# Patient Record
Sex: MALE | Race: WHITE | NOT HISPANIC OR LATINO | URBAN - METROPOLITAN AREA
[De-identification: names, ages, dates, MRNs, and addresses within clinical notes are randomized per-mention and may not be internally consistent; named-entity substitution may affect disease eponyms.]

---

## 2017-07-06 ENCOUNTER — INPATIENT (INPATIENT)
Facility: HOSPITAL | Age: 37
LOS: 15 days | Discharge: HOME | DRG: 896 | End: 2017-07-22
Attending: HOSPITALIST | Admitting: HOSPITALIST
Payer: MEDICAID

## 2017-07-06 VITALS
WEIGHT: 169.98 LBS | HEIGHT: 73 IN | TEMPERATURE: 98 F | SYSTOLIC BLOOD PRESSURE: 130 MMHG | HEART RATE: 122 BPM | RESPIRATION RATE: 20 BRPM | DIASTOLIC BLOOD PRESSURE: 90 MMHG | OXYGEN SATURATION: 99 %

## 2017-07-06 DIAGNOSIS — R58 HEMORRHAGE, NOT ELSEWHERE CLASSIFIED: ICD-10-CM

## 2017-07-06 DIAGNOSIS — K85.20 ALCOHOL INDUCED ACUTE PANCREATITIS WITHOUT NECROSIS OR INFECTION: ICD-10-CM

## 2017-07-06 DIAGNOSIS — F10.239 ALCOHOL DEPENDENCE WITH WITHDRAWAL, UNSPECIFIED: ICD-10-CM

## 2017-07-06 DIAGNOSIS — Z29.9 ENCOUNTER FOR PROPHYLACTIC MEASURES, UNSPECIFIED: ICD-10-CM

## 2017-07-06 DIAGNOSIS — J45.909 UNSPECIFIED ASTHMA, UNCOMPLICATED: ICD-10-CM

## 2017-07-06 DIAGNOSIS — E11.9 TYPE 2 DIABETES MELLITUS WITHOUT COMPLICATIONS: ICD-10-CM

## 2017-07-06 LAB
ALBUMIN SERPL ELPH-MCNC: 3.4 G/DL — LOW (ref 3.5–5)
ANION GAP SERPL CALC-SCNC: 14 MMOL/L — SIGNIFICANT CHANGE UP (ref 5–17)
BASOPHILS # BLD AUTO: 0 K/UL — SIGNIFICANT CHANGE UP (ref 0–0.2)
BASOPHILS NFR BLD AUTO: 0.3 % — SIGNIFICANT CHANGE UP (ref 0–2)
BUN SERPL-MCNC: 6 MG/DL — LOW (ref 7–18)
CALCIUM SERPL-MCNC: 8.5 MG/DL — SIGNIFICANT CHANGE UP (ref 8.4–10.5)
CHLORIDE SERPL-SCNC: 101 MMOL/L — SIGNIFICANT CHANGE UP (ref 96–108)
CO2 SERPL-SCNC: 21 MMOL/L — LOW (ref 22–31)
EOSINOPHIL # BLD AUTO: 0.1 K/UL — SIGNIFICANT CHANGE UP (ref 0–0.5)
EOSINOPHIL NFR BLD AUTO: 0.8 % — SIGNIFICANT CHANGE UP (ref 0–6)
GLUCOSE SERPL-MCNC: 77 MG/DL — SIGNIFICANT CHANGE UP (ref 70–99)
HCT VFR BLD CALC: 43.2 % — SIGNIFICANT CHANGE UP (ref 39–50)
HGB BLD-MCNC: 14.7 G/DL — SIGNIFICANT CHANGE UP (ref 13–17)
LYMPHOCYTES # BLD AUTO: 0.9 K/UL — LOW (ref 1–3.3)
LYMPHOCYTES # BLD AUTO: 13.1 % — SIGNIFICANT CHANGE UP (ref 13–44)
MCHC RBC-ENTMCNC: 34.1 GM/DL — SIGNIFICANT CHANGE UP (ref 32–36)
MCHC RBC-ENTMCNC: 34.1 PG — HIGH (ref 27–34)
MCV RBC AUTO: 100 FL — SIGNIFICANT CHANGE UP (ref 80–100)
MONOCYTES # BLD AUTO: 0.5 K/UL — SIGNIFICANT CHANGE UP (ref 0–0.9)
MONOCYTES NFR BLD AUTO: 6.9 % — SIGNIFICANT CHANGE UP (ref 2–14)
NEUTROPHILS # BLD AUTO: 5.2 K/UL — SIGNIFICANT CHANGE UP (ref 1.8–7.4)
NEUTROPHILS NFR BLD AUTO: 78.9 % — HIGH (ref 43–77)
PLATELET # BLD AUTO: 58 K/UL — LOW (ref 150–400)
POTASSIUM SERPL-MCNC: 3.6 MMOL/L — SIGNIFICANT CHANGE UP (ref 3.5–5.3)
POTASSIUM SERPL-SCNC: 3.6 MMOL/L — SIGNIFICANT CHANGE UP (ref 3.5–5.3)
RBC # BLD: 4.32 M/UL — SIGNIFICANT CHANGE UP (ref 4.2–5.8)
RBC # FLD: 11.3 % — SIGNIFICANT CHANGE UP (ref 10.3–14.5)
SODIUM SERPL-SCNC: 136 MMOL/L — SIGNIFICANT CHANGE UP (ref 135–145)
WBC # BLD: 6.6 K/UL — SIGNIFICANT CHANGE UP (ref 3.8–10.5)
WBC # FLD AUTO: 6.6 K/UL — SIGNIFICANT CHANGE UP (ref 3.8–10.5)

## 2017-07-06 PROCEDURE — 99285 EMERGENCY DEPT VISIT HI MDM: CPT

## 2017-07-06 PROCEDURE — 70450 CT HEAD/BRAIN W/O DYE: CPT | Mod: 26

## 2017-07-06 RX ORDER — BUDESONIDE AND FORMOTEROL FUMARATE DIHYDRATE 160; 4.5 UG/1; UG/1
2 AEROSOL RESPIRATORY (INHALATION)
Qty: 0 | Refills: 0 | Status: DISCONTINUED | OUTPATIENT
Start: 2017-07-06 | End: 2017-07-12

## 2017-07-06 RX ORDER — NICOTINE POLACRILEX 2 MG
1 GUM BUCCAL DAILY
Qty: 0 | Refills: 0 | Status: DISCONTINUED | OUTPATIENT
Start: 2017-07-06 | End: 2017-07-22

## 2017-07-06 RX ORDER — SODIUM CHLORIDE 9 MG/ML
1000 INJECTION, SOLUTION INTRAVENOUS
Qty: 0 | Refills: 0 | Status: COMPLETED | OUTPATIENT
Start: 2017-07-06 | End: 2017-07-08

## 2017-07-06 RX ORDER — ALBUTEROL 90 UG/1
2 AEROSOL, METERED ORAL EVERY 8 HOURS
Qty: 0 | Refills: 0 | Status: DISCONTINUED | OUTPATIENT
Start: 2017-07-06 | End: 2017-07-22

## 2017-07-06 RX ORDER — SODIUM CHLORIDE 9 MG/ML
1000 INJECTION INTRAMUSCULAR; INTRAVENOUS; SUBCUTANEOUS ONCE
Qty: 0 | Refills: 0 | Status: COMPLETED | OUTPATIENT
Start: 2017-07-06 | End: 2017-07-06

## 2017-07-06 RX ORDER — SODIUM CHLORIDE 9 MG/ML
1000 INJECTION, SOLUTION INTRAVENOUS
Qty: 0 | Refills: 0 | Status: DISCONTINUED | OUTPATIENT
Start: 2017-07-06 | End: 2017-07-06

## 2017-07-06 RX ORDER — INSULIN LISPRO 100/ML
VIAL (ML) SUBCUTANEOUS
Qty: 0 | Refills: 0 | Status: DISCONTINUED | OUTPATIENT
Start: 2017-07-06 | End: 2017-07-10

## 2017-07-06 RX ORDER — IPRATROPIUM/ALBUTEROL SULFATE 18-103MCG
3 AEROSOL WITH ADAPTER (GRAM) INHALATION EVERY 6 HOURS
Qty: 0 | Refills: 0 | Status: DISCONTINUED | OUTPATIENT
Start: 2017-07-06 | End: 2017-07-07

## 2017-07-06 RX ORDER — SODIUM CHLORIDE 9 MG/ML
1000 INJECTION, SOLUTION INTRAVENOUS
Qty: 0 | Refills: 0 | Status: DISCONTINUED | OUTPATIENT
Start: 2017-07-06 | End: 2017-07-15

## 2017-07-06 RX ADMIN — SODIUM CHLORIDE 150 MILLILITER(S): 9 INJECTION, SOLUTION INTRAVENOUS at 19:43

## 2017-07-06 RX ADMIN — SODIUM CHLORIDE 1000 MILLILITER(S): 9 INJECTION INTRAMUSCULAR; INTRAVENOUS; SUBCUTANEOUS at 09:39

## 2017-07-06 RX ADMIN — Medication 2 MILLIGRAM(S): at 09:40

## 2017-07-06 RX ADMIN — SODIUM CHLORIDE 150 MILLILITER(S): 9 INJECTION, SOLUTION INTRAVENOUS at 19:45

## 2017-07-06 RX ADMIN — Medication 2 MILLIGRAM(S): at 23:09

## 2017-07-06 RX ADMIN — Medication 1 PATCH: at 23:59

## 2017-07-06 RX ADMIN — BUDESONIDE AND FORMOTEROL FUMARATE DIHYDRATE 2 PUFF(S): 160; 4.5 AEROSOL RESPIRATORY (INHALATION) at 23:10

## 2017-07-06 RX ADMIN — ALBUTEROL 2 PUFF(S): 90 AEROSOL, METERED ORAL at 23:09

## 2017-07-06 NOTE — H&P ADULT - PROBLEM SELECTOR PLAN 1
WA protocol  Banana bag  seizure precautions  Ativan CIWA protocol  Banana bag (NS with thiamine 100, folic acid 1 and MV 10) for 3 days in addition to LR at 150 ml / hr  Change to oral Thiamine, folic acid and MV after 3 days  Seizure precautions  Ativan 2 mg q 4hr and Ativan 2 mg q2 for agitation or CIWA>7  CT head -ve for acute hemorrhage  Monitor vitals as per protocol,   Taper ativan as clinically indicated  Follow and replace electrolytes   consult.

## 2017-07-06 NOTE — ED PROVIDER NOTE - MEDICAL DECISION MAKING DETAILS
alcoholic, question withdrawal, pancreatitis, hepatitis. Will check labs, give IVF, likely admission

## 2017-07-06 NOTE — H&P ADULT - PROBLEM SELECTOR PLAN 2
IV ringers lacatete  Pain control IV ringers lactate  Pain control  bowel rest Bisap score 0 predict low mortality  IV ringers lactate at 150 ml/hr in addition to Banana bag  Pain control  NPO for bowel rest, advance diet as tolerated.  Advice to quit alcohol

## 2017-07-06 NOTE — ED PROVIDER NOTE - OBJECTIVE STATEMENT
38 y/o M pt with PMHx of Alcoholism, Alcohol induced acute pancreatitis, fatty liver, gastroenteritis, presents to ED c/o seizure episode x today. Pt reports he was at work pushing boxes when he was awakened by his coworker who told him he dropped to the ground and started shaking. Pt suffered abrasions to the b/l arms and a head injury. Pt admits to drinking 6-7 beers a day. Pt had 2 beers so far today and 6 beers yesterday. Pt notes he has withdrawn from alcohol in the past. Pt denies fever, chills, nausea, vomiting, diarrhea, abd pain, shortness of breath, cough, chest pain, palpitations, numbness, tingling, weakness, or any other complaints. NKDA.

## 2017-07-06 NOTE — ED ADULT NURSE NOTE - OBJECTIVE STATEMENT
Pt reports starting work at 6am and woke up on ground after biting lip and hitting head, c/o b/l lower extremity cramping. Reports drinking alcohol every day. Last alcoholic drink at 6am 2 beers before starting work.

## 2017-07-06 NOTE — H&P ADULT - NSHPSOCIALHISTORY_GEN_ALL_CORE
smokes 1 ppd of cigarette x 20 years  smokes" weed"  drinks alcohol - about 10 - 12 bottles of beer daily

## 2017-07-06 NOTE — H&P ADULT - NSHPLABSRESULTS_GEN_ALL_CORE
07-06    135  |  98  |  7   ----------------------------<  101<H>  4.2   |  26  |  0.90    Ca    9.6      06 Jul 2017 10:17    TPro  8.3  /  Alb  4.1  /  TBili  1.1  /  DBili  x   /  AST  406<H>  /  ALT  379<H>  /  AlkPhos  140<H>  07-06                          17.1   8.9   )-----------( 77       ( 06 Jul 2017 10:16 )             49.7

## 2017-07-06 NOTE — H&P ADULT - PROBLEM SELECTOR PLAN 5
Patient is on Atorvent and Symbicort inhaler  Not clinically wheezing on exam, not in acute exacerbation  Continue Proventil and Symbicort with Duoneb PRN

## 2017-07-06 NOTE — H&P ADULT - PROBLEM SELECTOR PLAN 4
AG 11, not in DKA  Patient is on metformin at home  Hold oral medication while inpatient  Continue HSS only as patient is NPO  Adjust/Add lantus if FG are uncontrolled  Diabetic Diet when patient can tolerate  F/u HbA1C

## 2017-07-06 NOTE — H&P ADULT - PROBLEM SELECTOR PLAN 3
Patient had a bruise on L elbow and ecchymosis on parital bone  CT head -ve for acute hemorrhage.  Pain control

## 2017-07-06 NOTE — ED ADULT NURSE REASSESSMENT NOTE - NS ED NURSE REASSESS COMMENT FT1
pt received axox3 resting in bed, no c/o verbalize, no distress noted, safety and seizure precaution maintained. pt received axox3 resting in bed, no c/o verbalize, no distress noted, safety and seizure precaution maintained. Pt received from down time nurse.

## 2017-07-06 NOTE — H&P ADULT - HISTORY OF PRESENT ILLNESS
37 year old M with PMH of alcohol abuse, pancreatitis, fatty liver presented to ED with one episode of witnessed seizures this morning. Patient sates that he has generalized seizures for around 3 minutes with loss of consciousness. Patient did hit his head on right side, but denies nicole bleeding. Denies tongue bite, loss of bowel or bladder control, rolling of eyes.   Patient also complaints of epigastric abdominal pain, 7/10 in intensity worsened by eating, relived by rest radiating to back.   Associated with nausea and generalized body pain. Denies chest pain, SOB, headache, leg swelling, cough, constipation or diarrheas    SH: Smoker 1 ppd from last 35 years.   Drinks alcohol around 10 beers every day and a bottle of scotch every day. Last drink was 2 beers on morning of admission.   Smokes marijuana.   Have a PCP Dr Chloe Rodriguez

## 2017-07-06 NOTE — H&P ADULT - ATTENDING COMMENTS
a/p   38 y/o M with alcohol withdrawal seizure  2. acute pancreatitis with acute abdominal pain  3. Parietal  ecchymosis  4. Abnormal LFTS    IVF ( ringers lactate at 150cc/hr x 24 hours); banana bag  CIWA protocal with Ativan  Seizure precautions  NPO for bowel rest in view of acute pancratitis  PAin control with IV Toradol for moderate pain and IV narcotics PRN severe pain  Trend LFTs

## 2017-07-06 NOTE — ED PROVIDER NOTE - CHPI ED SYMPTOMS NEG
no fever/no chills, no nausea, no vomiting, no diarrhea, no abd pain, no numbness, no tingling, no weakness, no shortness of breath, no cough, no chest pain, no palpitations

## 2017-07-06 NOTE — H&P ADULT - ASSESSMENT
HPI:  37 year old M with PMH of alcohol abuse, pancreatitis, fatty liver presented to ED with one episode of witnessed seizures this morning. Patient sates that he has generalized seizures for around 3 minutes with loss of consciousness. Patient did hit his head on right side, but denies nicole bleeding. Denies tongue bite, loss of bowel or bladder control, rolling of eyes.   Patient also complaints of epigastric abdominal pain, 7/10 in intensity worsened by eating, relived by rest radiating to back.   Associated with nausea and generalized body pain. Denies chest pain, SOB, headache, leg swelling, cough, constipation or diarrheas    SH: Smoker 1 ppd from last 20+ years ( since age 14years ).   Drinks alcohol around 10 beers every day and a bottle of scotch every day. Last drink was 2 beers on morning of admission.   Smokes marijuana.   Have a PCP Dr Chloe Rodriguez (06 Jul 2017 18:01)

## 2017-07-06 NOTE — H&P ADULT - NSHPPHYSICALEXAM_GEN_ALL_CORE
PHYSICAL EXAM:      Constitutional: NAD; tremors+    Eyes: reactive to light    Neck: supple    Breasts: normal male breast; no tenderness    Back: normal appearance ; no masses noted    Respiratory: clear to auscultation; no wheezing    Cardiovascular: S1S2+    Gastrointestinal: soft, nontender, bowel sounds present    Extremities: no calf tenderness; no cyanosis    Vascular: + distal pedal pulses bilaterally    Neurological: AAOx 3; non focal exam; moves all extremities    Skin: warm; areas of skin abrasion     Lymph Nodes: no palpable peripheral lymphadenopathy    Musculoskeletal: joint aches and pain and lower back pain likely due to fall from seizure episode    Psychiatric: normal behavior and affect

## 2017-07-07 LAB
ALBUMIN SERPL ELPH-MCNC: 3.8 G/DL — SIGNIFICANT CHANGE UP (ref 3.5–5)
ALP SERPL-CCNC: 111 U/L — SIGNIFICANT CHANGE UP (ref 40–120)
ALP SERPL-CCNC: 127 U/L — HIGH (ref 40–120)
ALT FLD-CCNC: 268 U/L DA — HIGH (ref 10–60)
ALT FLD-CCNC: 273 U/L DA — HIGH (ref 10–60)
ANION GAP SERPL CALC-SCNC: 18 MMOL/L — HIGH (ref 5–17)
APAP SERPL-MCNC: <2 UG/ML — LOW (ref 10–30)
AST SERPL-CCNC: 301 U/L — HIGH (ref 10–40)
AST SERPL-CCNC: 343 U/L — HIGH (ref 10–40)
BASE EXCESS BLDA CALC-SCNC: -4.4 MMOL/L — LOW (ref -2–2)
BILIRUB DIRECT SERPL-MCNC: 0.3 MG/DL — HIGH (ref 0–0.2)
BILIRUB INDIRECT FLD-MCNC: 1.1 MG/DL — HIGH (ref 0.2–1)
BILIRUB SERPL-MCNC: 1.1 MG/DL — SIGNIFICANT CHANGE UP (ref 0.2–1.2)
BILIRUB SERPL-MCNC: 1.4 MG/DL — HIGH (ref 0.2–1.2)
BLOOD GAS COMMENTS ARTERIAL: SIGNIFICANT CHANGE UP
BUN SERPL-MCNC: 5 MG/DL — LOW (ref 7–18)
CALCIUM SERPL-MCNC: 8.6 MG/DL — SIGNIFICANT CHANGE UP (ref 8.4–10.5)
CHLORIDE SERPL-SCNC: 100 MMOL/L — SIGNIFICANT CHANGE UP (ref 96–108)
CHOLEST SERPL-MCNC: 188 MG/DL — SIGNIFICANT CHANGE UP (ref 10–199)
CO2 SERPL-SCNC: 17 MMOL/L — LOW (ref 22–31)
CREAT SERPL-MCNC: 0.57 MG/DL — SIGNIFICANT CHANGE UP (ref 0.5–1.3)
CREAT SERPL-MCNC: 0.6 MG/DL — SIGNIFICANT CHANGE UP (ref 0.5–1.3)
ETHANOL SERPL-MCNC: <10 MG/DL — SIGNIFICANT CHANGE UP (ref 0–10)
GLUCOSE SERPL-MCNC: 74 MG/DL — SIGNIFICANT CHANGE UP (ref 70–99)
HBA1C BLD-MCNC: 5.3 % — SIGNIFICANT CHANGE UP (ref 4–5.6)
HCO3 BLDA-SCNC: 20 MMOL/L — LOW (ref 23–27)
HCT VFR BLD CALC: 43.6 % — SIGNIFICANT CHANGE UP (ref 39–50)
HDLC SERPL-MCNC: 124 MG/DL — SIGNIFICANT CHANGE UP (ref 40–125)
HGB BLD-MCNC: 15.6 G/DL — SIGNIFICANT CHANGE UP (ref 13–17)
HOROWITZ INDEX BLDA+IHG-RTO: 40 — SIGNIFICANT CHANGE UP
LIDOCAIN IGE QN: 3635 U/L — HIGH (ref 73–393)
LIPID PNL WITH DIRECT LDL SERPL: 54 MG/DL — SIGNIFICANT CHANGE UP
MAGNESIUM SERPL-MCNC: 1.7 MG/DL — SIGNIFICANT CHANGE UP (ref 1.6–2.6)
MCHC RBC-ENTMCNC: 35.4 PG — HIGH (ref 27–34)
MCHC RBC-ENTMCNC: 35.8 GM/DL — SIGNIFICANT CHANGE UP (ref 32–36)
MCV RBC AUTO: 98.8 FL — SIGNIFICANT CHANGE UP (ref 80–100)
PCO2 BLDA: 36 MMHG — SIGNIFICANT CHANGE UP (ref 32–46)
PH BLDA: 7.36 — SIGNIFICANT CHANGE UP (ref 7.35–7.45)
PHOSPHATE SERPL-MCNC: 2.4 MG/DL — LOW (ref 2.5–4.5)
PLATELET # BLD AUTO: 76 K/UL — LOW (ref 150–400)
PO2 BLDA: 156 MMHG — HIGH (ref 74–108)
POTASSIUM SERPL-MCNC: 3.4 MMOL/L — LOW (ref 3.5–5.3)
POTASSIUM SERPL-SCNC: 3.4 MMOL/L — LOW (ref 3.5–5.3)
PROT SERPL-MCNC: 7.1 G/DL — SIGNIFICANT CHANGE UP (ref 6–8.3)
PROT SERPL-MCNC: 7.6 G/DL — SIGNIFICANT CHANGE UP (ref 6–8.3)
RBC # BLD: 4.41 M/UL — SIGNIFICANT CHANGE UP (ref 4.2–5.8)
RBC # FLD: 11.2 % — SIGNIFICANT CHANGE UP (ref 10.3–14.5)
SALICYLATES SERPL-MCNC: <1.7 MG/DL — LOW (ref 2.8–20)
SAO2 % BLDA: 99 % — HIGH (ref 92–96)
SODIUM SERPL-SCNC: 135 MMOL/L — SIGNIFICANT CHANGE UP (ref 135–145)
TOTAL CHOLESTEROL/HDL RATIO MEASUREMENT: 1.5 RATIO — LOW (ref 3.4–9.6)
TRIGL SERPL-MCNC: 49 MG/DL — SIGNIFICANT CHANGE UP (ref 10–149)
TSH SERPL-MCNC: 2.26 UU/ML — SIGNIFICANT CHANGE UP (ref 0.34–4.82)
WBC # BLD: 9 K/UL — SIGNIFICANT CHANGE UP (ref 3.8–10.5)
WBC # FLD AUTO: 9 K/UL — SIGNIFICANT CHANGE UP (ref 3.8–10.5)

## 2017-07-07 PROCEDURE — 71010: CPT | Mod: 26

## 2017-07-07 RX ORDER — DIPHENHYDRAMINE HCL 50 MG
50 CAPSULE ORAL ONCE
Qty: 0 | Refills: 0 | Status: DISCONTINUED | OUTPATIENT
Start: 2017-07-07 | End: 2017-07-11

## 2017-07-07 RX ORDER — DEXMEDETOMIDINE HYDROCHLORIDE IN 0.9% SODIUM CHLORIDE 4 UG/ML
0.5 INJECTION INTRAVENOUS
Qty: 200 | Refills: 0 | Status: DISCONTINUED | OUTPATIENT
Start: 2017-07-07 | End: 2017-07-07

## 2017-07-07 RX ORDER — ENOXAPARIN SODIUM 100 MG/ML
30 INJECTION SUBCUTANEOUS DAILY
Qty: 0 | Refills: 0 | Status: DISCONTINUED | OUTPATIENT
Start: 2017-07-07 | End: 2017-07-07

## 2017-07-07 RX ORDER — PANTOPRAZOLE SODIUM 20 MG/1
40 TABLET, DELAYED RELEASE ORAL DAILY
Qty: 0 | Refills: 0 | Status: DISCONTINUED | OUTPATIENT
Start: 2017-07-07 | End: 2017-07-11

## 2017-07-07 RX ORDER — DEXMEDETOMIDINE HYDROCHLORIDE IN 0.9% SODIUM CHLORIDE 4 UG/ML
0.7 INJECTION INTRAVENOUS
Qty: 200 | Refills: 0 | Status: DISCONTINUED | OUTPATIENT
Start: 2017-07-07 | End: 2017-07-07

## 2017-07-07 RX ORDER — POTASSIUM PHOSPHATE, MONOBASIC POTASSIUM PHOSPHATE, DIBASIC 236; 224 MG/ML; MG/ML
15 INJECTION, SOLUTION INTRAVENOUS ONCE
Qty: 0 | Refills: 0 | Status: COMPLETED | OUTPATIENT
Start: 2017-07-07 | End: 2017-07-07

## 2017-07-07 RX ORDER — DEXMEDETOMIDINE HYDROCHLORIDE IN 0.9% SODIUM CHLORIDE 4 UG/ML
0.5 INJECTION INTRAVENOUS
Qty: 200 | Refills: 0 | Status: DISCONTINUED | OUTPATIENT
Start: 2017-07-07 | End: 2017-07-08

## 2017-07-07 RX ORDER — PHENOBARBITAL 60 MG
130 TABLET ORAL EVERY 8 HOURS
Qty: 0 | Refills: 0 | Status: DISCONTINUED | OUTPATIENT
Start: 2017-07-07 | End: 2017-07-07

## 2017-07-07 RX ORDER — PROPOFOL 10 MG/ML
100 INJECTION, EMULSION INTRAVENOUS ONCE
Qty: 0 | Refills: 0 | Status: COMPLETED | OUTPATIENT
Start: 2017-07-07 | End: 2017-07-07

## 2017-07-07 RX ORDER — PHENOBARBITAL 60 MG
130 TABLET ORAL EVERY 12 HOURS
Qty: 0 | Refills: 0 | Status: DISCONTINUED | OUTPATIENT
Start: 2017-07-07 | End: 2017-07-14

## 2017-07-07 RX ORDER — LABETALOL HCL 100 MG
10 TABLET ORAL EVERY 6 HOURS
Qty: 0 | Refills: 0 | Status: DISCONTINUED | OUTPATIENT
Start: 2017-07-07 | End: 2017-07-07

## 2017-07-07 RX ORDER — SODIUM CHLORIDE 9 MG/ML
1000 INJECTION, SOLUTION INTRAVENOUS ONCE
Qty: 0 | Refills: 0 | Status: COMPLETED | OUTPATIENT
Start: 2017-07-07 | End: 2017-07-07

## 2017-07-07 RX ORDER — ENOXAPARIN SODIUM 100 MG/ML
40 INJECTION SUBCUTANEOUS DAILY
Qty: 0 | Refills: 0 | Status: DISCONTINUED | OUTPATIENT
Start: 2017-07-07 | End: 2017-07-09

## 2017-07-07 RX ORDER — SUCCINYLCHOLINE CHLORIDE 100 MG/5ML
100 SYRINGE (ML) INTRAVENOUS ONCE
Qty: 0 | Refills: 0 | Status: COMPLETED | OUTPATIENT
Start: 2017-07-07 | End: 2017-07-07

## 2017-07-07 RX ORDER — PROPOFOL 10 MG/ML
5 INJECTION, EMULSION INTRAVENOUS
Qty: 1000 | Refills: 0 | Status: DISCONTINUED | OUTPATIENT
Start: 2017-07-07 | End: 2017-07-07

## 2017-07-07 RX ORDER — PROPOFOL 10 MG/ML
5.13 INJECTION, EMULSION INTRAVENOUS
Qty: 1000 | Refills: 0 | Status: DISCONTINUED | OUTPATIENT
Start: 2017-07-07 | End: 2017-07-11

## 2017-07-07 RX ORDER — PROPOFOL 10 MG/ML
80 INJECTION, EMULSION INTRAVENOUS ONCE
Qty: 0 | Refills: 0 | Status: COMPLETED | OUTPATIENT
Start: 2017-07-07 | End: 2017-07-07

## 2017-07-07 RX ORDER — HALOPERIDOL DECANOATE 100 MG/ML
1 INJECTION INTRAMUSCULAR ONCE
Qty: 0 | Refills: 0 | Status: COMPLETED | OUTPATIENT
Start: 2017-07-07 | End: 2017-07-07

## 2017-07-07 RX ORDER — HALOPERIDOL DECANOATE 100 MG/ML
2 INJECTION INTRAMUSCULAR ONCE
Qty: 0 | Refills: 0 | Status: COMPLETED | OUTPATIENT
Start: 2017-07-07 | End: 2017-07-07

## 2017-07-07 RX ADMIN — PROPOFOL 80 MILLIGRAM(S): 10 INJECTION, EMULSION INTRAVENOUS at 09:00

## 2017-07-07 RX ADMIN — PROPOFOL 2.31 MICROGRAM(S)/KG/MIN: 10 INJECTION, EMULSION INTRAVENOUS at 09:49

## 2017-07-07 RX ADMIN — DEXMEDETOMIDINE HYDROCHLORIDE IN 0.9% SODIUM CHLORIDE 9.4 MICROGRAM(S)/KG/HR: 4 INJECTION INTRAVENOUS at 20:25

## 2017-07-07 RX ADMIN — PROPOFOL 100 MILLIGRAM(S): 10 INJECTION, EMULSION INTRAVENOUS at 09:30

## 2017-07-07 RX ADMIN — SODIUM CHLORIDE 150 MILLILITER(S): 9 INJECTION, SOLUTION INTRAVENOUS at 09:49

## 2017-07-07 RX ADMIN — ALBUTEROL 2 PUFF(S): 90 AEROSOL, METERED ORAL at 05:30

## 2017-07-07 RX ADMIN — Medication 2 MILLIGRAM(S): at 03:11

## 2017-07-07 RX ADMIN — Medication 4 MILLIGRAM(S): at 11:04

## 2017-07-07 RX ADMIN — Medication 4 MILLIGRAM(S): at 08:10

## 2017-07-07 RX ADMIN — Medication 4 MILLIGRAM(S): at 15:56

## 2017-07-07 RX ADMIN — Medication 4 MILLIGRAM(S): at 20:25

## 2017-07-07 RX ADMIN — DEXMEDETOMIDINE HYDROCHLORIDE IN 0.9% SODIUM CHLORIDE 9.4 MICROGRAM(S)/KG/HR: 4 INJECTION INTRAVENOUS at 18:43

## 2017-07-07 RX ADMIN — Medication 1 PATCH: at 23:30

## 2017-07-07 RX ADMIN — ENOXAPARIN SODIUM 40 MILLIGRAM(S): 100 INJECTION SUBCUTANEOUS at 11:07

## 2017-07-07 RX ADMIN — Medication 2 MILLIGRAM(S): at 01:31

## 2017-07-07 RX ADMIN — Medication 2 MILLIGRAM(S): at 05:30

## 2017-07-07 RX ADMIN — Medication 1 PATCH: at 14:19

## 2017-07-07 RX ADMIN — Medication 2 MILLIGRAM(S): at 14:19

## 2017-07-07 RX ADMIN — SODIUM CHLORIDE 150 MILLILITER(S): 9 INJECTION, SOLUTION INTRAVENOUS at 14:19

## 2017-07-07 RX ADMIN — Medication 2 MILLIGRAM(S): at 08:59

## 2017-07-07 RX ADMIN — Medication 3 MILLILITER(S): at 02:42

## 2017-07-07 RX ADMIN — Medication 130 MILLIGRAM(S): at 18:43

## 2017-07-07 RX ADMIN — DEXMEDETOMIDINE HYDROCHLORIDE IN 0.9% SODIUM CHLORIDE 9.64 MICROGRAM(S)/KG/HR: 4 INJECTION INTRAVENOUS at 09:49

## 2017-07-07 RX ADMIN — Medication 100 MILLIGRAM(S): at 09:00

## 2017-07-07 RX ADMIN — Medication 2 MILLIGRAM(S): at 07:49

## 2017-07-07 RX ADMIN — Medication 4 MILLIGRAM(S): at 22:30

## 2017-07-07 RX ADMIN — PROPOFOL 2.31 MICROGRAM(S)/KG/MIN: 10 INJECTION, EMULSION INTRAVENOUS at 20:25

## 2017-07-07 RX ADMIN — HALOPERIDOL DECANOATE 1 MILLIGRAM(S): 100 INJECTION INTRAMUSCULAR at 04:31

## 2017-07-07 RX ADMIN — DEXMEDETOMIDINE HYDROCHLORIDE IN 0.9% SODIUM CHLORIDE 9.64 MICROGRAM(S)/KG/HR: 4 INJECTION INTRAVENOUS at 09:58

## 2017-07-07 RX ADMIN — Medication 4 MILLIGRAM(S): at 09:58

## 2017-07-07 RX ADMIN — PANTOPRAZOLE SODIUM 40 MILLIGRAM(S): 20 TABLET, DELAYED RELEASE ORAL at 11:03

## 2017-07-07 RX ADMIN — Medication 4 MILLIGRAM(S): at 13:14

## 2017-07-07 RX ADMIN — PROPOFOL 2.31 MICROGRAM(S)/KG/MIN: 10 INJECTION, EMULSION INTRAVENOUS at 11:52

## 2017-07-07 RX ADMIN — PROPOFOL 2.31 MICROGRAM(S)/KG/MIN: 10 INJECTION, EMULSION INTRAVENOUS at 15:57

## 2017-07-07 RX ADMIN — SODIUM CHLORIDE 1000 MILLILITER(S): 9 INJECTION, SOLUTION INTRAVENOUS at 11:00

## 2017-07-07 RX ADMIN — POTASSIUM PHOSPHATE, MONOBASIC POTASSIUM PHOSPHATE, DIBASIC 62.5 MILLIMOLE(S): 236; 224 INJECTION, SOLUTION INTRAVENOUS at 11:03

## 2017-07-07 RX ADMIN — Medication 4 MILLIGRAM(S): at 17:12

## 2017-07-07 RX ADMIN — SODIUM CHLORIDE 150 MILLILITER(S): 9 INJECTION, SOLUTION INTRAVENOUS at 22:29

## 2017-07-07 RX ADMIN — HALOPERIDOL DECANOATE 2 MILLIGRAM(S): 100 INJECTION INTRAMUSCULAR at 06:51

## 2017-07-07 NOTE — DIETITIAN INITIAL EVALUATION ADULT. - NUTRITION INTERVENTION
Enteral Nutrition/Collaboration and Referral of Nutrition Care/Nutrition - Related Medication Management

## 2017-07-07 NOTE — CHART NOTE - NSCHARTNOTEFT_GEN_A_CORE
Rapid Response PGY 2/ PGY 3 Note    Patient is a 37 year-old male with PMH of alcohol abuse, pancreatitis, fatty liver who presented with seizure     Patient was seen and examined at the bedside by the rapid response team.    Allergies    No Known Allergies    Intolerances    morphine (Other)      PAST MEDICAL & SURGICAL HISTORY:  Alcohol induced acute pancreatitis  Pancreatitis  Alcoholism  Fatty liver  Gastroenteritis  No significant past surgical history      Vital Signs Last 24 Hrs  T(C): 36.4 (2017 05:40), Max: 36.8 (2017 11:16)  T(F): 97.6 (2017 05:40), Max: 98.3 (2017 11:16)  HR: 96 (2017 05:40) (73 - 96)  BP: 149/83 (2017 05:40) (120/97 - 171/102)  BP(mean): --  RR: 18 (2017 05:40) (16 - 18)  SpO2: 99% (2017 05:40) (97% - 100%)          GENERAL: The patient is awake and alert in no apparent distress.   HEENT: Head is normocephalic and atraumatic. Extraocular muscles are intact. Mucous membranes are moist. No throat erythema/exudates no lymphadenopathy, no JVD,   NECK: Supple.  LUNGS: Clear to auscultation BL without wheezing, rales or rhonchi; respirations unlabored  HEART: Regular rate and rhythm ,+S1/+S2, no murmurs, rubs, gallops  ABDOMEN: Soft, nontender, and nondistended, no rebound, guarding rigidity, bowel sounds in all 4 quadrants  EXTREMITIES: Without any cyanosis, clubbing, rash, lesions or edema.  SKIN: No new rashes or lesions.  MSK: strength equal BL  VASCULAR: Radial and Dorsal pedal pulses palpable BL  NEUROLOGIC: Grossly intact.  PSYCH: No new changes.                            15.6   9.0   )-----------( 76       ( 2017 07:05 )             43.6     07-07    135  |  100  |  5<L>  ----------------------------<  74  3.4<L>   |  17<L>  |  0.60    Ca    8.6      2017 07:05  Phos  2.4     07-07  Mg     1.7         TPro  7.6  /  Alb  3.8  /  TBili  1.4<H>  /  DBili  0.3<H>  /  AST  343<H>  /  ALT  273<H>  /  AlkPhos  127<H>           LIVER FUNCTIONS - ( 2017 07:05 )  Alb: 3.8 g/dL / Pro: 7.6 g/dL / ALK PHOS: 127 U/L / ALT: 273 U/L DA / AST: 343 U/L / GGT: x         Urinalysis Basic - ( 2017 10:27 )    Color: Jacquie / Appearance: Clear / S.025 / pH: x  Gluc: x / Ketone: Large  / Bili: Small / Urobili: 4   Blood: x / Protein: 100 / Nitrite: Negative   Leuk Esterase: Trace / RBC: 0-2 /HPF / WBC 0-2 /HPF   Sq Epi: x / Non Sq Epi: x / Bacteria: Trace /HPF             Vital Signs Last 24 Hrs*       Assessment- Rapid Response called for 37y year old Male with a past medical history of     Plan- Rapid Response PGY 2/ PGY 3 Note    Patient is a 37 year-old male with PMH of alcohol abuse, pancreatitis, fatty liver who was admitted for alcohol withdrawal seizures and alcohol induced pancreatitis. Rapid response called for increased agitation, hallucinations. Patient had received total of 6mg ativan this morning, with CIWA score still 21.     Patient was seen and examined at the bedside by the rapid response team. He is agitated, confused, talking to people who are not present. Vital signs:     Allergies    No Known Allergies    Intolerances    morphine (Other)      PAST MEDICAL & SURGICAL HISTORY:  Alcohol induced acute pancreatitis  Pancreatitis  Alcoholism  Fatty liver  Gastroenteritis  No significant past surgical history      Vital Signs Last 24 Hrs  T(C): 36.4 (2017 05:40), Max: 36.8 (2017 11:16)  T(F): 97.6 (2017 05:40), Max: 98.3 (2017 11:16)  HR: 96 (2017 05:40) (73 - 96)  BP: 149/83 (2017 05:40) (120/97 - 171/102)  BP(mean): --  RR: 18 (2017 05:40) (16 - 18)  SpO2: 99% (2017 05:40) (97% - 100%)          GENERAL: agitated, in moderate distress   HEENT: Head is normocephalic and atraumatic; pupils reactive but dilated bilaterally; dry mucus membranes   NECK: Supple.  LUNGS: Clear to auscultation BL without wheezing, rales or rhonchi; respirations unlabored  HEART: Regular rate and rhythm, tachycardic, +S1/+S2, no murmurs, rubs, gallops  ABDOMEN: epigastric tenderness to palpation   EXTREMITIES: Without any cyanosis, clubbing, rash, lesions or edema.  SKIN: No new rashes or lesions.  MSK: strength equal BL  VASCULAR: Radial and Dorsal pedal pulses palpable BL  NEUROLOGIC: tremors, tongue fasciculations   PSYCH: visual hallucinations                           15.6   9.0   )-----------( 76       ( 2017 07:05 )             43.6     07-    135  |  100  |  5<L>  ----------------------------<  74  3.4<L>   |  17<L>  |  0.60    Ca    8.6      2017 07:05  Phos  2.4     07-  Mg     1.7     07-07    TPro  7.6  /  Alb  3.8  /  TBili  1.4<H>  /  DBili  0.3<H>  /  AST  343<H>  /  ALT  273<H>  /  AlkPhos  127<H>  -         LIVER FUNCTIONS - ( 2017 07:05 )  Alb: 3.8 g/dL / Pro: 7.6 g/dL / ALK PHOS: 127 U/L / ALT: 273 U/L DA / AST: 343 U/L / GGT: x         Urinalysis Basic - ( 2017 10:27 )    Color: Jacquie / Appearance: Clear / S.025 / pH: x  Gluc: x / Ketone: Large  / Bili: Small / Urobili: 4   Blood: x / Protein: 100 / Nitrite: Negative   Leuk Esterase: Trace / RBC: 0-2 /HPF / WBC 0-2 /HPF   Sq Epi: x / Non Sq Epi: x / Bacteria: Trace /HPF      Assessment- Rapid Response called for 37 year-old male with PMH of alcohol abuse, pancreatitis, fatty liver for increased agitation. Patient was initially admitted for alcohol withdrawal seizures and pancreatitis.    Plan-  - s/p 5mg IM ativan this morning (patient pulled out IV lines)  - will give Rapid Response PGY 2/ PGY 3 Note    Patient is a 37 year-old male with PMH of alcohol abuse, pancreatitis, fatty liver who was admitted for alcohol withdrawal seizures and alcohol induced pancreatitis. Rapid response called for increased agitation, hallucinations. Patient had received total of 6mg ativan this morning, with subsequent CIWA score still 21.     Patient was seen and examined at the bedside by the rapid response team. He is agitated, confused, talking to people who are not present. Vital signs: temp 98.2, , /113, RR 18, sat 99%. No evidence of seizure activity at this time.     Allergies    No Known Allergies    Intolerances    morphine (Other)      PAST MEDICAL & SURGICAL HISTORY:  Alcohol induced acute pancreatitis  Pancreatitis  Alcoholism  Fatty liver  Gastroenteritis  No significant past surgical history      Vital Signs Last 24 Hrs  T(C): 36.4 (2017 05:40), Max: 36.8 (2017 11:16)  T(F): 97.6 (2017 05:40), Max: 98.3 (2017 11:16)  HR: 96 (2017 05:40) (73 - 96)  BP: 149/83 (2017 05:40) (120/97 - 171/102)  BP(mean): --  RR: 18 (2017 05:40) (16 - 18)  SpO2: 99% (2017 05:40) (97% - 100%)          GENERAL: agitated, in moderate distress   HEENT: Head is normocephalic and atraumatic; pupils reactive but dilated bilaterally; dry mucus membranes   NECK: Supple.  LUNGS: Clear to auscultation BL without wheezing, rales or rhonchi; respirations unlabored  HEART: Regular rate and rhythm, tachycardic, +S1/+S2, no murmurs, rubs, gallops  ABDOMEN: epigastric tenderness to palpation   EXTREMITIES: Without any cyanosis, clubbing, rash, lesions or edema.  SKIN: No new rashes or lesions.  MSK: strength equal BL  VASCULAR: Radial and Dorsal pedal pulses palpable BL  NEUROLOGIC: tremors, tongue fasciculations   PSYCH: visual hallucinations                           15.6   9.0   )-----------( 76       ( 2017 07:05 )             43.6     07-    135  |  100  |  5<L>  ----------------------------<  74  3.4<L>   |  17<L>  |  0.60    Ca    8.6      2017 07:05  Phos  2.4       Mg     1.7         TPro  7.6  /  Alb  3.8  /  TBili  1.4<H>  /  DBili  0.3<H>  /  AST  343<H>  /  ALT  273<H>  /  AlkPhos  127<H>           LIVER FUNCTIONS - ( 2017 07:05 )  Alb: 3.8 g/dL / Pro: 7.6 g/dL / ALK PHOS: 127 U/L / ALT: 273 U/L DA / AST: 343 U/L / GGT: x         Urinalysis Basic - ( 2017 10:27 )    Color: Jacquie / Appearance: Clear / S.025 / pH: x  Gluc: x / Ketone: Large  / Bili: Small / Urobili: 4   Blood: x / Protein: 100 / Nitrite: Negative   Leuk Esterase: Trace / RBC: 0-2 /HPF / WBC 0-2 /HPF   Sq Epi: x / Non Sq Epi: x / Bacteria: Trace /HPF      Assessment- Rapid Response called for 37 year-old male with PMH of alcohol abuse, pancreatitis, fatty liver for increased agitation. Patient was initially admitted for alcohol withdrawal seizures and pancreatitis.    Plan-  - s/p 6mg ativan this morning  - will give 130mg IV phenobarbital, begin precedex drip  - admit to ICU for increasing sedation requirements, DTs, concern for seizure; may need propofol drip and intubation   - continue IVF, banana bag with thiamine, folic acid     Discussed with primary floor attending, Dr. Rodriguez, and ICU attending, Dr. Gomez Rapid Response PGY 2/ PGY 3 Note    Patient is a 37 year-old male with PMH of alcohol abuse, pancreatitis, fatty liver who was admitted for alcohol withdrawal seizures and alcohol induced pancreatitis. Rapid response called for increased agitation, hallucinations. Patient had received total of 6mg ativan this morning, with subsequent CIWA score still 21.     Patient was seen and examined at the bedside by the rapid response team. He is agitated, confused, talking to people who are not present. Vital signs: temp 98.2, , /113, RR 18, sat 99%. No evidence of seizure activity at this time.     Allergies    No Known Allergies    Intolerances    morphine (Other)      PAST MEDICAL & SURGICAL HISTORY:  Alcohol induced acute pancreatitis  Pancreatitis  Alcoholism  Fatty liver  Gastroenteritis  No significant past surgical history      Vital Signs Last 24 Hrs  T(C): 36.4 (2017 05:40), Max: 36.8 (2017 11:16)  T(F): 97.6 (2017 05:40), Max: 98.3 (2017 11:16)  HR: 96 (2017 05:40) (73 - 96)  BP: 149/83 (2017 05:40) (120/97 - 171/102)  BP(mean): --  RR: 18 (2017 05:40) (16 - 18)  SpO2: 99% (2017 05:40) (97% - 100%)          GENERAL: agitated, in moderate distress   HEENT: Head is normocephalic and atraumatic; pupils reactive but dilated bilaterally; dry mucus membranes   NECK: Supple.  LUNGS: Clear to auscultation BL without wheezing, rales or rhonchi; respirations unlabored  HEART: Regular rate and rhythm, tachycardic, +S1/+S2, no murmurs, rubs, gallops  ABDOMEN: epigastric tenderness to palpation   EXTREMITIES: Without any cyanosis, clubbing, rash, lesions or edema.  SKIN: No new rashes or lesions.  MSK: strength equal BL  VASCULAR: Radial and Dorsal pedal pulses palpable BL  NEUROLOGIC: tremors, tongue fasciculations   PSYCH: visual hallucinations                           15.6   9.0   )-----------( 76       ( 2017 07:05 )             43.6     07-    135  |  100  |  5<L>  ----------------------------<  74  3.4<L>   |  17<L>  |  0.60    Ca    8.6      2017 07:05  Phos  2.4       Mg     1.7         TPro  7.6  /  Alb  3.8  /  TBili  1.4<H>  /  DBili  0.3<H>  /  AST  343<H>  /  ALT  273<H>  /  AlkPhos  127<H>           LIVER FUNCTIONS - ( 2017 07:05 )  Alb: 3.8 g/dL / Pro: 7.6 g/dL / ALK PHOS: 127 U/L / ALT: 273 U/L DA / AST: 343 U/L / GGT: x         Urinalysis Basic - ( 2017 10:27 )    Color: Jacquie / Appearance: Clear / S.025 / pH: x  Gluc: x / Ketone: Large  / Bili: Small / Urobili: 4   Blood: x / Protein: 100 / Nitrite: Negative   Leuk Esterase: Trace / RBC: 0-2 /HPF / WBC 0-2 /HPF   Sq Epi: x / Non Sq Epi: x / Bacteria: Trace /HPF      Assessment- Rapid Response called for 37 year-old male with PMH of alcohol abuse, pancreatitis, fatty liver for increased agitation. Patient was initially admitted for alcohol withdrawal seizures and pancreatitis.    Plan-  - s/p 6mg ativan this morning  - will give 130mg IV phenobarbital, begin precedex drip  - admit to ICU for increasing sedation requirements, DTs, concern for seizure; may need propofol drip and intubation   - continue IVF, banana bag with thiamine, folic acid     Discussed with primary floor attending, Dr. Rodriguez, and ICU attending, Dr. Gomez    ICU Attending Addendum:  37 male with hx of ETOH abuse, presents with acute ETOH withdrawal/witnessed seizure, agitation, required transfer to to the ICU this morning and intubation and mechanical ventilation.  Now with acute resp failure, pancreatitis.  Total CC time 35 min.     Satish Gomez MD Rapid response/ICU acceptance note     Patient is a 37 year-old male with PMH of alcohol abuse, pancreatitis, fatty liver who was admitted for alcohol withdrawal seizures and alcohol induced pancreatitis. Rapid response called for increased agitation, hallucinations. Patient had received total of 6mg ativan this morning, with subsequent CIWA score still 21.     Patient was seen and examined at the bedside by the rapid response team. He is agitated, confused, talking to people who are not present. Vital signs: temp 98.2, , /113, RR 18, sat 99%. No evidence of seizure activity at this time.     Allergies    No Known Allergies    Intolerances    morphine (Other)      PAST MEDICAL & SURGICAL HISTORY:  Alcohol induced acute pancreatitis  Pancreatitis  Alcoholism  Fatty liver  Gastroenteritis  No significant past surgical history      Vital Signs Last 24 Hrs  T(C): 36.4 (2017 05:40), Max: 36.8 (2017 11:16)  T(F): 97.6 (2017 05:40), Max: 98.3 (2017 11:16)  HR: 96 (2017 05:40) (73 - 96)  BP: 149/83 (2017 05:40) (120/97 - 171/102)  BP(mean): --  RR: 18 (2017 05:40) (16 - 18)  SpO2: 99% (2017 05:40) (97% - 100%)          GENERAL: agitated, in moderate distress   HEENT: Head is normocephalic and atraumatic; pupils reactive but dilated bilaterally; dry mucus membranes   NECK: Supple.  LUNGS: Clear to auscultation BL without wheezing, rales or rhonchi; respirations unlabored  HEART: Regular rate and rhythm, tachycardic, +S1/+S2, no murmurs, rubs, gallops  ABDOMEN: epigastric tenderness to palpation   EXTREMITIES: Without any cyanosis, clubbing, rash, lesions or edema.  SKIN: No new rashes or lesions.  MSK: strength equal BL  VASCULAR: Radial and Dorsal pedal pulses palpable BL  NEUROLOGIC: tremors, tongue fasciculations   PSYCH: visual hallucinations                           15.6   9.0   )-----------( 76       ( 2017 07:05 )             43.6     07-    135  |  100  |  5<L>  ----------------------------<  74  3.4<L>   |  17<L>  |  0.60    Ca    8.6      2017 07:05  Phos  2.4       Mg     1.7         TPro  7.6  /  Alb  3.8  /  TBili  1.4<H>  /  DBili  0.3<H>  /  AST  343<H>  /  ALT  273<H>  /  AlkPhos  127<H>           LIVER FUNCTIONS - ( 2017 07:05 )  Alb: 3.8 g/dL / Pro: 7.6 g/dL / ALK PHOS: 127 U/L / ALT: 273 U/L DA / AST: 343 U/L / GGT: x         Urinalysis Basic - ( 2017 10:27 )    Color: Jacquie / Appearance: Clear / S.025 / pH: x  Gluc: x / Ketone: Large  / Bili: Small / Urobili: 4   Blood: x / Protein: 100 / Nitrite: Negative   Leuk Esterase: Trace / RBC: 0-2 /HPF / WBC 0-2 /HPF   Sq Epi: x / Non Sq Epi: x / Bacteria: Trace /HPF      Assessment- Rapid Response called for 37 year-old male with PMH of alcohol abuse, pancreatitis, fatty liver for increased agitation. Patient was initially admitted for alcohol withdrawal seizures and pancreatitis.    Plan-  - s/p 6mg ativan this morning  - will give 130mg IV phenobarbital, begin precedex drip  - admit to ICU for increasing sedation requirements, DTs, concern for seizure; may need propofol drip and intubation   - continue IVF, banana bag with thiamine, folic acid     Discussed with primary floor attending, Dr. Rodriguez, and ICU attending, Dr. Gomez    ICU Attending Addendum:  37 male with hx of ETOH abuse, presents with acute ETOH withdrawal/witnessed seizure, agitation, required transfer to to the ICU this morning and intubation and mechanical ventilation.  Now with acute resp failure, pancreatitis.  Total CC time 35 min.     Satish Gomez MD

## 2017-07-07 NOTE — DIETITIAN INITIAL EVALUATION ADULT. - OTHER INFO
Pt with Pancreatis CCM expecting to maintain Propofol >20 ml/hr. Requesting TF recommendations. May start TF tomorrow.

## 2017-07-08 LAB
ANION GAP SERPL CALC-SCNC: 12 MMOL/L — SIGNIFICANT CHANGE UP (ref 5–17)
BASE EXCESS BLDA CALC-SCNC: -2.2 MMOL/L — LOW (ref -2–2)
BASOPHILS # BLD AUTO: 0 K/UL — SIGNIFICANT CHANGE UP (ref 0–0.2)
BASOPHILS NFR BLD AUTO: 0.5 % — SIGNIFICANT CHANGE UP (ref 0–2)
BLOOD GAS COMMENTS ARTERIAL: SIGNIFICANT CHANGE UP
BUN SERPL-MCNC: 5 MG/DL — LOW (ref 7–18)
CALCIUM SERPL-MCNC: 8.2 MG/DL — LOW (ref 8.4–10.5)
CHLORIDE SERPL-SCNC: 106 MMOL/L — SIGNIFICANT CHANGE UP (ref 96–108)
CO2 SERPL-SCNC: 20 MMOL/L — LOW (ref 22–31)
CREAT SERPL-MCNC: 0.55 MG/DL — SIGNIFICANT CHANGE UP (ref 0.5–1.3)
EOSINOPHIL # BLD AUTO: 0.1 K/UL — SIGNIFICANT CHANGE UP (ref 0–0.5)
EOSINOPHIL NFR BLD AUTO: 1 % — SIGNIFICANT CHANGE UP (ref 0–6)
GLUCOSE SERPL-MCNC: 81 MG/DL — SIGNIFICANT CHANGE UP (ref 70–99)
HCO3 BLDA-SCNC: 21 MMOL/L — LOW (ref 23–27)
HCT VFR BLD CALC: 40.2 % — SIGNIFICANT CHANGE UP (ref 39–50)
HGB BLD-MCNC: 14 G/DL — SIGNIFICANT CHANGE UP (ref 13–17)
HOROWITZ INDEX BLDA+IHG-RTO: 30 — SIGNIFICANT CHANGE UP
LYMPHOCYTES # BLD AUTO: 0.9 K/UL — LOW (ref 1–3.3)
LYMPHOCYTES # BLD AUTO: 10.3 % — LOW (ref 13–44)
MAGNESIUM SERPL-MCNC: 1.6 MG/DL — SIGNIFICANT CHANGE UP (ref 1.6–2.6)
MCHC RBC-ENTMCNC: 34.5 PG — HIGH (ref 27–34)
MCHC RBC-ENTMCNC: 34.8 GM/DL — SIGNIFICANT CHANGE UP (ref 32–36)
MCV RBC AUTO: 99.3 FL — SIGNIFICANT CHANGE UP (ref 80–100)
MONOCYTES # BLD AUTO: 0.6 K/UL — SIGNIFICANT CHANGE UP (ref 0–0.9)
MONOCYTES NFR BLD AUTO: 6.7 % — SIGNIFICANT CHANGE UP (ref 2–14)
NEUTROPHILS # BLD AUTO: 7.5 K/UL — HIGH (ref 1.8–7.4)
NEUTROPHILS NFR BLD AUTO: 81.5 % — HIGH (ref 43–77)
PCO2 BLDA: 33 MMHG — SIGNIFICANT CHANGE UP (ref 32–46)
PH BLDA: 7.42 — SIGNIFICANT CHANGE UP (ref 7.35–7.45)
PHOSPHATE SERPL-MCNC: 2.9 MG/DL — SIGNIFICANT CHANGE UP (ref 2.5–4.5)
PLATELET # BLD AUTO: 54 K/UL — LOW (ref 150–400)
PO2 BLDA: 89 MMHG — SIGNIFICANT CHANGE UP (ref 74–108)
POTASSIUM SERPL-MCNC: 3.3 MMOL/L — LOW (ref 3.5–5.3)
POTASSIUM SERPL-SCNC: 3.3 MMOL/L — LOW (ref 3.5–5.3)
RBC # BLD: 4.05 M/UL — LOW (ref 4.2–5.8)
RBC # FLD: 12.1 % — SIGNIFICANT CHANGE UP (ref 10.3–14.5)
SAO2 % BLDA: 97 % — HIGH (ref 92–96)
SODIUM SERPL-SCNC: 138 MMOL/L — SIGNIFICANT CHANGE UP (ref 135–145)
WBC # BLD: 9.2 K/UL — SIGNIFICANT CHANGE UP (ref 3.8–10.5)
WBC # FLD AUTO: 9.2 K/UL — SIGNIFICANT CHANGE UP (ref 3.8–10.5)

## 2017-07-08 PROCEDURE — 71010: CPT | Mod: 26

## 2017-07-08 RX ORDER — MAGNESIUM SULFATE 500 MG/ML
2 VIAL (ML) INJECTION ONCE
Qty: 0 | Refills: 0 | Status: COMPLETED | OUTPATIENT
Start: 2017-07-08 | End: 2017-07-08

## 2017-07-08 RX ORDER — DEXMEDETOMIDINE HYDROCHLORIDE IN 0.9% SODIUM CHLORIDE 4 UG/ML
1 INJECTION INTRAVENOUS
Qty: 200 | Refills: 0 | Status: DISCONTINUED | OUTPATIENT
Start: 2017-07-08 | End: 2017-07-11

## 2017-07-08 RX ORDER — HALOPERIDOL DECANOATE 100 MG/ML
2 INJECTION INTRAMUSCULAR EVERY 6 HOURS
Qty: 0 | Refills: 0 | Status: DISCONTINUED | OUTPATIENT
Start: 2017-07-08 | End: 2017-07-09

## 2017-07-08 RX ORDER — SODIUM CHLORIDE 9 MG/ML
1000 INJECTION, SOLUTION INTRAVENOUS
Qty: 0 | Refills: 0 | Status: DISCONTINUED | OUTPATIENT
Start: 2017-07-08 | End: 2017-07-10

## 2017-07-08 RX ORDER — POTASSIUM CHLORIDE 20 MEQ
10 PACKET (EA) ORAL
Qty: 0 | Refills: 0 | Status: COMPLETED | OUTPATIENT
Start: 2017-07-08 | End: 2017-07-08

## 2017-07-08 RX ADMIN — Medication 4 MILLIGRAM(S): at 02:14

## 2017-07-08 RX ADMIN — PROPOFOL 2.31 MICROGRAM(S)/KG/MIN: 10 INJECTION, EMULSION INTRAVENOUS at 07:43

## 2017-07-08 RX ADMIN — ALBUTEROL 2 PUFF(S): 90 AEROSOL, METERED ORAL at 13:52

## 2017-07-08 RX ADMIN — Medication 4 MILLIGRAM(S): at 10:00

## 2017-07-08 RX ADMIN — Medication 1 PATCH: at 11:13

## 2017-07-08 RX ADMIN — Medication 4 MILLIGRAM(S): at 06:36

## 2017-07-08 RX ADMIN — DEXMEDETOMIDINE HYDROCHLORIDE IN 0.9% SODIUM CHLORIDE 18.8 MICROGRAM(S)/KG/HR: 4 INJECTION INTRAVENOUS at 22:09

## 2017-07-08 RX ADMIN — Medication 4 MILLIGRAM(S): at 05:17

## 2017-07-08 RX ADMIN — Medication 4 MILLIGRAM(S): at 12:00

## 2017-07-08 RX ADMIN — DEXMEDETOMIDINE HYDROCHLORIDE IN 0.9% SODIUM CHLORIDE 18.8 MICROGRAM(S)/KG/HR: 4 INJECTION INTRAVENOUS at 18:37

## 2017-07-08 RX ADMIN — Medication 50 GRAM(S): at 09:52

## 2017-07-08 RX ADMIN — Medication 130 MILLIGRAM(S): at 06:36

## 2017-07-08 RX ADMIN — Medication 100 MILLIEQUIVALENT(S): at 12:00

## 2017-07-08 RX ADMIN — Medication 130 MILLIGRAM(S): at 17:33

## 2017-07-08 RX ADMIN — ALBUTEROL 2 PUFF(S): 90 AEROSOL, METERED ORAL at 21:30

## 2017-07-08 RX ADMIN — DEXMEDETOMIDINE HYDROCHLORIDE IN 0.9% SODIUM CHLORIDE 9.4 MICROGRAM(S)/KG/HR: 4 INJECTION INTRAVENOUS at 00:36

## 2017-07-08 RX ADMIN — Medication 4 MILLIGRAM(S): at 00:35

## 2017-07-08 RX ADMIN — DEXMEDETOMIDINE HYDROCHLORIDE IN 0.9% SODIUM CHLORIDE 9.4 MICROGRAM(S)/KG/HR: 4 INJECTION INTRAVENOUS at 02:14

## 2017-07-08 RX ADMIN — SODIUM CHLORIDE 150 MILLILITER(S): 9 INJECTION, SOLUTION INTRAVENOUS at 18:36

## 2017-07-08 RX ADMIN — ENOXAPARIN SODIUM 40 MILLIGRAM(S): 100 INJECTION SUBCUTANEOUS at 11:12

## 2017-07-08 RX ADMIN — ALBUTEROL 2 PUFF(S): 90 AEROSOL, METERED ORAL at 05:37

## 2017-07-08 RX ADMIN — PROPOFOL 2.31 MICROGRAM(S)/KG/MIN: 10 INJECTION, EMULSION INTRAVENOUS at 22:05

## 2017-07-08 RX ADMIN — SODIUM CHLORIDE 150 MILLILITER(S): 9 INJECTION, SOLUTION INTRAVENOUS at 06:42

## 2017-07-08 RX ADMIN — PANTOPRAZOLE SODIUM 40 MILLIGRAM(S): 20 TABLET, DELAYED RELEASE ORAL at 11:13

## 2017-07-08 RX ADMIN — PROPOFOL 2.31 MICROGRAM(S)/KG/MIN: 10 INJECTION, EMULSION INTRAVENOUS at 14:37

## 2017-07-08 RX ADMIN — Medication 100 MILLIEQUIVALENT(S): at 10:00

## 2017-07-08 RX ADMIN — BUDESONIDE AND FORMOTEROL FUMARATE DIHYDRATE 2 PUFF(S): 160; 4.5 AEROSOL RESPIRATORY (INHALATION) at 21:31

## 2017-07-08 RX ADMIN — DEXMEDETOMIDINE HYDROCHLORIDE IN 0.9% SODIUM CHLORIDE 9.4 MICROGRAM(S)/KG/HR: 4 INJECTION INTRAVENOUS at 05:18

## 2017-07-08 RX ADMIN — Medication 1 PATCH: at 11:08

## 2017-07-08 RX ADMIN — Medication 3 MG/HR: at 13:34

## 2017-07-08 RX ADMIN — DEXMEDETOMIDINE HYDROCHLORIDE IN 0.9% SODIUM CHLORIDE 18.8 MICROGRAM(S)/KG/HR: 4 INJECTION INTRAVENOUS at 11:07

## 2017-07-08 RX ADMIN — Medication 100 MILLIEQUIVALENT(S): at 11:00

## 2017-07-08 RX ADMIN — Medication 4 MILLIGRAM(S): at 08:20

## 2017-07-08 RX ADMIN — DEXMEDETOMIDINE HYDROCHLORIDE IN 0.9% SODIUM CHLORIDE 18.8 MICROGRAM(S)/KG/HR: 4 INJECTION INTRAVENOUS at 13:06

## 2017-07-08 RX ADMIN — PROPOFOL 2.31 MICROGRAM(S)/KG/MIN: 10 INJECTION, EMULSION INTRAVENOUS at 00:36

## 2017-07-08 NOTE — PROGRESS NOTE ADULT - SUBJECTIVE AND OBJECTIVE BOX
[ ]DNR    [ ]DNI    [ ]MEWS SUSPENDED     [ ]GOALS OF CARE DISCUSSION WITH PATIENT/FAMILY/PROXY:    INTERVAL HPI/OVERNIGHT EVENTS:     no events     PRESSORS: [ ] YES [+ ] NO  WHICH:    ANTIBIOTICS:                  DATE STARTED:  ANTIBIOTICS:                  DATE STARTED:  ANTIBIOTICS:                  DATE STARTED:    LORazepam   Injectable 2 milliGRAM(s) IV Push every 2 hours PRN  lactated ringers. 1000 milliLiter(s) IV Continuous <Continuous>  buDESOnide 160 MICROgram(s)/formoterol 4.5 MICROgram(s) Inhaler 2 Puff(s) Inhalation two times a day  ALBUTerol    90 MICROgram(s) HFA Inhaler 2 Puff(s) Inhalation every 8 hours  insulin lispro (HumaLOG) corrective regimen sliding scale   SubCutaneous three times a day before meals  nicotine - 21 mG/24Hr(s) Patch 1 patch Transdermal daily  diphenhydrAMINE   Injectable 50 milliGRAM(s) IV Push once  LORazepam   Injectable 4 milliGRAM(s) IV Push every 2 hours  PHENobarbital Injectable 130 milliGRAM(s) IV Push every 12 hours  propofol Infusion 5.126 MICROgram(s)/kG/Min IV Continuous <Continuous>  enoxaparin Injectable 40 milliGRAM(s) SubCutaneous daily  pantoprazole  Injectable 40 milliGRAM(s) IV Push daily  dexmedetomidine Infusion 1 MICROgram(s)/kG/Hr IV Continuous <Continuous>      CENTRAL LINE: [+ ] YES [ ] NO  LOCATION: [facility] DATE INSERTED:   REMOVE: [ ] YES [ ] NO  EXPLAIN:    RHODES: [ +] YES [ ] NO    DATE INSERTED:    REMOVE:  [ ] YES [ ] NO  EXPLAIN:    PMH -reviewed admission note, no change since admission  PAST MEDICAL & SURGICAL HISTORY:  Alcohol induced acute pancreatitis  Pancreatitis  Alcoholism  Fatty liver  Gastroenteritis  No significant past surgical history      T(C): 36.4 (17 @ 07:51), Max: 37.8 (17 @ 00:23)  HR: 88 (17 @ 08:00)  BP: 130/79 (17 @ 08:00)  RR: 23 (17 @ 08:00)  SpO2: 99% (17 @ 08:00)  Wt(kg): --       @ 07:01  -   @ 07:00  --------------------------------------------------------  IN: 5477.2 mL / OUT: 2605 mL / NET: 2872.2 mL        PHYSICAL EXAM:    GENERAL: NAD   HEAD: Atraumatic, Normocephalic  EYES: EOMI, PERRLA  NERVOUS SYSTEM: sedated   CHEST/LUNG: No chest deformity; Normal percussion bilaterally, No rales, rhonchi, wheezing   HEART: [ ]Regular rate and rhythm; [ ]No murmurs, rubs, or gallops  ABDOMEN: [Soft, Nontender, Nondistended; Bowel sounds present  EXTREMITIES:No clubbing, cyanosis, or edema  LYMPH: No lymphadenopathy noted  SKIN: No rashes or lesions, Good capillary refill      LABS:  CBC Full  -  ( 2017 07:02 )  WBC Count : 9.2 K/uL  Hemoglobin : 14.0 g/dL  Hematocrit : 40.2 %  Platelet Count - Automated : 54 K/uL  Mean Cell Volume : 99.3 fl  Mean Cell Hemoglobin : 34.5 pg  Mean Cell Hemoglobin Concentration : 34.8 gm/dL  Auto Neutrophil # : 7.5 K/uL  Auto Lymphocyte # : 0.9 K/uL  Auto Monocyte # : 0.6 K/uL  Auto Eosinophil # : 0.1 K/uL  Auto Basophil # : 0.0 K/uL  Auto Neutrophil % : 81.5 %  Auto Lymphocyte % : 10.3 %  Auto Monocyte % : 6.7 %  Auto Eosinophil % : 1.0 %  Auto Basophil % : 0.5 %        138  |  106  |  5<L>  ----------------------------<  81  3.3<L>   |  20<L>  |  0.55    Ca    8.2<L>      2017 07:02  Phos  2.9       Mg     1.6         TPro  7.6  /  Alb  3.8  /  TBili  1.4<H>  /  DBili  0.3<H>  /  AST  343<H>  /  ALT  273<H>  /  AlkPhos  127<H>        Urinalysis Basic - ( 2017 10:27 )    Color: Jacquie / Appearance: Clear / S.025 / pH: x  Gluc: x / Ketone: Large  / Bili: Small / Urobili: 4   Blood: x / Protein: 100 / Nitrite: Negative   Leuk Esterase: Trace / RBC: 0-2 /HPF / WBC 0-2 /HPF   Sq Epi: x / Non Sq Epi: x / Bacteria: Trace /HPF          RADIOLOGY & ADDITIONAL STUDIES REVIEWED:      Problem: Alcohol withdrawal seizure.   [facility] protocol  Banana bag (NS with thiamine 100, folic acid 1 and MV 10) for 2 days in addition to LR at 150 ml / hr  Change to oral Thiamine, folic acid and MV after 2 days  Seizure precautions  Ativan 2 mg q 4hr and Ativan 2 mg q2 for anxiety or agitation   CT head -ve for acute hemorrhage  Taper ativan as clinically indicated  c/w pheno    consult.[facility] protocol  Banana bag  seizure precautions  Ativan.       ·  Problem: Alcohol induced acute pancreatitis.    Bisap score 0 predict low mortality  IV ringers lactate at 150 ml/hr in addition to Banana bag  Pain control  NPO for bowel rest, moght start diet today   Advice to quit alcoholIV ringers lactate  Pain control  bowel rest.       ·  Problem: Ecchymosis on examination.  Plan:Plan: Patient had a bruise on L elbow and ecchymosis on parital bone  CT head -ve for acute hemorrhage.  Pain control.       ·  Problem: Diabetes.      Continue HSS only as patient is NPO  Adjust/Add lantus if FG are uncontrolled  Diabetic Diet when patient can tolerate  A1c: 5.3       ·  Problem: Reactive airway disease.   Continue Proventil and Symbicort after extubation   will order Duoneb PRN if needed     CRITICAL CARE TIME SPENT: 35 minutes [ ]DNR    [ ]DNI    [ ]MEWS SUSPENDED     [ ]GOALS OF CARE DISCUSSION WITH PATIENT/FAMILY/PROXY:    INTERVAL HPI/OVERNIGHT EVENTS:     no events     PRESSORS: [ ] YES [+ ] NO  WHICH:    ANTIBIOTICS:                  DATE STARTED:  ANTIBIOTICS:                  DATE STARTED:  ANTIBIOTICS:                  DATE STARTED:    LORazepam   Injectable 2 milliGRAM(s) IV Push every 2 hours PRN  lactated ringers. 1000 milliLiter(s) IV Continuous <Continuous>  buDESOnide 160 MICROgram(s)/formoterol 4.5 MICROgram(s) Inhaler 2 Puff(s) Inhalation two times a day  ALBUTerol    90 MICROgram(s) HFA Inhaler 2 Puff(s) Inhalation every 8 hours  insulin lispro (HumaLOG) corrective regimen sliding scale   SubCutaneous three times a day before meals  nicotine - 21 mG/24Hr(s) Patch 1 patch Transdermal daily  diphenhydrAMINE   Injectable 50 milliGRAM(s) IV Push once  LORazepam   Injectable 4 milliGRAM(s) IV Push every 2 hours  PHENobarbital Injectable 130 milliGRAM(s) IV Push every 12 hours  propofol Infusion 5.126 MICROgram(s)/kG/Min IV Continuous <Continuous>  enoxaparin Injectable 40 milliGRAM(s) SubCutaneous daily  pantoprazole  Injectable 40 milliGRAM(s) IV Push daily  dexmedetomidine Infusion 1 MICROgram(s)/kG/Hr IV Continuous <Continuous>      CENTRAL LINE: [+ ] YES [ ] NO  LOCATION: [facility] DATE INSERTED:   REMOVE: [ ] YES [ ] NO  EXPLAIN:    RHODES: [ +] YES [ ] NO    DATE INSERTED:    REMOVE:  [ ] YES [ ] NO  EXPLAIN:    PMH -reviewed admission note, no change since admission  PAST MEDICAL & SURGICAL HISTORY:  Alcohol induced acute pancreatitis  Pancreatitis  Alcoholism  Fatty liver  Gastroenteritis  No significant past surgical history      T(C): 36.4 (17 @ 07:51), Max: 37.8 (17 @ 00:23)  HR: 88 (17 @ 08:00)  BP: 130/79 (17 @ 08:00)  RR: 23 (17 @ 08:00)  SpO2: 99% (17 @ 08:00)  Wt(kg): --       @ 07:01  -   @ 07:00  --------------------------------------------------------  IN: 5477.2 mL / OUT: 2605 mL / NET: 2872.2 mL        PHYSICAL EXAM:    GENERAL: NAD   HEAD: Atraumatic, Normocephalic  EYES: EOMI, PERRLA  NERVOUS SYSTEM: sedated   CHEST/LUNG: No chest deformity; Normal percussion bilaterally, No rales, rhonchi, wheezing   HEART: [ ]Regular rate and rhythm; [ ]No murmurs, rubs, or gallops  ABDOMEN: [Soft, Nontender, Nondistended; Bowel sounds present  EXTREMITIES:No clubbing, cyanosis, or edema  LYMPH: No lymphadenopathy noted  SKIN: No rashes or lesions, Good capillary refill      LABS:  CBC Full  -  ( 2017 07:02 )  WBC Count : 9.2 K/uL  Hemoglobin : 14.0 g/dL  Hematocrit : 40.2 %  Platelet Count - Automated : 54 K/uL  Mean Cell Volume : 99.3 fl  Mean Cell Hemoglobin : 34.5 pg  Mean Cell Hemoglobin Concentration : 34.8 gm/dL  Auto Neutrophil # : 7.5 K/uL  Auto Lymphocyte # : 0.9 K/uL  Auto Monocyte # : 0.6 K/uL  Auto Eosinophil # : 0.1 K/uL  Auto Basophil # : 0.0 K/uL  Auto Neutrophil % : 81.5 %  Auto Lymphocyte % : 10.3 %  Auto Monocyte % : 6.7 %  Auto Eosinophil % : 1.0 %  Auto Basophil % : 0.5 %        138  |  106  |  5<L>  ----------------------------<  81  3.3<L>   |  20<L>  |  0.55    Ca    8.2<L>      2017 07:02  Phos  2.9       Mg     1.6         TPro  7.6  /  Alb  3.8  /  TBili  1.4<H>  /  DBili  0.3<H>  /  AST  343<H>  /  ALT  273<H>  /  AlkPhos  127<H>        Urinalysis Basic - ( 2017 10:27 )    Color: Jacquie / Appearance: Clear / S.025 / pH: x  Gluc: x / Ketone: Large  / Bili: Small / Urobili: 4   Blood: x / Protein: 100 / Nitrite: Negative   Leuk Esterase: Trace / RBC: 0-2 /HPF / WBC 0-2 /HPF   Sq Epi: x / Non Sq Epi: x / Bacteria: Trace /HPF          RADIOLOGY & ADDITIONAL STUDIES REVIEWED:  cxr shows no infiltrate, ETT is high    Problem: Alcohol withdrawal seizure.   [facility] protocol  Banana bag (NS with thiamine 100, folic acid 1 and MV 10) for 2 days in addition to LR at 150 ml / hr  Change to oral Thiamine, folic acid and MV after 2 days  Seizure precautions  Ativan 2 mg q 4hr and Ativan 2 mg q2 for anxiety or agitation   CT head -ve for acute hemorrhage  Taper ativan as clinically indicated  c/w pheno    consult.[facility] protocol  Banana bag  seizure precautions  Ativan.       ·  Problem: Alcohol induced acute pancreatitis.    Bisap score 0 predict low mortality  IV ringers lactate at 150 ml/hr in addition to Banana bag  Pain control  NPO for bowel rest, moght start diet today   Advice to quit alcoholIV ringers lactate  Pain control  bowel rest.       ·  Problem: Ecchymosis on examination.  Plan:Plan: Patient had a bruise on L elbow and ecchymosis on parital bone  CT head -ve for acute hemorrhage.  Pain control.       ·  Problem: Diabetes.      Continue HSS only as patient is NPO  Adjust/Add lantus if FG are uncontrolled  Diabetic Diet when patient can tolerate  A1c: 5.3       ·  Problem: Reactive airway disease.   Continue Proventil and Symbicort after extubation   will order Duoneb PRN if needed     CRITICAL CARE TIME SPENT: 35 minutes

## 2017-07-09 LAB
ALBUMIN SERPL ELPH-MCNC: 2.3 G/DL — LOW (ref 3.5–5)
ALP SERPL-CCNC: 99 U/L — SIGNIFICANT CHANGE UP (ref 40–120)
ALT FLD-CCNC: 98 U/L DA — HIGH (ref 10–60)
ANION GAP SERPL CALC-SCNC: 8 MMOL/L — SIGNIFICANT CHANGE UP (ref 5–17)
AST SERPL-CCNC: 78 U/L — HIGH (ref 10–40)
BASE EXCESS BLDA CALC-SCNC: 0.4 MMOL/L — SIGNIFICANT CHANGE UP (ref -2–2)
BASOPHILS # BLD AUTO: 0 K/UL — SIGNIFICANT CHANGE UP (ref 0–0.2)
BASOPHILS NFR BLD AUTO: 0.6 % — SIGNIFICANT CHANGE UP (ref 0–2)
BILIRUB SERPL-MCNC: 0.6 MG/DL — SIGNIFICANT CHANGE UP (ref 0.2–1.2)
BLOOD GAS COMMENTS ARTERIAL: SIGNIFICANT CHANGE UP
BUN SERPL-MCNC: 3 MG/DL — LOW (ref 7–18)
CALCIUM SERPL-MCNC: 8 MG/DL — LOW (ref 8.4–10.5)
CHLORIDE SERPL-SCNC: 108 MMOL/L — SIGNIFICANT CHANGE UP (ref 96–108)
CO2 SERPL-SCNC: 25 MMOL/L — SIGNIFICANT CHANGE UP (ref 22–31)
CREAT SERPL-MCNC: 0.43 MG/DL — LOW (ref 0.5–1.3)
EOSINOPHIL # BLD AUTO: 0.1 K/UL — SIGNIFICANT CHANGE UP (ref 0–0.5)
EOSINOPHIL NFR BLD AUTO: 2.1 % — SIGNIFICANT CHANGE UP (ref 0–6)
GLUCOSE SERPL-MCNC: 99 MG/DL — SIGNIFICANT CHANGE UP (ref 70–99)
HCO3 BLDA-SCNC: 24 MMOL/L — SIGNIFICANT CHANGE UP (ref 23–27)
HCT VFR BLD CALC: 39 % — SIGNIFICANT CHANGE UP (ref 39–50)
HGB BLD-MCNC: 14 G/DL — SIGNIFICANT CHANGE UP (ref 13–17)
HOROWITZ INDEX BLDA+IHG-RTO: 30 — SIGNIFICANT CHANGE UP
LYMPHOCYTES # BLD AUTO: 0.9 K/UL — LOW (ref 1–3.3)
LYMPHOCYTES # BLD AUTO: 12.8 % — LOW (ref 13–44)
MAGNESIUM SERPL-MCNC: 1.6 MG/DL — SIGNIFICANT CHANGE UP (ref 1.6–2.6)
MCHC RBC-ENTMCNC: 35.5 PG — HIGH (ref 27–34)
MCHC RBC-ENTMCNC: 35.8 GM/DL — SIGNIFICANT CHANGE UP (ref 32–36)
MCV RBC AUTO: 99.1 FL — SIGNIFICANT CHANGE UP (ref 80–100)
MONOCYTES # BLD AUTO: 0.6 K/UL — SIGNIFICANT CHANGE UP (ref 0–0.9)
MONOCYTES NFR BLD AUTO: 8.9 % — SIGNIFICANT CHANGE UP (ref 2–14)
NEUTROPHILS # BLD AUTO: 5.2 K/UL — SIGNIFICANT CHANGE UP (ref 1.8–7.4)
NEUTROPHILS NFR BLD AUTO: 75.6 % — SIGNIFICANT CHANGE UP (ref 43–77)
PCO2 BLDA: 36 MMHG — SIGNIFICANT CHANGE UP (ref 32–46)
PH BLDA: 7.44 — SIGNIFICANT CHANGE UP (ref 7.35–7.45)
PHOSPHATE SERPL-MCNC: 2.3 MG/DL — LOW (ref 2.5–4.5)
PLATELET # BLD AUTO: 55 K/UL — LOW (ref 150–400)
PO2 BLDA: 85 MMHG — SIGNIFICANT CHANGE UP (ref 74–108)
POTASSIUM SERPL-MCNC: 3.4 MMOL/L — LOW (ref 3.5–5.3)
POTASSIUM SERPL-SCNC: 3.4 MMOL/L — LOW (ref 3.5–5.3)
PROT SERPL-MCNC: 5.5 G/DL — LOW (ref 6–8.3)
RBC # BLD: 3.93 M/UL — LOW (ref 4.2–5.8)
RBC # FLD: 11.3 % — SIGNIFICANT CHANGE UP (ref 10.3–14.5)
SAO2 % BLDA: 97 % — HIGH (ref 92–96)
SODIUM SERPL-SCNC: 141 MMOL/L — SIGNIFICANT CHANGE UP (ref 135–145)
TRIGL SERPL-MCNC: 137 MG/DL — SIGNIFICANT CHANGE UP (ref 10–149)
WBC # BLD: 6.9 K/UL — SIGNIFICANT CHANGE UP (ref 3.8–10.5)
WBC # FLD AUTO: 6.9 K/UL — SIGNIFICANT CHANGE UP (ref 3.8–10.5)

## 2017-07-09 RX ORDER — POTASSIUM CHLORIDE 20 MEQ
10 PACKET (EA) ORAL
Qty: 0 | Refills: 0 | Status: COMPLETED | OUTPATIENT
Start: 2017-07-09 | End: 2017-07-09

## 2017-07-09 RX ORDER — HALOPERIDOL DECANOATE 100 MG/ML
5 INJECTION INTRAMUSCULAR EVERY 8 HOURS
Qty: 0 | Refills: 0 | Status: DISCONTINUED | OUTPATIENT
Start: 2017-07-09 | End: 2017-07-11

## 2017-07-09 RX ORDER — HALOPERIDOL DECANOATE 100 MG/ML
5 INJECTION INTRAMUSCULAR ONCE
Qty: 0 | Refills: 0 | Status: COMPLETED | OUTPATIENT
Start: 2017-07-09 | End: 2017-07-09

## 2017-07-09 RX ADMIN — Medication 0.1 MILLIGRAM(S): at 05:03

## 2017-07-09 RX ADMIN — ENOXAPARIN SODIUM 40 MILLIGRAM(S): 100 INJECTION SUBCUTANEOUS at 11:01

## 2017-07-09 RX ADMIN — ALBUTEROL 2 PUFF(S): 90 AEROSOL, METERED ORAL at 05:40

## 2017-07-09 RX ADMIN — Medication 100 MILLIEQUIVALENT(S): at 10:00

## 2017-07-09 RX ADMIN — BUDESONIDE AND FORMOTEROL FUMARATE DIHYDRATE 2 PUFF(S): 160; 4.5 AEROSOL RESPIRATORY (INHALATION) at 21:15

## 2017-07-09 RX ADMIN — PANTOPRAZOLE SODIUM 40 MILLIGRAM(S): 20 TABLET, DELAYED RELEASE ORAL at 11:01

## 2017-07-09 RX ADMIN — ALBUTEROL 2 PUFF(S): 90 AEROSOL, METERED ORAL at 13:33

## 2017-07-09 RX ADMIN — Medication 0.1 MILLIGRAM(S): at 13:02

## 2017-07-09 RX ADMIN — ALBUTEROL 2 PUFF(S): 90 AEROSOL, METERED ORAL at 21:15

## 2017-07-09 RX ADMIN — Medication 100 MILLIEQUIVALENT(S): at 08:15

## 2017-07-09 RX ADMIN — SODIUM CHLORIDE 150 MILLILITER(S): 9 INJECTION, SOLUTION INTRAVENOUS at 14:00

## 2017-07-09 RX ADMIN — Medication 5 MG/HR: at 02:06

## 2017-07-09 RX ADMIN — Medication 1 PATCH: at 10:53

## 2017-07-09 RX ADMIN — DEXMEDETOMIDINE HYDROCHLORIDE IN 0.9% SODIUM CHLORIDE 18.8 MICROGRAM(S)/KG/HR: 4 INJECTION INTRAVENOUS at 22:47

## 2017-07-09 RX ADMIN — PROPOFOL 2.31 MICROGRAM(S)/KG/MIN: 10 INJECTION, EMULSION INTRAVENOUS at 06:31

## 2017-07-09 RX ADMIN — HALOPERIDOL DECANOATE 5 MILLIGRAM(S): 100 INJECTION INTRAMUSCULAR at 13:00

## 2017-07-09 RX ADMIN — DEXMEDETOMIDINE HYDROCHLORIDE IN 0.9% SODIUM CHLORIDE 18.8 MICROGRAM(S)/KG/HR: 4 INJECTION INTRAVENOUS at 18:00

## 2017-07-09 RX ADMIN — DEXMEDETOMIDINE HYDROCHLORIDE IN 0.9% SODIUM CHLORIDE 18.8 MICROGRAM(S)/KG/HR: 4 INJECTION INTRAVENOUS at 15:00

## 2017-07-09 RX ADMIN — Medication 1 PATCH: at 11:01

## 2017-07-09 RX ADMIN — Medication 130 MILLIGRAM(S): at 17:01

## 2017-07-09 RX ADMIN — Medication 0.1 MILLIGRAM(S): at 22:47

## 2017-07-09 RX ADMIN — Medication 100 MILLIEQUIVALENT(S): at 07:06

## 2017-07-09 RX ADMIN — DEXMEDETOMIDINE HYDROCHLORIDE IN 0.9% SODIUM CHLORIDE 18.8 MICROGRAM(S)/KG/HR: 4 INJECTION INTRAVENOUS at 04:15

## 2017-07-09 RX ADMIN — Medication 130 MILLIGRAM(S): at 05:04

## 2017-07-09 RX ADMIN — HALOPERIDOL DECANOATE 5 MILLIGRAM(S): 100 INJECTION INTRAMUSCULAR at 22:48

## 2017-07-09 RX ADMIN — SODIUM CHLORIDE 150 MILLILITER(S): 9 INJECTION, SOLUTION INTRAVENOUS at 06:31

## 2017-07-09 RX ADMIN — DEXMEDETOMIDINE HYDROCHLORIDE IN 0.9% SODIUM CHLORIDE 18.8 MICROGRAM(S)/KG/HR: 4 INJECTION INTRAVENOUS at 10:28

## 2017-07-09 RX ADMIN — PROPOFOL 2.31 MICROGRAM(S)/KG/MIN: 10 INJECTION, EMULSION INTRAVENOUS at 14:00

## 2017-07-09 RX ADMIN — DEXMEDETOMIDINE HYDROCHLORIDE IN 0.9% SODIUM CHLORIDE 18.8 MICROGRAM(S)/KG/HR: 4 INJECTION INTRAVENOUS at 07:20

## 2017-07-09 RX ADMIN — Medication 63.75 MILLIMOLE(S): at 08:40

## 2017-07-09 NOTE — PROGRESS NOTE ADULT - SUBJECTIVE AND OBJECTIVE BOX
INTERVAL HPI/OVERNIGHT EVENTS:     PRESSORS: [ ] YES [ ] NO  WHICH:    ANTIBIOTICS:                  DATE STARTED:  ANTIBIOTICS:                  DATE STARTED:  ANTIBIOTICS:                  DATE STARTED:    Antimicrobial:    Cardiovascular:  cloNIDine 0.1 milliGRAM(s) Oral every 8 hours    Pulmonary:  buDESOnide 160 MICROgram(s)/formoterol 4.5 MICROgram(s) Inhaler 2 Puff(s) Inhalation two times a day  ALBUTerol    90 MICROgram(s) HFA Inhaler 2 Puff(s) Inhalation every 8 hours  diphenhydrAMINE   Injectable 50 milliGRAM(s) IV Push once    Hematalogic:  enoxaparin Injectable 40 milliGRAM(s) SubCutaneous daily    Other:  lactated ringers. 1000 milliLiter(s) IV Continuous <Continuous>  insulin lispro (HumaLOG) corrective regimen sliding scale   SubCutaneous three times a day before meals  nicotine - 21 mG/24Hr(s) Patch 1 patch Transdermal daily  PHENobarbital Injectable 130 milliGRAM(s) IV Push every 12 hours  propofol Infusion 5.126 MICROgram(s)/kG/Min IV Continuous <Continuous>  pantoprazole  Injectable 40 milliGRAM(s) IV Push daily  dexmedetomidine Infusion 1 MICROgram(s)/kG/Hr IV Continuous <Continuous>  sodium chloride 0.9% 1000 milliLiter(s) IV Continuous <Continuous>  haloperidol    Injectable 2 milliGRAM(s) IV Push every 6 hours PRN  LORazepam Infusion 5 mG/Hr IV Continuous <Continuous>      Drug Dosing Weight  Height (cm): 182.88 (07 Jul 2017 09:46)  Weight (kg): 75.2 (07 Jul 2017 09:46)  BMI (kg/m2): 22.5 (07 Jul 2017 09:46)  BSA (m2): 1.97 (07 Jul 2017 09:46)    CENTRAL LINE: [ ] YES [ ] NO  LOCATION:   DATE INSERTED:  REMOVE: [ ] YES [ ] NO  EXPLAIN:    RHODES: [ ] YES [ ] NO    DATE INSERTED:  REMOVE:  [ ] YES [ ] NO  EXPLAIN:    A-LINE:  [ ] YES [ ] NO  LOCATION:   DATE INSERTED:  REMOVE:  [ ] YES [ ] NO  EXPLAIN:    PMH/Social Hx/Fam Hx -reviewed admission note, no change since admission  PAST MEDICAL & SURGICAL HISTORY:  Alcohol induced acute pancreatitis  Pancreatitis  Alcoholism  Fatty liver  Gastroenteritis  No significant past surgical history    Heart faliure: acute [ ] chronic [ ] acute or chronic [ ] diastolic [ ] systolic [ ] combied systolic and diastolic[ ]  HANK: ATN[ ] renal medullary necrosis [ ] CKD I [ ]CKDII [ ]CKD III [ ]CKD IV [ ]CKD V [ ]Other pathological lesions [ ]  Abdominal Nutrition Status: malnutrition [ ] cachexia [ ] morbid obesity/BMI=40 [ ] Supplement ordered [___________]     T(C): 37.2 (07-09-17 @ 08:00), Max: 37.3 (07-09-17 @ 04:00)  HR: 78 (07-09-17 @ 10:00)  BP: 154/94 (07-09-17 @ 10:00)  BP(mean): 109 (07-09-17 @ 10:00)  ABP: --  ABP(mean): --  RR: 18 (07-09-17 @ 10:00)  SpO2: 100% (07-09-17 @ 10:00)  Wt(kg): --    ABG - ( 09 Jul 2017 05:12 )  pH: 7.44  /  pCO2: 36    /  pO2: 85    / HCO3: 24    / Base Excess: 0.4   /  SaO2: 97                    07-08 @ 07:01  -  07-09 @ 07:00  --------------------------------------------------------  IN: 5017.5 mL / OUT: 1480 mL / NET: 3537.5 mL        Mode: AC/ CMV (Assist Control/ Continuous Mandatory Ventilation)  RR (machine): 12  TV (machine): 450  FiO2: 30  PEEP: 5  ITime: 1  MAP: 9.8  PIP: 22      PHYSICAL EXAM:    GENERAL: [ ]NAD, [ ]well-groomed, [ ]well-developed  HEAD:  [ ]Atraumatic, [ ]Normocephalic  EYES: [ ]EOMI, [ ]PERRLA, [ ]conjunctiva and sclera clear  ENMT: [ ]No tonsillar erythema, exudates, or enlargement; [ ]Moist mucous membranes, [ ]Good dentition, [ ]No lesions  NECK: [ ]Supple, normal appearance, [ ]No JVD; [ ]Normal thyroid; [ ]Trachea midline  NERVOUS SYSTEM:  [ ]Alert & Oriented X3, [ ]Good concentration; [ ]Motor Strength 5/5 B/L upper and lower extremities; [ ]DTRs 2+ intact and symmetric  CHEST/LUNG: [ ]No chest deformity; [ ]Normal percussion bilaterally; [ ]No rales, rhonchi, wheezing; [ ]Crackles at bases  HEART: [ ]Regular rate and rhythm; [ ]No murmurs, rubs, or gallops  ABDOMEN: [ ]Soft, Nontender, Nondistended; [ ]Bowel sounds present  EXTREMITIES:  [ ]2+ Peripheral Pulses, [ ]No clubbing, cyanosis, or edema [ ]Bilat lower extremity edema  LYMPH: [ ]No lymphadenopathy noted  SKIN: [ ]No rashes or lesions; [ ]Good capillary refill      LABS:  CBC Full  -  ( 09 Jul 2017 05:50 )  WBC Count : 6.9 K/uL  Hemoglobin : 14.0 g/dL  Hematocrit : 39.0 %  Platelet Count - Automated : 55 K/uL  Mean Cell Volume : 99.1 fl  Mean Cell Hemoglobin : 35.5 pg  Mean Cell Hemoglobin Concentration : 35.8 gm/dL  Auto Neutrophil # : 5.2 K/uL  Auto Lymphocyte # : 0.9 K/uL  Auto Monocyte # : 0.6 K/uL  Auto Eosinophil # : 0.1 K/uL  Auto Basophil # : 0.0 K/uL  Auto Neutrophil % : 75.6 %  Auto Lymphocyte % : 12.8 %  Auto Monocyte % : 8.9 %  Auto Eosinophil % : 2.1 %  Auto Basophil % : 0.6 %    07-09    141  |  108  |  3<L>  ----------------------------<  99  3.4<L>   |  25  |  0.43<L>    Ca    8.0<L>      09 Jul 2017 05:50  Phos  2.3     07-09  Mg     1.6     07-09    TPro  5.5<L>  /  Alb  2.3<L>  /  TBili  0.6  /  DBili  0.2  /  AST  78<H>  /  ALT  98<H>  /  AlkPhos  99  07-09            RADIOLOGY & ADDITIONAL STUDIES REVIEWED:      [ ]GOALS OF CARE DISCUSSION WITH PATIENT/FAMILY/PROXY:    CRITICAL CARE TIME SPENT: 35 minutes  Assessment    1. Problem: Alcohol withdrawal seizure.   [facility] protocol  Banana bag (NS with thiamine 100, folic acid 1 and MV 10) for 2 days in addition to LR at 150 ml / hr  Seizure precautions  Increased dose of Ativan Drip to 5mg  and titrating propofol down         2. Alcohol induced acute pancreatitis.    IV fluids  Pain control  NPO for bowel rest, might start diet today   Advice to quit alcohol.       3. Ecchymosis on examination.  Plan: Patient had a bruise on L elbow and ecchymosis on parital bone  CT head -ve for acute hemorrhage.  Pain control.       4. Diabetes.      Continue HSS only as patient is NPO  Adjust/Add lantus if FG are uncontrolled  Diabetic Diet when patient can tolerate  A1c: 5.3       5. Reactive airway disease.   Continue Proventil and Symbicort after extubation    Duoneb PRN if needed     CRITICAL CARE TIME SPENT: 35 minutes INTERVAL HPI/OVERNIGHT EVENTS: none    PRESSORS: [ ] YES [x ] NO  WHICH:    Antimicrobial:    Cardiovascular:  cloNIDine 0.1 milliGRAM(s) Oral every 8 hours    Pulmonary:  buDESOnide 160 MICROgram(s)/formoterol 4.5 MICROgram(s) Inhaler 2 Puff(s) Inhalation two times a day  ALBUTerol    90 MICROgram(s) HFA Inhaler 2 Puff(s) Inhalation every 8 hours  diphenhydrAMINE   Injectable 50 milliGRAM(s) IV Push once    Hematalogic:  enoxaparin Injectable 40 milliGRAM(s) SubCutaneous daily    Other:  lactated ringers. 1000 milliLiter(s) IV Continuous <Continuous>  insulin lispro (HumaLOG) corrective regimen sliding scale   SubCutaneous three times a day before meals  nicotine - 21 mG/24Hr(s) Patch 1 patch Transdermal daily  PHENobarbital Injectable 130 milliGRAM(s) IV Push every 12 hours  propofol Infusion 5.126 MICROgram(s)/kG/Min IV Continuous <Continuous>  pantoprazole  Injectable 40 milliGRAM(s) IV Push daily  dexmedetomidine Infusion 1 MICROgram(s)/kG/Hr IV Continuous <Continuous>  sodium chloride 0.9% 1000 milliLiter(s) IV Continuous <Continuous>  haloperidol    Injectable 2 milliGRAM(s) IV Push every 6 hours PRN  LORazepam Infusion 5 mG/Hr IV Continuous <Continuous>      Drug Dosing Weight  Height (cm): 182.88 (07 Jul 2017 09:46)  Weight (kg): 75.2 (07 Jul 2017 09:46)  BMI (kg/m2): 22.5 (07 Jul 2017 09:46)  BSA (m2): 1.97 (07 Jul 2017 09:46)    CENTRAL LINE: [ ] YES [ ] NO  LOCATION:   DATE INSERTED:  REMOVE: [ ] YES [ ] NO  EXPLAIN:    RHODES: [ ] YES [ ] NO    DATE INSERTED:  REMOVE:  [ ] YES [ ] NO  EXPLAIN:    A-LINE:  [ ] YES [ ] NO  LOCATION:   DATE INSERTED:  REMOVE:  [ ] YES [ ] NO  EXPLAIN:    PMH/Social Hx/Fam Hx -reviewed admission note, no change since admission  PAST MEDICAL & SURGICAL HISTORY:  Alcohol induced acute pancreatitis  Pancreatitis  Alcoholism  Fatty liver  Gastroenteritis  No significant past surgical history    Heart faliure: acute [ ] chronic [ ] acute or chronic [ ] diastolic [ ] systolic [ ] combied systolic and diastolic[ ]  HANK: ATN[ ] renal medullary necrosis [ ] CKD I [ ]CKDII [ ]CKD III [ ]CKD IV [ ]CKD V [ ]Other pathological lesions [ ]  Abdominal Nutrition Status: malnutrition [ ] cachexia [ ] morbid obesity/BMI=40 [ ] Supplement ordered [___________]     T(C): 37.2 (07-09-17 @ 08:00), Max: 37.3 (07-09-17 @ 04:00)  HR: 78 (07-09-17 @ 10:00)  BP: 154/94 (07-09-17 @ 10:00)  BP(mean): 109 (07-09-17 @ 10:00)  ABP: --  ABP(mean): --  RR: 18 (07-09-17 @ 10:00)  SpO2: 100% (07-09-17 @ 10:00)  Wt(kg): --    ABG - ( 09 Jul 2017 05:12 )  pH: 7.44  /  pCO2: 36    /  pO2: 85    / HCO3: 24    / Base Excess: 0.4   /  SaO2: 97                    07-08 @ 07:01  -  07-09 @ 07:00  --------------------------------------------------------  IN: 5017.5 mL / OUT: 1480 mL / NET: 3537.5 mL        Mode: AC/ CMV (Assist Control/ Continuous Mandatory Ventilation)  RR (machine): 12  TV (machine): 450  FiO2: 30  PEEP: 5  ITime: 1  MAP: 9.8  PIP: 22      PHYSICAL EXAM:    GENERAL: [ ]NAD, [ ]well-groomed, [ ]well-developed  HEAD:  [ ]Atraumatic, [ ]Normocephalic  EYES: [ ]EOMI, [ ]PERRLA, [ ]conjunctiva and sclera clear  ENMT: [ ]No tonsillar erythema, exudates, or enlargement; [ ]Moist mucous membranes, [ ]Good dentition, [ ]No lesions  NECK: [ ]Supple, normal appearance, [ ]No JVD; [ ]Normal thyroid; [ ]Trachea midline  NERVOUS SYSTEM:  [ ]Alert & Oriented X3, [ ]Good concentration; [ ]Motor Strength 5/5 B/L upper and lower extremities; [ ]DTRs 2+ intact and symmetric  CHEST/LUNG: [ ]No chest deformity; [ ]Normal percussion bilaterally; [ ]No rales, rhonchi, wheezing; [ ]Crackles at bases  HEART: [ ]Regular rate and rhythm; [ ]No murmurs, rubs, or gallops  ABDOMEN: [ ]Soft, Nontender, Nondistended; [ ]Bowel sounds present  EXTREMITIES:  [ ]2+ Peripheral Pulses, [ ]No clubbing, cyanosis, or edema [ ]Bilat lower extremity edema  LYMPH: [ ]No lymphadenopathy noted  SKIN: [ ]No rashes or lesions; [ ]Good capillary refill      LABS:  CBC Full  -  ( 09 Jul 2017 05:50 )  WBC Count : 6.9 K/uL  Hemoglobin : 14.0 g/dL  Hematocrit : 39.0 %  Platelet Count - Automated : 55 K/uL  Mean Cell Volume : 99.1 fl  Mean Cell Hemoglobin : 35.5 pg  Mean Cell Hemoglobin Concentration : 35.8 gm/dL  Auto Neutrophil # : 5.2 K/uL  Auto Lymphocyte # : 0.9 K/uL  Auto Monocyte # : 0.6 K/uL  Auto Eosinophil # : 0.1 K/uL  Auto Basophil # : 0.0 K/uL  Auto Neutrophil % : 75.6 %  Auto Lymphocyte % : 12.8 %  Auto Monocyte % : 8.9 %  Auto Eosinophil % : 2.1 %  Auto Basophil % : 0.6 %    07-09    141  |  108  |  3<L>  ----------------------------<  99  3.4<L>   |  25  |  0.43<L>    Ca    8.0<L>      09 Jul 2017 05:50  Phos  2.3     07-09  Mg     1.6     07-09    TPro  5.5<L>  /  Alb  2.3<L>  /  TBili  0.6  /  DBili  0.2  /  AST  78<H>  /  ALT  98<H>  /  AlkPhos  99  07-09            RADIOLOGY & ADDITIONAL STUDIES REVIEWED:      [ ]GOALS OF CARE DISCUSSION WITH PATIENT/FAMILY/PROXY:    CRITICAL CARE TIME SPENT: 35 minutes  Assessment    1. Problem: Alcohol withdrawal seizure.   [facility] protocol  Banana bag (NS with thiamine 100, folic acid 1 and MV 10) for 2 days in addition to LR at 150 ml / hr  Seizure precautions  Increased dose of Ativan Drip to 5mg  and titrating propofol down         2. Alcohol induced acute pancreatitis.    IV fluids  Pain control  NPO for bowel rest, might start diet today   Advice to quit alcohol.       3. Ecchymosis on examination.  Plan: Patient had a bruise on L elbow and ecchymosis on parital bone  CT head -ve for acute hemorrhage.  Pain control.       4. Diabetes.      Continue HSS only as patient is NPO  Adjust/Add lantus if FG are uncontrolled  Diabetic Diet when patient can tolerate  A1c: 5.3       5. Reactive airway disease.   Continue Proventil and Symbicort after extubation    Duoneb PRN if needed     CRITICAL CARE TIME SPENT: 35 minutes

## 2017-07-10 LAB
ANION GAP SERPL CALC-SCNC: 7 MMOL/L — SIGNIFICANT CHANGE UP (ref 5–17)
BASE EXCESS BLDA CALC-SCNC: 4.3 MMOL/L — HIGH (ref -2–2)
BASOPHILS # BLD AUTO: 0 K/UL — SIGNIFICANT CHANGE UP (ref 0–0.2)
BASOPHILS NFR BLD AUTO: 1 % — SIGNIFICANT CHANGE UP (ref 0–2)
BLOOD GAS COMMENTS ARTERIAL: SIGNIFICANT CHANGE UP
BUN SERPL-MCNC: 2 MG/DL — LOW (ref 7–18)
CALCIUM SERPL-MCNC: 8.2 MG/DL — LOW (ref 8.4–10.5)
CHLORIDE SERPL-SCNC: 106 MMOL/L — SIGNIFICANT CHANGE UP (ref 96–108)
CO2 SERPL-SCNC: 29 MMOL/L — SIGNIFICANT CHANGE UP (ref 22–31)
CREAT SERPL-MCNC: 0.44 MG/DL — LOW (ref 0.5–1.3)
EOSINOPHIL # BLD AUTO: 0.1 K/UL — SIGNIFICANT CHANGE UP (ref 0–0.5)
EOSINOPHIL NFR BLD AUTO: 2.7 % — SIGNIFICANT CHANGE UP (ref 0–6)
GLUCOSE SERPL-MCNC: 109 MG/DL — HIGH (ref 70–99)
HCO3 BLDA-SCNC: 28 MMOL/L — HIGH (ref 23–27)
HCT VFR BLD CALC: 38.2 % — LOW (ref 39–50)
HGB BLD-MCNC: 13.7 G/DL — SIGNIFICANT CHANGE UP (ref 13–17)
HOROWITZ INDEX BLDA+IHG-RTO: 30 — SIGNIFICANT CHANGE UP
LYMPHOCYTES # BLD AUTO: 0.8 K/UL — LOW (ref 1–3.3)
LYMPHOCYTES # BLD AUTO: 17.2 % — SIGNIFICANT CHANGE UP (ref 13–44)
MAGNESIUM SERPL-MCNC: 1.6 MG/DL — SIGNIFICANT CHANGE UP (ref 1.6–2.6)
MCHC RBC-ENTMCNC: 35.5 PG — HIGH (ref 27–34)
MCHC RBC-ENTMCNC: 36 GM/DL — SIGNIFICANT CHANGE UP (ref 32–36)
MCV RBC AUTO: 98.7 FL — SIGNIFICANT CHANGE UP (ref 80–100)
MONOCYTES # BLD AUTO: 0.5 K/UL — SIGNIFICANT CHANGE UP (ref 0–0.9)
MONOCYTES NFR BLD AUTO: 12.4 % — SIGNIFICANT CHANGE UP (ref 2–14)
NEUTROPHILS # BLD AUTO: 2.9 K/UL — SIGNIFICANT CHANGE UP (ref 1.8–7.4)
NEUTROPHILS NFR BLD AUTO: 66.6 % — SIGNIFICANT CHANGE UP (ref 43–77)
PCO2 BLDA: 40 MMHG — SIGNIFICANT CHANGE UP (ref 32–46)
PH BLDA: 7.46 — HIGH (ref 7.35–7.45)
PLATELET # BLD AUTO: 75 K/UL — LOW (ref 150–400)
PO2 BLDA: 97 MMHG — SIGNIFICANT CHANGE UP (ref 74–108)
POTASSIUM SERPL-MCNC: 3.3 MMOL/L — LOW (ref 3.5–5.3)
POTASSIUM SERPL-SCNC: 3.3 MMOL/L — LOW (ref 3.5–5.3)
RBC # BLD: 3.87 M/UL — LOW (ref 4.2–5.8)
RBC # FLD: 11.6 % — SIGNIFICANT CHANGE UP (ref 10.3–14.5)
SAO2 % BLDA: 98 % — HIGH (ref 92–96)
SODIUM SERPL-SCNC: 142 MMOL/L — SIGNIFICANT CHANGE UP (ref 135–145)
WBC # BLD: 4.4 K/UL — SIGNIFICANT CHANGE UP (ref 3.8–10.5)
WBC # FLD AUTO: 4.4 K/UL — SIGNIFICANT CHANGE UP (ref 3.8–10.5)

## 2017-07-10 RX ORDER — ENOXAPARIN SODIUM 100 MG/ML
40 INJECTION SUBCUTANEOUS DAILY
Qty: 0 | Refills: 0 | Status: DISCONTINUED | OUTPATIENT
Start: 2017-07-10 | End: 2017-07-22

## 2017-07-10 RX ORDER — HYDRALAZINE HCL 50 MG
10 TABLET ORAL ONCE
Qty: 0 | Refills: 0 | Status: COMPLETED | OUTPATIENT
Start: 2017-07-10 | End: 2017-07-10

## 2017-07-10 RX ORDER — POTASSIUM PHOSPHATE, MONOBASIC POTASSIUM PHOSPHATE, DIBASIC 236; 224 MG/ML; MG/ML
15 INJECTION, SOLUTION INTRAVENOUS ONCE
Qty: 0 | Refills: 0 | Status: COMPLETED | OUTPATIENT
Start: 2017-07-10 | End: 2017-07-10

## 2017-07-10 RX ORDER — POTASSIUM CHLORIDE 20 MEQ
10 PACKET (EA) ORAL
Qty: 0 | Refills: 0 | Status: COMPLETED | OUTPATIENT
Start: 2017-07-10 | End: 2017-07-10

## 2017-07-10 RX ORDER — INSULIN LISPRO 100/ML
VIAL (ML) SUBCUTANEOUS EVERY 6 HOURS
Qty: 0 | Refills: 0 | Status: DISCONTINUED | OUTPATIENT
Start: 2017-07-10 | End: 2017-07-15

## 2017-07-10 RX ORDER — SODIUM CHLORIDE 9 MG/ML
1000 INJECTION, SOLUTION INTRAVENOUS
Qty: 0 | Refills: 0 | Status: DISCONTINUED | OUTPATIENT
Start: 2017-07-10 | End: 2017-07-13

## 2017-07-10 RX ORDER — MAGNESIUM SULFATE 500 MG/ML
1 VIAL (ML) INJECTION ONCE
Qty: 0 | Refills: 0 | Status: COMPLETED | OUTPATIENT
Start: 2017-07-10 | End: 2017-07-10

## 2017-07-10 RX ADMIN — ALBUTEROL 2 PUFF(S): 90 AEROSOL, METERED ORAL at 13:57

## 2017-07-10 RX ADMIN — BUDESONIDE AND FORMOTEROL FUMARATE DIHYDRATE 2 PUFF(S): 160; 4.5 AEROSOL RESPIRATORY (INHALATION) at 13:58

## 2017-07-10 RX ADMIN — Medication 130 MILLIGRAM(S): at 17:47

## 2017-07-10 RX ADMIN — Medication 6 MG/HR: at 01:40

## 2017-07-10 RX ADMIN — Medication 1 PATCH: at 11:50

## 2017-07-10 RX ADMIN — HALOPERIDOL DECANOATE 5 MILLIGRAM(S): 100 INJECTION INTRAMUSCULAR at 13:50

## 2017-07-10 RX ADMIN — Medication 0.1 MILLIGRAM(S): at 06:36

## 2017-07-10 RX ADMIN — BUDESONIDE AND FORMOTEROL FUMARATE DIHYDRATE 2 PUFF(S): 160; 4.5 AEROSOL RESPIRATORY (INHALATION) at 21:11

## 2017-07-10 RX ADMIN — DEXMEDETOMIDINE HYDROCHLORIDE IN 0.9% SODIUM CHLORIDE 18.8 MICROGRAM(S)/KG/HR: 4 INJECTION INTRAVENOUS at 17:51

## 2017-07-10 RX ADMIN — Medication 130 MILLIGRAM(S): at 06:36

## 2017-07-10 RX ADMIN — HALOPERIDOL DECANOATE 5 MILLIGRAM(S): 100 INJECTION INTRAMUSCULAR at 22:12

## 2017-07-10 RX ADMIN — Medication 6 MG/HR: at 16:24

## 2017-07-10 RX ADMIN — DEXMEDETOMIDINE HYDROCHLORIDE IN 0.9% SODIUM CHLORIDE 18.8 MICROGRAM(S)/KG/HR: 4 INJECTION INTRAVENOUS at 05:09

## 2017-07-10 RX ADMIN — Medication 0.1 MILLIGRAM(S): at 13:46

## 2017-07-10 RX ADMIN — ALBUTEROL 2 PUFF(S): 90 AEROSOL, METERED ORAL at 21:10

## 2017-07-10 RX ADMIN — SODIUM CHLORIDE 150 MILLILITER(S): 9 INJECTION, SOLUTION INTRAVENOUS at 17:54

## 2017-07-10 RX ADMIN — Medication 10 MILLIGRAM(S): at 23:04

## 2017-07-10 RX ADMIN — DEXMEDETOMIDINE HYDROCHLORIDE IN 0.9% SODIUM CHLORIDE 18.8 MICROGRAM(S)/KG/HR: 4 INJECTION INTRAVENOUS at 07:31

## 2017-07-10 RX ADMIN — SODIUM CHLORIDE 150 MILLILITER(S): 9 INJECTION, SOLUTION INTRAVENOUS at 01:39

## 2017-07-10 RX ADMIN — DEXMEDETOMIDINE HYDROCHLORIDE IN 0.9% SODIUM CHLORIDE 18.8 MICROGRAM(S)/KG/HR: 4 INJECTION INTRAVENOUS at 22:12

## 2017-07-10 RX ADMIN — PROPOFOL 2.31 MICROGRAM(S)/KG/MIN: 10 INJECTION, EMULSION INTRAVENOUS at 01:39

## 2017-07-10 RX ADMIN — Medication 100 MILLIEQUIVALENT(S): at 11:51

## 2017-07-10 RX ADMIN — PROPOFOL 2.31 MICROGRAM(S)/KG/MIN: 10 INJECTION, EMULSION INTRAVENOUS at 06:36

## 2017-07-10 RX ADMIN — Medication 100 MILLIEQUIVALENT(S): at 13:57

## 2017-07-10 RX ADMIN — ENOXAPARIN SODIUM 40 MILLIGRAM(S): 100 INJECTION SUBCUTANEOUS at 13:45

## 2017-07-10 RX ADMIN — PROPOFOL 2.31 MICROGRAM(S)/KG/MIN: 10 INJECTION, EMULSION INTRAVENOUS at 09:32

## 2017-07-10 RX ADMIN — DEXMEDETOMIDINE HYDROCHLORIDE IN 0.9% SODIUM CHLORIDE 18.8 MICROGRAM(S)/KG/HR: 4 INJECTION INTRAVENOUS at 01:39

## 2017-07-10 RX ADMIN — POTASSIUM PHOSPHATE, MONOBASIC POTASSIUM PHOSPHATE, DIBASIC 62.5 MILLIMOLE(S): 236; 224 INJECTION, SOLUTION INTRAVENOUS at 11:50

## 2017-07-10 RX ADMIN — PANTOPRAZOLE SODIUM 40 MILLIGRAM(S): 20 TABLET, DELAYED RELEASE ORAL at 11:50

## 2017-07-10 RX ADMIN — HALOPERIDOL DECANOATE 5 MILLIGRAM(S): 100 INJECTION INTRAMUSCULAR at 06:36

## 2017-07-10 RX ADMIN — DEXMEDETOMIDINE HYDROCHLORIDE IN 0.9% SODIUM CHLORIDE 18.8 MICROGRAM(S)/KG/HR: 4 INJECTION INTRAVENOUS at 10:18

## 2017-07-10 RX ADMIN — ALBUTEROL 2 PUFF(S): 90 AEROSOL, METERED ORAL at 05:25

## 2017-07-10 RX ADMIN — Medication 0.1 MILLIGRAM(S): at 22:12

## 2017-07-10 RX ADMIN — SODIUM CHLORIDE 150 MILLILITER(S): 9 INJECTION, SOLUTION INTRAVENOUS at 06:36

## 2017-07-10 RX ADMIN — Medication 100 MILLIEQUIVALENT(S): at 09:32

## 2017-07-10 RX ADMIN — Medication 100 GRAM(S): at 11:52

## 2017-07-10 RX ADMIN — Medication 1 PATCH: at 11:54

## 2017-07-10 NOTE — PROGRESS NOTE ADULT - SUBJECTIVE AND OBJECTIVE BOX
INTERVAL HPI/OVERNIGHT EVENTS: Patient remains on sedation with Precedex, Propofol, and Ativan Infusions    PRESSORS: [ ] YES [X] NO    ANTIBIOTICS:                  DATE STARTED:    Cardiovascular:  cloNIDine 0.1 milliGRAM(s) Oral every 8 hours    Pulmonary:  buDESOnide 160 MICROgram(s)/formoterol 4.5 MICROgram(s) Inhaler 2 Puff(s) Inhalation two times a day  ALBUTerol    90 MICROgram(s) HFA Inhaler 2 Puff(s) Inhalation every 8 hours  diphenhydrAMINE   Injectable 50 milliGRAM(s) IV Push once    Hematalogic:    Other:  lactated ringers. 1000 milliLiter(s) IV Continuous <Continuous>  insulin lispro (HumaLOG) corrective regimen sliding scale   SubCutaneous three times a day before meals  nicotine - 21 mG/24Hr(s) Patch 1 patch Transdermal daily  PHENobarbital Injectable 130 milliGRAM(s) IV Push every 12 hours  propofol Infusion 5.126 MICROgram(s)/kG/Min IV Continuous <Continuous>  pantoprazole  Injectable 40 milliGRAM(s) IV Push daily  dexmedetomidine Infusion 1 MICROgram(s)/kG/Hr IV Continuous <Continuous>  sodium chloride 0.9% 1000 milliLiter(s) IV Continuous <Continuous>  haloperidol     Tablet 5 milliGRAM(s) Oral every 8 hours  LORazepam Infusion 6 mG/Hr IV Continuous <Continuous>    lactated ringers. 1000 milliLiter(s) IV Continuous <Continuous>  buDESOnide 160 MICROgram(s)/formoterol 4.5 MICROgram(s) Inhaler 2 Puff(s) Inhalation two times a day  ALBUTerol    90 MICROgram(s) HFA Inhaler 2 Puff(s) Inhalation every 8 hours  insulin lispro (HumaLOG) corrective regimen sliding scale   SubCutaneous three times a day before meals  nicotine - 21 mG/24Hr(s) Patch 1 patch Transdermal daily  diphenhydrAMINE   Injectable 50 milliGRAM(s) IV Push once  PHENobarbital Injectable 130 milliGRAM(s) IV Push every 12 hours  propofol Infusion 5.126 MICROgram(s)/kG/Min IV Continuous <Continuous>  pantoprazole  Injectable 40 milliGRAM(s) IV Push daily  dexmedetomidine Infusion 1 MICROgram(s)/kG/Hr IV Continuous <Continuous>  sodium chloride 0.9% 1000 milliLiter(s) IV Continuous <Continuous>  cloNIDine 0.1 milliGRAM(s) Oral every 8 hours  haloperidol     Tablet 5 milliGRAM(s) Oral every 8 hours  LORazepam Infusion 6 mG/Hr IV Continuous <Continuous>    Drug Dosing Weight  Height (cm): 182.88 (07 Jul 2017 09:46)  Weight (kg): 75.2 (07 Jul 2017 09:46)  BMI (kg/m2): 22.5 (07 Jul 2017 09:46)  BSA (m2): 1.97 (07 Jul 2017 09:46)    CENTRAL LINE: [ ] YES [X] NO      ACEVEDO: [X] YES [ ] NO    DATE INSERTED: 6/7  REMOVE:  [ ] YES [X] NO  EXPLAIN: Patient intubated and sedated    A-LINE:  [ ] YES [X] NO      PMH -reviewed admission note, no change since admission    ICU Vital Signs Last 24 Hrs  T(C): 37.4 (10 Jul 2017 04:00), Max: 37.4 (09 Jul 2017 15:58)  T(F): 99.3 (10 Jul 2017 04:00), Max: 99.3 (09 Jul 2017 15:58)  HR: 81 (10 Jul 2017 08:52) (70 - 88)  BP: 143/85 (10 Jul 2017 07:00) (127/79 - 165/96)  BP(mean): 97 (10 Jul 2017 07:00) (90 - 115)  ABP: --  ABP(mean): --  RR: 18 (10 Jul 2017 07:00) (16 - 24)  SpO2: 97% (10 Jul 2017 08:52) (96% - 100%)      ABG - ( 10 Jul 2017 05:10 )  pH: 7.46  /  pCO2: 40    /  pO2: 97    / HCO3: 28    / Base Excess: 4.3   /  SaO2: 98        07-09 @ 07:01  -  07-10 @ 07:00  --------------------------------------------------------  IN: 5729.1 mL / OUT: 5270 mL / NET: 459.1 mL    Mode: AC/ CMV (Assist Control/ Continuous Mandatory Ventilation)  RR (machine): 12  TV (machine): 450  FiO2: 30  PEEP: 5  ITime: 1  MAP: 12  PIP: 26      PHYSICAL EXAM:    GENERAL: lying in bed, intubated and sedated   HEAD: atraumatic, normocephalic   NECK: supple, no JVD     SKIN: warm, dry   CHEST/LUNG: ET tube: size 7.5, 23 cm at lip. Bilateral breath sounds present without any adventitious sounds  HEART: RRR, no m/r/g   ABDOMEN: soft, nontender, nondistended; bowel sounds present.  :acevedo catheter.  EXTREMITIES: +1 non-pitting edema, no cyanosis, no clubbing.  NEURO: Sedated    LABS:  CBC Full  -  ( 10 Jul 2017 05:13 )  WBC Count : 4.4 K/uL  Hemoglobin : 13.7 g/dL  Hematocrit : 38.2 %  Platelet Count - Automated : 75 K/uL  Mean Cell Volume : 98.7 fl  Mean Cell Hemoglobin : 35.5 pg  Mean Cell Hemoglobin Concentration : 36.0 gm/dL  Auto Neutrophil # : 2.9 K/uL  Auto Lymphocyte # : 0.8 K/uL  Auto Monocyte # : 0.5 K/uL  Auto Eosinophil # : 0.1 K/uL  Auto Basophil # : 0.0 K/uL  Auto Neutrophil % : 66.6 %  Auto Lymphocyte % : 17.2 %  Auto Monocyte % : 12.4 %  Auto Eosinophil % : 2.7 %  Auto Basophil % : 1.0 %    07-10    142  |  106  |  2<L>  ----------------------------<  109<H>  3.3<L>   |  29  |  0.44<L>    Ca    8.2<L>      10 Jul 2017 05:13  Phos  2.3     07-09  Mg     1.6     07-10    TPro  5.5<L>  /  Alb  2.3<L>  /  TBili  0.6  /  DBili  0.2  /  AST  78<H>  /  ALT  98<H>  /  AlkPhos  99  07-09      Assessment and Plan    1. Alcohol Withdrawal with Seizures  -[facility] protocol  -S/P Banana Bag; LR 150cc/hr  -C/W Ativan and Precedex Infusion; attempt to wean from propofol and plan for extubation  -C/W Phenobarbital and Haldol    2. Alcohol induced acute pancreatitis.    -IV fluids  -Pain control  -Repeat Lipase (Last was 3635 on 7/6)   -Consult social work     3. Diabetes Mellitus  -Continue HSS only as patient is NPO  -Adjust/Add lantus if FG are uncontrolled  -Diabetic Diet when patient can tolerate    4.History of Reactive Airway Disease   -Continue Proventil and Symbicort after extubation   -Duoneb PRN if needed     5. Prophylaxis  -SQ heparin and SCDs for DVT  -Protonix for Peptic Ulcers INTERVAL HPI/OVERNIGHT EVENTS: Patient remains on sedation with Precedex, Propofol, and Ativan Infusions    PRESSORS: [ ] YES [X] NO    ANTIBIOTICS:                  DATE STARTED:    Cardiovascular:  cloNIDine 0.1 milliGRAM(s) Oral every 8 hours    Pulmonary:  buDESOnide 160 MICROgram(s)/formoterol 4.5 MICROgram(s) Inhaler 2 Puff(s) Inhalation two times a day  ALBUTerol    90 MICROgram(s) HFA Inhaler 2 Puff(s) Inhalation every 8 hours  diphenhydrAMINE   Injectable 50 milliGRAM(s) IV Push once    Hematalogic:    Other:  lactated ringers. 1000 milliLiter(s) IV Continuous <Continuous>  insulin lispro (HumaLOG) corrective regimen sliding scale   SubCutaneous three times a day before meals  nicotine - 21 mG/24Hr(s) Patch 1 patch Transdermal daily  PHENobarbital Injectable 130 milliGRAM(s) IV Push every 12 hours  propofol Infusion 5.126 MICROgram(s)/kG/Min IV Continuous <Continuous>  pantoprazole  Injectable 40 milliGRAM(s) IV Push daily  dexmedetomidine Infusion 1 MICROgram(s)/kG/Hr IV Continuous <Continuous>  sodium chloride 0.9% 1000 milliLiter(s) IV Continuous <Continuous>  haloperidol     Tablet 5 milliGRAM(s) Oral every 8 hours  LORazepam Infusion 6 mG/Hr IV Continuous <Continuous>    lactated ringers. 1000 milliLiter(s) IV Continuous <Continuous>  buDESOnide 160 MICROgram(s)/formoterol 4.5 MICROgram(s) Inhaler 2 Puff(s) Inhalation two times a day  ALBUTerol    90 MICROgram(s) HFA Inhaler 2 Puff(s) Inhalation every 8 hours  insulin lispro (HumaLOG) corrective regimen sliding scale   SubCutaneous three times a day before meals  nicotine - 21 mG/24Hr(s) Patch 1 patch Transdermal daily  diphenhydrAMINE   Injectable 50 milliGRAM(s) IV Push once  PHENobarbital Injectable 130 milliGRAM(s) IV Push every 12 hours  propofol Infusion 5.126 MICROgram(s)/kG/Min IV Continuous <Continuous>  pantoprazole  Injectable 40 milliGRAM(s) IV Push daily  dexmedetomidine Infusion 1 MICROgram(s)/kG/Hr IV Continuous <Continuous>  sodium chloride 0.9% 1000 milliLiter(s) IV Continuous <Continuous>  cloNIDine 0.1 milliGRAM(s) Oral every 8 hours  haloperidol     Tablet 5 milliGRAM(s) Oral every 8 hours  LORazepam Infusion 6 mG/Hr IV Continuous <Continuous>    Drug Dosing Weight  Height (cm): 182.88 (07 Jul 2017 09:46)  Weight (kg): 75.2 (07 Jul 2017 09:46)  BMI (kg/m2): 22.5 (07 Jul 2017 09:46)  BSA (m2): 1.97 (07 Jul 2017 09:46)    CENTRAL LINE: [ ] YES [X] NO      ACEVEDO: [X] YES [ ] NO    DATE INSERTED: 6/7  REMOVE:  [ ] YES [X] NO  EXPLAIN: Patient intubated and sedated    A-LINE:  [ ] YES [X] NO      PMH -reviewed admission note, no change since admission    ICU Vital Signs Last 24 Hrs  T(C): 37.4 (10 Jul 2017 04:00), Max: 37.4 (09 Jul 2017 15:58)  T(F): 99.3 (10 Jul 2017 04:00), Max: 99.3 (09 Jul 2017 15:58)  HR: 81 (10 Jul 2017 08:52) (70 - 88)  BP: 143/85 (10 Jul 2017 07:00) (127/79 - 165/96)  BP(mean): 97 (10 Jul 2017 07:00) (90 - 115)  ABP: --  ABP(mean): --  RR: 18 (10 Jul 2017 07:00) (16 - 24)  SpO2: 97% (10 Jul 2017 08:52) (96% - 100%)      ABG - ( 10 Jul 2017 05:10 )  pH: 7.46  /  pCO2: 40    /  pO2: 97    / HCO3: 28    / Base Excess: 4.3   /  SaO2: 98        07-09 @ 07:01  -  07-10 @ 07:00  --------------------------------------------------------  IN: 5729.1 mL / OUT: 5270 mL / NET: 459.1 mL    Mode: AC/ CMV (Assist Control/ Continuous Mandatory Ventilation)  RR (machine): 12  TV (machine): 450  FiO2: 30  PEEP: 5  ITime: 1  MAP: 12  PIP: 26      PHYSICAL EXAM:    GENERAL: lying in bed, intubated and sedated   HEAD: atraumatic, normocephalic   NECK: supple, no JVD     SKIN: warm, dry   CHEST/LUNG: ET tube: size 7.5, 23 cm at lip. Bilateral breath sounds present without any adventitious sounds  HEART: RRR, no m/r/g   ABDOMEN: soft, nontender, nondistended; bowel sounds present.  :acevedo catheter.  EXTREMITIES: +1 non-pitting edema, no cyanosis, no clubbing.  NEURO: Sedated    LABS:  CBC Full  -  ( 10 Jul 2017 05:13 )  WBC Count : 4.4 K/uL  Hemoglobin : 13.7 g/dL  Hematocrit : 38.2 %  Platelet Count - Automated : 75 K/uL  Mean Cell Volume : 98.7 fl  Mean Cell Hemoglobin : 35.5 pg  Mean Cell Hemoglobin Concentration : 36.0 gm/dL  Auto Neutrophil # : 2.9 K/uL  Auto Lymphocyte # : 0.8 K/uL  Auto Monocyte # : 0.5 K/uL  Auto Eosinophil # : 0.1 K/uL  Auto Basophil # : 0.0 K/uL  Auto Neutrophil % : 66.6 %  Auto Lymphocyte % : 17.2 %  Auto Monocyte % : 12.4 %  Auto Eosinophil % : 2.7 %  Auto Basophil % : 1.0 %    07-10    142  |  106  |  2<L>  ----------------------------<  109<H>  3.3<L>   |  29  |  0.44<L>    Ca    8.2<L>      10 Jul 2017 05:13  Phos  2.3     07-09  Mg     1.6     07-10    TPro  5.5<L>  /  Alb  2.3<L>  /  TBili  0.6  /  DBili  0.2  /  AST  78<H>  /  ALT  98<H>  /  AlkPhos  99  07-09      Assessment and Plan    1. Alcohol Withdrawal with Seizures  -[facility] protocol  -C/W Banana Bag; LR 150cc/hr  -C/W Ativan and Precedex Infusion; attempt to wean from propofol and plan for extubation  -C/W Phenobarbital and Haldol    2. Alcohol induced acute pancreatitis.    -IV fluids  -Pain control  -Repeat Lipase (Last was 3635 on 7/6)   -Consult social work     3. Diabetes Mellitus  -Continue HSS only as patient is NPO  -Adjust/Add lantus if FG are uncontrolled  -Diabetic Diet when patient can tolerate    4.History of Reactive Airway Disease   -Continue Proventil and Symbicort after extubation   -Duoneb PRN if needed     5. Prophylaxis  -SQ heparin and SCDs for DVT  -Protonix for Peptic Ulcers INTERVAL HPI/OVERNIGHT EVENTS: Patient remains on sedation with Precedex, Propofol, and Ativan Infusions    PRESSORS: [ ] YES [X] NO    ANTIBIOTICS:                  DATE STARTED:    Cardiovascular:  cloNIDine 0.1 milliGRAM(s) Oral every 8 hours    Pulmonary:  buDESOnide 160 MICROgram(s)/formoterol 4.5 MICROgram(s) Inhaler 2 Puff(s) Inhalation two times a day  ALBUTerol    90 MICROgram(s) HFA Inhaler 2 Puff(s) Inhalation every 8 hours  diphenhydrAMINE   Injectable 50 milliGRAM(s) IV Push once    Hematalogic:    Other:  lactated ringers. 1000 milliLiter(s) IV Continuous <Continuous>  insulin lispro (HumaLOG) corrective regimen sliding scale   SubCutaneous three times a day before meals  nicotine - 21 mG/24Hr(s) Patch 1 patch Transdermal daily  PHENobarbital Injectable 130 milliGRAM(s) IV Push every 12 hours  propofol Infusion 5.126 MICROgram(s)/kG/Min IV Continuous <Continuous>  pantoprazole  Injectable 40 milliGRAM(s) IV Push daily  dexmedetomidine Infusion 1 MICROgram(s)/kG/Hr IV Continuous <Continuous>  sodium chloride 0.9% 1000 milliLiter(s) IV Continuous <Continuous>  haloperidol     Tablet 5 milliGRAM(s) Oral every 8 hours  LORazepam Infusion 6 mG/Hr IV Continuous <Continuous>    lactated ringers. 1000 milliLiter(s) IV Continuous <Continuous>  buDESOnide 160 MICROgram(s)/formoterol 4.5 MICROgram(s) Inhaler 2 Puff(s) Inhalation two times a day  ALBUTerol    90 MICROgram(s) HFA Inhaler 2 Puff(s) Inhalation every 8 hours  insulin lispro (HumaLOG) corrective regimen sliding scale   SubCutaneous three times a day before meals  nicotine - 21 mG/24Hr(s) Patch 1 patch Transdermal daily  diphenhydrAMINE   Injectable 50 milliGRAM(s) IV Push once  PHENobarbital Injectable 130 milliGRAM(s) IV Push every 12 hours  propofol Infusion 5.126 MICROgram(s)/kG/Min IV Continuous <Continuous>  pantoprazole  Injectable 40 milliGRAM(s) IV Push daily  dexmedetomidine Infusion 1 MICROgram(s)/kG/Hr IV Continuous <Continuous>  sodium chloride 0.9% 1000 milliLiter(s) IV Continuous <Continuous>  cloNIDine 0.1 milliGRAM(s) Oral every 8 hours  haloperidol     Tablet 5 milliGRAM(s) Oral every 8 hours  LORazepam Infusion 6 mG/Hr IV Continuous <Continuous>    Drug Dosing Weight  Height (cm): 182.88 (07 Jul 2017 09:46)  Weight (kg): 75.2 (07 Jul 2017 09:46)  BMI (kg/m2): 22.5 (07 Jul 2017 09:46)  BSA (m2): 1.97 (07 Jul 2017 09:46)    CENTRAL LINE: [ ] YES [X] NO      ACEVEDO: [X] YES [ ] NO    DATE INSERTED: 6/7  REMOVE:  [ ] YES [X] NO  EXPLAIN: Patient intubated and sedated    A-LINE:  [ ] YES [X] NO      PMH -reviewed admission note, no change since admission    ICU Vital Signs Last 24 Hrs  T(C): 37.4 (10 Jul 2017 04:00), Max: 37.4 (09 Jul 2017 15:58)  T(F): 99.3 (10 Jul 2017 04:00), Max: 99.3 (09 Jul 2017 15:58)  HR: 81 (10 Jul 2017 08:52) (70 - 88)  BP: 143/85 (10 Jul 2017 07:00) (127/79 - 165/96)  BP(mean): 97 (10 Jul 2017 07:00) (90 - 115)  ABP: --  ABP(mean): --  RR: 18 (10 Jul 2017 07:00) (16 - 24)  SpO2: 97% (10 Jul 2017 08:52) (96% - 100%)      ABG - ( 10 Jul 2017 05:10 )  pH: 7.46  /  pCO2: 40    /  pO2: 97    / HCO3: 28    / Base Excess: 4.3   /  SaO2: 98        07-09 @ 07:01  -  07-10 @ 07:00  --------------------------------------------------------  IN: 5729.1 mL / OUT: 5270 mL / NET: 459.1 mL    Mode: AC/ CMV (Assist Control/ Continuous Mandatory Ventilation)  RR (machine): 12  TV (machine): 450  FiO2: 30  PEEP: 5  ITime: 1  MAP: 12  PIP: 26      PHYSICAL EXAM:    GENERAL: lying in bed, intubated and sedated   HEAD: atraumatic, normocephalic   NECK: supple, no JVD     SKIN: warm, dry   CHEST/LUNG: ET tube: size 7.5, 23 cm at lip. Bilateral breath sounds present without any adventitious sounds  HEART: RRR, no m/r/g   ABDOMEN: soft, nontender, nondistended; bowel sounds present.  :acevedo catheter.  EXTREMITIES: +1 non-pitting edema, no cyanosis, no clubbing.  NEURO: Sedated    LABS:  CBC Full  -  ( 10 Jul 2017 05:13 )  WBC Count : 4.4 K/uL  Hemoglobin : 13.7 g/dL  Hematocrit : 38.2 %  Platelet Count - Automated : 75 K/uL  Mean Cell Volume : 98.7 fl  Mean Cell Hemoglobin : 35.5 pg  Mean Cell Hemoglobin Concentration : 36.0 gm/dL  Auto Neutrophil # : 2.9 K/uL  Auto Lymphocyte # : 0.8 K/uL  Auto Monocyte # : 0.5 K/uL  Auto Eosinophil # : 0.1 K/uL  Auto Basophil # : 0.0 K/uL  Auto Neutrophil % : 66.6 %  Auto Lymphocyte % : 17.2 %  Auto Monocyte % : 12.4 %  Auto Eosinophil % : 2.7 %  Auto Basophil % : 1.0 %    07-10    142  |  106  |  2<L>  ----------------------------<  109<H>  3.3<L>   |  29  |  0.44<L>    Ca    8.2<L>      10 Jul 2017 05:13  Phos  2.3     07-09  Mg     1.6     07-10    TPro  5.5<L>  /  Alb  2.3<L>  /  TBili  0.6  /  DBili  0.2  /  AST  78<H>  /  ALT  98<H>  /  AlkPhos  99  07-09      Assessment and Plan    1. Alcohol Withdrawal with Seizures  -[facility] protocol  -C/W Banana Bag; LR 150cc/hr  -C/W Ativan and Precedex Infusion; attempt to wean from propofol and plan for extubation  -C/W Phenobarbital and Haldol    2. Alcohol induced acute pancreatitis.    -IV fluids  -Pain control  -Repeat Lipase (Last was 3635 on 7/6)   -Consult social work     3. Hyperglycemia now under control  -C/W Sliding Scale   -Jevity 1.5 at rate of 40    4.History of Reactive Airway Disease likely due to smoking  -Continue Proventil and Symbicort after extubation   -Duoneb PRN if needed     5. Prophylaxis  -SQ heparin and SCDs for DVT  -Protonix for Peptic Ulcers INTERVAL HPI/OVERNIGHT EVENTS: Patient remains on sedation with Precedex, Propofol, and Ativan Infusions    PRESSORS: [ ] YES [X] NO    ANTIBIOTICS:                  DATE STARTED:    Cardiovascular:  cloNIDine 0.1 milliGRAM(s) Oral every 8 hours    Pulmonary:  buDESOnide 160 MICROgram(s)/formoterol 4.5 MICROgram(s) Inhaler 2 Puff(s) Inhalation two times a day  ALBUTerol    90 MICROgram(s) HFA Inhaler 2 Puff(s) Inhalation every 8 hours  diphenhydrAMINE   Injectable 50 milliGRAM(s) IV Push once    Hematalogic:    Other:  lactated ringers. 1000 milliLiter(s) IV Continuous <Continuous>  insulin lispro (HumaLOG) corrective regimen sliding scale   SubCutaneous three times a day before meals  nicotine - 21 mG/24Hr(s) Patch 1 patch Transdermal daily  PHENobarbital Injectable 130 milliGRAM(s) IV Push every 12 hours  propofol Infusion 5.126 MICROgram(s)/kG/Min IV Continuous <Continuous>  pantoprazole  Injectable 40 milliGRAM(s) IV Push daily  dexmedetomidine Infusion 1 MICROgram(s)/kG/Hr IV Continuous <Continuous>  sodium chloride 0.9% 1000 milliLiter(s) IV Continuous <Continuous>  haloperidol     Tablet 5 milliGRAM(s) Oral every 8 hours  LORazepam Infusion 6 mG/Hr IV Continuous <Continuous>    lactated ringers. 1000 milliLiter(s) IV Continuous <Continuous>  buDESOnide 160 MICROgram(s)/formoterol 4.5 MICROgram(s) Inhaler 2 Puff(s) Inhalation two times a day  ALBUTerol    90 MICROgram(s) HFA Inhaler 2 Puff(s) Inhalation every 8 hours  insulin lispro (HumaLOG) corrective regimen sliding scale   SubCutaneous three times a day before meals  nicotine - 21 mG/24Hr(s) Patch 1 patch Transdermal daily  diphenhydrAMINE   Injectable 50 milliGRAM(s) IV Push once  PHENobarbital Injectable 130 milliGRAM(s) IV Push every 12 hours  propofol Infusion 5.126 MICROgram(s)/kG/Min IV Continuous <Continuous>  pantoprazole  Injectable 40 milliGRAM(s) IV Push daily  dexmedetomidine Infusion 1 MICROgram(s)/kG/Hr IV Continuous <Continuous>  sodium chloride 0.9% 1000 milliLiter(s) IV Continuous <Continuous>  cloNIDine 0.1 milliGRAM(s) Oral every 8 hours  haloperidol     Tablet 5 milliGRAM(s) Oral every 8 hours  LORazepam Infusion 6 mG/Hr IV Continuous <Continuous>    Drug Dosing Weight  Height (cm): 182.88 (07 Jul 2017 09:46)  Weight (kg): 75.2 (07 Jul 2017 09:46)  BMI (kg/m2): 22.5 (07 Jul 2017 09:46)  BSA (m2): 1.97 (07 Jul 2017 09:46)    CENTRAL LINE: [ ] YES [X] NO      ACEVEDO: [X] YES [ ] NO    DATE INSERTED: 6/7  REMOVE:  [ ] YES [X] NO  EXPLAIN: Patient intubated and sedated    A-LINE:  [ ] YES [X] NO      PMH -reviewed admission note, no change since admission    ICU Vital Signs Last 24 Hrs  T(C): 37.4 (10 Jul 2017 04:00), Max: 37.4 (09 Jul 2017 15:58)  T(F): 99.3 (10 Jul 2017 04:00), Max: 99.3 (09 Jul 2017 15:58)  HR: 81 (10 Jul 2017 08:52) (70 - 88)  BP: 143/85 (10 Jul 2017 07:00) (127/79 - 165/96)  BP(mean): 97 (10 Jul 2017 07:00) (90 - 115)  ABP: --  ABP(mean): --  RR: 18 (10 Jul 2017 07:00) (16 - 24)  SpO2: 97% (10 Jul 2017 08:52) (96% - 100%)      ABG - ( 10 Jul 2017 05:10 )  pH: 7.46  /  pCO2: 40    /  pO2: 97    / HCO3: 28    / Base Excess: 4.3   /  SaO2: 98        07-09 @ 07:01  -  07-10 @ 07:00  --------------------------------------------------------  IN: 5729.1 mL / OUT: 5270 mL / NET: 459.1 mL    Mode: AC/ CMV (Assist Control/ Continuous Mandatory Ventilation)  RR (machine): 12  TV (machine): 450  FiO2: 30  PEEP: 5  ITime: 1  MAP: 12  PIP: 26      PHYSICAL EXAM:    GENERAL: lying in bed, intubated and sedated   HEAD: atraumatic, normocephalic   NECK: supple, no JVD     SKIN: warm, dry   CHEST/LUNG: ET tube: size 7.5, 23 cm at lip. Bilateral breath sounds present without any adventitious sounds  HEART: RRR, no m/r/g   ABDOMEN: soft, nontender, nondistended; bowel sounds present.  :acevedo catheter.  EXTREMITIES: +1 non-pitting edema, no cyanosis, no clubbing.  NEURO: Sedated    LABS:  CBC Full  -  ( 10 Jul 2017 05:13 )  WBC Count : 4.4 K/uL  Hemoglobin : 13.7 g/dL  Hematocrit : 38.2 %  Platelet Count - Automated : 75 K/uL  Mean Cell Volume : 98.7 fl  Mean Cell Hemoglobin : 35.5 pg  Mean Cell Hemoglobin Concentration : 36.0 gm/dL  Auto Neutrophil # : 2.9 K/uL  Auto Lymphocyte # : 0.8 K/uL  Auto Monocyte # : 0.5 K/uL  Auto Eosinophil # : 0.1 K/uL  Auto Basophil # : 0.0 K/uL  Auto Neutrophil % : 66.6 %  Auto Lymphocyte % : 17.2 %  Auto Monocyte % : 12.4 %  Auto Eosinophil % : 2.7 %  Auto Basophil % : 1.0 %    07-10    142  |  106  |  2<L>  ----------------------------<  109<H>  3.3<L>   |  29  |  0.44<L>    Ca    8.2<L>      10 Jul 2017 05:13  Phos  2.3     07-09  Mg     1.6     07-10    TPro  5.5<L>  /  Alb  2.3<L>  /  TBili  0.6  /  DBili  0.2  /  AST  78<H>  /  ALT  98<H>  /  AlkPhos  99  07-09      Assessment and Plan    1. Alcohol Withdrawal with Seizures  -[facility] protocol  -C/W Banana Bag; LR 150cc/hr  -C/W Ativan and Precedex Infusion; attempt to wean from propofol and plan for extubation  -C/W Phenobarbital and Haldol    2. Alcohol induced acute pancreatitis.    -IV fluids  -Pain control  -Lipase trending down (3635 on 7/6 -> 1032 on 7/10)   -Consult social work     3. Hyperglycemia now under control  -C/W Sliding Scale   -Jevity 1.5 at rate of 40    4.History of Reactive Airway Disease likely due to smoking  -Continue Proventil and Symbicort after extubation   -Duoneb PRN if needed     5. Prophylaxis  -SQ heparin and SCDs for DVT  -Protonix for Peptic Ulcers INTERVAL HPI/OVERNIGHT EVENTS: Patient remains on sedation with Precedex, Propofol, and Ativan Infusions    PRESSORS: [ ] YES [X] NO    ANTIBIOTICS:                  DATE STARTED:    Cardiovascular:  cloNIDine 0.1 milliGRAM(s) Oral every 8 hours    Pulmonary:  buDESOnide 160 MICROgram(s)/formoterol 4.5 MICROgram(s) Inhaler 2 Puff(s) Inhalation two times a day  ALBUTerol    90 MICROgram(s) HFA Inhaler 2 Puff(s) Inhalation every 8 hours  diphenhydrAMINE   Injectable 50 milliGRAM(s) IV Push once    Hematalogic:    Other:  lactated ringers. 1000 milliLiter(s) IV Continuous <Continuous>  insulin lispro (HumaLOG) corrective regimen sliding scale   SubCutaneous three times a day before meals  nicotine - 21 mG/24Hr(s) Patch 1 patch Transdermal daily  PHENobarbital Injectable 130 milliGRAM(s) IV Push every 12 hours  propofol Infusion 5.126 MICROgram(s)/kG/Min IV Continuous <Continuous>  pantoprazole  Injectable 40 milliGRAM(s) IV Push daily  dexmedetomidine Infusion 1 MICROgram(s)/kG/Hr IV Continuous <Continuous>  sodium chloride 0.9% 1000 milliLiter(s) IV Continuous <Continuous>  haloperidol     Tablet 5 milliGRAM(s) Oral every 8 hours  LORazepam Infusion 6 mG/Hr IV Continuous <Continuous>    lactated ringers. 1000 milliLiter(s) IV Continuous <Continuous>  buDESOnide 160 MICROgram(s)/formoterol 4.5 MICROgram(s) Inhaler 2 Puff(s) Inhalation two times a day  ALBUTerol    90 MICROgram(s) HFA Inhaler 2 Puff(s) Inhalation every 8 hours  insulin lispro (HumaLOG) corrective regimen sliding scale   SubCutaneous three times a day before meals  nicotine - 21 mG/24Hr(s) Patch 1 patch Transdermal daily  diphenhydrAMINE   Injectable 50 milliGRAM(s) IV Push once  PHENobarbital Injectable 130 milliGRAM(s) IV Push every 12 hours  propofol Infusion 5.126 MICROgram(s)/kG/Min IV Continuous <Continuous>  pantoprazole  Injectable 40 milliGRAM(s) IV Push daily  dexmedetomidine Infusion 1 MICROgram(s)/kG/Hr IV Continuous <Continuous>  sodium chloride 0.9% 1000 milliLiter(s) IV Continuous <Continuous>  cloNIDine 0.1 milliGRAM(s) Oral every 8 hours  haloperidol     Tablet 5 milliGRAM(s) Oral every 8 hours  LORazepam Infusion 6 mG/Hr IV Continuous <Continuous>    Drug Dosing Weight  Height (cm): 182.88 (07 Jul 2017 09:46)  Weight (kg): 75.2 (07 Jul 2017 09:46)  BMI (kg/m2): 22.5 (07 Jul 2017 09:46)  BSA (m2): 1.97 (07 Jul 2017 09:46)    CENTRAL LINE: [ ] YES [X] NO      ACEVEDO: [X] YES [ ] NO    DATE INSERTED: 6/7  REMOVE:  [ ] YES [X] NO  EXPLAIN: Patient intubated and sedated    A-LINE:  [ ] YES [X] NO      PMH -reviewed admission note, no change since admission    ICU Vital Signs Last 24 Hrs  T(C): 37.4 (10 Jul 2017 04:00), Max: 37.4 (09 Jul 2017 15:58)  T(F): 99.3 (10 Jul 2017 04:00), Max: 99.3 (09 Jul 2017 15:58)  HR: 81 (10 Jul 2017 08:52) (70 - 88)  BP: 143/85 (10 Jul 2017 07:00) (127/79 - 165/96)  BP(mean): 97 (10 Jul 2017 07:00) (90 - 115)  ABP: --  ABP(mean): --  RR: 18 (10 Jul 2017 07:00) (16 - 24)  SpO2: 97% (10 Jul 2017 08:52) (96% - 100%)      ABG - ( 10 Jul 2017 05:10 )  pH: 7.46  /  pCO2: 40    /  pO2: 97    / HCO3: 28    / Base Excess: 4.3   /  SaO2: 98        07-09 @ 07:01  -  07-10 @ 07:00  --------------------------------------------------------  IN: 5729.1 mL / OUT: 5270 mL / NET: 459.1 mL    Mode: AC/ CMV (Assist Control/ Continuous Mandatory Ventilation)  RR (machine): 12  TV (machine): 450  FiO2: 30  PEEP: 5  ITime: 1  MAP: 12  PIP: 26      PHYSICAL EXAM:    GENERAL: lying in bed, intubated and sedated   HEAD: atraumatic, normocephalic   NECK: supple, no JVD     SKIN: warm, dry   CHEST/LUNG: ET tube: size 7.5, 23 cm at lip. Bilateral breath sounds present without any adventitious sounds  HEART: RRR, no m/r/g   ABDOMEN: soft, nontender, nondistended; bowel sounds present.  :acevedo catheter.  EXTREMITIES: +1 non-pitting edema, no cyanosis, no clubbing.  NEURO: Sedated    LABS:  CBC Full  -  ( 10 Jul 2017 05:13 )  WBC Count : 4.4 K/uL  Hemoglobin : 13.7 g/dL  Hematocrit : 38.2 %  Platelet Count - Automated : 75 K/uL  Mean Cell Volume : 98.7 fl  Mean Cell Hemoglobin : 35.5 pg  Mean Cell Hemoglobin Concentration : 36.0 gm/dL  Auto Neutrophil # : 2.9 K/uL  Auto Lymphocyte # : 0.8 K/uL  Auto Monocyte # : 0.5 K/uL  Auto Eosinophil # : 0.1 K/uL  Auto Basophil # : 0.0 K/uL  Auto Neutrophil % : 66.6 %  Auto Lymphocyte % : 17.2 %  Auto Monocyte % : 12.4 %  Auto Eosinophil % : 2.7 %  Auto Basophil % : 1.0 %    07-10    142  |  106  |  2<L>  ----------------------------<  109<H>  3.3<L>   |  29  |  0.44<L>    Ca    8.2<L>      10 Jul 2017 05:13  Phos  2.3     07-09  Mg     1.6     07-10    TPro  5.5<L>  /  Alb  2.3<L>  /  TBili  0.6  /  DBili  0.2  /  AST  78<H>  /  ALT  98<H>  /  AlkPhos  99  07-09      Assessment and Plan    36 y/o M with Hx of Alcoholism, hepatic steatosis, pancreatitis, and reactive airway disease, likely secondary to smoking, admitted to the ICU for management of withdrawal causing severe agitation not amendable to 6mg Ativan and CIWA of 21.     1. Alcoholic Withdrawal with Seizures  -C/W Ventilation support due to sedation with Propofol, Ativan, and Precedex  -Attempt to wean from propofol and plan for extubation tomorrow   -C/W Phenobarbital and Haldol  -CIWA protocol  -C/W Banana Bag; LR 150cc/hr    2. Alcohol induced acute pancreatitis.    -IV fluids  -Pain control  -Lipase trending down (3635 on 7/6 -> 1032 on 7/10)   -Consult social work     3. Hyperglycemia now under control  -C/W Sliding Scale   -Jevity 1.5 at rate of 40    4.History of Reactive Airway Disease likely due to smoking  -Continue Proventil and Symbicort after extubation   -Duoneb PRN if needed     5. Prophylaxis  -SQ heparin and SCDs for DVT  -Protonix for Peptic Ulcers

## 2017-07-11 LAB
ALBUMIN SERPL ELPH-MCNC: 2.3 G/DL — LOW (ref 3.5–5)
ALP SERPL-CCNC: 113 U/L — SIGNIFICANT CHANGE UP (ref 40–120)
ALT FLD-CCNC: 72 U/L DA — HIGH (ref 10–60)
ANION GAP SERPL CALC-SCNC: 5 MMOL/L — SIGNIFICANT CHANGE UP (ref 5–17)
ANION GAP SERPL CALC-SCNC: 9 MMOL/L — SIGNIFICANT CHANGE UP (ref 5–17)
AST SERPL-CCNC: 46 U/L — HIGH (ref 10–40)
BASE EXCESS BLDA CALC-SCNC: 5.1 MMOL/L — HIGH (ref -2–2)
BILIRUB SERPL-MCNC: 0.4 MG/DL — SIGNIFICANT CHANGE UP (ref 0.2–1.2)
BLOOD GAS COMMENTS ARTERIAL: SIGNIFICANT CHANGE UP
BUN SERPL-MCNC: 3 MG/DL — LOW (ref 7–18)
BUN SERPL-MCNC: 4 MG/DL — LOW (ref 7–18)
CALCIUM SERPL-MCNC: 8.5 MG/DL — SIGNIFICANT CHANGE UP (ref 8.4–10.5)
CALCIUM SERPL-MCNC: 8.6 MG/DL — SIGNIFICANT CHANGE UP (ref 8.4–10.5)
CHLORIDE SERPL-SCNC: 106 MMOL/L — SIGNIFICANT CHANGE UP (ref 96–108)
CHLORIDE SERPL-SCNC: 107 MMOL/L — SIGNIFICANT CHANGE UP (ref 96–108)
CO2 SERPL-SCNC: 27 MMOL/L — SIGNIFICANT CHANGE UP (ref 22–31)
CO2 SERPL-SCNC: 29 MMOL/L — SIGNIFICANT CHANGE UP (ref 22–31)
CREAT SERPL-MCNC: 0.42 MG/DL — LOW (ref 0.5–1.3)
CREAT SERPL-MCNC: 0.45 MG/DL — LOW (ref 0.5–1.3)
EOSINOPHIL NFR BLD AUTO: 2 % — SIGNIFICANT CHANGE UP (ref 0–6)
GLUCOSE SERPL-MCNC: 118 MG/DL — HIGH (ref 70–99)
GLUCOSE SERPL-MCNC: 130 MG/DL — HIGH (ref 70–99)
HCO3 BLDA-SCNC: 28 MMOL/L — HIGH (ref 23–27)
HCT VFR BLD CALC: 39.8 % — SIGNIFICANT CHANGE UP (ref 39–50)
HGB BLD-MCNC: 13.7 G/DL — SIGNIFICANT CHANGE UP (ref 13–17)
HOROWITZ INDEX BLDA+IHG-RTO: 30 — SIGNIFICANT CHANGE UP
LYMPHOCYTES # BLD AUTO: 18 % — SIGNIFICANT CHANGE UP (ref 13–44)
MAGNESIUM SERPL-MCNC: 1.6 MG/DL — SIGNIFICANT CHANGE UP (ref 1.6–2.6)
MAGNESIUM SERPL-MCNC: 1.7 MG/DL — SIGNIFICANT CHANGE UP (ref 1.6–2.6)
MCHC RBC-ENTMCNC: 34.1 PG — HIGH (ref 27–34)
MCHC RBC-ENTMCNC: 34.4 GM/DL — SIGNIFICANT CHANGE UP (ref 32–36)
MCV RBC AUTO: 99.1 FL — SIGNIFICANT CHANGE UP (ref 80–100)
MONOCYTES NFR BLD AUTO: 18 % — HIGH (ref 2–14)
NEUTROPHILS NFR BLD AUTO: 56 % — SIGNIFICANT CHANGE UP (ref 43–77)
PCO2 BLDA: 36 MMHG — SIGNIFICANT CHANGE UP (ref 32–46)
PH BLDA: 7.5 — HIGH (ref 7.35–7.45)
PHOSPHATE SERPL-MCNC: 2.9 MG/DL — SIGNIFICANT CHANGE UP (ref 2.5–4.5)
PHOSPHATE SERPL-MCNC: 3.9 MG/DL — SIGNIFICANT CHANGE UP (ref 2.5–4.5)
PLATELET # BLD AUTO: 128 K/UL — LOW (ref 150–400)
PO2 BLDA: 70 MMHG — LOW (ref 74–108)
POTASSIUM SERPL-MCNC: 3.3 MMOL/L — LOW (ref 3.5–5.3)
POTASSIUM SERPL-MCNC: 4.1 MMOL/L — SIGNIFICANT CHANGE UP (ref 3.5–5.3)
POTASSIUM SERPL-SCNC: 3.3 MMOL/L — LOW (ref 3.5–5.3)
POTASSIUM SERPL-SCNC: 4.1 MMOL/L — SIGNIFICANT CHANGE UP (ref 3.5–5.3)
PROT SERPL-MCNC: 6.1 G/DL — SIGNIFICANT CHANGE UP (ref 6–8.3)
RBC # BLD: 4.01 M/UL — LOW (ref 4.2–5.8)
RBC # FLD: 11.1 % — SIGNIFICANT CHANGE UP (ref 10.3–14.5)
SAO2 % BLDA: 95 % — SIGNIFICANT CHANGE UP (ref 92–96)
SODIUM SERPL-SCNC: 141 MMOL/L — SIGNIFICANT CHANGE UP (ref 135–145)
SODIUM SERPL-SCNC: 142 MMOL/L — SIGNIFICANT CHANGE UP (ref 135–145)
WBC # BLD: 4.3 K/UL — SIGNIFICANT CHANGE UP (ref 3.8–10.5)
WBC # FLD AUTO: 4.3 K/UL — SIGNIFICANT CHANGE UP (ref 3.8–10.5)

## 2017-07-11 PROCEDURE — 71010: CPT | Mod: 26

## 2017-07-11 RX ORDER — QUETIAPINE FUMARATE 200 MG/1
25 TABLET, FILM COATED ORAL EVERY OTHER DAY
Qty: 0 | Refills: 0 | Status: DISCONTINUED | OUTPATIENT
Start: 2017-07-11 | End: 2017-07-20

## 2017-07-11 RX ORDER — FENTANYL CITRATE 50 UG/ML
50 INJECTION INTRAVENOUS EVERY 4 HOURS
Qty: 0 | Refills: 0 | Status: DISCONTINUED | OUTPATIENT
Start: 2017-07-11 | End: 2017-07-14

## 2017-07-11 RX ORDER — SENNA PLUS 8.6 MG/1
10 TABLET ORAL
Qty: 0 | Refills: 0 | Status: DISCONTINUED | OUTPATIENT
Start: 2017-07-11 | End: 2017-07-11

## 2017-07-11 RX ORDER — QUETIAPINE FUMARATE 200 MG/1
25 TABLET, FILM COATED ORAL DAILY
Qty: 0 | Refills: 0 | Status: DISCONTINUED | OUTPATIENT
Start: 2017-07-11 | End: 2017-07-11

## 2017-07-11 RX ORDER — POTASSIUM CHLORIDE 20 MEQ
40 PACKET (EA) ORAL ONCE
Qty: 0 | Refills: 0 | Status: DISCONTINUED | OUTPATIENT
Start: 2017-07-11 | End: 2017-07-11

## 2017-07-11 RX ORDER — POTASSIUM CHLORIDE 20 MEQ
40 PACKET (EA) ORAL ONCE
Qty: 0 | Refills: 0 | Status: COMPLETED | OUTPATIENT
Start: 2017-07-11 | End: 2017-07-11

## 2017-07-11 RX ORDER — DEXMEDETOMIDINE HYDROCHLORIDE IN 0.9% SODIUM CHLORIDE 4 UG/ML
1 INJECTION INTRAVENOUS
Qty: 200 | Refills: 0 | Status: DISCONTINUED | OUTPATIENT
Start: 2017-07-11 | End: 2017-07-14

## 2017-07-11 RX ORDER — PANTOPRAZOLE SODIUM 20 MG/1
40 TABLET, DELAYED RELEASE ORAL DAILY
Qty: 0 | Refills: 0 | Status: DISCONTINUED | OUTPATIENT
Start: 2017-07-11 | End: 2017-07-14

## 2017-07-11 RX ORDER — POTASSIUM CHLORIDE 20 MEQ
10 PACKET (EA) ORAL
Qty: 0 | Refills: 0 | Status: COMPLETED | OUTPATIENT
Start: 2017-07-11 | End: 2017-07-11

## 2017-07-11 RX ORDER — PROPOFOL 10 MG/ML
5.13 INJECTION, EMULSION INTRAVENOUS
Qty: 1000 | Refills: 0 | Status: DISCONTINUED | OUTPATIENT
Start: 2017-07-11 | End: 2017-07-14

## 2017-07-11 RX ADMIN — Medication 0.1 MILLIGRAM(S): at 13:51

## 2017-07-11 RX ADMIN — Medication 1.25 MILLIGRAM(S): at 02:20

## 2017-07-11 RX ADMIN — FENTANYL CITRATE 50 MICROGRAM(S): 50 INJECTION INTRAVENOUS at 17:45

## 2017-07-11 RX ADMIN — Medication 100 MILLIEQUIVALENT(S): at 09:43

## 2017-07-11 RX ADMIN — ALBUTEROL 2 PUFF(S): 90 AEROSOL, METERED ORAL at 13:31

## 2017-07-11 RX ADMIN — FENTANYL CITRATE 50 MICROGRAM(S): 50 INJECTION INTRAVENOUS at 13:51

## 2017-07-11 RX ADMIN — DEXMEDETOMIDINE HYDROCHLORIDE IN 0.9% SODIUM CHLORIDE 18.8 MICROGRAM(S)/KG/HR: 4 INJECTION INTRAVENOUS at 12:30

## 2017-07-11 RX ADMIN — FENTANYL CITRATE 50 MICROGRAM(S): 50 INJECTION INTRAVENOUS at 22:05

## 2017-07-11 RX ADMIN — SODIUM CHLORIDE 150 MILLILITER(S): 9 INJECTION, SOLUTION INTRAVENOUS at 15:57

## 2017-07-11 RX ADMIN — ENOXAPARIN SODIUM 40 MILLIGRAM(S): 100 INJECTION SUBCUTANEOUS at 11:49

## 2017-07-11 RX ADMIN — BUDESONIDE AND FORMOTEROL FUMARATE DIHYDRATE 2 PUFF(S): 160; 4.5 AEROSOL RESPIRATORY (INHALATION) at 13:31

## 2017-07-11 RX ADMIN — PROPOFOL 2.31 MICROGRAM(S)/KG/MIN: 10 INJECTION, EMULSION INTRAVENOUS at 11:02

## 2017-07-11 RX ADMIN — SODIUM CHLORIDE 150 MILLILITER(S): 9 INJECTION, SOLUTION INTRAVENOUS at 09:43

## 2017-07-11 RX ADMIN — Medication 40 MILLIEQUIVALENT(S): at 09:42

## 2017-07-11 RX ADMIN — DEXMEDETOMIDINE HYDROCHLORIDE IN 0.9% SODIUM CHLORIDE 18.8 MICROGRAM(S)/KG/HR: 4 INJECTION INTRAVENOUS at 19:15

## 2017-07-11 RX ADMIN — DEXMEDETOMIDINE HYDROCHLORIDE IN 0.9% SODIUM CHLORIDE 18.8 MICROGRAM(S)/KG/HR: 4 INJECTION INTRAVENOUS at 02:23

## 2017-07-11 RX ADMIN — PROPOFOL 2.31 MICROGRAM(S)/KG/MIN: 10 INJECTION, EMULSION INTRAVENOUS at 02:20

## 2017-07-11 RX ADMIN — SODIUM CHLORIDE 150 MILLILITER(S): 9 INJECTION, SOLUTION INTRAVENOUS at 22:20

## 2017-07-11 RX ADMIN — DEXMEDETOMIDINE HYDROCHLORIDE IN 0.9% SODIUM CHLORIDE 18.8 MICROGRAM(S)/KG/HR: 4 INJECTION INTRAVENOUS at 21:34

## 2017-07-11 RX ADMIN — FENTANYL CITRATE 50 MICROGRAM(S): 50 INJECTION INTRAVENOUS at 10:19

## 2017-07-11 RX ADMIN — Medication 6 MG/HR: at 05:40

## 2017-07-11 RX ADMIN — ALBUTEROL 2 PUFF(S): 90 AEROSOL, METERED ORAL at 20:20

## 2017-07-11 RX ADMIN — ALBUTEROL 2 PUFF(S): 90 AEROSOL, METERED ORAL at 05:34

## 2017-07-11 RX ADMIN — Medication 1 PATCH: at 11:46

## 2017-07-11 RX ADMIN — BUDESONIDE AND FORMOTEROL FUMARATE DIHYDRATE 2 PUFF(S): 160; 4.5 AEROSOL RESPIRATORY (INHALATION) at 20:21

## 2017-07-11 RX ADMIN — Medication 0.1 MILLIGRAM(S): at 21:34

## 2017-07-11 RX ADMIN — Medication 1: at 05:39

## 2017-07-11 RX ADMIN — Medication 130 MILLIGRAM(S): at 17:33

## 2017-07-11 RX ADMIN — FENTANYL CITRATE 50 MICROGRAM(S): 50 INJECTION INTRAVENOUS at 14:21

## 2017-07-11 RX ADMIN — FENTANYL CITRATE 50 MICROGRAM(S): 50 INJECTION INTRAVENOUS at 18:15

## 2017-07-11 RX ADMIN — Medication 1 PATCH: at 11:50

## 2017-07-11 RX ADMIN — DEXMEDETOMIDINE HYDROCHLORIDE IN 0.9% SODIUM CHLORIDE 18.8 MICROGRAM(S)/KG/HR: 4 INJECTION INTRAVENOUS at 07:54

## 2017-07-11 RX ADMIN — Medication 100 MILLIEQUIVALENT(S): at 08:43

## 2017-07-11 RX ADMIN — FENTANYL CITRATE 50 MICROGRAM(S): 50 INJECTION INTRAVENOUS at 09:49

## 2017-07-11 RX ADMIN — Medication 0.1 MILLIGRAM(S): at 05:26

## 2017-07-11 RX ADMIN — FENTANYL CITRATE 50 MICROGRAM(S): 50 INJECTION INTRAVENOUS at 21:33

## 2017-07-11 RX ADMIN — DEXMEDETOMIDINE HYDROCHLORIDE IN 0.9% SODIUM CHLORIDE 18.8 MICROGRAM(S)/KG/HR: 4 INJECTION INTRAVENOUS at 14:59

## 2017-07-11 RX ADMIN — Medication 100 MILLIEQUIVALENT(S): at 07:41

## 2017-07-11 RX ADMIN — DEXMEDETOMIDINE HYDROCHLORIDE IN 0.9% SODIUM CHLORIDE 18.8 MICROGRAM(S)/KG/HR: 4 INJECTION INTRAVENOUS at 05:23

## 2017-07-11 RX ADMIN — PANTOPRAZOLE SODIUM 40 MILLIGRAM(S): 20 TABLET, DELAYED RELEASE ORAL at 11:48

## 2017-07-11 RX ADMIN — SODIUM CHLORIDE 150 MILLILITER(S): 9 INJECTION, SOLUTION INTRAVENOUS at 07:55

## 2017-07-11 RX ADMIN — SODIUM CHLORIDE 150 MILLILITER(S): 9 INJECTION, SOLUTION INTRAVENOUS at 02:26

## 2017-07-11 RX ADMIN — QUETIAPINE FUMARATE 25 MILLIGRAM(S): 200 TABLET, FILM COATED ORAL at 11:49

## 2017-07-11 RX ADMIN — Medication 130 MILLIGRAM(S): at 05:31

## 2017-07-11 RX ADMIN — HALOPERIDOL DECANOATE 5 MILLIGRAM(S): 100 INJECTION INTRAMUSCULAR at 05:26

## 2017-07-11 NOTE — PHYSICAL THERAPY INITIAL EVALUATION ADULT - CRITERIA FOR SKILLED THERAPEUTIC INTERVENTIONS
predicted duration of therapy intervention/rehab potential/therapy frequency/impairments found/anticipated discharge recommendation

## 2017-07-11 NOTE — PHYSICAL THERAPY INITIAL EVALUATION ADULT - GENERAL OBSERVATIONS, REHAB EVAL
patient received in ICU,intubated. PEG tube,cardiac monitor, Givens's catheter, Z-flex boots, wrist restrain in place.

## 2017-07-11 NOTE — PROGRESS NOTE ADULT - SUBJECTIVE AND OBJECTIVE BOX
INTERVAL HPI/OVERNIGHT EVENTS: Patient remains sedated on Propofol, Precedex, and Ativan infusions    PRESSORS: [ ] YES [X] NO    Antimicrobial:    Cardiovascular:  cloNIDine 0.1 milliGRAM(s) Oral every 8 hours    Pulmonary:  buDESOnide 160 MICROgram(s)/formoterol 4.5 MICROgram(s) Inhaler 2 Puff(s) Inhalation two times a day  ALBUTerol    90 MICROgram(s) HFA Inhaler 2 Puff(s) Inhalation every 8 hours  diphenhydrAMINE   Injectable 50 milliGRAM(s) IV Push once    Hematalogic:  enoxaparin Injectable 40 milliGRAM(s) SubCutaneous daily    Other:  lactated ringers. 1000 milliLiter(s) IV Continuous <Continuous>  nicotine - 21 mG/24Hr(s) Patch 1 patch Transdermal daily  PHENobarbital Injectable 130 milliGRAM(s) IV Push every 12 hours  propofol Infusion 5.126 MICROgram(s)/kG/Min IV Continuous <Continuous>  pantoprazole  Injectable 40 milliGRAM(s) IV Push daily  dexmedetomidine Infusion 1 MICROgram(s)/kG/Hr IV Continuous <Continuous>  haloperidol     Tablet 5 milliGRAM(s) Oral every 8 hours  LORazepam Infusion 6 mG/Hr IV Continuous <Continuous>  sodium chloride 0.9% 1000 milliLiter(s) IV Continuous <Continuous>  insulin lispro (HumaLOG) corrective regimen sliding scale   SubCutaneous every 6 hours  potassium chloride  10 mEq/100 mL IVPB 10 milliEquivalent(s) IV Intermittent every 1 hour    lactated ringers. 1000 milliLiter(s) IV Continuous <Continuous>  buDESOnide 160 MICROgram(s)/formoterol 4.5 MICROgram(s) Inhaler 2 Puff(s) Inhalation two times a day  ALBUTerol    90 MICROgram(s) HFA Inhaler 2 Puff(s) Inhalation every 8 hours  nicotine - 21 mG/24Hr(s) Patch 1 patch Transdermal daily  diphenhydrAMINE   Injectable 50 milliGRAM(s) IV Push once  PHENobarbital Injectable 130 milliGRAM(s) IV Push every 12 hours  propofol Infusion 5.126 MICROgram(s)/kG/Min IV Continuous <Continuous>  pantoprazole  Injectable 40 milliGRAM(s) IV Push daily  dexmedetomidine Infusion 1 MICROgram(s)/kG/Hr IV Continuous <Continuous>  cloNIDine 0.1 milliGRAM(s) Oral every 8 hours  haloperidol     Tablet 5 milliGRAM(s) Oral every 8 hours  LORazepam Infusion 6 mG/Hr IV Continuous <Continuous>  enoxaparin Injectable 40 milliGRAM(s) SubCutaneous daily  sodium chloride 0.9% 1000 milliLiter(s) IV Continuous <Continuous>  insulin lispro (HumaLOG) corrective regimen sliding scale   SubCutaneous every 6 hours  potassium chloride  10 mEq/100 mL IVPB 10 milliEquivalent(s) IV Intermittent every 1 hour    Drug Dosing Weight  Height (cm): 182.88 (07 Jul 2017 09:46)  Weight (kg): 75.2 (07 Jul 2017 09:46)  BMI (kg/m2): 22.5 (07 Jul 2017 09:46)  BSA (m2): 1.97 (07 Jul 2017 09:46)    CENTRAL LINE: [ ] YES [X] NO     ACEVEDO: [X] YES [ ] NO    DATE INSERTED: 6/7  REMOVE:  [ ] YES [X] NO  EXPLAIN: Patient intubated and sedated    A-LINE:  [ ] YES [X] NO     PMH -reviewed admission note, no change since admission    ICU Vital Signs Last 24 Hrs  T(C): 36.9 (11 Jul 2017 05:00), Max: 37.8 (10 Jul 2017 15:00)  T(F): 98.4 (11 Jul 2017 05:00), Max: 100 (10 Jul 2017 15:00)  HR: 75 (11 Jul 2017 07:00) (70 - 99)  BP: 143/93 (11 Jul 2017 07:00) (125/77 - 185/97)  BP(mean): 105 (11 Jul 2017 07:00) (87 - 120)  ABP: --  ABP(mean): --  RR: 17 (11 Jul 2017 07:00) (16 - 32)  SpO2: 99% (11 Jul 2017 07:00) (95% - 100%)      ABG - ( 11 Jul 2017 05:03 )  pH: 7.50  /  pCO2: 36    /  pO2: 70    / HCO3: 28    / Base Excess: 5.1   /  SaO2: 95        07-10 @ 07:01  -  07-11 @ 07:00  --------------------------------------------------------  IN: 5028.1 mL / OUT: 4775 mL / NET: 253.1 mL    Mode: AC/ CMV (Assist Control/ Continuous Mandatory Ventilation)  RR (machine): 12  TV (machine): 450  FiO2: 30  PEEP: 5  ITime: 1  MAP: 9.3  PIP: 21      PHYSICAL EXAM:    GENERAL: lying in bed, sedated   HEAD: atraumatic, normocephalic   NECK: supple, no JVD    SKIN: warm, dry   CHEST/LUNG: ET tube: size 7.5, 21 cm at lip. Bilateral breath sounds present without any adventitious sounds  HEART: RRR, no m/r/g   ABDOMEN: soft, nontender, nondistended; bowel sounds present.  :acevedo catheter.  EXTREMITIES: +1 non-pitting edema, no cyanosis, no clubbing.    LABS:  CBC Full  -  ( 11 Jul 2017 06:21 )  WBC Count : 4.3 K/uL  Hemoglobin : 13.7 g/dL  Hematocrit : 39.8 %  Platelet Count - Automated : 128 K/uL  Mean Cell Volume : 99.1 fl  Mean Cell Hemoglobin : 34.1 pg  Mean Cell Hemoglobin Concentration : 34.4 gm/dL  Auto Neutrophil # : x  Auto Lymphocyte # : x  Auto Monocyte # : x  Auto Eosinophil # : x  Auto Basophil # : x  Auto Neutrophil % : x  Auto Lymphocyte % : x  Auto Monocyte % : x  Auto Eosinophil % : x  Auto Basophil % : x    07-11    142  |  106  |  3<L>  ----------------------------<  130<H>  3.3<L>   |  27  |  0.45<L>    Ca    8.6      11 Jul 2017 06:21  Phos  2.9     07-11  Mg     1.7     07-11    TPro  6.1  /  Alb  2.3<L>  /  TBili  0.4  /  DBili  x   /  AST  46<H>  /  ALT  72<H>  /  AlkPhos  113  07-11    Assessment and Plan    38 y/o M with Hx of Alcoholism, hepatic steatosis, pancreatitis, and reactive airway disease, likely secondary to smoking, admitted to the ICU for management of withdrawal causing severe agitation not amendable to 6mg Ativan and CIWA of 21.     1. Alcoholic Withdrawal with Seizures  -Plan for weaning from sedation and ventilation today, as tolerated by patient  -C/W Phenobarbital and Haldol  -CIWA protocol    2. Alcohol induced acute pancreatitis.    -IV fluids  -Pain control  -Lipase trending down (3635 on 7/6 -> 1032 on 7/10)   -Consult social work     3. Persistent Hypokalemia, likely due to alcoholism and poor oral intake  -10mEq of Potassium x 3 repleted      4. Hyperglycemia now under control  -C/W Sliding Scale   -Jevity 1.5 at rate of 30    5.History of Reactive Airway Disease likely due to smoking  -Continue Proventil and Symbicort after extubation   -Duoneb PRN if needed     6. Prophylaxis  -Lovenox and SCDs for DVT  -Protonix for Peptic Ulcers INTERVAL HPI/OVERNIGHT EVENTS: Patient remains sedated on Propofol, Precedex, and Ativan infusions    PRESSORS: [ ] YES [X] NO    Antimicrobial:    Cardiovascular:  cloNIDine 0.1 milliGRAM(s) Oral every 8 hours    Pulmonary:  buDESOnide 160 MICROgram(s)/formoterol 4.5 MICROgram(s) Inhaler 2 Puff(s) Inhalation two times a day  ALBUTerol    90 MICROgram(s) HFA Inhaler 2 Puff(s) Inhalation every 8 hours  diphenhydrAMINE   Injectable 50 milliGRAM(s) IV Push once    Hematalogic:  enoxaparin Injectable 40 milliGRAM(s) SubCutaneous daily    Other:  lactated ringers. 1000 milliLiter(s) IV Continuous <Continuous>  nicotine - 21 mG/24Hr(s) Patch 1 patch Transdermal daily  PHENobarbital Injectable 130 milliGRAM(s) IV Push every 12 hours  propofol Infusion 5.126 MICROgram(s)/kG/Min IV Continuous <Continuous>  pantoprazole  Injectable 40 milliGRAM(s) IV Push daily  dexmedetomidine Infusion 1 MICROgram(s)/kG/Hr IV Continuous <Continuous>  haloperidol     Tablet 5 milliGRAM(s) Oral every 8 hours  LORazepam Infusion 6 mG/Hr IV Continuous <Continuous>  sodium chloride 0.9% 1000 milliLiter(s) IV Continuous <Continuous>  insulin lispro (HumaLOG) corrective regimen sliding scale   SubCutaneous every 6 hours  potassium chloride  10 mEq/100 mL IVPB 10 milliEquivalent(s) IV Intermittent every 1 hour    lactated ringers. 1000 milliLiter(s) IV Continuous <Continuous>  buDESOnide 160 MICROgram(s)/formoterol 4.5 MICROgram(s) Inhaler 2 Puff(s) Inhalation two times a day  ALBUTerol    90 MICROgram(s) HFA Inhaler 2 Puff(s) Inhalation every 8 hours  nicotine - 21 mG/24Hr(s) Patch 1 patch Transdermal daily  diphenhydrAMINE   Injectable 50 milliGRAM(s) IV Push once  PHENobarbital Injectable 130 milliGRAM(s) IV Push every 12 hours  propofol Infusion 5.126 MICROgram(s)/kG/Min IV Continuous <Continuous>  pantoprazole  Injectable 40 milliGRAM(s) IV Push daily  dexmedetomidine Infusion 1 MICROgram(s)/kG/Hr IV Continuous <Continuous>  cloNIDine 0.1 milliGRAM(s) Oral every 8 hours  haloperidol     Tablet 5 milliGRAM(s) Oral every 8 hours  LORazepam Infusion 6 mG/Hr IV Continuous <Continuous>  enoxaparin Injectable 40 milliGRAM(s) SubCutaneous daily  sodium chloride 0.9% 1000 milliLiter(s) IV Continuous <Continuous>  insulin lispro (HumaLOG) corrective regimen sliding scale   SubCutaneous every 6 hours  potassium chloride  10 mEq/100 mL IVPB 10 milliEquivalent(s) IV Intermittent every 1 hour    Drug Dosing Weight  Height (cm): 182.88 (07 Jul 2017 09:46)  Weight (kg): 75.2 (07 Jul 2017 09:46)  BMI (kg/m2): 22.5 (07 Jul 2017 09:46)  BSA (m2): 1.97 (07 Jul 2017 09:46)    CENTRAL LINE: [ ] YES [X] NO     ACEVEDO: [X] YES [ ] NO    DATE INSERTED: 6/7  REMOVE:  [ ] YES [X] NO  EXPLAIN: Patient intubated and sedated    A-LINE:  [ ] YES [X] NO     PMH -reviewed admission note, no change since admission    ICU Vital Signs Last 24 Hrs  T(C): 36.9 (11 Jul 2017 05:00), Max: 37.8 (10 Jul 2017 15:00)  T(F): 98.4 (11 Jul 2017 05:00), Max: 100 (10 Jul 2017 15:00)  HR: 75 (11 Jul 2017 07:00) (70 - 99)  BP: 143/93 (11 Jul 2017 07:00) (125/77 - 185/97)  BP(mean): 105 (11 Jul 2017 07:00) (87 - 120)  ABP: --  ABP(mean): --  RR: 17 (11 Jul 2017 07:00) (16 - 32)  SpO2: 99% (11 Jul 2017 07:00) (95% - 100%)      ABG - ( 11 Jul 2017 05:03 )  pH: 7.50  /  pCO2: 36    /  pO2: 70    / HCO3: 28    / Base Excess: 5.1   /  SaO2: 95        07-10 @ 07:01  -  07-11 @ 07:00  --------------------------------------------------------  IN: 5028.1 mL / OUT: 4775 mL / NET: 253.1 mL    Mode: AC/ CMV (Assist Control/ Continuous Mandatory Ventilation)  RR (machine): 12  TV (machine): 450  FiO2: 30  PEEP: 5  ITime: 1  MAP: 9.3  PIP: 21      PHYSICAL EXAM:    GENERAL: lying in bed, sedated   HEAD: atraumatic, normocephalic   NECK: supple, no JVD    SKIN: warm, dry   CHEST/LUNG: ET tube: size 7.5, 21 cm at lip. Bilateral breath sounds present without any adventitious sounds  HEART: RRR, no m/r/g   ABDOMEN: soft, nontender, nondistended; bowel sounds present.  :acevedo catheter.  EXTREMITIES: +1 non-pitting edema, no cyanosis, no clubbing.    LABS:  CBC Full  -  ( 11 Jul 2017 06:21 )  WBC Count : 4.3 K/uL  Hemoglobin : 13.7 g/dL  Hematocrit : 39.8 %  Platelet Count - Automated : 128 K/uL  Mean Cell Volume : 99.1 fl  Mean Cell Hemoglobin : 34.1 pg  Mean Cell Hemoglobin Concentration : 34.4 gm/dL  Auto Neutrophil # : x  Auto Lymphocyte # : x  Auto Monocyte # : x  Auto Eosinophil # : x  Auto Basophil # : x  Auto Neutrophil % : x  Auto Lymphocyte % : x  Auto Monocyte % : x  Auto Eosinophil % : x  Auto Basophil % : x    07-11    142  |  106  |  3<L>  ----------------------------<  130<H>  3.3<L>   |  27  |  0.45<L>    Ca    8.6      11 Jul 2017 06:21  Phos  2.9     07-11  Mg     1.7     07-11    TPro  6.1  /  Alb  2.3<L>  /  TBili  0.4  /  DBili  x   /  AST  46<H>  /  ALT  72<H>  /  AlkPhos  113  07-11    Assessment and Plan    36 y/o M with Hx of Alcoholism, hepatic steatosis, pancreatitis, and reactive airway disease, likely secondary to smoking, admitted to the ICU for management of withdrawal causing severe agitation not amendable to 6mg Ativan and CIWA of 21.     1. Respiratory Failure secondary to sedation medication infusion for alcohol withdrawal   -C/W Propofol and Precedex; D/C Ativan Infusion  -Fentanyl 25mg q4 hours  -C/W AC Ventilation   -C/W Phenobarbital, switch Haldol to 25mg Seroquel BID  -[facility] Protocol    2. Alcohol induced acute pancreatitis.    -IV fluids  -Pain control  -Lipase trending down (3635 on 7/6 -> 1032 on 7/10)   -Consult social work     3. Persistent Hypokalemia, likely due to alcoholism and poor oral intake  -10mEq of Potassium x 3 repleted      4. Hyperglycemia now under control  -C/W Sliding Scale   -Jevity 1.5 at rate of 30    5.History of Reactive Airway Disease likely due to smoking  -Continue Proventil and Symbicort after extubation   -Duoneb PRN if needed     6. Prophylaxis  -Lovenox and SCDs for DVT  -Protonix for Peptic Ulcers

## 2017-07-12 LAB
ALBUMIN SERPL ELPH-MCNC: 2.3 G/DL — LOW (ref 3.5–5)
ALP SERPL-CCNC: 125 U/L — HIGH (ref 40–120)
ALT FLD-CCNC: 67 U/L DA — HIGH (ref 10–60)
ANION GAP SERPL CALC-SCNC: 8 MMOL/L — SIGNIFICANT CHANGE UP (ref 5–17)
APPEARANCE UR: CLEAR — SIGNIFICANT CHANGE UP
AST SERPL-CCNC: 42 U/L — HIGH (ref 10–40)
BASE EXCESS BLDA CALC-SCNC: 3.5 MMOL/L — HIGH (ref -2–2)
BASOPHILS # BLD AUTO: 0.1 K/UL — SIGNIFICANT CHANGE UP (ref 0–0.2)
BASOPHILS NFR BLD AUTO: 1.8 % — SIGNIFICANT CHANGE UP (ref 0–2)
BILIRUB SERPL-MCNC: 0.4 MG/DL — SIGNIFICANT CHANGE UP (ref 0.2–1.2)
BILIRUB UR-MCNC: NEGATIVE — SIGNIFICANT CHANGE UP
BLOOD GAS COMMENTS ARTERIAL: SIGNIFICANT CHANGE UP
BUN SERPL-MCNC: 6 MG/DL — LOW (ref 7–18)
CALCIUM SERPL-MCNC: 8.5 MG/DL — SIGNIFICANT CHANGE UP (ref 8.4–10.5)
CHLORIDE SERPL-SCNC: 107 MMOL/L — SIGNIFICANT CHANGE UP (ref 96–108)
CHLORIDE UR-SCNC: 101 MMOL/L — SIGNIFICANT CHANGE UP (ref 55–125)
CO2 SERPL-SCNC: 28 MMOL/L — SIGNIFICANT CHANGE UP (ref 22–31)
COLOR SPEC: YELLOW — SIGNIFICANT CHANGE UP
CREAT SERPL-MCNC: 0.44 MG/DL — LOW (ref 0.5–1.3)
DIFF PNL FLD: NEGATIVE — SIGNIFICANT CHANGE UP
EOSINOPHIL # BLD AUTO: 0.1 K/UL — SIGNIFICANT CHANGE UP (ref 0–0.5)
EOSINOPHIL NFR BLD AUTO: 3.1 % — SIGNIFICANT CHANGE UP (ref 0–6)
GLUCOSE SERPL-MCNC: 121 MG/DL — HIGH (ref 70–99)
GLUCOSE UR QL: NEGATIVE — SIGNIFICANT CHANGE UP
HCO3 BLDA-SCNC: 28 MMOL/L — HIGH (ref 23–27)
HCT VFR BLD CALC: 39.4 % — SIGNIFICANT CHANGE UP (ref 39–50)
HGB BLD-MCNC: 13.6 G/DL — SIGNIFICANT CHANGE UP (ref 13–17)
HOROWITZ INDEX BLDA+IHG-RTO: 30 — SIGNIFICANT CHANGE UP
KETONES UR-MCNC: NEGATIVE — SIGNIFICANT CHANGE UP
LEUKOCYTE ESTERASE UR-ACNC: NEGATIVE — SIGNIFICANT CHANGE UP
LYMPHOCYTES # BLD AUTO: 1 K/UL — SIGNIFICANT CHANGE UP (ref 1–3.3)
LYMPHOCYTES # BLD AUTO: 23.3 % — SIGNIFICANT CHANGE UP (ref 13–44)
MAGNESIUM SERPL-MCNC: 1.7 MG/DL — SIGNIFICANT CHANGE UP (ref 1.6–2.6)
MCHC RBC-ENTMCNC: 34.5 GM/DL — SIGNIFICANT CHANGE UP (ref 32–36)
MCHC RBC-ENTMCNC: 34.6 PG — HIGH (ref 27–34)
MCV RBC AUTO: 100.1 FL — HIGH (ref 80–100)
MONOCYTES # BLD AUTO: 0.8 K/UL — SIGNIFICANT CHANGE UP (ref 0–0.9)
MONOCYTES NFR BLD AUTO: 17.7 % — HIGH (ref 2–14)
NEUTROPHILS # BLD AUTO: 2.3 K/UL — SIGNIFICANT CHANGE UP (ref 1.8–7.4)
NEUTROPHILS NFR BLD AUTO: 54.1 % — SIGNIFICANT CHANGE UP (ref 43–77)
NITRITE UR-MCNC: NEGATIVE — SIGNIFICANT CHANGE UP
OSMOLALITY UR: 313 MOS/KG — SIGNIFICANT CHANGE UP (ref 50–1200)
PCO2 BLDA: 42 MMHG — SIGNIFICANT CHANGE UP (ref 32–46)
PH BLDA: 7.44 — SIGNIFICANT CHANGE UP (ref 7.35–7.45)
PH UR: 8 — SIGNIFICANT CHANGE UP (ref 5–8)
PHOSPHATE SERPL-MCNC: 3.6 MG/DL — SIGNIFICANT CHANGE UP (ref 2.5–4.5)
PLATELET # BLD AUTO: 144 K/UL — LOW (ref 150–400)
PO2 BLDA: 84 MMHG — SIGNIFICANT CHANGE UP (ref 74–108)
POTASSIUM SERPL-MCNC: 3.8 MMOL/L — SIGNIFICANT CHANGE UP (ref 3.5–5.3)
POTASSIUM SERPL-SCNC: 3.8 MMOL/L — SIGNIFICANT CHANGE UP (ref 3.5–5.3)
PROT SERPL-MCNC: 6.2 G/DL — SIGNIFICANT CHANGE UP (ref 6–8.3)
PROT UR-MCNC: NEGATIVE — SIGNIFICANT CHANGE UP
RBC # BLD: 3.94 M/UL — LOW (ref 4.2–5.8)
RBC # FLD: 11.5 % — SIGNIFICANT CHANGE UP (ref 10.3–14.5)
SAO2 % BLDA: 96 % — SIGNIFICANT CHANGE UP (ref 92–96)
SODIUM SERPL-SCNC: 143 MMOL/L — SIGNIFICANT CHANGE UP (ref 135–145)
SODIUM UR-SCNC: 111 MMOL/L — SIGNIFICANT CHANGE UP (ref 40–220)
SP GR SPEC: 1.01 — SIGNIFICANT CHANGE UP (ref 1.01–1.02)
UROBILINOGEN FLD QL: NEGATIVE — SIGNIFICANT CHANGE UP
WBC # BLD: 4.3 K/UL — SIGNIFICANT CHANGE UP (ref 3.8–10.5)
WBC # FLD AUTO: 4.3 K/UL — SIGNIFICANT CHANGE UP (ref 3.8–10.5)

## 2017-07-12 PROCEDURE — 71010: CPT | Mod: 26

## 2017-07-12 RX ORDER — PHENOBARBITAL 60 MG
130 TABLET ORAL EVERY 12 HOURS
Qty: 0 | Refills: 0 | Status: DISCONTINUED | OUTPATIENT
Start: 2017-07-15 | End: 2017-07-18

## 2017-07-12 RX ADMIN — PANTOPRAZOLE SODIUM 40 MILLIGRAM(S): 20 TABLET, DELAYED RELEASE ORAL at 11:04

## 2017-07-12 RX ADMIN — FENTANYL CITRATE 50 MICROGRAM(S): 50 INJECTION INTRAVENOUS at 21:00

## 2017-07-12 RX ADMIN — FENTANYL CITRATE 50 MICROGRAM(S): 50 INJECTION INTRAVENOUS at 13:40

## 2017-07-12 RX ADMIN — FENTANYL CITRATE 50 MICROGRAM(S): 50 INJECTION INTRAVENOUS at 21:04

## 2017-07-12 RX ADMIN — DEXMEDETOMIDINE HYDROCHLORIDE IN 0.9% SODIUM CHLORIDE 18.8 MICROGRAM(S)/KG/HR: 4 INJECTION INTRAVENOUS at 12:40

## 2017-07-12 RX ADMIN — DEXMEDETOMIDINE HYDROCHLORIDE IN 0.9% SODIUM CHLORIDE 18.8 MICROGRAM(S)/KG/HR: 4 INJECTION INTRAVENOUS at 15:56

## 2017-07-12 RX ADMIN — FENTANYL CITRATE 50 MICROGRAM(S): 50 INJECTION INTRAVENOUS at 17:25

## 2017-07-12 RX ADMIN — Medication 130 MILLIGRAM(S): at 17:26

## 2017-07-12 RX ADMIN — Medication 0.1 MILLIGRAM(S): at 13:05

## 2017-07-12 RX ADMIN — SODIUM CHLORIDE 150 MILLILITER(S): 9 INJECTION, SOLUTION INTRAVENOUS at 18:42

## 2017-07-12 RX ADMIN — ALBUTEROL 2 PUFF(S): 90 AEROSOL, METERED ORAL at 21:19

## 2017-07-12 RX ADMIN — FENTANYL CITRATE 50 MICROGRAM(S): 50 INJECTION INTRAVENOUS at 10:34

## 2017-07-12 RX ADMIN — FENTANYL CITRATE 50 MICROGRAM(S): 50 INJECTION INTRAVENOUS at 06:52

## 2017-07-12 RX ADMIN — DEXMEDETOMIDINE HYDROCHLORIDE IN 0.9% SODIUM CHLORIDE 18.8 MICROGRAM(S)/KG/HR: 4 INJECTION INTRAVENOUS at 10:17

## 2017-07-12 RX ADMIN — FENTANYL CITRATE 50 MICROGRAM(S): 50 INJECTION INTRAVENOUS at 13:05

## 2017-07-12 RX ADMIN — FENTANYL CITRATE 50 MICROGRAM(S): 50 INJECTION INTRAVENOUS at 06:22

## 2017-07-12 RX ADMIN — FENTANYL CITRATE 50 MICROGRAM(S): 50 INJECTION INTRAVENOUS at 02:30

## 2017-07-12 RX ADMIN — QUETIAPINE FUMARATE 25 MILLIGRAM(S): 200 TABLET, FILM COATED ORAL at 12:40

## 2017-07-12 RX ADMIN — Medication 1 PATCH: at 11:08

## 2017-07-12 RX ADMIN — DEXMEDETOMIDINE HYDROCHLORIDE IN 0.9% SODIUM CHLORIDE 18.8 MICROGRAM(S)/KG/HR: 4 INJECTION INTRAVENOUS at 02:08

## 2017-07-12 RX ADMIN — ENOXAPARIN SODIUM 40 MILLIGRAM(S): 100 INJECTION SUBCUTANEOUS at 11:04

## 2017-07-12 RX ADMIN — ALBUTEROL 2 PUFF(S): 90 AEROSOL, METERED ORAL at 02:59

## 2017-07-12 RX ADMIN — FENTANYL CITRATE 50 MICROGRAM(S): 50 INJECTION INTRAVENOUS at 17:46

## 2017-07-12 RX ADMIN — FENTANYL CITRATE 50 MICROGRAM(S): 50 INJECTION INTRAVENOUS at 02:10

## 2017-07-12 RX ADMIN — Medication 0.1 MILLIGRAM(S): at 06:17

## 2017-07-12 RX ADMIN — Medication 0.1 MILLIGRAM(S): at 21:03

## 2017-07-12 RX ADMIN — DEXMEDETOMIDINE HYDROCHLORIDE IN 0.9% SODIUM CHLORIDE 18.8 MICROGRAM(S)/KG/HR: 4 INJECTION INTRAVENOUS at 18:42

## 2017-07-12 RX ADMIN — Medication 1 PATCH: at 11:04

## 2017-07-12 RX ADMIN — ALBUTEROL 2 PUFF(S): 90 AEROSOL, METERED ORAL at 13:30

## 2017-07-12 RX ADMIN — Medication 130 MILLIGRAM(S): at 06:24

## 2017-07-12 RX ADMIN — PROPOFOL 2.31 MICROGRAM(S)/KG/MIN: 10 INJECTION, EMULSION INTRAVENOUS at 10:17

## 2017-07-12 RX ADMIN — FENTANYL CITRATE 50 MICROGRAM(S): 50 INJECTION INTRAVENOUS at 10:17

## 2017-07-12 NOTE — PROGRESS NOTE ADULT - SUBJECTIVE AND OBJECTIVE BOX
INTERVAL HPI/OVERNIGHT EVENTS: Propofol infusion increased to 20 mcg due to agitation.    PRESSORS: [ ] YES [x] NO    Antimicrobial:    Cardiovascular:  cloNIDine 0.1 milliGRAM(s) Oral every 8 hours    Pulmonary:  buDESOnide 160 MICROgram(s)/formoterol 4.5 MICROgram(s) Inhaler 2 Puff(s) Inhalation two times a day  ALBUTerol    90 MICROgram(s) HFA Inhaler 2 Puff(s) Inhalation every 8 hours    Hematalogic:  enoxaparin Injectable 40 milliGRAM(s) SubCutaneous daily    Other:  lactated ringers. 1000 milliLiter(s) IV Continuous <Continuous>  nicotine - 21 mG/24Hr(s) Patch 1 patch Transdermal daily  PHENobarbital Injectable 130 milliGRAM(s) IV Push every 12 hours  sodium chloride 0.9% 1000 milliLiter(s) IV Continuous <Continuous>  insulin lispro (HumaLOG) corrective regimen sliding scale   SubCutaneous every 6 hours  fentaNYL    Injectable 50 MICROGram(s) IV Push every 4 hours  Senna syrup 10 milliLiter(s) 10 milliLiter(s) Enteral Tube two times a day  QUEtiapine 25 milliGRAM(s) Oral every other day  propofol Infusion 5.126 MICROgram(s)/kG/Min IV Continuous <Continuous>  pantoprazole  Injectable 40 milliGRAM(s) IV Push daily  dexmedetomidine Infusion 1 MICROgram(s)/kG/Hr IV Continuous <Continuous>    lactated ringers. 1000 milliLiter(s) IV Continuous <Continuous>  buDESOnide 160 MICROgram(s)/formoterol 4.5 MICROgram(s) Inhaler 2 Puff(s) Inhalation two times a day  ALBUTerol    90 MICROgram(s) HFA Inhaler 2 Puff(s) Inhalation every 8 hours  nicotine - 21 mG/24Hr(s) Patch 1 patch Transdermal daily  PHENobarbital Injectable 130 milliGRAM(s) IV Push every 12 hours  cloNIDine 0.1 milliGRAM(s) Oral every 8 hours  enoxaparin Injectable 40 milliGRAM(s) SubCutaneous daily  sodium chloride 0.9% 1000 milliLiter(s) IV Continuous <Continuous>  insulin lispro (HumaLOG) corrective regimen sliding scale   SubCutaneous every 6 hours  fentaNYL    Injectable 50 MICROGram(s) IV Push every 4 hours  Senna syrup 10 milliLiter(s) 10 milliLiter(s) Enteral Tube two times a day  QUEtiapine 25 milliGRAM(s) Oral every other day  propofol Infusion 5.126 MICROgram(s)/kG/Min IV Continuous <Continuous>  pantoprazole  Injectable 40 milliGRAM(s) IV Push daily  dexmedetomidine Infusion 1 MICROgram(s)/kG/Hr IV Continuous <Continuous>    Drug Dosing Weight  Height (cm): 182.88 (2017 09:46)  Weight (kg): 75.2 (2017 09:46)  BMI (kg/m2): 22.5 (2017 09:46)  BSA (m2): 1.97 (2017 09:46)    CENTRAL LINE: [ ] YES [X] NO     ACEVEDO: [X] YES [ ] NO    DATE INSERTED:   REMOVE:  [ ] YES [X] NO  EXPLAIN: Patient intubated and sedated    A-LINE:  [ ] YES [X] NO     PMH -reviewed admission note, no change since admission    ICU Vital Signs Last 24 Hrs  T(C): 36 (2017 04:34), Max: 37.7 (2017 20:15)  T(F): 96.8 (2017 04:34), Max: 99.8 (2017 20:15)  HR: 81 (2017 09:00) (72 - 92)  BP: 147/92 (2017 09:00) (124/73 - 156/94)  BP(mean): 105 (2017 09:00) (83 - 110)  ABP: --  ABP(mean): --  RR: 20 (2017 09:00) (15 - 33)  SpO2: 99% (2017 09:00) (95% - 100%)    ABG - ( 2017 05:10 )  pH: 7.44  /  pCO2: 42    /  pO2: 84    / HCO3: 28    / Base Excess: 3.5   /  SaO2: 96        07-11 @ 07:01  -  -12 @ 07:00  --------------------------------------------------------  IN: 5274.8 mL / OUT: 4385 mL / NET: 889.8 mL    Mode: AC/ CMV (Assist Control/ Continuous Mandatory Ventilation)  RR (machine): 12  TV (machine): 450  FiO2: 30  PEEP: 5  ITime: 1  MAP: 9.8  PIP: 24    PHYSICAL EXAM:    HEAD: atraumatic, normocephalic   NECK: supple, no JVD    SKIN: warm, dry   CHEST/LUNG: ET tube: size 7.5, 21 cm at lip. Bilateral breath sounds present without any adventitious sounds  HEART: RRR, no m/r/g   ABDOMEN: soft, nontender, nondistended; bowel sounds present.  :acevedo catheter.  EXTREMITIES: +1 non-pitting edema, no cyanosis, no clubbing.    LABS:  CBC Full  -  ( 2017 06:09 )  WBC Count : 4.3 K/uL  Hemoglobin : 13.6 g/dL  Hematocrit : 39.4 %  Platelet Count - Automated : 144 K/uL  Mean Cell Volume : 100.1 fl  Mean Cell Hemoglobin : 34.6 pg  Mean Cell Hemoglobin Concentration : 34.5 gm/dL  Auto Neutrophil # : 2.3 K/uL  Auto Lymphocyte # : 1.0 K/uL  Auto Monocyte # : 0.8 K/uL  Auto Eosinophil # : 0.1 K/uL  Auto Basophil # : 0.1 K/uL  Auto Neutrophil % : 54.1 %  Auto Lymphocyte % : 23.3 %  Auto Monocyte % : 17.7 %  Auto Eosinophil % : 3.1 %  Auto Basophil % : 1.8 %        143  |  107  |  6<L>  ----------------------------<  121<H>  3.8   |  28  |  0.44<L>    Ca    8.5      2017 06:09  Phos  3.6       Mg     1.7         TPro  6.2  /  Alb  2.3<L>  /  TBili  0.4  /  DBili  x   /  AST  42<H>  /  ALT  67<H>  /  AlkPhos  125<H>      Urinalysis Basic - ( 2017 04:27 )    Color: Yellow / Appearance: Clear / S.015 / pH: x  Gluc: x / Ketone: Negative  / Bili: Negative / Urobili: Negative   Blood: x / Protein: Negative / Nitrite: Negative   Leuk Esterase: Negative / RBC: x / WBC x   Sq Epi: x / Non Sq Epi: x / Bacteria: x    Assessment and Plan    38 y/o M with Hx of Alcoholism, hepatic steatosis, pancreatitis, and reactive airway disease, likely secondary to smoking, admitted to the ICU for management of withdrawal causing severe agitation not amendable to 6mg Ativan and CIWA of 21.     1. Respiratory Failure secondary to sedation medication infusion for alcohol withdrawal   -C/W Propofol and Precedex  -Fentanyl 25mg q4 hours  -C/W AC Ventilation   -C/W Phenobarbital 130mg BID, 25mg Seroquel BID  -CIWA Protocol    2. Alcohol induced acute pancreatitis.    -IV fluids  -Pain control  -Lipase trending down (3635 on  -> 1032 on 7/10)   -Consult social work     3. Hyperglycemia now under control  -C/W Sliding Scale   -Jevity 1.5 at rate of 30    4.History of Reactive Airway Disease likely due to smoking  -Continue Proventil and Symbicort after extubation   -Duoneb PRN if needed     5. Prophylaxis  -Lovenox and SCDs for DVT  -Protonix for Peptic Ulcers

## 2017-07-13 LAB
ALBUMIN SERPL ELPH-MCNC: 2.4 G/DL — LOW (ref 3.5–5)
ALP SERPL-CCNC: 129 U/L — HIGH (ref 40–120)
ALT FLD-CCNC: 61 U/L DA — HIGH (ref 10–60)
ANION GAP SERPL CALC-SCNC: 7 MMOL/L — SIGNIFICANT CHANGE UP (ref 5–17)
AST SERPL-CCNC: 38 U/L — SIGNIFICANT CHANGE UP (ref 10–40)
BASE EXCESS BLDA CALC-SCNC: 4.9 MMOL/L — HIGH (ref -2–2)
BASOPHILS # BLD AUTO: 0.1 K/UL — SIGNIFICANT CHANGE UP (ref 0–0.2)
BASOPHILS NFR BLD AUTO: 1.3 % — SIGNIFICANT CHANGE UP (ref 0–2)
BILIRUB SERPL-MCNC: 0.3 MG/DL — SIGNIFICANT CHANGE UP (ref 0.2–1.2)
BLOOD GAS COMMENTS ARTERIAL: SIGNIFICANT CHANGE UP
BUN SERPL-MCNC: 6 MG/DL — LOW (ref 7–18)
CALCIUM SERPL-MCNC: 8.7 MG/DL — SIGNIFICANT CHANGE UP (ref 8.4–10.5)
CHLORIDE SERPL-SCNC: 105 MMOL/L — SIGNIFICANT CHANGE UP (ref 96–108)
CO2 SERPL-SCNC: 29 MMOL/L — SIGNIFICANT CHANGE UP (ref 22–31)
CREAT SERPL-MCNC: 0.44 MG/DL — LOW (ref 0.5–1.3)
EOSINOPHIL # BLD AUTO: 0.1 K/UL — SIGNIFICANT CHANGE UP (ref 0–0.5)
EOSINOPHIL NFR BLD AUTO: 2.4 % — SIGNIFICANT CHANGE UP (ref 0–6)
GLUCOSE SERPL-MCNC: 112 MG/DL — HIGH (ref 70–99)
HCO3 BLDA-SCNC: 28 MMOL/L — HIGH (ref 23–27)
HCT VFR BLD CALC: 40.7 % — SIGNIFICANT CHANGE UP (ref 39–50)
HGB BLD-MCNC: 14.1 G/DL — SIGNIFICANT CHANGE UP (ref 13–17)
HOROWITZ INDEX BLDA+IHG-RTO: 30 — SIGNIFICANT CHANGE UP
LIDOCAIN IGE QN: 976 U/L — HIGH (ref 73–393)
LYMPHOCYTES # BLD AUTO: 1.1 K/UL — SIGNIFICANT CHANGE UP (ref 1–3.3)
LYMPHOCYTES # BLD AUTO: 20.7 % — SIGNIFICANT CHANGE UP (ref 13–44)
MAGNESIUM SERPL-MCNC: 1.5 MG/DL — LOW (ref 1.6–2.6)
MCHC RBC-ENTMCNC: 34.4 PG — HIGH (ref 27–34)
MCHC RBC-ENTMCNC: 34.6 GM/DL — SIGNIFICANT CHANGE UP (ref 32–36)
MCV RBC AUTO: 99.5 FL — SIGNIFICANT CHANGE UP (ref 80–100)
MONOCYTES # BLD AUTO: 0.6 K/UL — SIGNIFICANT CHANGE UP (ref 0–0.9)
MONOCYTES NFR BLD AUTO: 10.7 % — SIGNIFICANT CHANGE UP (ref 2–14)
NEUTROPHILS # BLD AUTO: 3.4 K/UL — SIGNIFICANT CHANGE UP (ref 1.8–7.4)
NEUTROPHILS NFR BLD AUTO: 64.9 % — SIGNIFICANT CHANGE UP (ref 43–77)
PCO2 BLDA: 39 MMHG — SIGNIFICANT CHANGE UP (ref 32–46)
PH BLDA: 7.48 — HIGH (ref 7.35–7.45)
PHOSPHATE SERPL-MCNC: 3.3 MG/DL — SIGNIFICANT CHANGE UP (ref 2.5–4.5)
PLATELET # BLD AUTO: 209 K/UL — SIGNIFICANT CHANGE UP (ref 150–400)
PO2 BLDA: 62 MMHG — LOW (ref 74–108)
POTASSIUM SERPL-MCNC: 4.3 MMOL/L — SIGNIFICANT CHANGE UP (ref 3.5–5.3)
POTASSIUM SERPL-SCNC: 4.3 MMOL/L — SIGNIFICANT CHANGE UP (ref 3.5–5.3)
PROT SERPL-MCNC: 6.6 G/DL — SIGNIFICANT CHANGE UP (ref 6–8.3)
RBC # BLD: 4.09 M/UL — LOW (ref 4.2–5.8)
RBC # FLD: 10.9 % — SIGNIFICANT CHANGE UP (ref 10.3–14.5)
SAO2 % BLDA: 92 % — SIGNIFICANT CHANGE UP (ref 92–96)
SODIUM SERPL-SCNC: 141 MMOL/L — SIGNIFICANT CHANGE UP (ref 135–145)
TRIGL SERPL-MCNC: 98 MG/DL — SIGNIFICANT CHANGE UP (ref 10–149)
WBC # BLD: 5.2 K/UL — SIGNIFICANT CHANGE UP (ref 3.8–10.5)
WBC # FLD AUTO: 5.2 K/UL — SIGNIFICANT CHANGE UP (ref 3.8–10.5)

## 2017-07-13 RX ORDER — LACTULOSE 10 G/15ML
20 SOLUTION ORAL DAILY
Qty: 0 | Refills: 0 | Status: DISCONTINUED | OUTPATIENT
Start: 2017-07-13 | End: 2017-07-16

## 2017-07-13 RX ORDER — MAGNESIUM SULFATE 500 MG/ML
1 VIAL (ML) INJECTION ONCE
Qty: 0 | Refills: 0 | Status: COMPLETED | OUTPATIENT
Start: 2017-07-13 | End: 2017-07-13

## 2017-07-13 RX ORDER — SODIUM CHLORIDE 9 MG/ML
1000 INJECTION, SOLUTION INTRAVENOUS
Qty: 0 | Refills: 0 | Status: DISCONTINUED | OUTPATIENT
Start: 2017-07-13 | End: 2017-07-14

## 2017-07-13 RX ADMIN — FENTANYL CITRATE 50 MICROGRAM(S): 50 INJECTION INTRAVENOUS at 17:27

## 2017-07-13 RX ADMIN — FENTANYL CITRATE 50 MICROGRAM(S): 50 INJECTION INTRAVENOUS at 13:34

## 2017-07-13 RX ADMIN — QUETIAPINE FUMARATE 25 MILLIGRAM(S): 200 TABLET, FILM COATED ORAL at 11:03

## 2017-07-13 RX ADMIN — Medication 1 PATCH: at 11:02

## 2017-07-13 RX ADMIN — FENTANYL CITRATE 50 MICROGRAM(S): 50 INJECTION INTRAVENOUS at 02:03

## 2017-07-13 RX ADMIN — LACTULOSE 20 GRAM(S): 10 SOLUTION ORAL at 11:03

## 2017-07-13 RX ADMIN — FENTANYL CITRATE 50 MICROGRAM(S): 50 INJECTION INTRAVENOUS at 17:09

## 2017-07-13 RX ADMIN — Medication 0.1 MILLIGRAM(S): at 13:18

## 2017-07-13 RX ADMIN — SODIUM CHLORIDE 150 MILLILITER(S): 9 INJECTION, SOLUTION INTRAVENOUS at 07:35

## 2017-07-13 RX ADMIN — DEXMEDETOMIDINE HYDROCHLORIDE IN 0.9% SODIUM CHLORIDE 18.8 MICROGRAM(S)/KG/HR: 4 INJECTION INTRAVENOUS at 07:32

## 2017-07-13 RX ADMIN — ALBUTEROL 2 PUFF(S): 90 AEROSOL, METERED ORAL at 14:22

## 2017-07-13 RX ADMIN — Medication 0.1 MILLIGRAM(S): at 21:28

## 2017-07-13 RX ADMIN — SODIUM CHLORIDE 150 MILLILITER(S): 9 INJECTION, SOLUTION INTRAVENOUS at 18:46

## 2017-07-13 RX ADMIN — FENTANYL CITRATE 50 MICROGRAM(S): 50 INJECTION INTRAVENOUS at 21:28

## 2017-07-13 RX ADMIN — PROPOFOL 2.31 MICROGRAM(S)/KG/MIN: 10 INJECTION, EMULSION INTRAVENOUS at 09:57

## 2017-07-13 RX ADMIN — FENTANYL CITRATE 50 MICROGRAM(S): 50 INJECTION INTRAVENOUS at 02:05

## 2017-07-13 RX ADMIN — PROPOFOL 2.31 MICROGRAM(S)/KG/MIN: 10 INJECTION, EMULSION INTRAVENOUS at 15:36

## 2017-07-13 RX ADMIN — Medication 0.1 MILLIGRAM(S): at 05:18

## 2017-07-13 RX ADMIN — PANTOPRAZOLE SODIUM 40 MILLIGRAM(S): 20 TABLET, DELAYED RELEASE ORAL at 11:02

## 2017-07-13 RX ADMIN — Medication 100 GRAM(S): at 09:26

## 2017-07-13 RX ADMIN — DEXMEDETOMIDINE HYDROCHLORIDE IN 0.9% SODIUM CHLORIDE 18.8 MICROGRAM(S)/KG/HR: 4 INJECTION INTRAVENOUS at 09:57

## 2017-07-13 RX ADMIN — FENTANYL CITRATE 50 MICROGRAM(S): 50 INJECTION INTRAVENOUS at 21:35

## 2017-07-13 RX ADMIN — ALBUTEROL 2 PUFF(S): 90 AEROSOL, METERED ORAL at 20:03

## 2017-07-13 RX ADMIN — FENTANYL CITRATE 50 MICROGRAM(S): 50 INJECTION INTRAVENOUS at 05:24

## 2017-07-13 RX ADMIN — FENTANYL CITRATE 50 MICROGRAM(S): 50 INJECTION INTRAVENOUS at 13:21

## 2017-07-13 RX ADMIN — DEXMEDETOMIDINE HYDROCHLORIDE IN 0.9% SODIUM CHLORIDE 18.8 MICROGRAM(S)/KG/HR: 4 INJECTION INTRAVENOUS at 18:12

## 2017-07-13 RX ADMIN — ENOXAPARIN SODIUM 40 MILLIGRAM(S): 100 INJECTION SUBCUTANEOUS at 11:02

## 2017-07-13 RX ADMIN — FENTANYL CITRATE 50 MICROGRAM(S): 50 INJECTION INTRAVENOUS at 09:30

## 2017-07-13 RX ADMIN — DEXMEDETOMIDINE HYDROCHLORIDE IN 0.9% SODIUM CHLORIDE 18.8 MICROGRAM(S)/KG/HR: 4 INJECTION INTRAVENOUS at 18:46

## 2017-07-13 RX ADMIN — Medication 130 MILLIGRAM(S): at 17:11

## 2017-07-13 RX ADMIN — FENTANYL CITRATE 50 MICROGRAM(S): 50 INJECTION INTRAVENOUS at 05:19

## 2017-07-13 RX ADMIN — Medication 130 MILLIGRAM(S): at 05:19

## 2017-07-13 RX ADMIN — FENTANYL CITRATE 50 MICROGRAM(S): 50 INJECTION INTRAVENOUS at 09:44

## 2017-07-13 RX ADMIN — ALBUTEROL 2 PUFF(S): 90 AEROSOL, METERED ORAL at 05:25

## 2017-07-13 RX ADMIN — PROPOFOL 2.31 MICROGRAM(S)/KG/MIN: 10 INJECTION, EMULSION INTRAVENOUS at 18:46

## 2017-07-13 RX ADMIN — DEXMEDETOMIDINE HYDROCHLORIDE IN 0.9% SODIUM CHLORIDE 18.8 MICROGRAM(S)/KG/HR: 4 INJECTION INTRAVENOUS at 15:36

## 2017-07-13 RX ADMIN — Medication 1 PATCH: at 11:01

## 2017-07-13 RX ADMIN — SODIUM CHLORIDE 150 MILLILITER(S): 9 INJECTION, SOLUTION INTRAVENOUS at 11:32

## 2017-07-13 NOTE — PROGRESS NOTE ADULT - SUBJECTIVE AND OBJECTIVE BOX
INTERVAL HPI/OVERNIGHT EVENTS: Patient's Propofol infusion was increased to 20mcg due to agitation.     PRESSORS: [ ] YES [X] NO  WHICH:    Antimicrobial:    Cardiovascular:  cloNIDine 0.1 milliGRAM(s) Oral every 8 hours    Pulmonary:  ALBUTerol    90 MICROgram(s) HFA Inhaler 2 Puff(s) Inhalation every 8 hours    Hematalogic:  enoxaparin Injectable 40 milliGRAM(s) SubCutaneous daily    Other:  lactated ringers. 1000 milliLiter(s) IV Continuous <Continuous>  nicotine - 21 mG/24Hr(s) Patch 1 patch Transdermal daily  PHENobarbital Injectable 130 milliGRAM(s) IV Push every 12 hours  sodium chloride 0.9% 1000 milliLiter(s) IV Continuous <Continuous>  insulin lispro (HumaLOG) corrective regimen sliding scale   SubCutaneous every 6 hours  fentaNYL    Injectable 50 MICROGram(s) IV Push every 4 hours  Senna syrup 10 milliLiter(s) 10 milliLiter(s) Enteral Tube two times a day  QUEtiapine 25 milliGRAM(s) Oral every other day  propofol Infusion 5.126 MICROgram(s)/kG/Min IV Continuous <Continuous>  pantoprazole  Injectable 40 milliGRAM(s) IV Push daily  dexmedetomidine Infusion 1 MICROgram(s)/kG/Hr IV Continuous <Continuous>  magnesium sulfate  IVPB 1 Gram(s) IV Intermittent once    lactated ringers. 1000 milliLiter(s) IV Continuous <Continuous>  ALBUTerol    90 MICROgram(s) HFA Inhaler 2 Puff(s) Inhalation every 8 hours  nicotine - 21 mG/24Hr(s) Patch 1 patch Transdermal daily  PHENobarbital Injectable 130 milliGRAM(s) IV Push every 12 hours  cloNIDine 0.1 milliGRAM(s) Oral every 8 hours  enoxaparin Injectable 40 milliGRAM(s) SubCutaneous daily  sodium chloride 0.9% 1000 milliLiter(s) IV Continuous <Continuous>  insulin lispro (HumaLOG) corrective regimen sliding scale   SubCutaneous every 6 hours  fentaNYL    Injectable 50 MICROGram(s) IV Push every 4 hours  Senna syrup 10 milliLiter(s) 10 milliLiter(s) Enteral Tube two times a day  QUEtiapine 25 milliGRAM(s) Oral every other day  propofol Infusion 5.126 MICROgram(s)/kG/Min IV Continuous <Continuous>  pantoprazole  Injectable 40 milliGRAM(s) IV Push daily  dexmedetomidine Infusion 1 MICROgram(s)/kG/Hr IV Continuous <Continuous>  magnesium sulfate  IVPB 1 Gram(s) IV Intermittent once    Drug Dosing Weight  Height (cm): 182.88 (2017 09:46)  Weight (kg): 75.2 (2017 09:46)  BMI (kg/m2): 22.5 (2017 09:46)  BSA (m2): 1.97 (2017 09:46)    CENTRAL LINE: [ ] YES [X] NO      ACEVEDO: [X] YES [ ] NO    DATE INSERTED:   REMOVE: [ ] YES [X] NO  EXPLAIN: Sedated and monitoring I/O    A-LINE:  [ ] YES [X] NO     PMH -reviewed admission note, no change since admission    ICU Vital Signs Last 24 Hrs  T(C): 36.7 (2017 07:00), Max: 37.1 (2017 12:00)  T(F): 98 (2017 07:00), Max: 98.7 (2017 12:00)  HR: 98 (2017 08:04) (73 - 98)  BP: 173/99 (2017 08:04) (130/70 - 173/101)  BP(mean): 118 (2017 08:00) (84 - 118)  ABP: --  ABP(mean): --  RR: 20 (2017 08:04) (13 - 24)  SpO2: 97% (2017 08:04) (97% - 100%)    ABG - ( 2017 04:48 )  pH: 7.48  /  pCO2: 39    /  pO2: 62    / HCO3: 28    / Base Excess: 4.9   /  SaO2: 92      12 @ 07:01  -  -13 @ 07:00  --------------------------------------------------------  IN: 4784.1 mL / OUT: 5220 mL / NET: -435.9 mL    Mode: AC/ CMV (Assist Control/ Continuous Mandatory Ventilation)  RR (machine): 12  TV (machine): 450  FiO2: 30  PEEP: 5  ITime: 1  MAP: 9.3  PIP: 20      PHYSICAL EXAM:    GENERAL: lying in bed in no acute distress   HEAD: atraumatic, normocephalic   EYES: PERRLA, white sclera.   NECK: supple, no JVD     SKIN: warm, dry   CHEST/LUNG: ET tube: 7.5 size 23 cm at lip. Bilateral breath sounds present with no rales, ronchi, or wheezing   HEART: RRR, no m/r/g   ABDOMEN: soft, nontender, nondistended; bowel sounds present.  : acevedo catheter.  EXTREMITIES: +1 non-pitting edema, no cyanosis, no clubbing.    LABS:  CBC Full  -  ( 2017 06:38 )  WBC Count : 5.2 K/uL  Hemoglobin : 14.1 g/dL  Hematocrit : 40.7 %  Platelet Count - Automated : 209 K/uL  Mean Cell Volume : 99.5 fl  Mean Cell Hemoglobin : 34.4 pg  Mean Cell Hemoglobin Concentration : 34.6 gm/dL  Auto Neutrophil # : 3.4 K/uL  Auto Lymphocyte # : 1.1 K/uL  Auto Monocyte # : 0.6 K/uL  Auto Eosinophil # : 0.1 K/uL  Auto Basophil # : 0.1 K/uL  Auto Neutrophil % : 64.9 %  Auto Lymphocyte % : 20.7 %  Auto Monocyte % : 10.7 %  Auto Eosinophil % : 2.4 %  Auto Basophil % : 1.3 %        141  |  105  |  6<L>  ----------------------------<  112<H>  4.3   |  29  |  0.44<L>    Ca    8.7      2017 06:38  Phos  3.3       Mg     1.5         TPro  6.6  /  Alb  2.4<L>  /  TBili  0.3  /  DBili  x   /  AST  38  /  ALT  61<H>  /  AlkPhos  129<H>        Urinalysis Basic - ( 2017 04:27 )    Color: Yellow / Appearance: Clear / S.015 / pH: x  Gluc: x / Ketone: Negative  / Bili: Negative / Urobili: Negative   Blood: x / Protein: Negative / Nitrite: Negative   Leuk Esterase: Negative / RBC: x / WBC x   Sq Epi: x / Non Sq Epi: x / Bacteria: x      [X]GOALS OF CARE DISCUSSION WITH PATIENT/FAMILY/PROXY:    Assessment and Plan    36 y/o M with Hx of Alcoholism, hepatic steatosis, pancreatitis, and reactive airway disease, likely secondary to smoking, admitted to the ICU for management of withdrawal causing severe agitation not amendable to 6mg Ativan and CIWA of 21.     1. Respiratory Failure secondary to sedation medication infusion for alcohol withdrawal   -C/W Propofol and Precedex; attempt to wean from sedation today  -Fentanyl 25mg q4 hours  -C/W AC Ventilation   -C/W Phenobarbital 130mg BID, 25mg Seroquel BID  -[facility]WA Protocol    2. Alcohol induced acute pancreatitis.   -IV fluids  -Pain control  -Lipase trending down (3635 on  -> 1032 on 7/10 -> 967 )     3. Hyperglycemia now under control  -C/W Sliding Scale   -Jevity 1.5 at rate of 30    4.History of Reactive Airway Disease likely due to smoking  -Continue Proventil and Symbicort after extubation   -Duoneb PRN if needed     5. Prophylaxis  -Lovenox and SCDs for DVT  -Protonix for Peptic Ulcers INTERVAL HPI/OVERNIGHT EVENTS: Patient's Propofol infusion was increased to 20mcg due to agitation.     PRESSORS: [ ] YES [X] NO  WHICH:    Antimicrobial:    Cardiovascular:  cloNIDine 0.1 milliGRAM(s) Oral every 8 hours    Pulmonary:  ALBUTerol    90 MICROgram(s) HFA Inhaler 2 Puff(s) Inhalation every 8 hours    Hematalogic:  enoxaparin Injectable 40 milliGRAM(s) SubCutaneous daily    Other:  lactated ringers. 1000 milliLiter(s) IV Continuous <Continuous>  nicotine - 21 mG/24Hr(s) Patch 1 patch Transdermal daily  PHENobarbital Injectable 130 milliGRAM(s) IV Push every 12 hours  sodium chloride 0.9% 1000 milliLiter(s) IV Continuous <Continuous>  insulin lispro (HumaLOG) corrective regimen sliding scale   SubCutaneous every 6 hours  fentaNYL    Injectable 50 MICROGram(s) IV Push every 4 hours  Senna syrup 10 milliLiter(s) 10 milliLiter(s) Enteral Tube two times a day  QUEtiapine 25 milliGRAM(s) Oral every other day  propofol Infusion 5.126 MICROgram(s)/kG/Min IV Continuous <Continuous>  pantoprazole  Injectable 40 milliGRAM(s) IV Push daily  dexmedetomidine Infusion 1 MICROgram(s)/kG/Hr IV Continuous <Continuous>  magnesium sulfate  IVPB 1 Gram(s) IV Intermittent once    lactated ringers. 1000 milliLiter(s) IV Continuous <Continuous>  ALBUTerol    90 MICROgram(s) HFA Inhaler 2 Puff(s) Inhalation every 8 hours  nicotine - 21 mG/24Hr(s) Patch 1 patch Transdermal daily  PHENobarbital Injectable 130 milliGRAM(s) IV Push every 12 hours  cloNIDine 0.1 milliGRAM(s) Oral every 8 hours  enoxaparin Injectable 40 milliGRAM(s) SubCutaneous daily  sodium chloride 0.9% 1000 milliLiter(s) IV Continuous <Continuous>  insulin lispro (HumaLOG) corrective regimen sliding scale   SubCutaneous every 6 hours  fentaNYL    Injectable 50 MICROGram(s) IV Push every 4 hours  Senna syrup 10 milliLiter(s) 10 milliLiter(s) Enteral Tube two times a day  QUEtiapine 25 milliGRAM(s) Oral every other day  propofol Infusion 5.126 MICROgram(s)/kG/Min IV Continuous <Continuous>  pantoprazole  Injectable 40 milliGRAM(s) IV Push daily  dexmedetomidine Infusion 1 MICROgram(s)/kG/Hr IV Continuous <Continuous>  magnesium sulfate  IVPB 1 Gram(s) IV Intermittent once    Drug Dosing Weight  Height (cm): 182.88 (2017 09:46)  Weight (kg): 75.2 (2017 09:46)  BMI (kg/m2): 22.5 (2017 09:46)  BSA (m2): 1.97 (2017 09:46)    CENTRAL LINE: [ ] YES [X] NO      ACEVEDO: [X] YES [ ] NO    DATE INSERTED:   REMOVE: [ ] YES [X] NO  EXPLAIN: Sedated and monitoring I/O    A-LINE:  [ ] YES [X] NO     PMH -reviewed admission note, no change since admission    ICU Vital Signs Last 24 Hrs  T(C): 36.7 (2017 07:00), Max: 37.1 (2017 12:00)  T(F): 98 (2017 07:00), Max: 98.7 (2017 12:00)  HR: 98 (2017 08:04) (73 - 98)  BP: 173/99 (2017 08:04) (130/70 - 173/101)  BP(mean): 118 (2017 08:00) (84 - 118)  ABP: --  ABP(mean): --  RR: 20 (2017 08:04) (13 - 24)  SpO2: 97% (2017 08:04) (97% - 100%)    ABG - ( 2017 04:48 )  pH: 7.48  /  pCO2: 39    /  pO2: 62    / HCO3: 28    / Base Excess: 4.9   /  SaO2: 92      12 @ 07:01  -  -13 @ 07:00  --------------------------------------------------------  IN: 4784.1 mL / OUT: 5220 mL / NET: -435.9 mL    Mode: AC/ CMV (Assist Control/ Continuous Mandatory Ventilation)  RR (machine): 12  TV (machine): 450  FiO2: 30  PEEP: 5  ITime: 1  MAP: 9.3  PIP: 20      PHYSICAL EXAM:    GENERAL: lying in bed in no acute distress   HEAD: atraumatic, normocephalic   EYES: PERRLA, white sclera.   NECK: supple, no JVD     SKIN: warm, dry   CHEST/LUNG: ET tube: 7.5 size 23 cm at lip. Bilateral breath sounds present with no rales, ronchi, or wheezing   HEART: RRR, no m/r/g   ABDOMEN: soft, nontender, nondistended; bowel sounds present.  : acevedo catheter.  EXTREMITIES: +1 non-pitting edema, no cyanosis, no clubbing.    LABS:  CBC Full  -  ( 2017 06:38 )  WBC Count : 5.2 K/uL  Hemoglobin : 14.1 g/dL  Hematocrit : 40.7 %  Platelet Count - Automated : 209 K/uL  Mean Cell Volume : 99.5 fl  Mean Cell Hemoglobin : 34.4 pg  Mean Cell Hemoglobin Concentration : 34.6 gm/dL  Auto Neutrophil # : 3.4 K/uL  Auto Lymphocyte # : 1.1 K/uL  Auto Monocyte # : 0.6 K/uL  Auto Eosinophil # : 0.1 K/uL  Auto Basophil # : 0.1 K/uL  Auto Neutrophil % : 64.9 %  Auto Lymphocyte % : 20.7 %  Auto Monocyte % : 10.7 %  Auto Eosinophil % : 2.4 %  Auto Basophil % : 1.3 %        141  |  105  |  6<L>  ----------------------------<  112<H>  4.3   |  29  |  0.44<L>    Ca    8.7      2017 06:38  Phos  3.3       Mg     1.5         TPro  6.6  /  Alb  2.4<L>  /  TBili  0.3  /  DBili  x   /  AST  38  /  ALT  61<H>  /  AlkPhos  129<H>        Urinalysis Basic - ( 2017 04:27 )    Color: Yellow / Appearance: Clear / S.015 / pH: x  Gluc: x / Ketone: Negative  / Bili: Negative / Urobili: Negative   Blood: x / Protein: Negative / Nitrite: Negative   Leuk Esterase: Negative / RBC: x / WBC x   Sq Epi: x / Non Sq Epi: x / Bacteria: x      [X]GOALS OF CARE DISCUSSION WITH PATIENT/FAMILY/PROXY:    Assessment and Plan    36 y/o M with Hx of Alcoholism, hepatic steatosis, pancreatitis, and reactive airway disease, likely secondary to smoking, admitted to the ICU for management of withdrawal causing severe agitation not amendable to 6mg Ativan and CIWA of 21.     1. Respiratory Failure secondary to sedation medication infusion for alcohol withdrawal   -C/W Propofol and Precedex; C/W AC Ventilation as patient is not candidate for SBT due to continued withdrawal symptoms  -Fentanyl 25mg q4 hours  -C/W Phenobarbital 130mg BID, 25mg Seroquel BID  -[facility]WA Protocol    2. Alcohol induced acute pancreatitis.   -IV fluids  -Pain control  -Lipase trending down (3635 on  -> 1032 on 7/10 -> 967 )     3. Hyperglycemia now under control  -C/W Sliding Scale   -Jevity 1.5 at rate of 30    4.History of Reactive Airway Disease likely due to smoking  -Continue Proventil and Symbicort after extubation   -Duoneb PRN if needed     5. Prophylaxis  -Lovenox and SCDs for DVT  -Protonix for Peptic Ulcers

## 2017-07-14 LAB
ANION GAP SERPL CALC-SCNC: 8 MMOL/L — SIGNIFICANT CHANGE UP (ref 5–17)
BASE EXCESS BLDA CALC-SCNC: 4.4 MMOL/L — HIGH (ref -2–2)
BASOPHILS # BLD AUTO: 0.1 K/UL — SIGNIFICANT CHANGE UP (ref 0–0.2)
BASOPHILS NFR BLD AUTO: 1.2 % — SIGNIFICANT CHANGE UP (ref 0–2)
BLOOD GAS COMMENTS ARTERIAL: SIGNIFICANT CHANGE UP
BUN SERPL-MCNC: 8 MG/DL — SIGNIFICANT CHANGE UP (ref 7–18)
CALCIUM SERPL-MCNC: 8.9 MG/DL — SIGNIFICANT CHANGE UP (ref 8.4–10.5)
CHLORIDE SERPL-SCNC: 106 MMOL/L — SIGNIFICANT CHANGE UP (ref 96–108)
CO2 SERPL-SCNC: 26 MMOL/L — SIGNIFICANT CHANGE UP (ref 22–31)
CREAT SERPL-MCNC: 0.48 MG/DL — LOW (ref 0.5–1.3)
EOSINOPHIL # BLD AUTO: 0 K/UL — SIGNIFICANT CHANGE UP (ref 0–0.5)
EOSINOPHIL NFR BLD AUTO: 0.6 % — SIGNIFICANT CHANGE UP (ref 0–6)
GLUCOSE SERPL-MCNC: 125 MG/DL — HIGH (ref 70–99)
HCO3 BLDA-SCNC: 28 MMOL/L — HIGH (ref 23–27)
HCT VFR BLD CALC: 40.5 % — SIGNIFICANT CHANGE UP (ref 39–50)
HGB BLD-MCNC: 14.4 G/DL — SIGNIFICANT CHANGE UP (ref 13–17)
HOROWITZ INDEX BLDA+IHG-RTO: 30 — SIGNIFICANT CHANGE UP
LYMPHOCYTES # BLD AUTO: 0.8 K/UL — LOW (ref 1–3.3)
LYMPHOCYTES # BLD AUTO: 10.6 % — LOW (ref 13–44)
MAGNESIUM SERPL-MCNC: 1.8 MG/DL — SIGNIFICANT CHANGE UP (ref 1.6–2.6)
MCHC RBC-ENTMCNC: 34.6 PG — HIGH (ref 27–34)
MCHC RBC-ENTMCNC: 35.5 GM/DL — SIGNIFICANT CHANGE UP (ref 32–36)
MCV RBC AUTO: 97.3 FL — SIGNIFICANT CHANGE UP (ref 80–100)
MONOCYTES # BLD AUTO: 0.7 K/UL — SIGNIFICANT CHANGE UP (ref 0–0.9)
MONOCYTES NFR BLD AUTO: 9.6 % — SIGNIFICANT CHANGE UP (ref 2–14)
NEUTROPHILS # BLD AUTO: 5.8 K/UL — SIGNIFICANT CHANGE UP (ref 1.8–7.4)
NEUTROPHILS NFR BLD AUTO: 78 % — HIGH (ref 43–77)
PCO2 BLDA: 39 MMHG — SIGNIFICANT CHANGE UP (ref 32–46)
PH BLDA: 7.47 — HIGH (ref 7.35–7.45)
PHOSPHATE SERPL-MCNC: 2.7 MG/DL — SIGNIFICANT CHANGE UP (ref 2.5–4.5)
PLATELET # BLD AUTO: 249 K/UL — SIGNIFICANT CHANGE UP (ref 150–400)
PO2 BLDA: 80 MMHG — SIGNIFICANT CHANGE UP (ref 74–108)
POTASSIUM SERPL-MCNC: 3.7 MMOL/L — SIGNIFICANT CHANGE UP (ref 3.5–5.3)
POTASSIUM SERPL-SCNC: 3.7 MMOL/L — SIGNIFICANT CHANGE UP (ref 3.5–5.3)
RBC # BLD: 4.16 M/UL — LOW (ref 4.2–5.8)
RBC # FLD: 11.6 % — SIGNIFICANT CHANGE UP (ref 10.3–14.5)
SAO2 % BLDA: 96 % — SIGNIFICANT CHANGE UP (ref 92–96)
SODIUM SERPL-SCNC: 140 MMOL/L — SIGNIFICANT CHANGE UP (ref 135–145)
WBC # BLD: 7.4 K/UL — SIGNIFICANT CHANGE UP (ref 3.8–10.5)
WBC # FLD AUTO: 7.4 K/UL — SIGNIFICANT CHANGE UP (ref 3.8–10.5)

## 2017-07-14 PROCEDURE — 71010: CPT | Mod: 26

## 2017-07-14 RX ORDER — METOPROLOL TARTRATE 50 MG
5 TABLET ORAL ONCE
Qty: 0 | Refills: 0 | Status: COMPLETED | OUTPATIENT
Start: 2017-07-14 | End: 2017-07-14

## 2017-07-14 RX ORDER — HYDRALAZINE HCL 50 MG
10 TABLET ORAL ONCE
Qty: 0 | Refills: 0 | Status: COMPLETED | OUTPATIENT
Start: 2017-07-14 | End: 2017-07-14

## 2017-07-14 RX ORDER — PANTOPRAZOLE SODIUM 20 MG/1
40 TABLET, DELAYED RELEASE ORAL DAILY
Qty: 0 | Refills: 0 | Status: DISCONTINUED | OUTPATIENT
Start: 2017-07-14 | End: 2017-07-17

## 2017-07-14 RX ORDER — DEXMEDETOMIDINE HYDROCHLORIDE IN 0.9% SODIUM CHLORIDE 4 UG/ML
1 INJECTION INTRAVENOUS
Qty: 200 | Refills: 0 | Status: DISCONTINUED | OUTPATIENT
Start: 2017-07-14 | End: 2017-07-17

## 2017-07-14 RX ORDER — PROPOFOL 10 MG/ML
5.13 INJECTION, EMULSION INTRAVENOUS
Qty: 1000 | Refills: 0 | Status: DISCONTINUED | OUTPATIENT
Start: 2017-07-14 | End: 2017-07-14

## 2017-07-14 RX ORDER — HALOPERIDOL DECANOATE 100 MG/ML
5 INJECTION INTRAMUSCULAR ONCE
Qty: 0 | Refills: 0 | Status: COMPLETED | OUTPATIENT
Start: 2017-07-14 | End: 2017-07-14

## 2017-07-14 RX ORDER — DEXMEDETOMIDINE HYDROCHLORIDE IN 0.9% SODIUM CHLORIDE 4 UG/ML
1.5 INJECTION INTRAVENOUS
Qty: 200 | Refills: 0 | Status: DISCONTINUED | OUTPATIENT
Start: 2017-07-14 | End: 2017-07-14

## 2017-07-14 RX ADMIN — PROPOFOL 2.31 MICROGRAM(S)/KG/MIN: 10 INJECTION, EMULSION INTRAVENOUS at 08:30

## 2017-07-14 RX ADMIN — Medication 1 PATCH: at 11:42

## 2017-07-14 RX ADMIN — DEXMEDETOMIDINE HYDROCHLORIDE IN 0.9% SODIUM CHLORIDE 28.2 MICROGRAM(S)/KG/HR: 4 INJECTION INTRAVENOUS at 17:15

## 2017-07-14 RX ADMIN — Medication 130 MILLIGRAM(S): at 17:14

## 2017-07-14 RX ADMIN — FENTANYL CITRATE 50 MICROGRAM(S): 50 INJECTION INTRAVENOUS at 02:16

## 2017-07-14 RX ADMIN — DEXMEDETOMIDINE HYDROCHLORIDE IN 0.9% SODIUM CHLORIDE 18.8 MICROGRAM(S)/KG/HR: 4 INJECTION INTRAVENOUS at 19:46

## 2017-07-14 RX ADMIN — DEXMEDETOMIDINE HYDROCHLORIDE IN 0.9% SODIUM CHLORIDE 28.2 MICROGRAM(S)/KG/HR: 4 INJECTION INTRAVENOUS at 15:05

## 2017-07-14 RX ADMIN — Medication 130 MILLIGRAM(S): at 05:06

## 2017-07-14 RX ADMIN — ALBUTEROL 2 PUFF(S): 90 AEROSOL, METERED ORAL at 13:49

## 2017-07-14 RX ADMIN — FENTANYL CITRATE 50 MICROGRAM(S): 50 INJECTION INTRAVENOUS at 05:14

## 2017-07-14 RX ADMIN — FENTANYL CITRATE 50 MICROGRAM(S): 50 INJECTION INTRAVENOUS at 02:19

## 2017-07-14 RX ADMIN — LACTULOSE 20 GRAM(S): 10 SOLUTION ORAL at 11:11

## 2017-07-14 RX ADMIN — Medication 0.1 MILLIGRAM(S): at 13:08

## 2017-07-14 RX ADMIN — ALBUTEROL 2 PUFF(S): 90 AEROSOL, METERED ORAL at 03:21

## 2017-07-14 RX ADMIN — Medication 1 PATCH: at 11:12

## 2017-07-14 RX ADMIN — Medication 2 MILLIGRAM(S): at 23:45

## 2017-07-14 RX ADMIN — DEXMEDETOMIDINE HYDROCHLORIDE IN 0.9% SODIUM CHLORIDE 18.8 MICROGRAM(S)/KG/HR: 4 INJECTION INTRAVENOUS at 23:45

## 2017-07-14 RX ADMIN — PROPOFOL 2.31 MICROGRAM(S)/KG/MIN: 10 INJECTION, EMULSION INTRAVENOUS at 13:08

## 2017-07-14 RX ADMIN — ENOXAPARIN SODIUM 40 MILLIGRAM(S): 100 INJECTION SUBCUTANEOUS at 11:11

## 2017-07-14 RX ADMIN — HALOPERIDOL DECANOATE 5 MILLIGRAM(S): 100 INJECTION INTRAMUSCULAR at 09:16

## 2017-07-14 RX ADMIN — Medication 5 MILLIGRAM(S): at 09:16

## 2017-07-14 RX ADMIN — FENTANYL CITRATE 50 MICROGRAM(S): 50 INJECTION INTRAVENOUS at 05:06

## 2017-07-14 RX ADMIN — DEXMEDETOMIDINE HYDROCHLORIDE IN 0.9% SODIUM CHLORIDE 28.2 MICROGRAM(S)/KG/HR: 4 INJECTION INTRAVENOUS at 11:21

## 2017-07-14 RX ADMIN — Medication 5 MILLIGRAM(S): at 11:12

## 2017-07-14 RX ADMIN — Medication 2 MILLIGRAM(S): at 17:14

## 2017-07-14 RX ADMIN — Medication 0.1 MILLIGRAM(S): at 05:06

## 2017-07-14 RX ADMIN — DEXMEDETOMIDINE HYDROCHLORIDE IN 0.9% SODIUM CHLORIDE 28.2 MICROGRAM(S)/KG/HR: 4 INJECTION INTRAVENOUS at 13:11

## 2017-07-14 RX ADMIN — DEXMEDETOMIDINE HYDROCHLORIDE IN 0.9% SODIUM CHLORIDE 28.2 MICROGRAM(S)/KG/HR: 4 INJECTION INTRAVENOUS at 08:43

## 2017-07-14 RX ADMIN — PANTOPRAZOLE SODIUM 40 MILLIGRAM(S): 20 TABLET, DELAYED RELEASE ORAL at 11:11

## 2017-07-14 RX ADMIN — Medication 2 MILLIGRAM(S): at 11:15

## 2017-07-14 RX ADMIN — SODIUM CHLORIDE 150 MILLILITER(S): 9 INJECTION, SOLUTION INTRAVENOUS at 19:46

## 2017-07-14 RX ADMIN — Medication 2 MILLIGRAM(S): at 08:43

## 2017-07-14 RX ADMIN — Medication 10 MILLIGRAM(S): at 05:46

## 2017-07-14 RX ADMIN — SODIUM CHLORIDE 150 MILLILITER(S): 9 INJECTION, SOLUTION INTRAVENOUS at 13:07

## 2017-07-14 NOTE — PROGRESS NOTE ADULT - SUBJECTIVE AND OBJECTIVE BOX
INTERVAL HPI/OVERNIGHT EVENTS: Patient's blood pressure increased to a max of 190/120; responded to Hydralazine 5mg and improved to 150/90. He is more awake despite 50mcg propofol and 1mcg precedex infusions.     PRESSORS: [] YES [X] NO    Antimicrobial:    Cardiovascular:  cloNIDine 0.1 milliGRAM(s) Oral every 8 hours    Pulmonary:  ALBUTerol    90 MICROgram(s) HFA Inhaler 2 Puff(s) Inhalation every 8 hours    Hematalogic:  enoxaparin Injectable 40 milliGRAM(s) SubCutaneous daily    Other:  lactated ringers. 1000 milliLiter(s) IV Continuous <Continuous>  nicotine - 21 mG/24Hr(s) Patch 1 patch Transdermal daily  PHENobarbital Injectable 130 milliGRAM(s) IV Push every 12 hours  insulin lispro (HumaLOG) corrective regimen sliding scale   SubCutaneous every 6 hours  fentaNYL    Injectable 50 MICROGram(s) IV Push every 4 hours  Senna syrup 10 milliLiter(s) 10 milliLiter(s) Enteral Tube two times a day  QUEtiapine 25 milliGRAM(s) Oral every other day  propofol Infusion 5.126 MICROgram(s)/kG/Min IV Continuous <Continuous>  pantoprazole  Injectable 40 milliGRAM(s) IV Push daily  dexmedetomidine Infusion 1 MICROgram(s)/kG/Hr IV Continuous <Continuous>  sodium chloride 0.9% 1000 milliLiter(s) IV Continuous <Continuous>  lactulose Syrup 20 Gram(s) Oral daily    lactated ringers. 1000 milliLiter(s) IV Continuous <Continuous>  ALBUTerol    90 MICROgram(s) HFA Inhaler 2 Puff(s) Inhalation every 8 hours  nicotine - 21 mG/24Hr(s) Patch 1 patch Transdermal daily  PHENobarbital Injectable 130 milliGRAM(s) IV Push every 12 hours  cloNIDine 0.1 milliGRAM(s) Oral every 8 hours  enoxaparin Injectable 40 milliGRAM(s) SubCutaneous daily  insulin lispro (HumaLOG) corrective regimen sliding scale   SubCutaneous every 6 hours  fentaNYL    Injectable 50 MICROGram(s) IV Push every 4 hours  Senna syrup 10 milliLiter(s) 10 milliLiter(s) Enteral Tube two times a day  QUEtiapine 25 milliGRAM(s) Oral every other day  propofol Infusion 5.126 MICROgram(s)/kG/Min IV Continuous <Continuous>  pantoprazole  Injectable 40 milliGRAM(s) IV Push daily  dexmedetomidine Infusion 1 MICROgram(s)/kG/Hr IV Continuous <Continuous>  sodium chloride 0.9% 1000 milliLiter(s) IV Continuous <Continuous>  lactulose Syrup 20 Gram(s) Oral daily    Drug Dosing Weight  Height (cm): 182.88 (07 Jul 2017 09:46)  Weight (kg): 75.2 (07 Jul 2017 09:46)  BMI (kg/m2): 22.5 (07 Jul 2017 09:46)  BSA (m2): 1.97 (07 Jul 2017 09:46)    CENTRAL LINE: [X] YES [X] NO      ACEVEDO: [X] YES [] NO    DATE INSERTED: 7/6  REMOVE: [] YES [X] NO  EXPLAIN: Patient sedated    A-LINE:  [X] YES [X] NO      PMH -reviewed admission note, no change since admission    ICU Vital Signs Last 24 Hrs  T(C): 37 (14 Jul 2017 04:00), Max: 38.1 (13 Jul 2017 15:00)  T(F): 98.6 (14 Jul 2017 04:00), Max: 100.5 (13 Jul 2017 15:00)  HR: 100 (14 Jul 2017 06:00) (78 - 106)  BP: 153/88 (14 Jul 2017 06:00) (115/64 - 191/117)  BP(mean): 103 (14 Jul 2017 06:00) (79 - 132)  ABP: --  ABP(mean): --  RR: 18 (14 Jul 2017 06:00) (13 - 21)  SpO2: 99% (14 Jul 2017 06:00) (96% - 100%)      ABG - ( 14 Jul 2017 05:02 )  pH: 7.47  /  pCO2: 39    /  pO2: 80    / HCO3: 28    / Base Excess: 4.4   /  SaO2: 96                    07-13 @ 07:01  -  07-14 @ 07:00  --------------------------------------------------------  IN: 5081.1 mL / OUT: 6675 mL / NET: -1593.9 mL        Mode: AC/ CMV (Assist Control/ Continuous Mandatory Ventilation)  RR (machine): 12  TV (machine): 450  FiO2: 30  PEEP: 5  ITime: 1  MAP: 12  PIP: 31      PHYSICAL EXAM:    GENERAL: lying in bed, eyes open and mild distress  EYES: PERRLA, white sclera.   NECK: supple, no JVD  SKIN: warm, dry   CHEST/LUNG: ET tube: 7.5 size 23 cm at lip. Bilateral breath sounds present with no rales, ronchi, or wheezing   HEART: RRR, no m/r/g   ABDOMEN: soft, nontender, nondistended; bowel sounds present.  : acevedo catheter.  EXTREMITIES: +1 non-pitting edema, no cyanosis, no clubbing.    LABS:  CBC Full  -  ( 14 Jul 2017 06:26 )  WBC Count : 7.4 K/uL  Hemoglobin : 14.4 g/dL  Hematocrit : 40.5 %  Platelet Count - Automated : 249 K/uL  Mean Cell Volume : 97.3 fl  Mean Cell Hemoglobin : 34.6 pg  Mean Cell Hemoglobin Concentration : 35.5 gm/dL  Auto Neutrophil # : 5.8 K/uL  Auto Lymphocyte # : 0.8 K/uL  Auto Monocyte # : 0.7 K/uL  Auto Eosinophil # : 0.0 K/uL  Auto Basophil # : 0.1 K/uL  Auto Neutrophil % : 78.0 %  Auto Lymphocyte % : 10.6 %  Auto Monocyte % : 9.6 %  Auto Eosinophil % : 0.6 %  Auto Basophil % : 1.2 %    07-14    140  |  106  |  8   ----------------------------<  125<H>  3.7   |  26  |  0.48<L>    Ca    8.9      14 Jul 2017 06:26  Phos  2.7     07-14  Mg     1.8     07-14    TPro  6.6  /  Alb  2.4<L>  /  TBili  0.3  /  DBili  x   /  AST  38  /  ALT  61<H>  /  AlkPhos  129<H>  07-13    Assessment and Plan    36 y/o M with Hx of Alcoholism, hepatic steatosis, pancreatitis, and reactive airway disease, likely secondary to smoking, admitted to the ICU for management of withdrawal causing severe agitation not amendable to 6mg Ativan and CIWA of 21.     1. Respiratory Failure secondary to sedation medication infusion for alcohol withdrawal   -Attempt to taper from Propofol and Precedex for SBT  -C/WFentanyl 25mg q4 hours  -C/W Phenobarbital 130mg BID, 25mg Seroquel BID  -[facility] Protocol    2. Alcohol induced acute pancreatitis.   -IV fluids  -Pain control  -Lipase trending down (3635 on 7/6 -> 1032 on 7/10 -> 967 7/13)     3. Hyperglycemia now under control  -C/W Sliding Scale   -Jevity 1.5 at rate of 30    4.History of Reactive Airway Disease likely due to smoking  -Continue Proventil and Symbicort after extubation   -Duoneb PRN if needed     5. Prophylaxis  -Lovenox and SCDs for DVT  -Protonix for Peptic Ulcers INTERVAL HPI/OVERNIGHT EVENTS: Patient's blood pressure increased to a max of 190/120; responded to Hydralazine 5mg and improved to 150/90. He is more awake despite 50mcg propofol and 1mcg precedex infusions.     PRESSORS: [] YES [X] NO    Antimicrobial:    Cardiovascular:  cloNIDine 0.1 milliGRAM(s) Oral every 8 hours    Pulmonary:  ALBUTerol    90 MICROgram(s) HFA Inhaler 2 Puff(s) Inhalation every 8 hours    Hematalogic:  enoxaparin Injectable 40 milliGRAM(s) SubCutaneous daily    Other:  lactated ringers. 1000 milliLiter(s) IV Continuous <Continuous>  nicotine - 21 mG/24Hr(s) Patch 1 patch Transdermal daily  PHENobarbital Injectable 130 milliGRAM(s) IV Push every 12 hours  insulin lispro (HumaLOG) corrective regimen sliding scale   SubCutaneous every 6 hours  fentaNYL    Injectable 50 MICROGram(s) IV Push every 4 hours  Senna syrup 10 milliLiter(s) 10 milliLiter(s) Enteral Tube two times a day  QUEtiapine 25 milliGRAM(s) Oral every other day  propofol Infusion 5.126 MICROgram(s)/kG/Min IV Continuous <Continuous>  pantoprazole  Injectable 40 milliGRAM(s) IV Push daily  dexmedetomidine Infusion 1 MICROgram(s)/kG/Hr IV Continuous <Continuous>  sodium chloride 0.9% 1000 milliLiter(s) IV Continuous <Continuous>  lactulose Syrup 20 Gram(s) Oral daily    lactated ringers. 1000 milliLiter(s) IV Continuous <Continuous>  ALBUTerol    90 MICROgram(s) HFA Inhaler 2 Puff(s) Inhalation every 8 hours  nicotine - 21 mG/24Hr(s) Patch 1 patch Transdermal daily  PHENobarbital Injectable 130 milliGRAM(s) IV Push every 12 hours  cloNIDine 0.1 milliGRAM(s) Oral every 8 hours  enoxaparin Injectable 40 milliGRAM(s) SubCutaneous daily  insulin lispro (HumaLOG) corrective regimen sliding scale   SubCutaneous every 6 hours  fentaNYL    Injectable 50 MICROGram(s) IV Push every 4 hours  Senna syrup 10 milliLiter(s) 10 milliLiter(s) Enteral Tube two times a day  QUEtiapine 25 milliGRAM(s) Oral every other day  propofol Infusion 5.126 MICROgram(s)/kG/Min IV Continuous <Continuous>  pantoprazole  Injectable 40 milliGRAM(s) IV Push daily  dexmedetomidine Infusion 1 MICROgram(s)/kG/Hr IV Continuous <Continuous>  sodium chloride 0.9% 1000 milliLiter(s) IV Continuous <Continuous>  lactulose Syrup 20 Gram(s) Oral daily    Drug Dosing Weight  Height (cm): 182.88 (07 Jul 2017 09:46)  Weight (kg): 75.2 (07 Jul 2017 09:46)  BMI (kg/m2): 22.5 (07 Jul 2017 09:46)  BSA (m2): 1.97 (07 Jul 2017 09:46)    CENTRAL LINE: [X] YES [X] NO      ACEVEDO: [X] YES [] NO    DATE INSERTED: 7/6  REMOVE: [] YES [X] NO  EXPLAIN: Patient sedated    A-LINE:  [X] YES [X] NO      PMH -reviewed admission note, no change since admission    ICU Vital Signs Last 24 Hrs  T(C): 37 (14 Jul 2017 04:00), Max: 38.1 (13 Jul 2017 15:00)  T(F): 98.6 (14 Jul 2017 04:00), Max: 100.5 (13 Jul 2017 15:00)  HR: 100 (14 Jul 2017 06:00) (78 - 106)  BP: 153/88 (14 Jul 2017 06:00) (115/64 - 191/117)  BP(mean): 103 (14 Jul 2017 06:00) (79 - 132)  ABP: --  ABP(mean): --  RR: 18 (14 Jul 2017 06:00) (13 - 21)  SpO2: 99% (14 Jul 2017 06:00) (96% - 100%)      ABG - ( 14 Jul 2017 05:02 )  pH: 7.47  /  pCO2: 39    /  pO2: 80    / HCO3: 28    / Base Excess: 4.4   /  SaO2: 96                    07-13 @ 07:01  -  07-14 @ 07:00  --------------------------------------------------------  IN: 5081.1 mL / OUT: 6675 mL / NET: -1593.9 mL        Mode: AC/ CMV (Assist Control/ Continuous Mandatory Ventilation)  RR (machine): 12  TV (machine): 450  FiO2: 30  PEEP: 5  ITime: 1  MAP: 12  PIP: 31      PHYSICAL EXAM:    GENERAL: lying in bed, eyes open and mild distress  EYES: PERRLA, white sclera.   SKIN: warm, dry   CHEST/LUNG: ET tube: 7.5 size 23 cm at lip. Bilateral breath sounds present with no rales, ronchi, or wheezing   HEART: RRR, no m/r/g   ABDOMEN: soft, nontender, nondistended; bowel sounds present.  : acevedo catheter.  EXTREMITIES: +1 non-pitting edema, no cyanosis, no clubbing.  Neuro: patient follows commands    LABS:  CBC Full  -  ( 14 Jul 2017 06:26 )  WBC Count : 7.4 K/uL  Hemoglobin : 14.4 g/dL  Hematocrit : 40.5 %  Platelet Count - Automated : 249 K/uL  Mean Cell Volume : 97.3 fl  Mean Cell Hemoglobin : 34.6 pg  Mean Cell Hemoglobin Concentration : 35.5 gm/dL  Auto Neutrophil # : 5.8 K/uL  Auto Lymphocyte # : 0.8 K/uL  Auto Monocyte # : 0.7 K/uL  Auto Eosinophil # : 0.0 K/uL  Auto Basophil # : 0.1 K/uL  Auto Neutrophil % : 78.0 %  Auto Lymphocyte % : 10.6 %  Auto Monocyte % : 9.6 %  Auto Eosinophil % : 0.6 %  Auto Basophil % : 1.2 %    07-14    140  |  106  |  8   ----------------------------<  125<H>  3.7   |  26  |  0.48<L>    Ca    8.9      14 Jul 2017 06:26  Phos  2.7     07-14  Mg     1.8     07-14    TPro  6.6  /  Alb  2.4<L>  /  TBili  0.3  /  DBili  x   /  AST  38  /  ALT  61<H>  /  AlkPhos  129<H>  07-13    Assessment and Plan    36 y/o M with Hx of Alcoholism, hepatic steatosis, pancreatitis, and reactive airway disease, likely secondary to smoking, admitted to the ICU for management of withdrawal causing severe agitation not amendable to 6mg Ativan and CIWA of 21.     1. Respiratory Failure secondary to sedation medication infusion for alcohol withdrawal   -Attempt to taper from Propofol and Precedex for SBT  -C/W Fentanyl 25mg q4 hours  -C/W Phenobarbital 130mg BID, 25mg Seroquel BID  -[facility] Protocol    2. Alcohol induced acute pancreatitis.   -IV fluids  -Pain control  -Lipase trending down (3635 on 7/6 -> 1032 on 7/10 -> 967 7/13)     3. Hyperglycemia now under control  -C/W Sliding Scale   -Jevity 1.5 at rate of 30    4.History of Reactive Airway Disease likely due to smoking  -Continue Proventil and Symbicort after extubation   -Duoneb PRN if needed     5. Prophylaxis  -Lovenox and SCDs for DVT  -Protonix for Peptic Ulcers

## 2017-07-15 LAB
ALBUMIN SERPL ELPH-MCNC: 3.2 G/DL — LOW (ref 3.5–5)
ALP SERPL-CCNC: 161 U/L — HIGH (ref 40–120)
ALT FLD-CCNC: 65 U/L DA — HIGH (ref 10–60)
ANION GAP SERPL CALC-SCNC: 13 MMOL/L — SIGNIFICANT CHANGE UP (ref 5–17)
AST SERPL-CCNC: 46 U/L — HIGH (ref 10–40)
BASOPHILS # BLD AUTO: 0.1 K/UL — SIGNIFICANT CHANGE UP (ref 0–0.2)
BASOPHILS NFR BLD AUTO: 0.9 % — SIGNIFICANT CHANGE UP (ref 0–2)
BILIRUB SERPL-MCNC: 0.6 MG/DL — SIGNIFICANT CHANGE UP (ref 0.2–1.2)
BUN SERPL-MCNC: 6 MG/DL — LOW (ref 7–18)
CALCIUM SERPL-MCNC: 9.5 MG/DL — SIGNIFICANT CHANGE UP (ref 8.4–10.5)
CHLORIDE SERPL-SCNC: 100 MMOL/L — SIGNIFICANT CHANGE UP (ref 96–108)
CO2 SERPL-SCNC: 24 MMOL/L — SIGNIFICANT CHANGE UP (ref 22–31)
CREAT SERPL-MCNC: 0.44 MG/DL — LOW (ref 0.5–1.3)
EOSINOPHIL # BLD AUTO: 0.1 K/UL — SIGNIFICANT CHANGE UP (ref 0–0.5)
EOSINOPHIL NFR BLD AUTO: 1 % — SIGNIFICANT CHANGE UP (ref 0–6)
GLUCOSE SERPL-MCNC: 83 MG/DL — SIGNIFICANT CHANGE UP (ref 70–99)
HCT VFR BLD CALC: 42.2 % — SIGNIFICANT CHANGE UP (ref 39–50)
HGB BLD-MCNC: 15.1 G/DL — SIGNIFICANT CHANGE UP (ref 13–17)
LYMPHOCYTES # BLD AUTO: 1.7 K/UL — SIGNIFICANT CHANGE UP (ref 1–3.3)
LYMPHOCYTES # BLD AUTO: 12.3 % — LOW (ref 13–44)
MAGNESIUM SERPL-MCNC: 1.8 MG/DL — SIGNIFICANT CHANGE UP (ref 1.6–2.6)
MCHC RBC-ENTMCNC: 34.6 PG — HIGH (ref 27–34)
MCHC RBC-ENTMCNC: 35.7 GM/DL — SIGNIFICANT CHANGE UP (ref 32–36)
MCV RBC AUTO: 96.7 FL — SIGNIFICANT CHANGE UP (ref 80–100)
MONOCYTES # BLD AUTO: 1.2 K/UL — HIGH (ref 0–0.9)
MONOCYTES NFR BLD AUTO: 8.7 % — SIGNIFICANT CHANGE UP (ref 2–14)
NEUTROPHILS # BLD AUTO: 10.6 K/UL — HIGH (ref 1.8–7.4)
NEUTROPHILS NFR BLD AUTO: 77.1 % — HIGH (ref 43–77)
PHOSPHATE SERPL-MCNC: 2.7 MG/DL — SIGNIFICANT CHANGE UP (ref 2.5–4.5)
PLATELET # BLD AUTO: 363 K/UL — SIGNIFICANT CHANGE UP (ref 150–400)
POTASSIUM SERPL-MCNC: 3 MMOL/L — LOW (ref 3.5–5.3)
POTASSIUM SERPL-SCNC: 3 MMOL/L — LOW (ref 3.5–5.3)
PROT SERPL-MCNC: 8 G/DL — SIGNIFICANT CHANGE UP (ref 6–8.3)
RBC # BLD: 4.36 M/UL — SIGNIFICANT CHANGE UP (ref 4.2–5.8)
RBC # FLD: 10.5 % — SIGNIFICANT CHANGE UP (ref 10.3–14.5)
SODIUM SERPL-SCNC: 137 MMOL/L — SIGNIFICANT CHANGE UP (ref 135–145)
WBC # BLD: 13.8 K/UL — HIGH (ref 3.8–10.5)
WBC # FLD AUTO: 13.8 K/UL — HIGH (ref 3.8–10.5)

## 2017-07-15 RX ADMIN — DEXMEDETOMIDINE HYDROCHLORIDE IN 0.9% SODIUM CHLORIDE 18.8 MICROGRAM(S)/KG/HR: 4 INJECTION INTRAVENOUS at 11:25

## 2017-07-15 RX ADMIN — DEXMEDETOMIDINE HYDROCHLORIDE IN 0.9% SODIUM CHLORIDE 18.8 MICROGRAM(S)/KG/HR: 4 INJECTION INTRAVENOUS at 21:15

## 2017-07-15 RX ADMIN — PANTOPRAZOLE SODIUM 40 MILLIGRAM(S): 20 TABLET, DELAYED RELEASE ORAL at 11:24

## 2017-07-15 RX ADMIN — SODIUM CHLORIDE 150 MILLILITER(S): 9 INJECTION, SOLUTION INTRAVENOUS at 08:43

## 2017-07-15 RX ADMIN — Medication 4 MILLIGRAM(S): at 04:01

## 2017-07-15 RX ADMIN — Medication 1 PATCH: at 11:26

## 2017-07-15 RX ADMIN — Medication 2 MILLIGRAM(S): at 23:22

## 2017-07-15 RX ADMIN — Medication 1 PATCH: at 11:25

## 2017-07-15 RX ADMIN — Medication 50 MILLIGRAM(S): at 14:30

## 2017-07-15 RX ADMIN — DEXMEDETOMIDINE HYDROCHLORIDE IN 0.9% SODIUM CHLORIDE 18.8 MICROGRAM(S)/KG/HR: 4 INJECTION INTRAVENOUS at 14:31

## 2017-07-15 RX ADMIN — Medication 2 MILLIGRAM(S): at 05:32

## 2017-07-15 RX ADMIN — SODIUM CHLORIDE 150 MILLILITER(S): 9 INJECTION, SOLUTION INTRAVENOUS at 03:55

## 2017-07-15 RX ADMIN — Medication 50 MILLIGRAM(S): at 21:15

## 2017-07-15 RX ADMIN — Medication 5 MILLIGRAM(S): at 05:49

## 2017-07-15 RX ADMIN — DEXMEDETOMIDINE HYDROCHLORIDE IN 0.9% SODIUM CHLORIDE 18.8 MICROGRAM(S)/KG/HR: 4 INJECTION INTRAVENOUS at 03:55

## 2017-07-15 RX ADMIN — QUETIAPINE FUMARATE 25 MILLIGRAM(S): 200 TABLET, FILM COATED ORAL at 11:26

## 2017-07-15 RX ADMIN — DEXMEDETOMIDINE HYDROCHLORIDE IN 0.9% SODIUM CHLORIDE 18.8 MICROGRAM(S)/KG/HR: 4 INJECTION INTRAVENOUS at 08:43

## 2017-07-15 RX ADMIN — LACTULOSE 20 GRAM(S): 10 SOLUTION ORAL at 11:24

## 2017-07-15 RX ADMIN — Medication 2 MILLIGRAM(S): at 11:25

## 2017-07-15 RX ADMIN — Medication 130 MILLIGRAM(S): at 05:33

## 2017-07-15 RX ADMIN — Medication 130 MILLIGRAM(S): at 18:31

## 2017-07-15 RX ADMIN — ENOXAPARIN SODIUM 40 MILLIGRAM(S): 100 INJECTION SUBCUTANEOUS at 11:24

## 2017-07-15 RX ADMIN — Medication 4 MILLIGRAM(S): at 08:43

## 2017-07-15 RX ADMIN — DEXMEDETOMIDINE HYDROCHLORIDE IN 0.9% SODIUM CHLORIDE 18.8 MICROGRAM(S)/KG/HR: 4 INJECTION INTRAVENOUS at 18:32

## 2017-07-15 NOTE — PROGRESS NOTE ADULT - SUBJECTIVE AND OBJECTIVE BOX
[ ]DNR    [ ]DNI    [ ]MEWS SUSPENDED     [ ]GOALS OF CARE DISCUSSION WITH PATIENT/FAMILY/PROXY:    INTERVAL HPI/OVERNIGHT EVENTS: mild distress     PRESSORS: [ ] YES [+ ] NO  WHICH:    ANTIBIOTICS:                  DATE STARTED:  ANTIBIOTICS:                  DATE STARTED:  ANTIBIOTICS:                  DATE STARTED:    lactated ringers. 1000 milliLiter(s) IV Continuous <Continuous>  ALBUTerol    90 MICROgram(s) HFA Inhaler 2 Puff(s) Inhalation every 8 hours  nicotine - 21 mG/24Hr(s) Patch 1 patch Transdermal daily  enoxaparin Injectable 40 milliGRAM(s) SubCutaneous daily  insulin lispro (HumaLOG) corrective regimen sliding scale   SubCutaneous every 6 hours  Senna syrup 10 milliLiter(s) 10 milliLiter(s) Enteral Tube two times a day  QUEtiapine 25 milliGRAM(s) Oral every other day  PHENobarbital Injectable 130 milliGRAM(s) IV Push every 12 hours  lactulose Syrup 20 Gram(s) Oral daily  pantoprazole  Injectable 40 milliGRAM(s) IV Push daily  enalapril 5 milliGRAM(s) Oral daily  LORazepam   Injectable 2 milliGRAM(s) IV Push every 6 hours  dexmedetomidine Infusion 1 MICROgram(s)/kG/Hr IV Continuous <Continuous>      CENTRAL LINE: [ ] YES [+ ] NO  LOCATION:   DATE INSERTED: 7/6   REMOVE: [ ] YES [ ] NO  EXPLAIN:    CARMELO: [+ ] YES [ ] NO    DATE INSERTED:  REMOVE:  [ ] YES [ ] NO  EXPLAIN:    PMH -reviewed admission note, no change since admission  PAST MEDICAL & SURGICAL HISTORY:  Alcohol induced acute pancreatitis  Pancreatitis  Alcoholism  Fatty liver  Gastroenteritis  No significant past surgical history      T(C): 36.4 (07-15-17 @ 02:00), Max: 37.1 (07-14-17 @ 08:00)  HR: 105 (07-15-17 @ 04:00)  BP: 167/101 (07-15-17 @ 04:00)  RR: 18 (07-15-17 @ 04:00)  SpO2: 97% (07-15-17 @ 03:00)  Wt(kg): --      07-13 @ 07:01  -  07-14 @ 07:00  --------------------------------------------------------  IN: 5302.5 mL / OUT: 6675 mL / NET: -1372.5 mL        PHYSICAL EXAM:    GENERAL: [ ]NAD, [ ]well-groomed, [ ]well-developed  HEAD:  [ ]Atraumatic, [ ]Normocephalic  EYES: [ ]EOMI, [ ]PERRLA, [ ]conjunctiva and sclera clear  ENMT: [ ]No tonsillar erythema, exudates, or enlargement; [ ]Moist mucous membranes, [ ]Good dentition, [ ]No lesions  NECK: [ ]Supple, normal appearance, [ ]No JVD; [ ]Normal thyroid; [ ]Trachea midline  NERVOUS SYSTEM:  [ ]Alert & Oriented X3, [ ]Good concentration; [ ]Motor Strength 5/5 B/L upper and lower extremities; [ ]DTRs 2+ intact and symmetric  CHEST/LUNG: [ ]No chest deformity; [ ]Normal percussion bilaterally; [ ]No rales, rhonchi, wheezing   HEART: [ ]Regular rate and rhythm; [ ]No murmurs, rubs, or gallops  ABDOMEN: [ ]Soft, Nontender, Nondistended; [ ]Bowel sounds present  EXTREMITIES:  [ ]2+ Peripheral Pulses, [ ]No clubbing, cyanosis, or edema  LYMPH: [ ]No lymphadenopathy noted  SKIN: [ ]No rashes or lesions; [ ]Good capillary refill      LABS:  CBC Full  -  ( 14 Jul 2017 06:26 )  WBC Count : 7.4 K/uL  Hemoglobin : 14.4 g/dL  Hematocrit : 40.5 %  Platelet Count - Automated : 249 K/uL  Mean Cell Volume : 97.3 fl  Mean Cell Hemoglobin : 34.6 pg  Mean Cell Hemoglobin Concentration : 35.5 gm/dL  Auto Neutrophil # : 5.8 K/uL  Auto Lymphocyte # : 0.8 K/uL  Auto Monocyte # : 0.7 K/uL  Auto Eosinophil # : 0.0 K/uL  Auto Basophil # : 0.1 K/uL  Auto Neutrophil % : 78.0 %  Auto Lymphocyte % : 10.6 %  Auto Monocyte % : 9.6 %  Auto Eosinophil % : 0.6 %  Auto Basophil % : 1.2 %    07-14    140  |  106  |  8   ----------------------------<  125<H>  3.7   |  26  |  0.48<L>    Ca    8.9      14 Jul 2017 06:26  Phos  2.7     07-14  Mg     1.8     07-14    TPro  6.6  /  Alb  2.4<L>  /  TBili  0.3  /  DBili  x   /  AST  38  /  ALT  61<H>  /  AlkPhos  129<H>  07-13            RADIOLOGY & ADDITIONAL STUDIES REVIEWED:     1. Respiratory Failure secondary to sedation medication infusion for alcohol withdrawal     -s/p intubation  -C/W Phenobarbital 130mg BID, 25mg Seroquel BID  c/w Precedex drip myrna as tolerated      2. Alcohol induced acute pancreatitis.   -c/w IV fluids  -c/w Pain control  -advance diet es tolerated      3. Hyperglycemia     controlled now   FS: 126  -c/w Sliding Scale     4.History of Reactive Airway Disease likely due to smoking  -Continue Proventil and Symbicort      5. Prophylaxis  -Lovenox and SCDs for DVT  -Protonix for Peptic Ulcers        CRITICAL CARE TIME SPENT: 35 minutes [ ]DNR    [ ]DNI    [ ]MEWS SUSPENDED     [X]GOALS OF CARE DISCUSSION WITH PATIENT/FAMILY/PROXY:    INTERVAL HPI/OVERNIGHT EVENTS: mild distress     PRESSORS: [ ] YES [+ ] NO  WHICH:    ANTIBIOTICS:                  DATE STARTED:  ANTIBIOTICS:                  DATE STARTED:  ANTIBIOTICS:                  DATE STARTED:    lactated ringers. 1000 milliLiter(s) IV Continuous <Continuous>  ALBUTerol    90 MICROgram(s) HFA Inhaler 2 Puff(s) Inhalation every 8 hours  nicotine - 21 mG/24Hr(s) Patch 1 patch Transdermal daily  enoxaparin Injectable 40 milliGRAM(s) SubCutaneous daily  insulin lispro (HumaLOG) corrective regimen sliding scale   SubCutaneous every 6 hours  Senna syrup 10 milliLiter(s) 10 milliLiter(s) Enteral Tube two times a day  QUEtiapine 25 milliGRAM(s) Oral every other day  PHENobarbital Injectable 130 milliGRAM(s) IV Push every 12 hours  lactulose Syrup 20 Gram(s) Oral daily  pantoprazole  Injectable 40 milliGRAM(s) IV Push daily  enalapril 5 milliGRAM(s) Oral daily  LORazepam   Injectable 2 milliGRAM(s) IV Push every 6 hours  dexmedetomidine Infusion 1 MICROgram(s)/kG/Hr IV Continuous <Continuous>      CENTRAL LINE: [ ] YES [+ ] NO  LOCATION:   DATE INSERTED: 7/6   REMOVE: [ ] YES [ ] NO  EXPLAIN:    CARMELO: [+ ] YES [ ] NO    DATE INSERTED:  REMOVE:  [ ] YES [ ] NO  EXPLAIN:    PMH -reviewed admission note, no change since admission  PAST MEDICAL & SURGICAL HISTORY:  Alcohol induced acute pancreatitis  Pancreatitis  Alcoholism  Fatty liver  Gastroenteritis  No significant past surgical history      T(C): 36.4 (07-15-17 @ 02:00), Max: 37.1 (07-14-17 @ 08:00)  HR: 105 (07-15-17 @ 04:00)  BP: 167/101 (07-15-17 @ 04:00)  RR: 18 (07-15-17 @ 04:00)  SpO2: 97% (07-15-17 @ 03:00)  Wt(kg): --      07-13 @ 07:01  -  07-14 @ 07:00  --------------------------------------------------------  IN: 5302.5 mL / OUT: 6675 mL / NET: -1372.5 mL        PHYSICAL EXAM:    GENERAL: lying in bed, eyes open and mild distress  SKIN: warm, dry   CHEST/LUNG: Bilateral breath sounds present with no rales, ronchi, or wheezing   HEART: RRR, no m/r/g   ABDOMEN: soft, nontender, nondistended; bowel sounds present.  : acevedo catheter.  Neuro: AAOx1, mildly agitated      LABS:  CBC Full  -  ( 14 Jul 2017 06:26 )  WBC Count : 7.4 K/uL  Hemoglobin : 14.4 g/dL  Hematocrit : 40.5 %  Platelet Count - Automated : 249 K/uL  Mean Cell Volume : 97.3 fl  Mean Cell Hemoglobin : 34.6 pg  Mean Cell Hemoglobin Concentration : 35.5 gm/dL  Auto Neutrophil # : 5.8 K/uL  Auto Lymphocyte # : 0.8 K/uL  Auto Monocyte # : 0.7 K/uL  Auto Eosinophil # : 0.0 K/uL  Auto Basophil # : 0.1 K/uL  Auto Neutrophil % : 78.0 %  Auto Lymphocyte % : 10.6 %  Auto Monocyte % : 9.6 %  Auto Eosinophil % : 0.6 %  Auto Basophil % : 1.2 %    07-14    140  |  106  |  8   ----------------------------<  125<H>  3.7   |  26  |  0.48<L>    Ca    8.9      14 Jul 2017 06:26  Phos  2.7     07-14  Mg     1.8     07-14    TPro  6.6  /  Alb  2.4<L>  /  TBili  0.3  /  DBili  x   /  AST  38  /  ALT  61<H>  /  AlkPhos  129<H>  07-13        RADIOLOGY & ADDITIONAL STUDIES REVIEWED:     Assessment and Plan    36 y/o M with Hx of Alcoholism, hepatic steatosis, pancreatitis, and reactive airway disease, likely secondary to smoking, admitted to the ICU for management of withdrawal causing severe agitation not amendable to 6mg Ativan and CIWA of 21.     1. Respiratory Failure secondary to sedation medication infusion for alcohol withdrawal   -s/p intubation  -C/W Phenobarbital 130mg BID, 25mg Seroquel BID  c/w Precedex drip myrna as tolerated    2. Alcohol induced acute pancreatitis.   -c/w IV fluids  -c/w Pain control  -advance diet es tolerated    3. Hyperglycemia   controlled now   FS: 126  -c/w Sliding Scale     4.History of Reactive Airway Disease likely due to smoking  -Continue Proventil and Symbicort    5. Prophylaxis  -Lovenox and SCDs for DVT  -Protonix for Peptic Ulcers      CRITICAL CARE TIME SPENT: 35 minutes

## 2017-07-16 LAB
ANION GAP SERPL CALC-SCNC: 9 MMOL/L — SIGNIFICANT CHANGE UP (ref 5–17)
BUN SERPL-MCNC: 10 MG/DL — SIGNIFICANT CHANGE UP (ref 7–18)
CALCIUM SERPL-MCNC: 8.8 MG/DL — SIGNIFICANT CHANGE UP (ref 8.4–10.5)
CHLORIDE SERPL-SCNC: 104 MMOL/L — SIGNIFICANT CHANGE UP (ref 96–108)
CO2 SERPL-SCNC: 29 MMOL/L — SIGNIFICANT CHANGE UP (ref 22–31)
CREAT SERPL-MCNC: 0.44 MG/DL — LOW (ref 0.5–1.3)
GLUCOSE SERPL-MCNC: 108 MG/DL — HIGH (ref 70–99)
HCT VFR BLD CALC: 38.2 % — LOW (ref 39–50)
HGB BLD-MCNC: 13.8 G/DL — SIGNIFICANT CHANGE UP (ref 13–17)
MAGNESIUM SERPL-MCNC: 1.8 MG/DL — SIGNIFICANT CHANGE UP (ref 1.6–2.6)
MCHC RBC-ENTMCNC: 34.6 PG — HIGH (ref 27–34)
MCHC RBC-ENTMCNC: 36 GM/DL — SIGNIFICANT CHANGE UP (ref 32–36)
MCV RBC AUTO: 95.9 FL — SIGNIFICANT CHANGE UP (ref 80–100)
PHOSPHATE SERPL-MCNC: 2.8 MG/DL — SIGNIFICANT CHANGE UP (ref 2.5–4.5)
PLATELET # BLD AUTO: 383 K/UL — SIGNIFICANT CHANGE UP (ref 150–400)
POTASSIUM SERPL-MCNC: 3.2 MMOL/L — LOW (ref 3.5–5.3)
POTASSIUM SERPL-SCNC: 3.2 MMOL/L — LOW (ref 3.5–5.3)
RBC # BLD: 3.99 M/UL — LOW (ref 4.2–5.8)
RBC # FLD: 11.3 % — SIGNIFICANT CHANGE UP (ref 10.3–14.5)
SODIUM SERPL-SCNC: 142 MMOL/L — SIGNIFICANT CHANGE UP (ref 135–145)
WBC # BLD: 11.8 K/UL — HIGH (ref 3.8–10.5)
WBC # FLD AUTO: 11.8 K/UL — HIGH (ref 3.8–10.5)

## 2017-07-16 RX ORDER — POTASSIUM CHLORIDE 20 MEQ
40 PACKET (EA) ORAL ONCE
Qty: 0 | Refills: 0 | Status: DISCONTINUED | OUTPATIENT
Start: 2017-07-16 | End: 2017-07-16

## 2017-07-16 RX ORDER — MULTIVIT-MIN/FERROUS GLUCONATE 9 MG/15 ML
1 LIQUID (ML) ORAL DAILY
Qty: 0 | Refills: 0 | Status: DISCONTINUED | OUTPATIENT
Start: 2017-07-16 | End: 2017-07-22

## 2017-07-16 RX ORDER — POTASSIUM CHLORIDE 20 MEQ
40 PACKET (EA) ORAL
Qty: 0 | Refills: 0 | Status: COMPLETED | OUTPATIENT
Start: 2017-07-16 | End: 2017-07-16

## 2017-07-16 RX ORDER — BACITRACIN ZINC 500 UNIT/G
1 OINTMENT IN PACKET (EA) TOPICAL EVERY 6 HOURS
Qty: 0 | Refills: 0 | Status: DISCONTINUED | OUTPATIENT
Start: 2017-07-16 | End: 2017-07-22

## 2017-07-16 RX ORDER — FOLIC ACID 0.8 MG
1 TABLET ORAL DAILY
Qty: 0 | Refills: 0 | Status: DISCONTINUED | OUTPATIENT
Start: 2017-07-16 | End: 2017-07-22

## 2017-07-16 RX ORDER — SODIUM CHLORIDE 9 MG/ML
1000 INJECTION, SOLUTION INTRAVENOUS
Qty: 0 | Refills: 0 | Status: DISCONTINUED | OUTPATIENT
Start: 2017-07-16 | End: 2017-07-17

## 2017-07-16 RX ORDER — THIAMINE MONONITRATE (VIT B1) 100 MG
100 TABLET ORAL DAILY
Qty: 0 | Refills: 0 | Status: DISCONTINUED | OUTPATIENT
Start: 2017-07-16 | End: 2017-07-22

## 2017-07-16 RX ORDER — BACITRACIN ZINC 500 UNIT/G
1 OINTMENT IN PACKET (EA) TOPICAL DAILY
Qty: 0 | Refills: 0 | Status: DISCONTINUED | OUTPATIENT
Start: 2017-07-16 | End: 2017-07-16

## 2017-07-16 RX ADMIN — Medication 1 MILLIGRAM(S): at 15:35

## 2017-07-16 RX ADMIN — DEXMEDETOMIDINE HYDROCHLORIDE IN 0.9% SODIUM CHLORIDE 18.8 MICROGRAM(S)/KG/HR: 4 INJECTION INTRAVENOUS at 11:22

## 2017-07-16 RX ADMIN — DEXMEDETOMIDINE HYDROCHLORIDE IN 0.9% SODIUM CHLORIDE 18.8 MICROGRAM(S)/KG/HR: 4 INJECTION INTRAVENOUS at 18:17

## 2017-07-16 RX ADMIN — Medication 1 APPLICATION(S): at 18:17

## 2017-07-16 RX ADMIN — Medication 50 MILLIGRAM(S): at 21:24

## 2017-07-16 RX ADMIN — Medication 1 PATCH: at 11:22

## 2017-07-16 RX ADMIN — Medication 2 MILLIGRAM(S): at 11:23

## 2017-07-16 RX ADMIN — Medication 40 MILLIEQUIVALENT(S): at 08:13

## 2017-07-16 RX ADMIN — Medication 130 MILLIGRAM(S): at 05:17

## 2017-07-16 RX ADMIN — DEXMEDETOMIDINE HYDROCHLORIDE IN 0.9% SODIUM CHLORIDE 18.8 MICROGRAM(S)/KG/HR: 4 INJECTION INTRAVENOUS at 15:00

## 2017-07-16 RX ADMIN — Medication 1 PATCH: at 11:23

## 2017-07-16 RX ADMIN — Medication 100 MILLIGRAM(S): at 15:36

## 2017-07-16 RX ADMIN — DEXMEDETOMIDINE HYDROCHLORIDE IN 0.9% SODIUM CHLORIDE 18.8 MICROGRAM(S)/KG/HR: 4 INJECTION INTRAVENOUS at 08:13

## 2017-07-16 RX ADMIN — Medication 130 MILLIGRAM(S): at 18:17

## 2017-07-16 RX ADMIN — SODIUM CHLORIDE 80 MILLILITER(S): 9 INJECTION, SOLUTION INTRAVENOUS at 15:33

## 2017-07-16 RX ADMIN — Medication 5 MILLIGRAM(S): at 05:17

## 2017-07-16 RX ADMIN — Medication 1 APPLICATION(S): at 11:23

## 2017-07-16 RX ADMIN — Medication 1 TABLET(S): at 15:35

## 2017-07-16 RX ADMIN — PANTOPRAZOLE SODIUM 40 MILLIGRAM(S): 20 TABLET, DELAYED RELEASE ORAL at 11:22

## 2017-07-16 RX ADMIN — ENOXAPARIN SODIUM 40 MILLIGRAM(S): 100 INJECTION SUBCUTANEOUS at 11:22

## 2017-07-16 RX ADMIN — Medication 50 MILLIGRAM(S): at 05:17

## 2017-07-16 RX ADMIN — Medication 40 MILLIEQUIVALENT(S): at 11:22

## 2017-07-16 NOTE — PROGRESS NOTE ADULT - SUBJECTIVE AND OBJECTIVE BOX
INTERVAL HPI/OVERNIGHT EVENTS:     PRESSORS: [ ] YES [ ] NO  WHICH:    ANTIBIOTICS:                  DATE STARTED:  ANTIBIOTICS:                  DATE STARTED:  ANTIBIOTICS:                  DATE STARTED:    Antimicrobial:    Cardiovascular:  enalapril 5 milliGRAM(s) Oral daily    Pulmonary:  ALBUTerol    90 MICROgram(s) HFA Inhaler 2 Puff(s) Inhalation every 8 hours    Hematalogic:  enoxaparin Injectable 40 milliGRAM(s) SubCutaneous daily    Other:  nicotine - 21 mG/24Hr(s) Patch 1 patch Transdermal daily  Senna syrup 10 milliLiter(s) 10 milliLiter(s) Enteral Tube two times a day  QUEtiapine 25 milliGRAM(s) Oral every other day  PHENobarbital Injectable 130 milliGRAM(s) IV Push every 12 hours  lactulose Syrup 20 Gram(s) Oral daily  pantoprazole  Injectable 40 milliGRAM(s) IV Push daily  LORazepam   Injectable 2 milliGRAM(s) IV Push every 6 hours  dexmedetomidine Infusion 1 MICROgram(s)/kG/Hr IV Continuous <Continuous>  chlordiazePOXIDE 50 milliGRAM(s) Oral every 8 hours      Drug Dosing Weight  Height (cm): 182.88 (07 Jul 2017 09:46)  Weight (kg): 75.2 (07 Jul 2017 09:46)  BMI (kg/m2): 22.5 (07 Jul 2017 09:46)  BSA (m2): 1.97 (07 Jul 2017 09:46)    CENTRAL LINE: [ ] YES [x ] NO  LOCATION:   DATE INSERTED:  REMOVE: [ ] YES [ ] NO  EXPLAIN:    RHODES: [ ] YES [x ] NO    DATE INSERTED:  REMOVE:  [ ] YES [ ] NO  EXPLAIN:    A-LINE:  [ ] YES [x ] NO  LOCATION:   DATE INSERTED:  REMOVE:  [ ] YES [ ] NO  EXPLAIN:    PMH/Social Hx/Fam Hx -reviewed admission note, no change since admission  PAST MEDICAL & SURGICAL HISTORY:  Alcohol induced acute pancreatitis  Pancreatitis  Alcoholism  Fatty liver  Gastroenteritis  No significant past surgical history    Heart faliure: acute [ ] chronic [ ] acute or chronic [ ] diastolic [ ] systolic [ ] combied systolic and diastolic[ ]  HANK: ATN[ ] renal medullary necrosis [ ] CKD I [ ]CKDII [ ]CKD III [ ]CKD IV [ ]CKD V [ ]Other pathological lesions [ ]  Abdominal Nutrition Status: malnutrition [ ] cachexia [ ] morbid obesity/BMI=40 [ ] Supplement ordered [___________]     T(C): 37.3 (07-16-17 @ 00:00), Max: 37.3 (07-15-17 @ 07:15)  HR: 82 (07-16-17 @ 04:00)  BP: 119/70 (07-16-17 @ 04:00)  BP(mean): 87 (07-16-17 @ 03:00)  ABP: --  ABP(mean): --  RR: 20 (07-16-17 @ 04:00)  SpO2: 97% (07-16-17 @ 04:00)  Wt(kg): --    ABG - ( 14 Jul 2017 05:02 )  pH: 7.47  /  pCO2: 39    /  pO2: 80    / HCO3: 28    / Base Excess: 4.4   /  SaO2: 96                    07-14 @ 07:01  -  07-15 @ 07:00  --------------------------------------------------------  IN: 4547.6 mL / OUT: 4075 mL / NET: 472.6 mL            PHYSICAL EXAM:    GENERAL: lying in bed, eyes open   SKIN: warm, dry   CHEST/LUNG: Bilateral breath sounds present with no rales, ronchi, or wheezing   HEART: RRR, no m/r/g   ABDOMEN: soft, nontender, nondistended; bowel sounds present.  NEURO: AAOx1, hypoactive, NAD      LABS:  CBC Full  -  ( 15 Jul 2017 06:27 )  WBC Count : 13.8 K/uL  Hemoglobin : 15.1 g/dL  Hematocrit : 42.2 %  Platelet Count - Automated : 363 K/uL  Mean Cell Volume : 96.7 fl  Mean Cell Hemoglobin : 34.6 pg  Mean Cell Hemoglobin Concentration : 35.7 gm/dL  Auto Neutrophil # : 10.6 K/uL  Auto Lymphocyte # : 1.7 K/uL  Auto Monocyte # : 1.2 K/uL  Auto Eosinophil # : 0.1 K/uL  Auto Basophil # : 0.1 K/uL  Auto Neutrophil % : 77.1 %  Auto Lymphocyte % : 12.3 %  Auto Monocyte % : 8.7 %  Auto Eosinophil % : 1.0 %  Auto Basophil % : 0.9 %    07-15    137  |  100  |  6<L>  ----------------------------<  83  3.0<L>   |  24  |  0.44<L>    Ca    9.5      15 Jul 2017 06:27  Phos  2.7     07-15  Mg     1.8     07-15    TPro  8.0  /  Alb  3.2<L>  /  TBili  0.6  /  DBili  x   /  AST  46<H>  /  ALT  65<H>  /  AlkPhos  161<H>  07-15              RADIOLOGY & ADDITIONAL STUDIES REVIEWED:      [ ]GOALS OF CARE DISCUSSION WITH PATIENT/FAMILY/PROXY:    CRITICAL CARE TIME SPENT: 35 minutes    Assessment and Plan    36 y/o M with Hx of Alcoholism, hepatic steatosis, pancreatitis, and reactive airway disease, likely secondary to smoking, admitted to the ICU for management of withdrawal causing severe agitation not amendable to 6mg Ativan and CIWA of 21.     1. Respiratory Failure secondary to sedation medication infusion for alcohol withdrawal   -s/p intubation  -C/W Phenobarbital 130mg BID, 25mg Seroquel BID  c/w Precedex drip myrna as tolerated    2. Alcohol induced acute pancreatitis.   -c/w IV fluids  -c/w Pain control  -advance diet es tolerated    3. Hyperglycemia   -c/w Sliding Scale     4.History of Reactive Airway Disease likely due to smoking  -Continue Proventil and Symbicort    5. Prophylaxis  -Lovenox and SCDs for DVT  -Protonix for Peptic Ulcers

## 2017-07-17 LAB
ANION GAP SERPL CALC-SCNC: 8 MMOL/L — SIGNIFICANT CHANGE UP (ref 5–17)
APPEARANCE UR: CLEAR — SIGNIFICANT CHANGE UP
BASOPHILS # BLD AUTO: 0.1 K/UL — SIGNIFICANT CHANGE UP (ref 0–0.2)
BASOPHILS NFR BLD AUTO: 0.9 % — SIGNIFICANT CHANGE UP (ref 0–2)
BILIRUB UR-MCNC: NEGATIVE — SIGNIFICANT CHANGE UP
BUN SERPL-MCNC: 9 MG/DL — SIGNIFICANT CHANGE UP (ref 7–18)
CALCIUM SERPL-MCNC: 8.6 MG/DL — SIGNIFICANT CHANGE UP (ref 8.4–10.5)
CHLORIDE SERPL-SCNC: 104 MMOL/L — SIGNIFICANT CHANGE UP (ref 96–108)
CO2 SERPL-SCNC: 26 MMOL/L — SIGNIFICANT CHANGE UP (ref 22–31)
COLOR SPEC: YELLOW — SIGNIFICANT CHANGE UP
CREAT SERPL-MCNC: 0.45 MG/DL — LOW (ref 0.5–1.3)
DIFF PNL FLD: ABNORMAL
EOSINOPHIL # BLD AUTO: 0.2 K/UL — SIGNIFICANT CHANGE UP (ref 0–0.5)
EOSINOPHIL NFR BLD AUTO: 1.4 % — SIGNIFICANT CHANGE UP (ref 0–6)
GLUCOSE SERPL-MCNC: 130 MG/DL — HIGH (ref 70–99)
GLUCOSE UR QL: NEGATIVE — SIGNIFICANT CHANGE UP
HCT VFR BLD CALC: 39.5 % — SIGNIFICANT CHANGE UP (ref 39–50)
HGB BLD-MCNC: 14.2 G/DL — SIGNIFICANT CHANGE UP (ref 13–17)
KETONES UR-MCNC: NEGATIVE — SIGNIFICANT CHANGE UP
LEUKOCYTE ESTERASE UR-ACNC: ABNORMAL
LYMPHOCYTES # BLD AUTO: 1.7 K/UL — SIGNIFICANT CHANGE UP (ref 1–3.3)
LYMPHOCYTES # BLD AUTO: 11.8 % — LOW (ref 13–44)
MAGNESIUM SERPL-MCNC: 1.7 MG/DL — SIGNIFICANT CHANGE UP (ref 1.6–2.6)
MCHC RBC-ENTMCNC: 35 PG — HIGH (ref 27–34)
MCHC RBC-ENTMCNC: 35.8 GM/DL — SIGNIFICANT CHANGE UP (ref 32–36)
MCV RBC AUTO: 97.6 FL — SIGNIFICANT CHANGE UP (ref 80–100)
MONOCYTES # BLD AUTO: 0.6 K/UL — SIGNIFICANT CHANGE UP (ref 0–0.9)
MONOCYTES NFR BLD AUTO: 4 % — SIGNIFICANT CHANGE UP (ref 2–14)
NEUTROPHILS # BLD AUTO: 11.5 K/UL — HIGH (ref 1.8–7.4)
NEUTROPHILS NFR BLD AUTO: 81.9 % — HIGH (ref 43–77)
NITRITE UR-MCNC: NEGATIVE — SIGNIFICANT CHANGE UP
PH UR: 8 — SIGNIFICANT CHANGE UP (ref 5–8)
PHOSPHATE SERPL-MCNC: 2.2 MG/DL — LOW (ref 2.5–4.5)
PLATELET # BLD AUTO: 421 K/UL — HIGH (ref 150–400)
POTASSIUM SERPL-MCNC: 3.4 MMOL/L — LOW (ref 3.5–5.3)
POTASSIUM SERPL-SCNC: 3.4 MMOL/L — LOW (ref 3.5–5.3)
PROT UR-MCNC: 30 MG/DL
RBC # BLD: 4.04 M/UL — LOW (ref 4.2–5.8)
RBC # FLD: 10.7 % — SIGNIFICANT CHANGE UP (ref 10.3–14.5)
SODIUM SERPL-SCNC: 138 MMOL/L — SIGNIFICANT CHANGE UP (ref 135–145)
SP GR SPEC: 1.01 — SIGNIFICANT CHANGE UP (ref 1.01–1.02)
UROBILINOGEN FLD QL: NEGATIVE — SIGNIFICANT CHANGE UP
WBC # BLD: 14.1 K/UL — HIGH (ref 3.8–10.5)
WBC # FLD AUTO: 14.1 K/UL — HIGH (ref 3.8–10.5)

## 2017-07-17 PROCEDURE — 71010: CPT | Mod: 26

## 2017-07-17 RX ORDER — ACETAMINOPHEN 500 MG
650 TABLET ORAL ONCE
Qty: 0 | Refills: 0 | Status: COMPLETED | OUTPATIENT
Start: 2017-07-17 | End: 2017-07-17

## 2017-07-17 RX ORDER — DEXMEDETOMIDINE HYDROCHLORIDE IN 0.9% SODIUM CHLORIDE 4 UG/ML
1 INJECTION INTRAVENOUS
Qty: 200 | Refills: 0 | Status: DISCONTINUED | OUTPATIENT
Start: 2017-07-17 | End: 2017-07-17

## 2017-07-17 RX ORDER — PANTOPRAZOLE SODIUM 20 MG/1
40 TABLET, DELAYED RELEASE ORAL
Qty: 0 | Refills: 0 | Status: DISCONTINUED | OUTPATIENT
Start: 2017-07-17 | End: 2017-07-22

## 2017-07-17 RX ORDER — POTASSIUM CHLORIDE 20 MEQ
40 PACKET (EA) ORAL EVERY 4 HOURS
Qty: 0 | Refills: 0 | Status: COMPLETED | OUTPATIENT
Start: 2017-07-17 | End: 2017-07-17

## 2017-07-17 RX ORDER — CEFTRIAXONE 500 MG/1
INJECTION, POWDER, FOR SOLUTION INTRAMUSCULAR; INTRAVENOUS
Qty: 0 | Refills: 0 | Status: DISCONTINUED | OUTPATIENT
Start: 2017-07-17 | End: 2017-07-22

## 2017-07-17 RX ORDER — CEFTRIAXONE 500 MG/1
1 INJECTION, POWDER, FOR SOLUTION INTRAMUSCULAR; INTRAVENOUS ONCE
Qty: 0 | Refills: 0 | Status: COMPLETED | OUTPATIENT
Start: 2017-07-17 | End: 2017-07-17

## 2017-07-17 RX ORDER — PANTOPRAZOLE SODIUM 20 MG/1
40 TABLET, DELAYED RELEASE ORAL DAILY
Qty: 0 | Refills: 0 | Status: DISCONTINUED | OUTPATIENT
Start: 2017-07-17 | End: 2017-07-17

## 2017-07-17 RX ORDER — ACETAMINOPHEN 500 MG
650 TABLET ORAL EVERY 6 HOURS
Qty: 0 | Refills: 0 | Status: DISCONTINUED | OUTPATIENT
Start: 2017-07-17 | End: 2017-07-22

## 2017-07-17 RX ORDER — DEXMEDETOMIDINE HYDROCHLORIDE IN 0.9% SODIUM CHLORIDE 4 UG/ML
1 INJECTION INTRAVENOUS
Qty: 200 | Refills: 0 | Status: DISCONTINUED | OUTPATIENT
Start: 2017-07-17 | End: 2017-07-19

## 2017-07-17 RX ORDER — CEFTRIAXONE 500 MG/1
1 INJECTION, POWDER, FOR SOLUTION INTRAMUSCULAR; INTRAVENOUS EVERY 24 HOURS
Qty: 0 | Refills: 0 | Status: DISCONTINUED | OUTPATIENT
Start: 2017-07-18 | End: 2017-07-22

## 2017-07-17 RX ORDER — DOCUSATE SODIUM 100 MG
100 CAPSULE ORAL THREE TIMES A DAY
Qty: 0 | Refills: 0 | Status: DISCONTINUED | OUTPATIENT
Start: 2017-07-17 | End: 2017-07-22

## 2017-07-17 RX ORDER — SODIUM,POTASSIUM PHOSPHATES 278-250MG
1 POWDER IN PACKET (EA) ORAL
Qty: 0 | Refills: 0 | Status: COMPLETED | OUTPATIENT
Start: 2017-07-17 | End: 2017-07-18

## 2017-07-17 RX ADMIN — DEXMEDETOMIDINE HYDROCHLORIDE IN 0.9% SODIUM CHLORIDE 18.8 MICROGRAM(S)/KG/HR: 4 INJECTION INTRAVENOUS at 23:44

## 2017-07-17 RX ADMIN — Medication 1 APPLICATION(S): at 11:43

## 2017-07-17 RX ADMIN — Medication 650 MILLIGRAM(S): at 15:36

## 2017-07-17 RX ADMIN — Medication 1 TABLET(S): at 13:23

## 2017-07-17 RX ADMIN — Medication 1 APPLICATION(S): at 05:53

## 2017-07-17 RX ADMIN — Medication 100 MILLIGRAM(S): at 11:53

## 2017-07-17 RX ADMIN — Medication 40 MILLIEQUIVALENT(S): at 11:45

## 2017-07-17 RX ADMIN — Medication 650 MILLIGRAM(S): at 08:26

## 2017-07-17 RX ADMIN — Medication 650 MILLIGRAM(S): at 23:44

## 2017-07-17 RX ADMIN — Medication 1 APPLICATION(S): at 18:24

## 2017-07-17 RX ADMIN — DEXMEDETOMIDINE HYDROCHLORIDE IN 0.9% SODIUM CHLORIDE 18.8 MICROGRAM(S)/KG/HR: 4 INJECTION INTRAVENOUS at 20:32

## 2017-07-17 RX ADMIN — Medication 5 MILLIGRAM(S): at 05:53

## 2017-07-17 RX ADMIN — DEXMEDETOMIDINE HYDROCHLORIDE IN 0.9% SODIUM CHLORIDE 18.8 MICROGRAM(S)/KG/HR: 4 INJECTION INTRAVENOUS at 22:43

## 2017-07-17 RX ADMIN — Medication 100 MILLIGRAM(S): at 21:15

## 2017-07-17 RX ADMIN — Medication 1 APPLICATION(S): at 21:15

## 2017-07-17 RX ADMIN — Medication 40 MILLIEQUIVALENT(S): at 15:12

## 2017-07-17 RX ADMIN — Medication 2 MILLIGRAM(S): at 11:45

## 2017-07-17 RX ADMIN — Medication 1 PATCH: at 11:42

## 2017-07-17 RX ADMIN — CEFTRIAXONE 100 GRAM(S): 500 INJECTION, POWDER, FOR SOLUTION INTRAMUSCULAR; INTRAVENOUS at 20:32

## 2017-07-17 RX ADMIN — Medication 1 TABLET(S): at 18:24

## 2017-07-17 RX ADMIN — Medication 40 MILLIEQUIVALENT(S): at 17:54

## 2017-07-17 RX ADMIN — Medication 130 MILLIGRAM(S): at 05:53

## 2017-07-17 RX ADMIN — Medication 1 MILLIGRAM(S): at 11:45

## 2017-07-17 RX ADMIN — PANTOPRAZOLE SODIUM 40 MILLIGRAM(S): 20 TABLET, DELAYED RELEASE ORAL at 11:46

## 2017-07-17 RX ADMIN — ENOXAPARIN SODIUM 40 MILLIGRAM(S): 100 INJECTION SUBCUTANEOUS at 11:53

## 2017-07-17 RX ADMIN — Medication 130 MILLIGRAM(S): at 17:54

## 2017-07-17 RX ADMIN — Medication 1 TABLET(S): at 11:43

## 2017-07-17 RX ADMIN — Medication 2 MILLIGRAM(S): at 17:54

## 2017-07-17 RX ADMIN — Medication 100 MILLIGRAM(S): at 15:12

## 2017-07-17 RX ADMIN — Medication 50 MILLIGRAM(S): at 11:47

## 2017-07-17 RX ADMIN — Medication 650 MILLIGRAM(S): at 13:23

## 2017-07-17 RX ADMIN — Medication 2 MILLIGRAM(S): at 22:46

## 2017-07-17 RX ADMIN — Medication 650 MILLIGRAM(S): at 09:00

## 2017-07-17 RX ADMIN — ALBUTEROL 2 PUFF(S): 90 AEROSOL, METERED ORAL at 14:00

## 2017-07-17 RX ADMIN — Medication 1 APPLICATION(S): at 00:40

## 2017-07-17 RX ADMIN — QUETIAPINE FUMARATE 25 MILLIGRAM(S): 200 TABLET, FILM COATED ORAL at 11:53

## 2017-07-17 RX ADMIN — Medication 2 MILLIGRAM(S): at 00:40

## 2017-07-17 RX ADMIN — Medication 650 MILLIGRAM(S): at 01:06

## 2017-07-17 RX ADMIN — Medication 50 MILLIGRAM(S): at 05:53

## 2017-07-17 RX ADMIN — Medication 1 PATCH: at 12:05

## 2017-07-17 NOTE — PROGRESS NOTE ADULT - NSHPATTENDINGPLANDISCUSS_GEN_ALL_CORE
icu team on rounds

## 2017-07-17 NOTE — CHART NOTE - NSCHARTNOTEFT_GEN_A_CORE
38 y/o M with Hx of Alcoholism, hepatic steatosis, pancreatitis, and reactive airway disease, likely secondary to smoking, admitted to the ICU for management of withdrawal causing severe agitation not amendable to 6mg Ativan and CIWA of 21. Admitted to ICU for close monitoring            1. Fever:   -101  - CXR clear  - UA positive will start Rocephin   -f/u  Bcx  -monitor fevers  -skin shows no signs of infection     2. Respiratory Failure secondary to alcohol withdrawal and intubated for airway protection      -pt alert, awake and cooperative   -pt intubated for airway protection   -s/p intubation on 7/14  -c/w  Phenobarbital 130mg BID, 25mg Seroquel BID, ativan 2 mg q6  - d/maricruz Precedex   - librium was increased to 100 mg q8      3. Alcohol induced acute pancreatitis    -c/w Pain control  -advance diet es tolerated    4. Hypokalemia      -will replace with PO potassium   -monitor BMP am     5) Hypophosphatemia     will replace with PO   monitor BMP am       Plan discussed with ICU team on rounds. 38 y/o M with Hx of Alcoholism, hepatic steatosis, pancreatitis, and reactive airway disease, likely secondary to smoking, admitted to the ICU for management of withdrawal causing severe agitation not amendable to 6mg Ativan and CIWA of 21.     1. Respiratory Failure secondary to alcohol withdrawal and intubated for airway protection      -pt alert, awake  -off precedex   -s/p intubation  -Phenobarbital discontinued mg BID  c/w  25mg Seroquel BID  - ativan  was discontinued   - librium was decrease to 50 mg q8      2. UTI    - pt had a one time 101 fever, septic work up was done   -UA positive , CXR negative -Bcx negative   -pt started on IV abx   - leukocytosis improving 25>19  -monitor fevers  -monitor CBC     3. Alcohol induced acute pancreatitis    -c/w Pain control  -pt tolerating regular diet     4. Hypokalemia/hypomagnesemia/hypophosphatemia     -resolved   -monitor BMP am 38 y/o M with Hx of Alcoholism, hepatic steatosis, pancreatitis, and reactive airway disease, likely secondary to smoking, admitted to the ICU for management of withdrawal causing severe agitation not amendable to 6mg Ativan and CIWA of 21.     1. Respiratory Failure secondary to alcohol withdrawal and intubated for airway protection      -pt alert, awake  -off precedex   -s/p intubation  -Phenobarbital discontinued mg BID  c/w  25mg Seroquel BID  - ativan  was discontinued   - librium was decrease to 50 mg q8      2. UTI    - pt had a one time 101 fever, septic work up was done   -UA positive , CXR negative -Bcx negative   - leukocytosis improving 25>19, pt does not look septic   -c/w IV Rocephin   -monitor fevers  -monitor CBC     3. Alcohol induced acute pancreatitis    -c/w Pain control  -pt tolerating regular diet     4. Hypokalemia/hypomagnesemia/hypophosphatemia     -resolved   -monitor BMP am 38 y/o M with Hx of Alcoholism, hepatic steatosis, pancreatitis, and reactive airway disease, likely secondary to smoking, admitted to the ICU for management of withdrawal causing severe agitation not amendable to 6mg Ativan and CIWA of 21.     1. Respiratory Failure secondary to alcohol withdrawal and intubated for airway protection      -s/p intubation. Pt extubated  7/14/17  -pt alert, awake and cooperative  -off precedex   -Phenobarbital discontinued mg BID  -c/w  25mg Seroquel BID  - ativan  was discontinued   - librium was decrease to 50 mg q8      2. UTI    - pt had a one time 101 fever, septic work up was done   -UA positive , CXR negative -Bcx negative   - leukocytosis improving 25>19, pt does not look septic   -c/w IV Rocephin   -monitor fevers  -monitor CBC     3. Alcohol induced acute pancreatitis    -c/w Pain control  -pt tolerating regular diet     4. Hypokalemia/hypomagnesemia/hypophosphatemia     -resolved   -monitor BMP am

## 2017-07-17 NOTE — PROGRESS NOTE ADULT - SUBJECTIVE AND OBJECTIVE BOX
[ ]DNR    [ ]DNI    [ ]MEWS SUSPENDED     [ ]GOALS OF CARE DISCUSSION WITH PATIENT/FAMILY/PROXY:    INTERVAL HPI/OVERNIGHT EVENTS:     headache    PRESSORS: [ ] YES [+ ] NO  WHICH:    ANTIBIOTICS:                  DATE STARTED:  ANTIBIOTICS:                  DATE STARTED:  ANTIBIOTICS:                  DATE STARTED:    ALBUTerol    90 MICROgram(s) HFA Inhaler 2 Puff(s) Inhalation every 8 hours  nicotine - 21 mG/24Hr(s) Patch 1 patch Transdermal daily  enoxaparin Injectable 40 milliGRAM(s) SubCutaneous daily  Senna syrup 10 milliLiter(s) 10 milliLiter(s) Enteral Tube two times a day  QUEtiapine 25 milliGRAM(s) Oral every other day  PHENobarbital Injectable 130 milliGRAM(s) IV Push every 12 hours  enalapril 5 milliGRAM(s) Oral daily  LORazepam   Injectable 2 milliGRAM(s) IV Push every 6 hours  dexmedetomidine Infusion 1 MICROgram(s)/kG/Hr IV Continuous <Continuous>  chlordiazePOXIDE 50 milliGRAM(s) Oral every 8 hours  BACItracin   Ointment 1 Application(s) Topical every 6 hours  thiamine 100 milliGRAM(s) Oral daily  folic acid 1 milliGRAM(s) Oral daily  multivitamin/minerals 1 Tablet(s) Oral daily  sodium chloride 0.9% 1000 milliLiter(s) IV Continuous <Continuous>  pantoprazole  Injectable 40 milliGRAM(s) IV Push daily  acetaminophen   Tablet. 650 milliGRAM(s) Oral once      CENTRAL LINE: [ ] YES [ +] NO  LOCATION:   DATE INSERTED:  REMOVE: [ ] YES [ ] NO  EXPLAIN:    CARMELO: [ ] YES [ ] NO    DATE INSERTED:  REMOVE:  [ ] YES [ ] NO  EXPLAIN:    [facility] -reviewed admission note, no change since admission  PAST MEDICAL & SURGICAL HISTORY:  Alcohol induced acute pancreatitis  Pancreatitis  Alcoholism  Fatty liver  Gastroenteritis  No significant past surgical history      T(C): 36.5 (07-17-17 @ 04:21), Max: 37.1 (07-16-17 @ 12:00)  HR: 108 (07-17-17 @ 08:00)  BP: 161/95 (07-17-17 @ 08:00)  RR: 20 (07-16-17 @ 23:00)  SpO2: 97% (07-17-17 @ 08:00)  Wt(kg): --      07-16 @ 07:01  -  07-17 @ 07:00  --------------------------------------------------------  IN: 1894.2 mL / OUT: 1775 mL / NET: 119.2 mL        PHYSICAL EXAM:    GENERAL: awake, alert   SKIN: warm, dry   CHEST/LUNG: Bilateral breath sounds present with no rales, ronchi, or wheezing   HEART: RRR, no m/r/g   ABDOMEN: soft, nontender, nondistended; bowel sounds present.  NEURO: AAOx2       LABS:  CBC Full  -  ( 17 Jul 2017 06:28 )  WBC Count : 14.1 K/uL  Hemoglobin : 14.2 g/dL  Hematocrit : 39.5 %  Platelet Count - Automated : 421 K/uL  Mean Cell Volume : 97.6 fl  Mean Cell Hemoglobin : 35.0 pg  Mean Cell Hemoglobin Concentration : 35.8 gm/dL  Auto Neutrophil # : 11.5 K/uL  Auto Lymphocyte # : 1.7 K/uL  Auto Monocyte # : 0.6 K/uL  Auto Eosinophil # : 0.2 K/uL  Auto Basophil # : 0.1 K/uL  Auto Neutrophil % : 81.9 %  Auto Lymphocyte % : 11.8 %  Auto Monocyte % : 4.0 %  Auto Eosinophil % : 1.4 %  Auto Basophil % : 0.9 %    07-17    138  |  104  |  9   ----------------------------<  130<H>  3.4<L>   |  26  |  0.45<L>    Ca    8.6      17 Jul 2017 06:28  Phos  2.2     07-17  Mg     1.7     07-17              RADIOLOGY & ADDITIONAL STUDIES REVIEWED:        1. Respiratory Failure secondary to sedation medication infusion for alcohol withdrawal   -pt alert, awake and cooperative   -s/p intubation  -C/W Phenobarbital 130mg BID, 25mg Seroquel BID  c/w Precedex drip myrna as tolerated    2. Alcohol induced acute pancreatitis    -c/w IV fluids  -c/w Pain control  -advance diet es tolerated, currently on puree     Plan discussed with ICU team on rounds.    CRITICAL CARE TIME SPENT: 35 minutes [ ]DNR    [ ]DNI    [ ]MEWS SUSPENDED     [ ]GOALS OF CARE DISCUSSION WITH PATIENT/FAMILY/PROXY:    INTERVAL HPI/OVERNIGHT EVENTS:     headache    PRESSORS: [ ] YES [+ ] NO  WHICH:    ANTIBIOTICS:                  DATE STARTED:  ANTIBIOTICS:                  DATE STARTED:  ANTIBIOTICS:                  DATE STARTED:    ALBUTerol    90 MICROgram(s) HFA Inhaler 2 Puff(s) Inhalation every 8 hours  nicotine - 21 mG/24Hr(s) Patch 1 patch Transdermal daily  enoxaparin Injectable 40 milliGRAM(s) SubCutaneous daily  Senna syrup 10 milliLiter(s) 10 milliLiter(s) Enteral Tube two times a day  QUEtiapine 25 milliGRAM(s) Oral every other day  PHENobarbital Injectable 130 milliGRAM(s) IV Push every 12 hours  enalapril 5 milliGRAM(s) Oral daily  LORazepam   Injectable 2 milliGRAM(s) IV Push every 6 hours  dexmedetomidine Infusion 1 MICROgram(s)/kG/Hr IV Continuous <Continuous>  chlordiazePOXIDE 50 milliGRAM(s) Oral every 8 hours  BACItracin   Ointment 1 Application(s) Topical every 6 hours  thiamine 100 milliGRAM(s) Oral daily  folic acid 1 milliGRAM(s) Oral daily  multivitamin/minerals 1 Tablet(s) Oral daily  sodium chloride 0.9% 1000 milliLiter(s) IV Continuous <Continuous>  pantoprazole  Injectable 40 milliGRAM(s) IV Push daily  acetaminophen   Tablet. 650 milliGRAM(s) Oral once      CENTRAL LINE: [ ] YES [ +] NO  LOCATION:   DATE INSERTED:  REMOVE: [ ] YES [ ] NO  EXPLAIN:    CARMELO: [ ] YES [ ] NO    DATE INSERTED:  REMOVE:  [ ] YES [ ] NO  EXPLAIN:    [facility] -reviewed admission note, no change since admission  PAST MEDICAL & SURGICAL HISTORY:  Alcohol induced acute pancreatitis  Pancreatitis  Alcoholism  Fatty liver  Gastroenteritis  No significant past surgical history      T(C): 36.5 (07-17-17 @ 04:21), Max: 37.1 (07-16-17 @ 12:00)  HR: 108 (07-17-17 @ 08:00)  BP: 161/95 (07-17-17 @ 08:00)  RR: 20 (07-16-17 @ 23:00)  SpO2: 97% (07-17-17 @ 08:00)  Wt(kg): --      07-16 @ 07:01  -  07-17 @ 07:00  --------------------------------------------------------  IN: 1894.2 mL / OUT: 1775 mL / NET: 119.2 mL        PHYSICAL EXAM:    GENERAL: awake, alert   SKIN: warm, dry   CHEST/LUNG: Bilateral breath sounds present with no rales, ronchi, or wheezing   HEART: RRR, no m/r/g   ABDOMEN: soft, nontender, nondistended; bowel sounds present.  NEURO: AAOx2       LABS:  CBC Full  -  ( 17 Jul 2017 06:28 )  WBC Count : 14.1 K/uL  Hemoglobin : 14.2 g/dL  Hematocrit : 39.5 %  Platelet Count - Automated : 421 K/uL  Mean Cell Volume : 97.6 fl  Mean Cell Hemoglobin : 35.0 pg  Mean Cell Hemoglobin Concentration : 35.8 gm/dL  Auto Neutrophil # : 11.5 K/uL  Auto Lymphocyte # : 1.7 K/uL  Auto Monocyte # : 0.6 K/uL  Auto Eosinophil # : 0.2 K/uL  Auto Basophil # : 0.1 K/uL  Auto Neutrophil % : 81.9 %  Auto Lymphocyte % : 11.8 %  Auto Monocyte % : 4.0 %  Auto Eosinophil % : 1.4 %  Auto Basophil % : 0.9 %    07-17    138  |  104  |  9   ----------------------------<  130<H>  3.4<L>   |  26  |  0.45<L>    Ca    8.6      17 Jul 2017 06:28  Phos  2.2     07-17  Mg     1.7     07-17              RADIOLOGY & ADDITIONAL STUDIES REVIEWED:        1. Respiratory Failure secondary to sedation medication infusion for alcohol withdrawal   -pt alert, awake and cooperative   -s/p intubation  -C/W Phenobarbital 130mg BID, 25mg Seroquel BID  c/w Precedex drip myrna as tolerated    2. Alcohol induced acute pancreatitis    -c/w IV fluids  -c/w Pain control  -advance diet es tolerated, currently on puree     3. Hypokalemia    -will replace with PO potassium   -monitor BMP am       Plan discussed with ICU team on rounds.    CRITICAL CARE TIME SPENT: 35 minutes [ ]DNR    [ ]DNI    [ ]MEWS SUSPENDED     [ ]GOALS OF CARE DISCUSSION WITH PATIENT/FAMILY/PROXY:    INTERVAL HPI/OVERNIGHT EVENTS:     headache    PRESSORS: [ ] YES [+ ] NO  WHICH:    ANTIBIOTICS:                  DATE STARTED:  ANTIBIOTICS:                  DATE STARTED:  ANTIBIOTICS:                  DATE STARTED:    ALBUTerol    90 MICROgram(s) HFA Inhaler 2 Puff(s) Inhalation every 8 hours  nicotine - 21 mG/24Hr(s) Patch 1 patch Transdermal daily  enoxaparin Injectable 40 milliGRAM(s) SubCutaneous daily  Senna syrup 10 milliLiter(s) 10 milliLiter(s) Enteral Tube two times a day  QUEtiapine 25 milliGRAM(s) Oral every other day  PHENobarbital Injectable 130 milliGRAM(s) IV Push every 12 hours  enalapril 5 milliGRAM(s) Oral daily  LORazepam   Injectable 2 milliGRAM(s) IV Push every 6 hours  dexmedetomidine Infusion 1 MICROgram(s)/kG/Hr IV Continuous <Continuous>  chlordiazePOXIDE 50 milliGRAM(s) Oral every 8 hours  BACItracin   Ointment 1 Application(s) Topical every 6 hours  thiamine 100 milliGRAM(s) Oral daily  folic acid 1 milliGRAM(s) Oral daily  multivitamin/minerals 1 Tablet(s) Oral daily  sodium chloride 0.9% 1000 milliLiter(s) IV Continuous <Continuous>  pantoprazole  Injectable 40 milliGRAM(s) IV Push daily  acetaminophen   Tablet. 650 milliGRAM(s) Oral once      CENTRAL LINE: [ ] YES [ +] NO  LOCATION:   DATE INSERTED:  REMOVE: [ ] YES [ ] NO  EXPLAIN:    CARMELO: [ ] YES [ ] NO    DATE INSERTED:  REMOVE:  [ ] YES [ ] NO  EXPLAIN:    [facility] -reviewed admission note, no change since admission  PAST MEDICAL & SURGICAL HISTORY:  Alcohol induced acute pancreatitis  Pancreatitis  Alcoholism  Fatty liver  Gastroenteritis  No significant past surgical history      T(C): 36.5 (07-17-17 @ 04:21), Max: 37.1 (07-16-17 @ 12:00)  HR: 108 (07-17-17 @ 08:00)  BP: 161/95 (07-17-17 @ 08:00)  RR: 20 (07-16-17 @ 23:00)  SpO2: 97% (07-17-17 @ 08:00)  Wt(kg): --      07-16 @ 07:01  -  07-17 @ 07:00  --------------------------------------------------------  IN: 1894.2 mL / OUT: 1775 mL / NET: 119.2 mL        PHYSICAL EXAM:    GENERAL: awake, alert   SKIN: warm, dry   CHEST/LUNG: Bilateral breath sounds present with no rales, ronchi, or wheezing   HEART: RRR, no m/r/g   ABDOMEN: soft, nontender, nondistended; bowel sounds present.  NEURO: AAOx2       LABS:  CBC Full  -  ( 17 Jul 2017 06:28 )  WBC Count : 14.1 K/uL  Hemoglobin : 14.2 g/dL  Hematocrit : 39.5 %  Platelet Count - Automated : 421 K/uL  Mean Cell Volume : 97.6 fl  Mean Cell Hemoglobin : 35.0 pg  Mean Cell Hemoglobin Concentration : 35.8 gm/dL  Auto Neutrophil # : 11.5 K/uL  Auto Lymphocyte # : 1.7 K/uL  Auto Monocyte # : 0.6 K/uL  Auto Eosinophil # : 0.2 K/uL  Auto Basophil # : 0.1 K/uL  Auto Neutrophil % : 81.9 %  Auto Lymphocyte % : 11.8 %  Auto Monocyte % : 4.0 %  Auto Eosinophil % : 1.4 %  Auto Basophil % : 0.9 %    07-17    138  |  104  |  9   ----------------------------<  130<H>  3.4<L>   |  26  |  0.45<L>    Ca    8.6      17 Jul 2017 06:28  Phos  2.2     07-17  Mg     1.7     07-17              RADIOLOGY & ADDITIONAL STUDIES REVIEWED:      38 y/o M with Hx of Alcoholism, hepatic steatosis, pancreatitis, and reactive airway disease, likely secondary to smoking, admitted to the ICU for management of withdrawal causing severe agitation not amendable to 6mg Ativan and CIWA of 21.           1. Fever:   -101  -will get UA, CXR and Bcx  -monitor fevers  -skin shows no signs of infection     2. Respiratory Failure secondary to alcohol withdrawal and intubated for airway protection      -pt alert, awake and cooperative   -s/p intubation  -c/w  Phenobarbital 130mg BID, 25mg Seroquel BID  - d/c Precedex and increase librium to 100 mg q8    3. Alcohol induced acute pancreatitis      -c/w Pain control  -advance diet es tolerated    4. Hypokalemia    -will replace with PO potassium   -monitor BMP am       Plan discussed with ICU team on rounds.    CRITICAL CARE TIME SPENT: 35 minutes [ ]DNR    [ ]DNI    [ ]MEWS SUSPENDED     [ ]GOALS OF CARE DISCUSSION WITH PATIENT/FAMILY/PROXY:    INTERVAL HPI/OVERNIGHT EVENTS:     headache    PRESSORS: [ ] YES [+ ] NO  WHICH:    ANTIBIOTICS:              DATE STARTED:   ANTIBIOTICS:                  DATE STARTED:  ANTIBIOTICS:                  DATE STARTED:    ALBUTerol    90 MICROgram(s) HFA Inhaler 2 Puff(s) Inhalation every 8 hours  nicotine - 21 mG/24Hr(s) Patch 1 patch Transdermal daily  enoxaparin Injectable 40 milliGRAM(s) SubCutaneous daily  Senna syrup 10 milliLiter(s) 10 milliLiter(s) Enteral Tube two times a day  QUEtiapine 25 milliGRAM(s) Oral every other day  PHENobarbital Injectable 130 milliGRAM(s) IV Push every 12 hours  enalapril 5 milliGRAM(s) Oral daily  LORazepam   Injectable 2 milliGRAM(s) IV Push every 6 hours  dexmedetomidine Infusion 1 MICROgram(s)/kG/Hr IV Continuous <Continuous>  chlordiazePOXIDE 50 milliGRAM(s) Oral every 8 hours  BACItracin   Ointment 1 Application(s) Topical every 6 hours  thiamine 100 milliGRAM(s) Oral daily  folic acid 1 milliGRAM(s) Oral daily  multivitamin/minerals 1 Tablet(s) Oral daily  sodium chloride 0.9% 1000 milliLiter(s) IV Continuous <Continuous>  pantoprazole  Injectable 40 milliGRAM(s) IV Push daily  acetaminophen   Tablet. 650 milliGRAM(s) Oral once      CENTRAL LINE: [ ] YES [ +] NO  LOCATION:   DATE INSERTED:  REMOVE: [ ] YES [ ] NO  EXPLAIN:    CARMELO: [ ] YES [ ] NO    DATE INSERTED:  REMOVE:  [ ] YES [ ] NO  EXPLAIN:    [facility] -reviewed admission note, no change since admission  PAST MEDICAL & SURGICAL HISTORY:  Alcohol induced acute pancreatitis  Pancreatitis  Alcoholism  Fatty liver  Gastroenteritis  No significant past surgical history      T(C): 36.5 (07-17-17 @ 04:21), Max: 37.1 (07-16-17 @ 12:00)  HR: 108 (07-17-17 @ 08:00)  BP: 161/95 (07-17-17 @ 08:00)  RR: 20 (07-16-17 @ 23:00)  SpO2: 97% (07-17-17 @ 08:00)  Wt(kg): --      07-16 @ 07:01  -  07-17 @ 07:00  --------------------------------------------------------  IN: 1894.2 mL / OUT: 1775 mL / NET: 119.2 mL        PHYSICAL EXAM:    GENERAL: awake, alert   SKIN: warm, dry   CHEST/LUNG: Bilateral breath sounds present with no rales, ronchi, or wheezing   HEART: RRR, no m/r/g   ABDOMEN: soft, nontender, nondistended; bowel sounds present.  NEURO: AAOx2       LABS:  CBC Full  -  ( 17 Jul 2017 06:28 )  WBC Count : 14.1 K/uL  Hemoglobin : 14.2 g/dL  Hematocrit : 39.5 %  Platelet Count - Automated : 421 K/uL  Mean Cell Volume : 97.6 fl  Mean Cell Hemoglobin : 35.0 pg  Mean Cell Hemoglobin Concentration : 35.8 gm/dL  Auto Neutrophil # : 11.5 K/uL  Auto Lymphocyte # : 1.7 K/uL  Auto Monocyte # : 0.6 K/uL  Auto Eosinophil # : 0.2 K/uL  Auto Basophil # : 0.1 K/uL  Auto Neutrophil % : 81.9 %  Auto Lymphocyte % : 11.8 %  Auto Monocyte % : 4.0 %  Auto Eosinophil % : 1.4 %  Auto Basophil % : 0.9 %    07-17    138  |  104  |  9   ----------------------------<  130<H>  3.4<L>   |  26  |  0.45<L>    Ca    8.6      17 Jul 2017 06:28  Phos  2.2     07-17  Mg     1.7     07-17              RADIOLOGY & ADDITIONAL STUDIES REVIEWED:      36 y/o M with Hx of Alcoholism, hepatic steatosis, pancreatitis, and reactive airway disease, likely secondary to smoking, admitted to the ICU for management of withdrawal causing severe agitation not amendable to 6mg Ativan and CIWA of 21.           1. Fever:   -101  -will get UA, CXR and Bcx  -monitor fevers  -skin shows no signs of infection     2. Respiratory Failure secondary to alcohol withdrawal and intubated for airway protection      -pt alert, awake and cooperative   -s/p intubation  -c/w  Phenobarbital 130mg BID, 25mg Seroquel BID  - d/c Precedex and increase librium to 100 mg q8    3. Alcohol induced acute pancreatitis    -c/w Pain control  -advance diet es tolerated    4. Hypokalemia    -will replace with PO potassium   -monitor BMP am       Plan discussed with ICU team on rounds.    CRITICAL CARE TIME SPENT: 35 minutes

## 2017-07-18 LAB
ANION GAP SERPL CALC-SCNC: 10 MMOL/L — SIGNIFICANT CHANGE UP (ref 5–17)
BUN SERPL-MCNC: 7 MG/DL — SIGNIFICANT CHANGE UP (ref 7–18)
CALCIUM SERPL-MCNC: 9.4 MG/DL — SIGNIFICANT CHANGE UP (ref 8.4–10.5)
CHLORIDE SERPL-SCNC: 102 MMOL/L — SIGNIFICANT CHANGE UP (ref 96–108)
CO2 SERPL-SCNC: 23 MMOL/L — SIGNIFICANT CHANGE UP (ref 22–31)
CREAT SERPL-MCNC: 0.52 MG/DL — SIGNIFICANT CHANGE UP (ref 0.5–1.3)
GLUCOSE SERPL-MCNC: 87 MG/DL — SIGNIFICANT CHANGE UP (ref 70–99)
HCT VFR BLD CALC: 38.7 % — LOW (ref 39–50)
HGB BLD-MCNC: 14 G/DL — SIGNIFICANT CHANGE UP (ref 13–17)
LYMPHOCYTES # BLD AUTO: 6 % — LOW (ref 13–44)
MAGNESIUM SERPL-MCNC: 1.5 MG/DL — LOW (ref 1.6–2.6)
MCHC RBC-ENTMCNC: 34.9 PG — HIGH (ref 27–34)
MCHC RBC-ENTMCNC: 36.2 GM/DL — HIGH (ref 32–36)
MCV RBC AUTO: 96.6 FL — SIGNIFICANT CHANGE UP (ref 80–100)
MONOCYTES NFR BLD AUTO: 6 % — SIGNIFICANT CHANGE UP (ref 2–14)
NEUTROPHILS NFR BLD AUTO: 86 % — HIGH (ref 43–77)
PHOSPHATE SERPL-MCNC: 3 MG/DL — SIGNIFICANT CHANGE UP (ref 2.5–4.5)
PLATELET # BLD AUTO: 442 K/UL — HIGH (ref 150–400)
POTASSIUM SERPL-MCNC: 3.8 MMOL/L — SIGNIFICANT CHANGE UP (ref 3.5–5.3)
POTASSIUM SERPL-SCNC: 3.8 MMOL/L — SIGNIFICANT CHANGE UP (ref 3.5–5.3)
RBC # BLD: 4.01 M/UL — LOW (ref 4.2–5.8)
RBC # FLD: 11.5 % — SIGNIFICANT CHANGE UP (ref 10.3–14.5)
SODIUM SERPL-SCNC: 135 MMOL/L — SIGNIFICANT CHANGE UP (ref 135–145)
WBC # BLD: 25 K/UL — HIGH (ref 3.8–10.5)
WBC # FLD AUTO: 25 K/UL — HIGH (ref 3.8–10.5)

## 2017-07-18 RX ORDER — PHENOBARBITAL 60 MG
64.8 TABLET ORAL EVERY 12 HOURS
Qty: 0 | Refills: 0 | Status: DISCONTINUED | OUTPATIENT
Start: 2017-07-18 | End: 2017-07-19

## 2017-07-18 RX ORDER — PHENOBARBITAL 60 MG
60 TABLET ORAL EVERY 12 HOURS
Qty: 0 | Refills: 0 | Status: DISCONTINUED | OUTPATIENT
Start: 2017-07-18 | End: 2017-07-18

## 2017-07-18 RX ADMIN — DEXMEDETOMIDINE HYDROCHLORIDE IN 0.9% SODIUM CHLORIDE 18.8 MICROGRAM(S)/KG/HR: 4 INJECTION INTRAVENOUS at 21:02

## 2017-07-18 RX ADMIN — DEXMEDETOMIDINE HYDROCHLORIDE IN 0.9% SODIUM CHLORIDE 18.8 MICROGRAM(S)/KG/HR: 4 INJECTION INTRAVENOUS at 01:56

## 2017-07-18 RX ADMIN — Medication 1 PATCH: at 13:21

## 2017-07-18 RX ADMIN — DEXMEDETOMIDINE HYDROCHLORIDE IN 0.9% SODIUM CHLORIDE 18.8 MICROGRAM(S)/KG/HR: 4 INJECTION INTRAVENOUS at 13:00

## 2017-07-18 RX ADMIN — ENOXAPARIN SODIUM 40 MILLIGRAM(S): 100 INJECTION SUBCUTANEOUS at 13:21

## 2017-07-18 RX ADMIN — Medication 100 MILLIGRAM(S): at 05:23

## 2017-07-18 RX ADMIN — Medication 1 PATCH: at 13:22

## 2017-07-18 RX ADMIN — Medication 1 APPLICATION(S): at 18:08

## 2017-07-18 RX ADMIN — Medication 130 MILLIGRAM(S): at 05:24

## 2017-07-18 RX ADMIN — Medication 100 MILLIGRAM(S): at 13:21

## 2017-07-18 RX ADMIN — Medication 1 APPLICATION(S): at 13:22

## 2017-07-18 RX ADMIN — Medication 100 MILLIGRAM(S): at 21:58

## 2017-07-18 RX ADMIN — Medication 100 MILLIGRAM(S): at 13:25

## 2017-07-18 RX ADMIN — Medication 1 MILLIGRAM(S): at 13:21

## 2017-07-18 RX ADMIN — CEFTRIAXONE 100 GRAM(S): 500 INJECTION, POWDER, FOR SOLUTION INTRAMUSCULAR; INTRAVENOUS at 18:08

## 2017-07-18 RX ADMIN — ALBUTEROL 2 PUFF(S): 90 AEROSOL, METERED ORAL at 13:22

## 2017-07-18 RX ADMIN — Medication 1 TABLET(S): at 10:21

## 2017-07-18 RX ADMIN — Medication 2 MILLIGRAM(S): at 04:48

## 2017-07-18 RX ADMIN — DEXMEDETOMIDINE HYDROCHLORIDE IN 0.9% SODIUM CHLORIDE 18.8 MICROGRAM(S)/KG/HR: 4 INJECTION INTRAVENOUS at 05:22

## 2017-07-18 RX ADMIN — PANTOPRAZOLE SODIUM 40 MILLIGRAM(S): 20 TABLET, DELAYED RELEASE ORAL at 05:23

## 2017-07-18 RX ADMIN — DEXMEDETOMIDINE HYDROCHLORIDE IN 0.9% SODIUM CHLORIDE 18.8 MICROGRAM(S)/KG/HR: 4 INJECTION INTRAVENOUS at 07:30

## 2017-07-18 RX ADMIN — Medication 1 TABLET(S): at 13:21

## 2017-07-18 RX ADMIN — Medication 1 APPLICATION(S): at 05:20

## 2017-07-18 RX ADMIN — Medication 64.8 MILLIGRAM(S): at 18:08

## 2017-07-18 NOTE — PROGRESS NOTE ADULT - SUBJECTIVE AND OBJECTIVE BOX
[ ]DNR    [ ]DNI    [ ]MEWS SUSPENDED     [ ]GOALS OF CARE DISCUSSION WITH PATIENT/FAMILY/PROXY:    INTERVAL HPI/OVERNIGHT EVENTS: agitation, tachycardia     PRESSORS: [ ] YES [+ ] NO  WHICH:    ANTIBIOTICS:    Rocephin               DATE STARTED:   ANTIBIOTICS:                  DATE STARTED:  ANTIBIOTICS:                  DATE STARTED:    ALBUTerol    90 MICROgram(s) HFA Inhaler 2 Puff(s) Inhalation every 8 hours  nicotine - 21 mG/24Hr(s) Patch 1 patch Transdermal daily  enoxaparin Injectable 40 milliGRAM(s) SubCutaneous daily  QUEtiapine 25 milliGRAM(s) Oral every other day  PHENobarbital Injectable 130 milliGRAM(s) IV Push every 12 hours  enalapril 5 milliGRAM(s) Oral daily  LORazepam   Injectable 2 milliGRAM(s) IV Push every 6 hours  BACItracin   Ointment 1 Application(s) Topical every 6 hours  thiamine 100 milliGRAM(s) Oral daily  folic acid 1 milliGRAM(s) Oral daily  multivitamin/minerals 1 Tablet(s) Oral daily  pantoprazole    Tablet 40 milliGRAM(s) Oral before breakfast  chlordiazePOXIDE 100 milliGRAM(s) Oral every 8 hours  docusate sodium 100 milliGRAM(s) Oral three times a day  potassium acid phosphate/sodium acid phosphate tablet (K-PHOS No. 2) 1 Tablet(s) Oral four times a day with meals  acetaminophen   Tablet. 650 milliGRAM(s) Oral every 6 hours PRN  cefTRIAXone   IVPB 1 Gram(s) IV Intermittent every 24 hours  cefTRIAXone   IVPB   IV Intermittent   dexmedetomidine Infusion 1 MICROgram(s)/kG/Hr IV Continuous <Continuous>      CENTRAL LINE: [ ] YES [+ ] NO  LOCATION:   DATE INSERTED:  REMOVE: [ ] YES [ ] NO  EXPLAIN:    RHODES: [ ] YES [+ ] NO    DATE INSERTED:  REMOVE:  [ ] YES [ ] NO  EXPLAIN:    [facility] -reviewed admission note, no change since admission  PAST MEDICAL & SURGICAL HISTORY:  Alcohol induced acute pancreatitis  Pancreatitis  Alcoholism  Fatty liver  Gastroenteritis  No significant past surgical history      T(C): 37.7 (17 @ 05:12), Max: 38.6 (17 @ 23:37)  HR: 109 (17 @ 08:00)  BP: 116/64 (17 @ 08:00)  RR: 26 (17 @ 08:00)  SpO2: 99% (17 @ 08:00)  Wt(kg): --       @ 07:01  -   @ 07:00  --------------------------------------------------------  IN: 814 mL / OUT: 1050 mL / NET: -236 mL        PHYSICAL EXAM:        LABS:  CBC Full  -  ( 2017 06:30 )  WBC Count : 25.0 K/uL  Hemoglobin : 14.0 g/dL  Hematocrit : 38.7 %  Platelet Count - Automated : 442 K/uL  Mean Cell Volume : 96.6 fl  Mean Cell Hemoglobin : 34.9 pg  Mean Cell Hemoglobin Concentration : 36.2 gm/dL  Auto Neutrophil # : x  Auto Lymphocyte # : x  Auto Monocyte # : x  Auto Eosinophil # : x  Auto Basophil # : x  Auto Neutrophil % : x  Auto Lymphocyte % : x  Auto Monocyte % : x  Auto Eosinophil % : x  Auto Basophil % : x        135  |  102  |  7   ----------------------------<  87  3.8   |  23  |  0.52    Ca    9.4      2017 06:30  Phos  3.0       Mg     1.5             Urinalysis Basic - ( 2017 13:15 )    Color: Yellow / Appearance: Clear / S.015 / pH: x  Gluc: x / Ketone: Negative  / Bili: Negative / Urobili: Negative   Blood: x / Protein: 30 mg/dL / Nitrite: Negative   Leuk Esterase: Small / RBC: 10-25 /HPF / WBC 26-50 /HPF   Sq Epi: x / Non Sq Epi: x / Bacteria: Few /HPF          RADIOLOGY & ADDITIONAL STUDIES REVIEWED:          1. Respiratory Failure secondary to alcohol withdrawal and intubated for airway protection      -pt alert, awake but had episode of agitation over night and precedex has to be restarted   -s/p intubation  -c/w  Phenobarbital 130mg BID, 25mg Seroquel BID  - c/w  librium to 100 mg q8      2. Fever:   -101  -UA positive , CXR negative   -f/u Bcx  -monitor fevers  -skin shows no signs of infection       3. Alcohol induced acute pancreatitis    -c/w Pain control  -advance diet es tolerated    4. Hypokalemia    -resolved   -monitor BMP am       CRITICAL CARE TIME SPENT: 35 minutes [ ]DNR    [ ]DNI    [ ]MEWS SUSPENDED     [ ]GOALS OF CARE DISCUSSION WITH PATIENT/FAMILY/PROXY:    INTERVAL HPI/OVERNIGHT EVENTS: agitation, tachycardia     PRESSORS: [ ] YES [+ ] NO  WHICH:    ANTIBIOTICS:    Rocephin               DATE STARTED:   ANTIBIOTICS:                  DATE STARTED:  ANTIBIOTICS:                  DATE STARTED:    ALBUTerol    90 MICROgram(s) HFA Inhaler 2 Puff(s) Inhalation every 8 hours  nicotine - 21 mG/24Hr(s) Patch 1 patch Transdermal daily  enoxaparin Injectable 40 milliGRAM(s) SubCutaneous daily  QUEtiapine 25 milliGRAM(s) Oral every other day  PHENobarbital Injectable 130 milliGRAM(s) IV Push every 12 hours  enalapril 5 milliGRAM(s) Oral daily  LORazepam   Injectable 2 milliGRAM(s) IV Push every 6 hours  BACItracin   Ointment 1 Application(s) Topical every 6 hours  thiamine 100 milliGRAM(s) Oral daily  folic acid 1 milliGRAM(s) Oral daily  multivitamin/minerals 1 Tablet(s) Oral daily  pantoprazole    Tablet 40 milliGRAM(s) Oral before breakfast  chlordiazePOXIDE 100 milliGRAM(s) Oral every 8 hours  docusate sodium 100 milliGRAM(s) Oral three times a day  potassium acid phosphate/sodium acid phosphate tablet (K-PHOS No. 2) 1 Tablet(s) Oral four times a day with meals  acetaminophen   Tablet. 650 milliGRAM(s) Oral every 6 hours PRN  cefTRIAXone   IVPB 1 Gram(s) IV Intermittent every 24 hours  cefTRIAXone   IVPB   IV Intermittent   dexmedetomidine Infusion 1 MICROgram(s)/kG/Hr IV Continuous <Continuous>      CENTRAL LINE: [ ] YES [+ ] NO  LOCATION:   DATE INSERTED:  REMOVE: [ ] YES [ ] NO  EXPLAIN:    RHODES: [ ] YES [+ ] NO    DATE INSERTED:  REMOVE:  [ ] YES [ ] NO  EXPLAIN:    [facility] -reviewed admission note, no change since admission  PAST MEDICAL & SURGICAL HISTORY:  Alcohol induced acute pancreatitis  Pancreatitis  Alcoholism  Fatty liver  Gastroenteritis  No significant past surgical history      T(C): 37.7 (17 @ 05:12), Max: 38.6 (17 @ 23:37)  HR: 109 (17 @ 08:00)  BP: 116/64 (17 @ 08:00)  RR: 26 (17 @ 08:00)  SpO2: 99% (17 @ 08:00)  Wt(kg): --       @ 07:01  -   @ 07:00  --------------------------------------------------------  IN: 814 mL / OUT: 1050 mL / NET: -236 mL        PHYSICAL EXAM:    GENERAL: lying in bed, eyes open   CVS: tachy, no RMG  CHEST/LUNG: Bilateral breath sounds present with no rales, ronchi, or wheezing   ABDOMEN: soft, nontender, nondistended; bowel sounds present.  NEURO: AAOx2, hypoactive, NAD, mild sedation       LABS:  CBC Full  -  ( 2017 06:30 )  WBC Count : 25.0 K/uL  Hemoglobin : 14.0 g/dL  Hematocrit : 38.7 %  Platelet Count - Automated : 442 K/uL  Mean Cell Volume : 96.6 fl  Mean Cell Hemoglobin : 34.9 pg  Mean Cell Hemoglobin Concentration : 36.2 gm/dL  Auto Neutrophil # : x  Auto Lymphocyte # : x  Auto Monocyte # : x  Auto Eosinophil # : x  Auto Basophil # : x  Auto Neutrophil % : x  Auto Lymphocyte % : x  Auto Monocyte % : x  Auto Eosinophil % : x  Auto Basophil % : x        135  |  102  |  7   ----------------------------<  87  3.8   |  23  |  0.52    Ca    9.4      2017 06:30  Phos  3.0       Mg     1.5     18        Urinalysis Basic - ( 2017 13:15 )    Color: Yellow / Appearance: Clear / S.015 / pH: x  Gluc: x / Ketone: Negative  / Bili: Negative / Urobili: Negative   Blood: x / Protein: 30 mg/dL / Nitrite: Negative   Leuk Esterase: Small / RBC: 10-25 /HPF / WBC 26-50 /HPF   Sq Epi: x / Non Sq Epi: x / Bacteria: Few /HPF          RADIOLOGY & ADDITIONAL STUDIES REVIEWED:          1. Respiratory Failure secondary to alcohol withdrawal and intubated for airway protection      -pt alert, awake but had episode of agitation over night and precedex has to be restarted   -s/p intubation  -c/w  Phenobarbital 130mg BID, 25mg Seroquel BID  - c/w  librium to 100 mg q8      2. Fever:   -101  -UA positive , CXR negative   -f/u Bcx  -monitor fevers  -skin shows no signs of infection       3. Alcohol induced acute pancreatitis    -c/w Pain control  -advance diet es tolerated    4. Hypokalemia    -resolved   -monitor BMP am       CRITICAL CARE TIME SPENT: 35 minutes

## 2017-07-19 LAB
ANION GAP SERPL CALC-SCNC: 12 MMOL/L — SIGNIFICANT CHANGE UP (ref 5–17)
BUN SERPL-MCNC: 8 MG/DL — SIGNIFICANT CHANGE UP (ref 7–18)
CALCIUM SERPL-MCNC: 9 MG/DL — SIGNIFICANT CHANGE UP (ref 8.4–10.5)
CHLORIDE SERPL-SCNC: 100 MMOL/L — SIGNIFICANT CHANGE UP (ref 96–108)
CO2 SERPL-SCNC: 23 MMOL/L — SIGNIFICANT CHANGE UP (ref 22–31)
CREAT SERPL-MCNC: 0.48 MG/DL — LOW (ref 0.5–1.3)
GLUCOSE SERPL-MCNC: 96 MG/DL — SIGNIFICANT CHANGE UP (ref 70–99)
HCT VFR BLD CALC: 37 % — LOW (ref 39–50)
HGB BLD-MCNC: 13.3 G/DL — SIGNIFICANT CHANGE UP (ref 13–17)
MAGNESIUM SERPL-MCNC: 1.8 MG/DL — SIGNIFICANT CHANGE UP (ref 1.6–2.6)
MCHC RBC-ENTMCNC: 34.3 PG — HIGH (ref 27–34)
MCHC RBC-ENTMCNC: 35.9 GM/DL — SIGNIFICANT CHANGE UP (ref 32–36)
MCV RBC AUTO: 95.6 FL — SIGNIFICANT CHANGE UP (ref 80–100)
PHOSPHATE SERPL-MCNC: 3.4 MG/DL — SIGNIFICANT CHANGE UP (ref 2.5–4.5)
PLATELET # BLD AUTO: 432 K/UL — HIGH (ref 150–400)
POTASSIUM SERPL-MCNC: 3.6 MMOL/L — SIGNIFICANT CHANGE UP (ref 3.5–5.3)
POTASSIUM SERPL-SCNC: 3.6 MMOL/L — SIGNIFICANT CHANGE UP (ref 3.5–5.3)
RBC # BLD: 3.86 M/UL — LOW (ref 4.2–5.8)
RBC # FLD: 11 % — SIGNIFICANT CHANGE UP (ref 10.3–14.5)
SODIUM SERPL-SCNC: 135 MMOL/L — SIGNIFICANT CHANGE UP (ref 135–145)
WBC # BLD: 19.6 K/UL — HIGH (ref 3.8–10.5)
WBC # FLD AUTO: 19.6 K/UL — HIGH (ref 3.8–10.5)

## 2017-07-19 RX ADMIN — Medication 100 MILLIGRAM(S): at 13:38

## 2017-07-19 RX ADMIN — ALBUTEROL 2 PUFF(S): 90 AEROSOL, METERED ORAL at 06:46

## 2017-07-19 RX ADMIN — Medication 64.8 MILLIGRAM(S): at 06:45

## 2017-07-19 RX ADMIN — Medication 100 MILLIGRAM(S): at 06:45

## 2017-07-19 RX ADMIN — Medication 1 PATCH: at 11:56

## 2017-07-19 RX ADMIN — ALBUTEROL 2 PUFF(S): 90 AEROSOL, METERED ORAL at 22:20

## 2017-07-19 RX ADMIN — Medication 1 APPLICATION(S): at 02:32

## 2017-07-19 RX ADMIN — Medication 1 APPLICATION(S): at 11:55

## 2017-07-19 RX ADMIN — DEXMEDETOMIDINE HYDROCHLORIDE IN 0.9% SODIUM CHLORIDE 18.8 MICROGRAM(S)/KG/HR: 4 INJECTION INTRAVENOUS at 07:40

## 2017-07-19 RX ADMIN — QUETIAPINE FUMARATE 25 MILLIGRAM(S): 200 TABLET, FILM COATED ORAL at 11:56

## 2017-07-19 RX ADMIN — Medication 50 MILLIGRAM(S): at 22:18

## 2017-07-19 RX ADMIN — Medication 1 APPLICATION(S): at 06:45

## 2017-07-19 RX ADMIN — Medication 100 MILLIGRAM(S): at 11:56

## 2017-07-19 RX ADMIN — ENOXAPARIN SODIUM 40 MILLIGRAM(S): 100 INJECTION SUBCUTANEOUS at 11:55

## 2017-07-19 RX ADMIN — PANTOPRAZOLE SODIUM 40 MILLIGRAM(S): 20 TABLET, DELAYED RELEASE ORAL at 06:45

## 2017-07-19 RX ADMIN — Medication 1 MILLIGRAM(S): at 11:56

## 2017-07-19 RX ADMIN — Medication 1 PATCH: at 12:04

## 2017-07-19 RX ADMIN — Medication 1 TABLET(S): at 11:56

## 2017-07-19 RX ADMIN — Medication 1 APPLICATION(S): at 20:09

## 2017-07-19 RX ADMIN — CEFTRIAXONE 100 GRAM(S): 500 INJECTION, POWDER, FOR SOLUTION INTRAMUSCULAR; INTRAVENOUS at 20:06

## 2017-07-19 RX ADMIN — ALBUTEROL 2 PUFF(S): 90 AEROSOL, METERED ORAL at 13:40

## 2017-07-19 RX ADMIN — Medication 50 MILLIGRAM(S): at 13:39

## 2017-07-19 RX ADMIN — Medication 100 MILLIGRAM(S): at 22:18

## 2017-07-19 NOTE — PROGRESS NOTE ADULT - SUBJECTIVE AND OBJECTIVE BOX
[ ]DNR    [ ]DNI    [ ]MEWS SUSPENDED     [ ]GOALS OF CARE DISCUSSION WITH PATIENT/FAMILY/PROXY:    INTERVAL HPI/OVERNIGHT EVENTS: restless still on precedex     PRESSORS: [ ] YES [ ] NO  WHICH:    ANTIBIOTICS:           Rocephin        DATE STARTED:   ANTIBIOTICS:                  DATE STARTED:  ANTIBIOTICS:                  DATE STARTED:    ALBUTerol    90 MICROgram(s) HFA Inhaler 2 Puff(s) Inhalation every 8 hours  nicotine - 21 mG/24Hr(s) Patch 1 patch Transdermal daily  enoxaparin Injectable 40 milliGRAM(s) SubCutaneous daily  QUEtiapine 25 milliGRAM(s) Oral every other day  enalapril 5 milliGRAM(s) Oral daily  BACItracin   Ointment 1 Application(s) Topical every 6 hours  thiamine 100 milliGRAM(s) Oral daily  folic acid 1 milliGRAM(s) Oral daily  multivitamin/minerals 1 Tablet(s) Oral daily  pantoprazole    Tablet 40 milliGRAM(s) Oral before breakfast  chlordiazePOXIDE 100 milliGRAM(s) Oral every 8 hours  docusate sodium 100 milliGRAM(s) Oral three times a day  acetaminophen   Tablet. 650 milliGRAM(s) Oral every 6 hours PRN  cefTRIAXone   IVPB 1 Gram(s) IV Intermittent every 24 hours  cefTRIAXone   IVPB   IV Intermittent   dexmedetomidine Infusion 1 MICROgram(s)/kG/Hr IV Continuous <Continuous>  PHENobarbital 64.8 milliGRAM(s) Oral every 12 hours      CENTRAL LINE: [ ] YES [+ ] NO  LOCATION:   DATE INSERTED:  REMOVE: [ ] YES [ ] NO  EXPLAIN:    RHODES: [ ] YES [+ ] NO    DATE INSERTED:  REMOVE:  [ ] YES [ ] NO  EXPLAIN:    PMH -reviewed admission note, no change since admission  PAST MEDICAL & SURGICAL HISTORY:  Alcohol induced acute pancreatitis  Pancreatitis  Alcoholism  Fatty liver  Gastroenteritis  No significant past surgical history      T(C): 36.8 (17 @ 05:08), Max: 37.8 (17 @ 10:00)  HR: 96 (17 @ 07:00)  BP: 101/47 (17 @ 07:00)  RR: 22 (17 @ 07:00)  SpO2: 97% (17 @ 07:00)  Wt(kg): --       @ 07:01  -   @ 07:00  --------------------------------------------------------  IN: 1233.3 mL / OUT: 800 mL / NET: 433.3 mL        PHYSICAL EXAM:    GENERAL: lying in bed, eyes open, alert, active, cooperative   CVS: tachy, no RMG  CHEST/LUNG: Bilateral breath sounds present with no rales, ronchi, or wheezing   ABDOMEN: soft, nontender, nondistended; bowel sounds present.  NEURO: AAOx3, reflexes intact  EXT: no cyanosis, no edema    LABS:  CBC Full  -  ( 2017 06:43 )  WBC Count : 19.6 K/uL  Hemoglobin : 13.3 g/dL  Hematocrit : 37.0 %  Platelet Count - Automated : 432 K/uL  Mean Cell Volume : 95.6 fl  Mean Cell Hemoglobin : 34.3 pg  Mean Cell Hemoglobin Concentration : 35.9 gm/dL  Auto Neutrophil # : x  Auto Lymphocyte # : x  Auto Monocyte # : x  Auto Eosinophil # : x  Auto Basophil # : x  Auto Neutrophil % : x  Auto Lymphocyte % : x  Auto Monocyte % : x  Auto Eosinophil % : x  Auto Basophil % : x    07-19    135  |  100  |  8   ----------------------------<  96  3.6   |  23  |  0.48<L>    Ca    9.0      2017 06:43  Phos  3.4     07-19  Mg     1.8     07-19        Urinalysis Basic - ( 2017 13:15 )    Color: Yellow / Appearance: Clear / S.015 / pH: x  Gluc: x / Ketone: Negative  / Bili: Negative / Urobili: Negative   Blood: x / Protein: 30 mg/dL / Nitrite: Negative   Leuk Esterase: Small / RBC: 10-25 /HPF / WBC 26-50 /HPF   Sq Epi: x / Non Sq Epi: x / Bacteria: Few /HPF          RADIOLOGY & ADDITIONAL STUDIES REVIEWED:     38 y/o M with Hx of Alcoholism, hepatic steatosis, pancreatitis, and reactive airway disease, likely secondary to smoking, admitted to the ICU for management of withdrawal causing severe agitation not amendable to 6mg Ativan and CIWA of 21.     1. Respiratory Failure secondary to alcohol withdrawal and intubated for airway protection      -pt alert, awake  -still on precedex   -s/p intubation  -Phenobarbital decreased to 64.8 mg BID, 25mg Seroquel BID  - dc ativan   - c/w  librium to 100 mg q8      2. Fever:   -no more fevers overnight   -UA positive , CXR negative   -Bcx negative   -monitor fevers  - leukocytosis improving 25>19, reactive?  -monitor CBC     3. Alcohol induced acute pancreatitis    -c/w Pain control  -pt tolerating regular diet     4. Hypokalemia    -resolved   -monitor BMP am       CRITICAL CARE TIME SPENT: 35 minutes [ ]DNR    [ ]DNI    [ ]MEWS SUSPENDED     [ ]GOALS OF CARE DISCUSSION WITH PATIENT/FAMILY/PROXY:    INTERVAL HPI/OVERNIGHT EVENTS: no acute events     PRESSORS: [ ] YES [ ] NO  WHICH:    ANTIBIOTICS:           Rocephin        DATE STARTED:   ANTIBIOTICS:                  DATE STARTED:  ANTIBIOTICS:                  DATE STARTED:    ALBUTerol    90 MICROgram(s) HFA Inhaler 2 Puff(s) Inhalation every 8 hours  nicotine - 21 mG/24Hr(s) Patch 1 patch Transdermal daily  enoxaparin Injectable 40 milliGRAM(s) SubCutaneous daily  QUEtiapine 25 milliGRAM(s) Oral every other day  enalapril 5 milliGRAM(s) Oral daily  BACItracin   Ointment 1 Application(s) Topical every 6 hours  thiamine 100 milliGRAM(s) Oral daily  folic acid 1 milliGRAM(s) Oral daily  multivitamin/minerals 1 Tablet(s) Oral daily  pantoprazole    Tablet 40 milliGRAM(s) Oral before breakfast  chlordiazePOXIDE 100 milliGRAM(s) Oral every 8 hours  docusate sodium 100 milliGRAM(s) Oral three times a day  acetaminophen   Tablet. 650 milliGRAM(s) Oral every 6 hours PRN  cefTRIAXone   IVPB 1 Gram(s) IV Intermittent every 24 hours  cefTRIAXone   IVPB   IV Intermittent   dexmedetomidine Infusion 1 MICROgram(s)/kG/Hr IV Continuous <Continuous>  PHENobarbital 64.8 milliGRAM(s) Oral every 12 hours      CENTRAL LINE: [ ] YES [+ ] NO  LOCATION:   DATE INSERTED:  REMOVE: [ ] YES [ ] NO  EXPLAIN:    RHODES: [ ] YES [+ ] NO    DATE INSERTED:  REMOVE:  [ ] YES [ ] NO  EXPLAIN:    PMH -reviewed admission note, no change since admission  PAST MEDICAL & SURGICAL HISTORY:  Alcohol induced acute pancreatitis  Pancreatitis  Alcoholism  Fatty liver  Gastroenteritis  No significant past surgical history      T(C): 36.8 (17 @ 05:08), Max: 37.8 (17 @ 10:00)  HR: 96 (17 @ 07:00)  BP: 101/47 (17 @ 07:00)  RR: 22 (17 @ 07:00)  SpO2: 97% (17 @ 07:00)  Wt(kg): --       @ 07:01  -   @ 07:00  --------------------------------------------------------  IN: 1233.3 mL / OUT: 800 mL / NET: 433.3 mL        PHYSICAL EXAM:    GENERAL: lying in bed, eyes open, alert, active, cooperative   CVS: tachy, no RMG  CHEST/LUNG: Bilateral breath sounds present with no rales, ronchi, or wheezing   ABDOMEN: soft, nontender, nondistended; bowel sounds present.  NEURO: AAOx3, reflexes intact  EXT: no cyanosis, no edema    LABS:  CBC Full  -  ( 2017 06:43 )  WBC Count : 19.6 K/uL  Hemoglobin : 13.3 g/dL  Hematocrit : 37.0 %  Platelet Count - Automated : 432 K/uL  Mean Cell Volume : 95.6 fl  Mean Cell Hemoglobin : 34.3 pg  Mean Cell Hemoglobin Concentration : 35.9 gm/dL  Auto Neutrophil # : x  Auto Lymphocyte # : x  Auto Monocyte # : x  Auto Eosinophil # : x  Auto Basophil # : x  Auto Neutrophil % : x  Auto Lymphocyte % : x  Auto Monocyte % : x  Auto Eosinophil % : x  Auto Basophil % : x    07-19    135  |  100  |  8   ----------------------------<  96  3.6   |  23  |  0.48<L>    Ca    9.0      2017 06:43  Phos  3.4     07-19  Mg     1.8     07-19        Urinalysis Basic - ( 2017 13:15 )    Color: Yellow / Appearance: Clear / S.015 / pH: x  Gluc: x / Ketone: Negative  / Bili: Negative / Urobili: Negative   Blood: x / Protein: 30 mg/dL / Nitrite: Negative   Leuk Esterase: Small / RBC: 10-25 /HPF / WBC 26-50 /HPF   Sq Epi: x / Non Sq Epi: x / Bacteria: Few /HPF          RADIOLOGY & ADDITIONAL STUDIES REVIEWED:     38 y/o M with Hx of Alcoholism, hepatic steatosis, pancreatitis, and reactive airway disease, likely secondary to smoking, admitted to the ICU for management of withdrawal causing severe agitation not amendable to 6mg Ativan and CIWA of 21.     1. Respiratory Failure secondary to alcohol withdrawal and intubated for airway protection      -pt alert, awake  -still on precedex   -s/p intubation  -Phenobarbital decreased to 64.8 mg BID, 25mg Seroquel BID  - dc ativan   - c/w  librium to 100 mg q8      2. UTI  -no more fevers overnight   -UA positive , CXR negative   -Bcx negative   -monitor fevers  - leukocytosis improving 25>19  -monitor CBC     3. Alcohol induced acute pancreatitis    -c/w Pain control  -pt tolerating regular diet     4. Hypokalemia    -resolved   -monitor BMP am       CRITICAL CARE TIME SPENT: 35 minutes [ ]DNR    [ ]DNI    [ ]MEWS SUSPENDED     [ ]GOALS OF CARE DISCUSSION WITH PATIENT/FAMILY/PROXY:    INTERVAL HPI/OVERNIGHT EVENTS: no acute events     PRESSORS: [ ] YES [ ] NO  WHICH:    ANTIBIOTICS:           Rocephin        DATE STARTED:   ANTIBIOTICS:                  DATE STARTED:  ANTIBIOTICS:                  DATE STARTED:    ALBUTerol    90 MICROgram(s) HFA Inhaler 2 Puff(s) Inhalation every 8 hours  nicotine - 21 mG/24Hr(s) Patch 1 patch Transdermal daily  enoxaparin Injectable 40 milliGRAM(s) SubCutaneous daily  QUEtiapine 25 milliGRAM(s) Oral every other day  enalapril 5 milliGRAM(s) Oral daily  BACItracin   Ointment 1 Application(s) Topical every 6 hours  thiamine 100 milliGRAM(s) Oral daily  folic acid 1 milliGRAM(s) Oral daily  multivitamin/minerals 1 Tablet(s) Oral daily  pantoprazole    Tablet 40 milliGRAM(s) Oral before breakfast  chlordiazePOXIDE 100 milliGRAM(s) Oral every 8 hours  docusate sodium 100 milliGRAM(s) Oral three times a day  acetaminophen   Tablet. 650 milliGRAM(s) Oral every 6 hours PRN  cefTRIAXone   IVPB 1 Gram(s) IV Intermittent every 24 hours  cefTRIAXone   IVPB   IV Intermittent   dexmedetomidine Infusion 1 MICROgram(s)/kG/Hr IV Continuous <Continuous>  PHENobarbital 64.8 milliGRAM(s) Oral every 12 hours      CENTRAL LINE: [ ] YES [+ ] NO  LOCATION:   DATE INSERTED:  REMOVE: [ ] YES [ ] NO  EXPLAIN:    RHODES: [ ] YES [+ ] NO    DATE INSERTED:  REMOVE:  [ ] YES [ ] NO  EXPLAIN:    PMH -reviewed admission note, no change since admission  PAST MEDICAL & SURGICAL HISTORY:  Alcohol induced acute pancreatitis  Pancreatitis  Alcoholism  Fatty liver  Gastroenteritis  No significant past surgical history      T(C): 36.8 (17 @ 05:08), Max: 37.8 (17 @ 10:00)  HR: 96 (17 @ 07:00)  BP: 101/47 (17 @ 07:00)  RR: 22 (17 @ 07:00)  SpO2: 97% (17 @ 07:00)  Wt(kg): --       @ 07:01  -   @ 07:00  --------------------------------------------------------  IN: 1233.3 mL / OUT: 800 mL / NET: 433.3 mL        PHYSICAL EXAM:    GENERAL: lying in bed, eyes open, alert, active, cooperative   CVS: tachy, no RMG  CHEST/LUNG: Bilateral breath sounds present with no rales, ronchi, or wheezing   ABDOMEN: soft, nontender, nondistended; bowel sounds present.  NEURO: AAOx3, reflexes intact  EXT: no cyanosis, no edema    LABS:  CBC Full  -  ( 2017 06:43 )  WBC Count : 19.6 K/uL  Hemoglobin : 13.3 g/dL  Hematocrit : 37.0 %  Platelet Count - Automated : 432 K/uL  Mean Cell Volume : 95.6 fl  Mean Cell Hemoglobin : 34.3 pg  Mean Cell Hemoglobin Concentration : 35.9 gm/dL  Auto Neutrophil # : x  Auto Lymphocyte # : x  Auto Monocyte # : x  Auto Eosinophil # : x  Auto Basophil # : x  Auto Neutrophil % : x  Auto Lymphocyte % : x  Auto Monocyte % : x  Auto Eosinophil % : x  Auto Basophil % : x    07-19    135  |  100  |  8   ----------------------------<  96  3.6   |  23  |  0.48<L>    Ca    9.0      2017 06:43  Phos  3.4     07-19  Mg     1.8     07-19        Urinalysis Basic - ( 2017 13:15 )    Color: Yellow / Appearance: Clear / S.015 / pH: x  Gluc: x / Ketone: Negative  / Bili: Negative / Urobili: Negative   Blood: x / Protein: 30 mg/dL / Nitrite: Negative   Leuk Esterase: Small / RBC: 10-25 /HPF / WBC 26-50 /HPF   Sq Epi: x / Non Sq Epi: x / Bacteria: Few /HPF          RADIOLOGY & ADDITIONAL STUDIES REVIEWED:     36 y/o M with Hx of Alcoholism, hepatic steatosis, pancreatitis, and reactive airway disease, likely secondary to smoking, admitted to the ICU for management of withdrawal causing severe agitation not amendable to 6mg Ativan and CIWA of 21.     1. Respiratory Failure secondary to alcohol withdrawal and intubated for airway protection      -pt alert, awake  -off precedex   -s/p intubation  -Phenobarbital decreased to 64.8 mg BID, 25mg Seroquel BID  - dc ativan   - c/w  librium to 100 mg q8      2. UTI  -no more fevers overnight   -UA positive , CXR negative   -Bcx negative   -monitor fevers  - leukocytosis improving 25>19  -monitor CBC     3. Alcohol induced acute pancreatitis    -c/w Pain control  -pt tolerating regular diet     4. Hypokalemia    -resolved   -monitor BMP am       CRITICAL CARE TIME SPENT: 35 minutes [ ]DNR    [ ]DNI    [ ]MEWS SUSPENDED     [ ]GOALS OF CARE DISCUSSION WITH PATIENT/FAMILY/PROXY:    INTERVAL HPI/OVERNIGHT EVENTS: no acute events     PRESSORS: [ ] YES [ ] NO  WHICH:    ANTIBIOTICS:           Rocephin        DATE STARTED:   ANTIBIOTICS:                  DATE STARTED:  ANTIBIOTICS:                  DATE STARTED:    ALBUTerol    90 MICROgram(s) HFA Inhaler 2 Puff(s) Inhalation every 8 hours  nicotine - 21 mG/24Hr(s) Patch 1 patch Transdermal daily  enoxaparin Injectable 40 milliGRAM(s) SubCutaneous daily  QUEtiapine 25 milliGRAM(s) Oral every other day  enalapril 5 milliGRAM(s) Oral daily  BACItracin   Ointment 1 Application(s) Topical every 6 hours  thiamine 100 milliGRAM(s) Oral daily  folic acid 1 milliGRAM(s) Oral daily  multivitamin/minerals 1 Tablet(s) Oral daily  pantoprazole    Tablet 40 milliGRAM(s) Oral before breakfast  chlordiazePOXIDE 100 milliGRAM(s) Oral every 8 hours  docusate sodium 100 milliGRAM(s) Oral three times a day  acetaminophen   Tablet. 650 milliGRAM(s) Oral every 6 hours PRN  cefTRIAXone   IVPB 1 Gram(s) IV Intermittent every 24 hours  cefTRIAXone   IVPB   IV Intermittent   dexmedetomidine Infusion 1 MICROgram(s)/kG/Hr IV Continuous <Continuous>  PHENobarbital 64.8 milliGRAM(s) Oral every 12 hours      CENTRAL LINE: [ ] YES [+ ] NO  LOCATION:   DATE INSERTED:  REMOVE: [ ] YES [ ] NO  EXPLAIN:    RHODES: [ ] YES [+ ] NO    DATE INSERTED:  REMOVE:  [ ] YES [ ] NO  EXPLAIN:    PMH -reviewed admission note, no change since admission  PAST MEDICAL & SURGICAL HISTORY:  Alcohol induced acute pancreatitis  Pancreatitis  Alcoholism  Fatty liver  Gastroenteritis  No significant past surgical history      T(C): 36.8 (17 @ 05:08), Max: 37.8 (17 @ 10:00)  HR: 96 (17 @ 07:00)  BP: 101/47 (17 @ 07:00)  RR: 22 (17 @ 07:00)  SpO2: 97% (17 @ 07:00)  Wt(kg): --       @ 07:01  -   @ 07:00  --------------------------------------------------------  IN: 1233.3 mL / OUT: 800 mL / NET: 433.3 mL        PHYSICAL EXAM:    GENERAL: lying in bed, eyes open, alert, active, cooperative   CVS: tachy, no RMG  CHEST/LUNG: Bilateral breath sounds present with no rales, ronchi, or wheezing   ABDOMEN: soft, nontender, nondistended; bowel sounds present.  NEURO: AAOx3, reflexes intact  EXT: no cyanosis, no edema    LABS:  CBC Full  -  ( 2017 06:43 )  WBC Count : 19.6 K/uL  Hemoglobin : 13.3 g/dL  Hematocrit : 37.0 %  Platelet Count - Automated : 432 K/uL  Mean Cell Volume : 95.6 fl  Mean Cell Hemoglobin : 34.3 pg  Mean Cell Hemoglobin Concentration : 35.9 gm/dL  Auto Neutrophil # : x  Auto Lymphocyte # : x  Auto Monocyte # : x  Auto Eosinophil # : x  Auto Basophil # : x  Auto Neutrophil % : x  Auto Lymphocyte % : x  Auto Monocyte % : x  Auto Eosinophil % : x  Auto Basophil % : x    07-19    135  |  100  |  8   ----------------------------<  96  3.6   |  23  |  0.48<L>    Ca    9.0      2017 06:43  Phos  3.4     07-19  Mg     1.8     07-19        Urinalysis Basic - ( 2017 13:15 )    Color: Yellow / Appearance: Clear / S.015 / pH: x  Gluc: x / Ketone: Negative  / Bili: Negative / Urobili: Negative   Blood: x / Protein: 30 mg/dL / Nitrite: Negative   Leuk Esterase: Small / RBC: 10-25 /HPF / WBC 26-50 /HPF   Sq Epi: x / Non Sq Epi: x / Bacteria: Few /HPF          RADIOLOGY & ADDITIONAL STUDIES REVIEWED:     38 y/o M with Hx of Alcoholism, hepatic steatosis, pancreatitis, and reactive airway disease, likely secondary to smoking, admitted to the ICU for management of withdrawal causing severe agitation not amendable to 6mg Ativan and CIWA of 21.     1. Respiratory Failure secondary to alcohol withdrawal and intubated for airway protection      -pt alert, awake  -off precedex   -s/p intubation  -Phenobarbital decreased to 64.8 mg BID, 25mg Seroquel BID  - dc ativan   - c/w  librium to 100 mg q8      2. UTI  -no more fevers overnight   -UA positive , CXR negative   -Bcx negative   -monitor fevers  - leukocytosis improving 25>19  -c/w IV rocephin   -monitor CBC     3. Alcohol induced acute pancreatitis    -c/w Pain control  -pt tolerating regular diet     4. Hypokalemia    -resolved   -monitor BMP am       CRITICAL CARE TIME SPENT: 35 minutes

## 2017-07-20 DIAGNOSIS — N39.0 URINARY TRACT INFECTION, SITE NOT SPECIFIED: ICD-10-CM

## 2017-07-20 LAB
ANION GAP SERPL CALC-SCNC: 11 MMOL/L — SIGNIFICANT CHANGE UP (ref 5–17)
BUN SERPL-MCNC: 8 MG/DL — SIGNIFICANT CHANGE UP (ref 7–18)
CALCIUM SERPL-MCNC: 9.1 MG/DL — SIGNIFICANT CHANGE UP (ref 8.4–10.5)
CHLORIDE SERPL-SCNC: 101 MMOL/L — SIGNIFICANT CHANGE UP (ref 96–108)
CO2 SERPL-SCNC: 23 MMOL/L — SIGNIFICANT CHANGE UP (ref 22–31)
CREAT SERPL-MCNC: 0.52 MG/DL — SIGNIFICANT CHANGE UP (ref 0.5–1.3)
GLUCOSE SERPL-MCNC: 109 MG/DL — HIGH (ref 70–99)
HCT VFR BLD CALC: 40.1 % — SIGNIFICANT CHANGE UP (ref 39–50)
HGB BLD-MCNC: 13.8 G/DL — SIGNIFICANT CHANGE UP (ref 13–17)
MAGNESIUM SERPL-MCNC: 1.9 MG/DL — SIGNIFICANT CHANGE UP (ref 1.6–2.6)
MCHC RBC-ENTMCNC: 33.8 PG — SIGNIFICANT CHANGE UP (ref 27–34)
MCHC RBC-ENTMCNC: 34.5 GM/DL — SIGNIFICANT CHANGE UP (ref 32–36)
MCV RBC AUTO: 98 FL — SIGNIFICANT CHANGE UP (ref 80–100)
PHOSPHATE SERPL-MCNC: 3.4 MG/DL — SIGNIFICANT CHANGE UP (ref 2.5–4.5)
PLATELET # BLD AUTO: 524 K/UL — HIGH (ref 150–400)
POTASSIUM SERPL-MCNC: 3.6 MMOL/L — SIGNIFICANT CHANGE UP (ref 3.5–5.3)
POTASSIUM SERPL-SCNC: 3.6 MMOL/L — SIGNIFICANT CHANGE UP (ref 3.5–5.3)
RBC # BLD: 4.09 M/UL — LOW (ref 4.2–5.8)
RBC # FLD: 10.6 % — SIGNIFICANT CHANGE UP (ref 10.3–14.5)
SODIUM SERPL-SCNC: 135 MMOL/L — SIGNIFICANT CHANGE UP (ref 135–145)
WBC # BLD: 13.2 K/UL — HIGH (ref 3.8–10.5)
WBC # FLD AUTO: 13.2 K/UL — HIGH (ref 3.8–10.5)

## 2017-07-20 PROCEDURE — 99233 SBSQ HOSP IP/OBS HIGH 50: CPT | Mod: GC

## 2017-07-20 RX ORDER — BENZOCAINE AND MENTHOL 5; 1 G/100ML; G/100ML
1 LIQUID ORAL
Qty: 0 | Refills: 0 | Status: DISCONTINUED | OUTPATIENT
Start: 2017-07-20 | End: 2017-07-20

## 2017-07-20 RX ADMIN — Medication 1 APPLICATION(S): at 18:13

## 2017-07-20 RX ADMIN — Medication 1 APPLICATION(S): at 00:14

## 2017-07-20 RX ADMIN — Medication 100 MILLIGRAM(S): at 12:18

## 2017-07-20 RX ADMIN — Medication 1 APPLICATION(S): at 22:04

## 2017-07-20 RX ADMIN — Medication 5 MILLIGRAM(S): at 05:56

## 2017-07-20 RX ADMIN — Medication 1 PATCH: at 12:22

## 2017-07-20 RX ADMIN — PANTOPRAZOLE SODIUM 40 MILLIGRAM(S): 20 TABLET, DELAYED RELEASE ORAL at 05:52

## 2017-07-20 RX ADMIN — Medication 100 MILLIGRAM(S): at 05:52

## 2017-07-20 RX ADMIN — Medication 650 MILLIGRAM(S): at 03:19

## 2017-07-20 RX ADMIN — Medication 650 MILLIGRAM(S): at 03:56

## 2017-07-20 RX ADMIN — Medication 1 APPLICATION(S): at 12:18

## 2017-07-20 RX ADMIN — CEFTRIAXONE 100 GRAM(S): 500 INJECTION, POWDER, FOR SOLUTION INTRAMUSCULAR; INTRAVENOUS at 18:15

## 2017-07-20 RX ADMIN — Medication 1 MILLIGRAM(S): at 12:18

## 2017-07-20 RX ADMIN — ALBUTEROL 2 PUFF(S): 90 AEROSOL, METERED ORAL at 22:04

## 2017-07-20 RX ADMIN — Medication 1 APPLICATION(S): at 05:51

## 2017-07-20 RX ADMIN — Medication 1 PATCH: at 12:18

## 2017-07-20 RX ADMIN — Medication 100 MILLIGRAM(S): at 13:47

## 2017-07-20 RX ADMIN — Medication 50 MILLIGRAM(S): at 18:13

## 2017-07-20 RX ADMIN — Medication 100 MILLIGRAM(S): at 22:04

## 2017-07-20 RX ADMIN — ENOXAPARIN SODIUM 40 MILLIGRAM(S): 100 INJECTION SUBCUTANEOUS at 12:19

## 2017-07-20 RX ADMIN — Medication 50 MILLIGRAM(S): at 13:47

## 2017-07-20 RX ADMIN — ALBUTEROL 2 PUFF(S): 90 AEROSOL, METERED ORAL at 05:57

## 2017-07-20 RX ADMIN — Medication 1 TABLET(S): at 12:18

## 2017-07-20 RX ADMIN — Medication 50 MILLIGRAM(S): at 07:09

## 2017-07-20 NOTE — PROGRESS NOTE ADULT - PROBLEM SELECTOR PLAN 2
- No abdominal pain,  - tolerating regular diet  -Protonix 40 mg, Alcohol induced gastritis.  - follow up CMP, CBC tomorrow

## 2017-07-20 NOTE — PROGRESS NOTE ADULT - PROBLEM SELECTOR PLAN 3
no more fevers overnight   -UA positive , CXR negative   -Bcx negative   -monitor fevers  - leukocytosis improving 25> 13.2   -c/w IV rocephin   -monitor CBC

## 2017-07-20 NOTE — PROGRESS NOTE ADULT - PROBLEM SELECTOR PLAN 1
-tapering off chlordiazepoxide 50 mgs 12Qhrs.  -CIWA today 4  -continue monitor  - off Quetiapine today  -Alcohol abstinence counselling done

## 2017-07-20 NOTE — PROGRESS NOTE ADULT - ATTENDING COMMENTS
1. Respiratory Failure secondary to sedation medication infusion for alcohol withdrawal   -C/W Propofol and Precedex  -Fentanyl 25mg q4 hours  -decrease sedation as tolerated for sat/bat
37 male with ETOH withdrawal, intubated for agitation, now extubated doing better.  Currently on CTX for possible UTI.  D/c phenobarbitol, taper librium. Seroquel can be d/c'd tomorrow.  Stable for transfer to floor.
37 male with hx of ETOH abuse, presents with acute ETOH withdrawal/witnessed seizure, pancreatitis, required transfer to the ICU and intubation for agitation/acute resp failure.  Continue hydration, start enteral feeds, reposition ET tube.  Total CC time 35 min.
37 male with hx of ETOH abuse, presents with acute ETOH withdrawal/witnessed seizure, pancreatitis, required transfer to the ICU and intubation for agitation/acute resp failure.  Continue hydration, start enteral feeds, will start haldol in an attempt to decrease sedation requirements.  Total CC time 35 min.
38 y/o M with Hx of Alcoholism, hepatic steatosis, pancreatitis, and reactive airway disease, likely secondary to smoking, admitted to the ICU for management of withdrawal causing severe agitation not amendable to 6mg Ativan and CIWA of 21.     1. Respiratory Failure secondary to sedation medication infusion for alcohol withdrawal   -s/p intubation  -C/W Phenobarbital 130mg BID, 25mg Seroquel BID  c/w Precedex drip myrna as tolerated    2. Alcohol induced acute pancreatitis.   -c/w IV fluids  -c/w Pain control  -advance diet es tolerated
1. Respiratory Failure secondary to sedation medication infusion for alcohol withdrawal   -Attempt to taper from Propofol and Precedex for SBT  -C/W Fentanyl 25mg q4 hours  -C/W Phenobarbital 130mg BID, 25mg Seroquel BID  -[facility] Protocol    2. Alcohol induced acute pancreatitis.   -IV fluids  -Pain control  -Lipase trending down (3635 on 7/6 -> 1032 on 7/10 -> 967 7/13)
36 y/o M with Hx of Alcoholism, hepatic steatosis, pancreatitis, and reactive airway disease, likely secondary to smoking, admitted to the ICU for management of withdrawal causing severe agitation not amendable to 6mg Ativan and CIWA of 21.     1. Respiratory Failure secondary to sedation medication infusion for alcohol withdrawal   -s/p intubation  -C/W Phenobarbital 130mg BID, 25mg Seroquel BID  c/w Precedex drip myrna as tolerated    2. Alcohol induced acute pancreatitis.   -c/w IV fluids  -c/w Pain control  -advance diet es tolerated
36 y/o M with Hx of Alcoholism, hepatic steatosis, pancreatitis, and reactive airway disease, likely secondary to smoking, admitted to the ICU for management of withdrawal causing severe agitation not amendable to 6mg Ativan and CIWA of 21.  pat is encephalopathic requiring multiple iv sedation    1. Respiratory Failure secondary to sedation medication infusion for alcohol withdrawal   -C/W Propofol and Precedex; C/W AC Ventilation as patient is not candidate for SBT due to continued withdrawal symptoms  -Fentanyl 25mg q4 hours  -C/W Phenobarbital 130mg BID, 25mg Seroquel BID  -CIWA Protocol    2. Alcohol induced acute pancreatitis.   -IV fluids  -Pain control  -Lipase trending down (3635 on 7/6 -> 1032 on 7/10 -> 967 7/13)
37 male with ETOH withdrawal, intubated for agitation, now extubated doing better.  Currently on CTX for possible UTI.  Will taper precedex, phenobarbitol, and ativan.  Total CC time 35 min.
38 y/o M with Hx of Alcoholism, hepatic steatosis, pancreatitis, and reactive airway disease, likely secondary to smoking, admitted to the ICU for encephalopathy, resp failure management of withdrawal causing severe agitation not amendable to 6mg Ativan and CIWA of 21.     1. Respiratory Failure secondary to sedation medication infusion for alcohol withdrawal   -C/W Propofol and Precedex; D/C Ativan Infusion  -Fentanyl 25mg q4 hours  -C/W AC Ventilation   -C/W Phenobarbital, switch Haldol to 25mg Seroquel BID  -CIWA Protocol    2. Alcohol induced acute pancreatitis.    -IV fluids  -Pain control  -Lipase trending down (3635 on 7/6 -> 1032 on 7/10)   -Consult social work
38 y/o M with Hx of Alcoholism, hepatic steatosis, pancreatitis, and reactive airway disease, likely secondary to smoking, admitted to the ICU for management of withdrawal causing severe agitation not amendable to 6mg Ativan and CIWA of 21.           1. Fever:   -101  -will get UA, CXR and Bcx  -monitor fevers  -skin shows no signs of infection     -pat is more awake and following commands. will transfer to medicine
38 y/o M with Hx of Alcoholism, hepatic steatosis, pancreatitis, and reactive airway disease, likely secondary to smoking, admitted to the ICU for management of withdrawal causing severe agitation not amendable to 6mg Ativan and CIWA of 21.  Pat intubated for resp failure sedated
Patient known to me from prior admission last year.  He was downgraded from MICU yesterday after intubation for airway protection secondary to alcohol withdrawal seizures and extensive DTs.  Extensive counselling given to the patient in term of alcohol induced comorbidities and mortality risk, his mother and girl friend at bedside. He shows understanding and he agrees to follow up outpatient with Rehab resources and AA meeting.  Physical exam:   vitals stable,   PE: wnl, no tremors seen  A/p:  1- DT: CIWA 4, will taper Librium to 50 BID then tomorrow down to 25 BID then 25 once daily  Extensive counselling given as above,   social work consult    2- Alcohol induced pancreatitis; resolving    3- Alcohol withdrawal seizures:   no need for AED.

## 2017-07-21 DIAGNOSIS — F10.10 ALCOHOL ABUSE, UNCOMPLICATED: ICD-10-CM

## 2017-07-21 LAB
ALBUMIN SERPL ELPH-MCNC: 3 G/DL — LOW (ref 3.5–5)
ALP SERPL-CCNC: 204 U/L — HIGH (ref 40–120)
ALT FLD-CCNC: 282 U/L DA — HIGH (ref 10–60)
ANION GAP SERPL CALC-SCNC: 11 MMOL/L — SIGNIFICANT CHANGE UP (ref 5–17)
AST SERPL-CCNC: 153 U/L — HIGH (ref 10–40)
BILIRUB SERPL-MCNC: 0.3 MG/DL — SIGNIFICANT CHANGE UP (ref 0.2–1.2)
BUN SERPL-MCNC: 8 MG/DL — SIGNIFICANT CHANGE UP (ref 7–18)
CALCIUM SERPL-MCNC: 9.3 MG/DL — SIGNIFICANT CHANGE UP (ref 8.4–10.5)
CHLORIDE SERPL-SCNC: 101 MMOL/L — SIGNIFICANT CHANGE UP (ref 96–108)
CO2 SERPL-SCNC: 26 MMOL/L — SIGNIFICANT CHANGE UP (ref 22–31)
CREAT SERPL-MCNC: 0.56 MG/DL — SIGNIFICANT CHANGE UP (ref 0.5–1.3)
GLUCOSE SERPL-MCNC: 106 MG/DL — HIGH (ref 70–99)
HCT VFR BLD CALC: 41.6 % — SIGNIFICANT CHANGE UP (ref 39–50)
HGB BLD-MCNC: 14.3 G/DL — SIGNIFICANT CHANGE UP (ref 13–17)
MAGNESIUM SERPL-MCNC: 2 MG/DL — SIGNIFICANT CHANGE UP (ref 1.6–2.6)
MCHC RBC-ENTMCNC: 33.8 PG — SIGNIFICANT CHANGE UP (ref 27–34)
MCHC RBC-ENTMCNC: 34.5 GM/DL — SIGNIFICANT CHANGE UP (ref 32–36)
MCV RBC AUTO: 98 FL — SIGNIFICANT CHANGE UP (ref 80–100)
PHOSPHATE SERPL-MCNC: 4.1 MG/DL — SIGNIFICANT CHANGE UP (ref 2.5–4.5)
PLATELET # BLD AUTO: 638 K/UL — HIGH (ref 150–400)
POTASSIUM SERPL-MCNC: 3.9 MMOL/L — SIGNIFICANT CHANGE UP (ref 3.5–5.3)
POTASSIUM SERPL-SCNC: 3.9 MMOL/L — SIGNIFICANT CHANGE UP (ref 3.5–5.3)
PROT SERPL-MCNC: 8.3 G/DL — SIGNIFICANT CHANGE UP (ref 6–8.3)
RBC # BLD: 4.24 M/UL — SIGNIFICANT CHANGE UP (ref 4.2–5.8)
RBC # FLD: 10.8 % — SIGNIFICANT CHANGE UP (ref 10.3–14.5)
SODIUM SERPL-SCNC: 138 MMOL/L — SIGNIFICANT CHANGE UP (ref 135–145)
WBC # BLD: 10.5 K/UL — SIGNIFICANT CHANGE UP (ref 3.8–10.5)
WBC # FLD AUTO: 10.5 K/UL — SIGNIFICANT CHANGE UP (ref 3.8–10.5)

## 2017-07-21 PROCEDURE — 99233 SBSQ HOSP IP/OBS HIGH 50: CPT | Mod: GC

## 2017-07-21 RX ADMIN — ALBUTEROL 2 PUFF(S): 90 AEROSOL, METERED ORAL at 21:37

## 2017-07-21 RX ADMIN — ENOXAPARIN SODIUM 40 MILLIGRAM(S): 100 INJECTION SUBCUTANEOUS at 12:01

## 2017-07-21 RX ADMIN — CEFTRIAXONE 100 GRAM(S): 500 INJECTION, POWDER, FOR SOLUTION INTRAMUSCULAR; INTRAVENOUS at 18:57

## 2017-07-21 RX ADMIN — Medication 1 APPLICATION(S): at 23:36

## 2017-07-21 RX ADMIN — Medication 1 APPLICATION(S): at 12:01

## 2017-07-21 RX ADMIN — Medication 100 MILLIGRAM(S): at 21:06

## 2017-07-21 RX ADMIN — ALBUTEROL 2 PUFF(S): 90 AEROSOL, METERED ORAL at 14:00

## 2017-07-21 RX ADMIN — Medication 1 MILLIGRAM(S): at 12:02

## 2017-07-21 RX ADMIN — Medication 5 MILLIGRAM(S): at 05:42

## 2017-07-21 RX ADMIN — Medication 100 MILLIGRAM(S): at 05:42

## 2017-07-21 RX ADMIN — Medication 1 PATCH: at 12:03

## 2017-07-21 RX ADMIN — Medication 650 MILLIGRAM(S): at 21:03

## 2017-07-21 RX ADMIN — Medication 1 TABLET(S): at 12:02

## 2017-07-21 RX ADMIN — Medication 50 MILLIGRAM(S): at 05:43

## 2017-07-21 RX ADMIN — Medication 100 MILLIGRAM(S): at 14:00

## 2017-07-21 RX ADMIN — Medication 100 MILLIGRAM(S): at 12:02

## 2017-07-21 RX ADMIN — Medication 650 MILLIGRAM(S): at 22:10

## 2017-07-21 RX ADMIN — Medication 25 MILLIGRAM(S): at 17:49

## 2017-07-21 RX ADMIN — Medication 1 APPLICATION(S): at 17:50

## 2017-07-21 RX ADMIN — Medication 1 PATCH: at 12:02

## 2017-07-21 RX ADMIN — Medication 1 APPLICATION(S): at 05:42

## 2017-07-21 RX ADMIN — ALBUTEROL 2 PUFF(S): 90 AEROSOL, METERED ORAL at 05:42

## 2017-07-21 RX ADMIN — PANTOPRAZOLE SODIUM 40 MILLIGRAM(S): 20 TABLET, DELAYED RELEASE ORAL at 05:42

## 2017-07-21 NOTE — PROGRESS NOTE ADULT - PROBLEM SELECTOR PLAN 1
slowly tapering Librium,  today down to 25 BID, tomorrow is 25 once daily and anticipate discharge tomorrow

## 2017-07-21 NOTE — PROGRESS NOTE ADULT - PROBLEM SELECTOR PLAN 1
-tapering off chlordiazepoxide to 25 mgs BID. will decrease to 25 mg once daily.  -CIWA today 1  anticipate discharge tomorrow/

## 2017-07-21 NOTE — PROGRESS NOTE ADULT - SUBJECTIVE AND OBJECTIVE BOX
Patient is a 37y old  Male who presents with a chief complaint of seizure (06 Jul 2017 18:01)    INTERVAL HPI/OVERNIGHT EVENTS:  Patient seen and examined at bedside, feeling ok, girlfriend at bedside.  extensive counselling given today as well, patient agrees and is requesting for outside resources to follow upon discharge.     Allergies    No Known Allergies    Intolerances    morphine (Other)    REVIEW OF SYSTEMS:  CONSTITUTIONAL: No fever, weight loss, or fatigue  EYES: No eye pain, visual disturbances, or discharge  RESPIRATORY: No cough, wheezing, chills or hemoptysis; No shortness of breath  CARDIOVASCULAR: No chest pain, palpitations, dizziness, or leg swelling  GASTROINTESTINAL: No abdominal or epigastric pain. No nausea, vomiting, or hematemesis; No diarrhea or constipation. No melena or hematochezia.  GENITOURINARY: No dysuria, frequency, hematuria, or incontinence  NEUROLOGICAL: No headaches, memory loss, loss of strength, numbness, or tremors  MUSCULOSKELETAL: No joint pain or swelling; No muscle, back, or extremity pain  PSYCHIATRIC: No depression, anxiety, mood swings, or difficulty sleeping  HEME/LYMPH: No easy bruising, or bleeding gums  ALLERGY AND IMMUNOLOGIC: No hives or eczema    Medications:  ALBUTerol    90 MICROgram(s) HFA Inhaler 2 Puff(s) Inhalation every 8 hours  enoxaparin Injectable 40 milliGRAM(s) SubCutaneous daily  enalapril 5 milliGRAM(s) Oral daily  BACItracin   Ointment 1 Application(s) Topical every 6 hours  thiamine 100 milliGRAM(s) Oral daily  folic acid 1 milliGRAM(s) Oral daily  multivitamin/minerals 1 Tablet(s) Oral daily  pantoprazole    Tablet 40 milliGRAM(s) Oral before breakfast  docusate sodium 100 milliGRAM(s) Oral three times a day  acetaminophen   Tablet. 650 milliGRAM(s) Oral every 6 hours PRN Mild Pain (1 - 3)  cefTRIAXone   IVPB 1 Gram(s) IV Intermittent every 24 hours  cefTRIAXone   IVPB   IV Intermittent   chlordiazePOXIDE 25 milliGRAM(s) Oral every 12 hours      PHYSICAL EXAM:  Vital Signs Last 24 Hrs  T(C): 36.5 (21 Jul 2017 08:11), Max: 36.5 (20 Jul 2017 15:00)  T(F): 97.7 (21 Jul 2017 08:11), Max: 97.7 (20 Jul 2017 15:00)  HR: 86 (21 Jul 2017 08:11) (86 - 98)  BP: 123/70 (21 Jul 2017 08:11) (112/81 - 123/70)  BP(mean): --  RR: 17 (21 Jul 2017 08:11) (16 - 17)  SpO2: 100% (21 Jul 2017 08:11) (98% - 100%)    GENERAL: NAD  HEAD:  Atraumatic, Normocephalic  EYES: EOMI, PERRLA, conjunctiva and sclera clear  ENT: moist mucous membranes  NECK: Supple, No JVD, Normal thyroid  NERVOUS SYSTEM:  Alert & Oriented X3, Good concentration; Motor Strength 5/5 B/L upper and lower extremities; DTRs 2+ intact and symmetric  CHEST/LUNG: No rales, rhonchi, wheezing, or rubs  HEART: Regular rate and rhythm; No murmurs, rubs, or gallops  ABDOMEN: Soft, Nontender, Nondistended; Bowel sounds present  EXTREMITIES:  2+ Peripheral Pulses, No clubbing, cyanosis, or edema  SKIN: No rashes or lesions    LABS:                        14.3   10.5  )-----------( 638      ( 21 Jul 2017 06:19 )             41.6     07-21    138  |  101  |  8   ----------------------------<  106<H>  3.9   |  26  |  0.56    Ca    9.3      21 Jul 2017 06:19  Phos  4.1     07-21  Mg     2.0     07-21    TPro  8.3  /  Alb  3.0<L>  /  TBili  0.3  /  DBili  x   /  AST  153<H>  /  ALT  282<H>  /  AlkPhos  204<H>  07-21    LIVER FUNCTIONS - ( 21 Jul 2017 06:19 )  Alb: 3.0 g/dL / Pro: 8.3 g/dL / ALK PHOS: 204 U/L / ALT: 282 U/L DA / AST: 153 U/L / GGT: x           Culture & Sensitivity:   CAPILLARY BLOOD GLUCOSE  127 (20 Jul 2017 21:00)  137 (20 Jul 2017 16:08)    Consultant(s) Notes Reviewed:  [ x] YES  [ ] NO    Care Discussed with Consultants/Other Providers [ x] YES  [ ] NO

## 2017-07-21 NOTE — PROGRESS NOTE ADULT - SUBJECTIVE AND OBJECTIVE BOX
PGY 1 Note discussed with supervising resident and primary attending    Patient is a 37y old  Male who presents with a chief complaint of seizure (06 Jul 2017 18:01)      INTERVAL HPI/OVERNIGHT EVENTS: Patient was seen and examined at the bed side. Patient had no new complaints today. CIWA 1     MEDICATIONS  (STANDING):  ALBUTerol    90 MICROgram(s) HFA Inhaler 2 Puff(s) Inhalation every 8 hours  nicotine - 21 mG/24Hr(s) Patch 1 patch Transdermal daily  enoxaparin Injectable 40 milliGRAM(s) SubCutaneous daily  enalapril 5 milliGRAM(s) Oral daily  BACItracin   Ointment 1 Application(s) Topical every 6 hours  thiamine 100 milliGRAM(s) Oral daily  folic acid 1 milliGRAM(s) Oral daily  multivitamin/minerals 1 Tablet(s) Oral daily  pantoprazole    Tablet 40 milliGRAM(s) Oral before breakfast  docusate sodium 100 milliGRAM(s) Oral three times a day  cefTRIAXone   IVPB 1 Gram(s) IV Intermittent every 24 hours  cefTRIAXone   IVPB   IV Intermittent   chlordiazePOXIDE 25 milliGRAM(s) Oral every 12 hours    MEDICATIONS  (PRN):  acetaminophen   Tablet. 650 milliGRAM(s) Oral every 6 hours PRN Mild Pain (1 - 3)      __________________________________________________  REVIEW OF SYSTEMS:    CONSTITUTIONAL: No fever,   EYES: no acute visual disturbances  NECK: No pain or stiffness  RESPIRATORY: No cough; No shortness of breath  CARDIOVASCULAR: No chest pain, no palpitations  GASTROINTESTINAL: No pain. No nausea or vomiting; No diarrhea   NEUROLOGICAL: No headache or numbness, no tremors  MUSCULOSKELETAL: No joint pain, no muscle pain  GENITOURINARY: no dysuria, no frequency, no hesitancy  PSYCHIATRY: no depression , no anxiety  ALL OTHER  ROS negative        Vital Signs Last 24 Hrs  T(C): 36.8 (21 Jul 2017 15:43), Max: 36.8 (21 Jul 2017 15:43)  T(F): 98.2 (21 Jul 2017 15:43), Max: 98.2 (21 Jul 2017 15:43)  HR: 84 (21 Jul 2017 15:43) (84 - 95)  BP: 115/60 (21 Jul 2017 15:43) (113/65 - 123/70)  BP(mean): --  RR: 17 (21 Jul 2017 15:43) (16 - 17)  SpO2: 98% (21 Jul 2017 15:43) (98% - 100%)    ________________________________________________  PHYSICAL EXAM:  GENERAL: NAD  HEENT: Normocephalic;  conjunctivae and sclerae clear; moist mucous membranes;   NECK : supple  CHEST/LUNG: Clear to auscultation bilaterally with good air entry   HEART: S1 S2  regular; no murmurs, gallops or rubs  ABDOMEN: Soft, Nontender, Nondistended; Bowel sounds present  EXTREMITIES: no cyanosis; no edema; no calf tenderness  SKIN: warm and dry; no rash  NERVOUS SYSTEM:  Awake and alert; Oriented  to place, person and time ; no new deficits    _________________________________________________  LABS:                        14.3   10.5  )-----------( 638      ( 21 Jul 2017 06:19 )             41.6     07-21    138  |  101  |  8   ----------------------------<  106<H>  3.9   |  26  |  0.56    Ca    9.3      21 Jul 2017 06:19  Phos  4.1     07-21  Mg     2.0     07-21    TPro  8.3  /  Alb  3.0<L>  /  TBili  0.3  /  DBili  x   /  AST  153<H>  /  ALT  282<H>  /  AlkPhos  204<H>  07-21        CAPILLARY BLOOD GLUCOSE  127 (20 Jul 2017 21:00)            RADIOLOGY & ADDITIONAL TESTS:    Imaging Personally Reviewed:  YES/NO    Consultant(s) Notes Reviewed:   YES/ No    Care Discussed with Consultants :     Plan of care was discussed with patient and /or primary care giver; all questions and concerns were addressed and care was aligned with patient's wishes.

## 2017-07-21 NOTE — PROGRESS NOTE ADULT - PROBLEM SELECTOR PLAN 2
no indication for AED at this moment  slowly tapering Librium, as per girlfriend, patient tried to quit on his own alcohol.

## 2017-07-22 ENCOUNTER — TRANSCRIPTION ENCOUNTER (OUTPATIENT)
Age: 37
End: 2017-07-22

## 2017-07-22 VITALS
DIASTOLIC BLOOD PRESSURE: 59 MMHG | SYSTOLIC BLOOD PRESSURE: 105 MMHG | HEART RATE: 100 BPM | OXYGEN SATURATION: 97 % | TEMPERATURE: 98 F | RESPIRATION RATE: 16 BRPM

## 2017-07-22 DIAGNOSIS — R74.0 NONSPECIFIC ELEVATION OF LEVELS OF TRANSAMINASE AND LACTIC ACID DEHYDROGENASE [LDH]: ICD-10-CM

## 2017-07-22 LAB
ANION GAP SERPL CALC-SCNC: 9 MMOL/L — SIGNIFICANT CHANGE UP (ref 5–17)
BUN SERPL-MCNC: 11 MG/DL — SIGNIFICANT CHANGE UP (ref 7–18)
CALCIUM SERPL-MCNC: 9 MG/DL — SIGNIFICANT CHANGE UP (ref 8.4–10.5)
CHLORIDE SERPL-SCNC: 105 MMOL/L — SIGNIFICANT CHANGE UP (ref 96–108)
CO2 SERPL-SCNC: 25 MMOL/L — SIGNIFICANT CHANGE UP (ref 22–31)
CREAT SERPL-MCNC: 0.53 MG/DL — SIGNIFICANT CHANGE UP (ref 0.5–1.3)
CULTURE RESULTS: SIGNIFICANT CHANGE UP
CULTURE RESULTS: SIGNIFICANT CHANGE UP
GLUCOSE SERPL-MCNC: 105 MG/DL — HIGH (ref 70–99)
HCT VFR BLD CALC: 38.5 % — LOW (ref 39–50)
HGB BLD-MCNC: 13.4 G/DL — SIGNIFICANT CHANGE UP (ref 13–17)
MCHC RBC-ENTMCNC: 34 PG — SIGNIFICANT CHANGE UP (ref 27–34)
MCHC RBC-ENTMCNC: 34.9 GM/DL — SIGNIFICANT CHANGE UP (ref 32–36)
MCV RBC AUTO: 97.4 FL — SIGNIFICANT CHANGE UP (ref 80–100)
PLATELET # BLD AUTO: 584 K/UL — HIGH (ref 150–400)
POTASSIUM SERPL-MCNC: 4.1 MMOL/L — SIGNIFICANT CHANGE UP (ref 3.5–5.3)
POTASSIUM SERPL-SCNC: 4.1 MMOL/L — SIGNIFICANT CHANGE UP (ref 3.5–5.3)
RBC # BLD: 3.95 M/UL — LOW (ref 4.2–5.8)
RBC # FLD: 10.9 % — SIGNIFICANT CHANGE UP (ref 10.3–14.5)
SODIUM SERPL-SCNC: 139 MMOL/L — SIGNIFICANT CHANGE UP (ref 135–145)
SPECIMEN SOURCE: SIGNIFICANT CHANGE UP
SPECIMEN SOURCE: SIGNIFICANT CHANGE UP
WBC # BLD: 8.5 K/UL — SIGNIFICANT CHANGE UP (ref 3.8–10.5)
WBC # FLD AUTO: 8.5 K/UL — SIGNIFICANT CHANGE UP (ref 3.8–10.5)

## 2017-07-22 PROCEDURE — 99239 HOSP IP/OBS DSCHRG MGMT >30: CPT

## 2017-07-22 PROCEDURE — 76705 ECHO EXAM OF ABDOMEN: CPT | Mod: 26

## 2017-07-22 RX ADMIN — Medication 5 MILLIGRAM(S): at 06:24

## 2017-07-22 RX ADMIN — Medication 100 MILLIGRAM(S): at 06:24

## 2017-07-22 RX ADMIN — PANTOPRAZOLE SODIUM 40 MILLIGRAM(S): 20 TABLET, DELAYED RELEASE ORAL at 06:24

## 2017-07-22 RX ADMIN — Medication 1 PATCH: at 13:23

## 2017-07-22 RX ADMIN — Medication 1 PATCH: at 13:25

## 2017-07-22 RX ADMIN — ALBUTEROL 2 PUFF(S): 90 AEROSOL, METERED ORAL at 06:23

## 2017-07-22 RX ADMIN — Medication 1 APPLICATION(S): at 06:23

## 2017-07-22 RX ADMIN — Medication 25 MILLIGRAM(S): at 06:23

## 2017-07-22 NOTE — DISCHARGE NOTE ADULT - CARE PLAN
Principal Discharge DX:	Alcohol withdrawal seizure  Goal:	Stop binge drinking and Prevent relapse  Instructions for follow-up, activity and diet:	You were treated for Alcohol with drawl seizure during your stay with Multivitamin supplement, and Medications like Ativan and Phenobarbital to prevent Agitation and seizure reoccurrence. Please continue with Folic acid and Vitamin B1 Thiamine. Please avoid bring alcohol drinking. Follow up with Options suggested to you for quitting alcohol intake.  Secondary Diagnosis:	Alcohol induced acute pancreatitis  Instructions for follow-up, activity and diet:	You were treated for Alcohol induced pancreatitis with keeping you Nothing per mouth, Intravenous Fluids and Multivitamin supplements. your diet was advanced slowly from Nothing per mouth to regular diet. Please continue follow up with your PCP in 2 weeks.  Secondary Diagnosis:	Alcohol abuse  Instructions for follow-up, activity and diet:	Please stop binge drinking and regularly follow up with the options provided to you with the help of a . Please consider that your First drink will not be the last drink. You were provided counselling regarding how to quit this substance abuse.  Secondary Diagnosis:	Gastroenteritis  Instructions for follow-up, activity and diet:	You were treated with pantoprazole for probable alcohol induced gastririts. Please continue taking medication as prescribed, Please follow up with your pcp in a month.  Secondary Diagnosis:	UTI (urinary tract infection)  Instructions for follow-up, activity and diet:	You were treated for UTI during your stay at the hospital with antibiotics. Please drink plenty of water at least 4-5 glass a day and continue to have a healthy hygiene.  Secondary Diagnosis:	Diabetes  Instructions for follow-up, activity and diet:	Please continue follow up with your PCP to avoid complications of diabetes. Please continue taking medications as prescribed.  Secondary Diagnosis:	Tobacco abuse  Instructions for follow-up, activity and diet:	You were treated for Tobacco cessation with bronchodilators such a symbicort for your troubled breathing and were slowly discontinued and switched to a Nicotine patch. Please continue the prescribed treatment . Please stop smoking as it is associated with a high risk of malignancies and other physical disabilities. Please follow up with your PCP for cessation of Tobacco intake. Principal Discharge DX:	Alcohol withdrawal seizure  Goal:	Stop binge drinking and Prevent relapse  Instructions for follow-up, activity and diet:	You were treated for Alcohol with drawl seizure during your stay with Multivitamin supplement, and Medications like Ativan and Phenobarbital to prevent Agitation and seizure reoccurrence. Please continue with Folic acid and Vitamin B1 Thiamine. Please avoid bring alcohol drinking. Follow up with Options suggested to you for quitting alcohol intake.  Secondary Diagnosis:	Alcohol induced acute pancreatitis  Instructions for follow-up, activity and diet:	You were treated for Alcohol induced pancreatitis with keeping you Nothing per mouth, Intravenous Fluids and Multivitamin supplements. your diet was advanced slowly from Nothing per mouth to regular diet. Please continue follow up with your PCP in 2 weeks. Please follow up with GI doctor, Dr. Campos. within 2 weeks.  Secondary Diagnosis:	Alcohol abuse  Instructions for follow-up, activity and diet:	Please stop binge drinking and regularly follow up with the options provided to you with the help of a . Please consider that your First drink will not be the last drink. You were provided counselling regarding how to quit this substance abuse.  Secondary Diagnosis:	Gastroenteritis  Instructions for follow-up, activity and diet:	You were treated with pantoprazole for probable alcohol induced gastririts. Please continue taking medication as prescribed, Please follow up with your pcp in a month.  Secondary Diagnosis:	UTI (urinary tract infection)  Instructions for follow-up, activity and diet:	You were treated for UTI during your stay at the hospital with antibiotics. Please drink plenty of water at least 4-5 glass a day and continue to have a healthy hygiene.  Secondary Diagnosis:	Diabetes  Instructions for follow-up, activity and diet:	Please continue follow up with your PCP to avoid complications of diabetes. Please continue taking medications as prescribed.  Secondary Diagnosis:	Tobacco abuse  Instructions for follow-up, activity and diet:	You were treated for Tobacco cessation with bronchodilators such a symbicort for your troubled breathing and were slowly discontinued and switched to a Nicotine patch. Please continue the prescribed treatment . Please stop smoking as it is associated with a high risk of malignancies and other physical disabilities. Please follow up with your PCP for cessation of Tobacco intake.

## 2017-07-22 NOTE — DISCHARGE NOTE ADULT - HOSPITAL COURSE
37 year old M with PMH of alcohol abuse, pancreatitis, fatty liver presented to ED with one episode of witnessed seizures this morning. Patient sates that he has generalized seizures for around 3 minutes with loss of consciousness. Patient did hit his head on right side, but denies nicole bleeding. Denies tongue bite, loss of bowel or bladder control, rolling of eyes.   Patient also complaints of epigastric abdominal pain, 7/10 in intensity worsened by eating, relived by rest radiating to back.   Associated with nausea and generalized body pain. Denies chest pain, SOB, headache, leg swelling, cough, constipation or diarrhea. Patient was admitted to ICU and was treated for alcohol withdrawl seizure according to CIWA protocol with a banana baf for 3 days then was switched to oral thiamine and folic acid after 3 days. Seizure precautions were  provided. Ativan and Phenobarbital were given. Ativan was tapered. Social work consult provided.    Alcohol induced pancreatitis was treated with IV fluids, Pain control medications and keeping patient NPO. diet was slowly advanced.    Patient has history of diabetes, Metformin which is home medication was stopped due to patient;s condition. Patient's blood glucose was well controlled without being On sliding scale. patient switched to metformin on discharge.    Patient had a urinary tract infection diagnosed during his stay. Patient was treated with IV antibiotics rocephin for that. No leukocytosis on discharged observed. Patient is afebrile. No new urinary complaints from patient.     Patient was treated for reactive air way disease due to smoking with proventil and symbicort with duoneb PRN.    Patient' CIWA score improved.  Patient was moved to floor with librium which was tapered as 50mg oral Q8 hrs to 50 Q 12 hrs, Then 25 Q 12 hrs, Then 25 Q4 hours. Last dose this morning. Patient will be discharged to home without being on any DT protocol medication.    Plan of care and assessment was discussed with the Patient and family. Patient understands and agrees to the plan. Patient will be discharged to home in stable condition.

## 2017-07-22 NOTE — DISCHARGE NOTE ADULT - SECONDARY DIAGNOSIS.
Alcohol induced acute pancreatitis Alcohol abuse Gastroenteritis UTI (urinary tract infection) Diabetes Tobacco abuse

## 2017-07-22 NOTE — PROGRESS NOTE ADULT - PROBLEM SELECTOR PLAN 4
finished course of antibiotics
no more fevers overnight   -UA positive , CXR negative   -Bcx negative   -monitor fevers  - leukocytosis improving 25> 13.2   -c/w IV rocephin   -monitor CBC

## 2017-07-22 NOTE — DISCHARGE NOTE ADULT - PROVIDER TOKENS
FREE:[LAST:[Arabella],FIRST:[Michael],PHONE:[phone]],FAX:[(   )    -]] FREE:[LAST:[Arabella],FIRST:[Michael],PHONE:[phone]:[(   )    -]],TOKEN:'[id number]'

## 2017-07-22 NOTE — PROGRESS NOTE ADULT - PROBLEM SELECTOR PLAN 1
librium discontinued  completed withdrawal regimen  extensive counselling given and patient has all resources and numbers to call upon discharge.

## 2017-07-22 NOTE — DISCHARGE NOTE ADULT - CARE PROVIDER_API CALL
Michael Bell  Phone: [phone]  Fax: (   )    - Michael Bell  Phone: [phone]  Fax: (   )    -    Jesus Aranda), Gastroenterology; Internal Medicine  [id number] [place] [address]  Phone: [phone]  Fax: [phone]

## 2017-07-22 NOTE — DISCHARGE NOTE ADULT - PATIENT PORTAL LINK FT
“You can access the Follow[facility] Patient Portal, offered by [facility], by registering with the following website: [email or web address]”

## 2017-07-22 NOTE — DISCHARGE NOTE ADULT - MEDICATION SUMMARY - MEDICATIONS TO TAKE
I will START or STAY ON the medications listed below when I get home from the hospital:    enalapril 5 mg oral tablet  -- 1 tab(s) by mouth once a day  -- Indication: For hypertension    metFORMIN 1000 mg oral tablet, extended release  -- 1 tab(s) by mouth once a day  -- Indication: For Diabetes    omeprazole 40 mg oral delayed release capsule  -- 1 cap(s) by mouth once a day  -- Indication: For gastritis    nicotine 21 mg/24 hr transdermal film, extended release  --  by transdermal patch   -- Indication: For Medication to quit smoking    Multiple Vitamins oral tablet  -- 1 tab(s) by mouth once a day  -- Indication: For supplement    thiamine 100 mg oral tablet  -- 1 tab(s) by mouth once a day  -- Indication: For supplement    folic acid 1 mg oral tablet  -- 1 tab(s) by mouth once a day  -- Indication: For supplement    cholecalciferol oral tablet  -- 1000 unit(s) by mouth once a day  -- Indication: For supplement vit D deficiency

## 2017-07-22 NOTE — PROGRESS NOTE ADULT - ASSESSMENT
36 y/o M with Hx of Alcoholism, hepatic steatosis, pancreatitis, and reactive airway disease, likely secondary to smoking, admitted to the ICU for management of withdrawal causing severe agitation not amendable to 6mg Ativan and CIWA of 21. Patient was downgraded from ICU. Patient was seen and examined at the bed side. Patient was speaking in a slow tone. Patient was walking slowly. No tremors or anxiety appreciated on examination. Patient didn't reported any recent complaints. Patient was counselled on side effects of alcohol intake. Patient understands the consequences. Patient was counselled at bedside about quitting alcohol by Attending physician and resident. will follow CIWA and will taper patient on DT protocol.
36 y/o M with Hx of Alcoholism, hepatic steatosis, pancreatitis, and reactive airway disease, likely secondary to smoking, admitted to the ICU for management of withdrawal causing severe agitation not amendable to 6mg Ativan and CIWA of 21. Patient was downgraded from ICU. Patient was seen and examined at the bed side. Patient was speaking in a slow tone. Patient was walking slowly. No tremors or anxiety appreciated on examination. Patient didn't reported any recent complaints. Patient was counselled on side effects of alcohol intake. Patient understands the consequences. Patient was counselled at bedside about quitting alcohol by Attending physician and resident. will follow CIWA and will taper patient on DT protocol.
38 y/o M with Hx of Alcoholism, hepatic steatosis, pancreatitis, and reactive airway disease, likely secondary to smoking, admitted to the ICU for management of withdrawal causing severe agitation not amendable to 6mg Ativan and CIWA of 21. Patient was downgraded from ICU. Patient was seen and examined at the bed side. Patient was speaking in a slow tone. Patient was walking slowly. No tremors or anxiety appreciated on examination. Patient didn't reported any recent complaints. Patient was counselled on side effects of alcohol intake. Patient understands the consequences. Patient was counselled at bedside about quitting alcohol by Attending physician and resident. will follow CIWA and will taper patient on DT protocol.
38 y/o M with Hx of Alcoholism, hepatic steatosis, pancreatitis, and reactive airway disease, likely secondary to smoking, admitted to the ICU for management of withdrawal causing severe agitation not amendable to 6mg Ativan and CIWA of 21. Patient was downgraded from ICU. Patient was seen and examined at the bed side. Patient was speaking in a slow tone. Patient was walking slowly. No tremors or anxiety appreciated on examination. Patient didn't reported any recent complaints. Patient was counselled on side effects of alcohol intake. Patient understands the consequences. Patient was counselled at bedside about quitting alcohol by Attending physician and resident. will follow CIWA and will taper patient on DT protocol.

## 2017-07-22 NOTE — DISCHARGE NOTE ADULT - PLAN OF CARE
Stop binge drinking and Prevent relapse You were treated for Alcohol with drawl seizure during your stay with Multivitamin supplement, and Medications like Ativan and Phenobarbital to prevent Agitation and seizure reoccurrence. Please continue with Folic acid and Vitamin B1 Thiamine. Please avoid bring alcohol drinking. Follow up with Options suggested to you for quitting alcohol intake. You were treated for Alcohol induced pancreatitis with keeping you Nothing per mouth, Intravenous Fluids and Multivitamin supplements. your diet was advanced slowly from Nothing per mouth to regular diet. Please continue follow up with your PCP in 2 weeks. Please stop binge drinking and regularly follow up with the options provided to you with the help of a . Please consider that your First drink will not be the last drink. You were provided counselling regarding how to quit this substance abuse. You were treated with pantoprazole for probable alcohol induced gastririts. Please continue taking medication as prescribed, Please follow up with your pcp in a month. You were treated for UTI during your stay at the hospital with antibiotics. Please drink plenty of water at least 4-5 glass a day and continue to have a healthy hygiene. Please continue follow up with your PCP to avoid complications of diabetes. Please continue taking medications as prescribed. You were treated for Tobacco cessation with bronchodilators such a symbicort for your troubled breathing and were slowly discontinued and switched to a Nicotine patch. Please continue the prescribed treatment . Please stop smoking as it is associated with a high risk of malignancies and other physical disabilities. Please follow up with your PCP for cessation of Tobacco intake. You were treated for Alcohol induced pancreatitis with keeping you Nothing per mouth, Intravenous Fluids and Multivitamin supplements. your diet was advanced slowly from Nothing per mouth to regular diet. Please continue follow up with your PCP in 2 weeks. Please follow up with GI doctor, Dr. Campos. within 2 weeks.

## 2017-07-22 NOTE — DISCHARGE NOTE ADULT - MEDICATION SUMMARY - MEDICATIONS TO STOP TAKING
I will STOP taking the medications listed below when I get home from the hospital:    Multiple Vitamins oral tablet  -- 1 tab(s) by mouth once a day

## 2017-07-22 NOTE — PROGRESS NOTE ADULT - PROBLEM SELECTOR PLAN 5
most likely secondary to alcohol but unclear why the second bump in enzymes levels  trending down today  abdomina ultrasound if negative patient and girlfriend, know they can follow outpatient.

## 2017-07-22 NOTE — PROGRESS NOTE ADULT - SUBJECTIVE AND OBJECTIVE BOX
Patient is a 37y old  Male who presents with a chief complaint of Seizure due to Alcohol with drawl. (22 Jul 2017 09:47)      INTERVAL HPI/OVERNIGHT EVENTS:  patient seen and examined at bedside, feeling ok.  Yesterday's labs pertienent for elvated liver enzymes but patient denies any pain on his Right upper quadrant abdomen.  Plan was for discharge today, repeat liver enzymes are trending down, U/S abdomen ordered to confirm no pathology, awaiting report to decide about discharge.    Allergies    No Known Allergies    Intolerances    morphine (Other)      REVIEW OF SYSTEMS:  CONSTITUTIONAL: No fever, weight loss, or fatigue  EYES: No eye pain, visual disturbances, or discharge  RESPIRATORY: No cough, wheezing or shortness of breath  CARDIOVASCULAR: No chest pain, feeling of heart beats  GASTROINTESTINAL: No abdominal or epigastric pain. No nausea, vomiting; No diarrhea or constipation.  GENITOURINARY: No dysuria, frequency, hematuria  NEUROLOGICAL: No headaches  MUSCULOSKELETAL: No joint pain  PSYCHIATRIC: No depression or anxiety  HEME/LYMPH: No easy bruising, or bleeding gums  ALLERGY AND IMMUNOLOGIC: No hives or eczema    Medications:  enalapril 5 milliGRAM(s) Oral daily  thiamine 100 milliGRAM(s) Oral daily  folic acid 1 milliGRAM(s) Oral daily  multivitamin/minerals 1 Tablet(s) Oral daily  pantoprazole    Tablet 40 milliGRAM(s) Oral before breakfast      PHYSICAL EXAM:  Vital Signs Last 24 Hrs  T(C): 36.6 (22 Jul 2017 08:16), Max: 36.8 (21 Jul 2017 15:43)  T(F): 97.8 (22 Jul 2017 08:16), Max: 98.2 (21 Jul 2017 15:43)  HR: 91 (22 Jul 2017 08:16) (84 - 93)  BP: 114/73 (22 Jul 2017 08:16) (114/73 - 123/80)  BP(mean): --  RR: 18 (22 Jul 2017 08:16) (16 - 18)  SpO2: 100% (22 Jul 2017 08:16) (98% - 100%)    GENERAL: NAD  HEAD:  Atraumatic, Normocephalic  EYES: EOMI, PERRLA, conjunctiva and sclera clear  ENT: moist mucous membranes  NECK: Supple, No JVD, Normal thyroid  NERVOUS SYSTEM:  Alert & Oriented X3, Good concentration; Motor Strength 5/5 B/L upper and lower extremities; DTRs 2+ intact and symmetric  CHEST/LUNG: No rales, rhonchi, wheezing, or rubs  HEART: Regular rate and rhythm; No murmurs, rubs, or gallops  ABDOMEN: Soft, Nontender, Nondistended; Bowel sounds present  EXTREMITIES:  2+ Peripheral Pulses, No clubbing, cyanosis, or edema  SKIN: No rashes or lesions    LABS:                        13.4   8.5   )-----------( 584      ( 22 Jul 2017 06:50 )             38.5     07-22    139  |  105  |  11  ----------------------------<  105<H>  4.1   |  25  |  0.53    Ca    9.0      22 Jul 2017 06:50  Phos  4.1     07-21  Mg     2.0     07-21    TPro  7.5  /  Alb  2.8<L>  /  TBili  0.2  /  DBili  0.1  /  AST  90<H>  /  ALT  230<H>  /  AlkPhos  184<H>  07-22    LIVER FUNCTIONS - ( 22 Jul 2017 10:09 )  Alb: 2.8 g/dL / Pro: 7.5 g/dL / ALK PHOS: 184 U/L / ALT: 230 U/L DA / AST: 90 U/L / GGT: x                     Culture & Sensitivity:   CAPILLARY BLOOD GLUCOSE            RADIOLOGY & ADDITIONAL TESTS:  Radiology testing independently reviewed:     Consultant(s) Notes Reviewed:  [ x] YES  [ ] NO    Care Discussed with Consultants/Other Providers [ x] YES  [ ] NO

## 2017-07-22 NOTE — PROGRESS NOTE ADULT - PROBLEM SELECTOR PROBLEM 3
Alcohol induced acute pancreatitis
Alcohol induced acute pancreatitis
UTI (urinary tract infection)
UTI (urinary tract infection)

## 2017-07-22 NOTE — DISCHARGE NOTE ADULT - CARE PROVIDERS DIRECT ADDRESSES
,DirectAddress_Unknown ,DirectAddress_Unknown,[email or web address]

## 2017-07-22 NOTE — PROGRESS NOTE ADULT - PROBLEM SELECTOR PROBLEM 2
Alcohol induced acute pancreatitis
Alcohol withdrawal seizure
Alcohol withdrawal seizure
Alcohol induced acute pancreatitis

## 2017-07-26 DIAGNOSIS — S50.02XA CONTUSION OF LEFT ELBOW, INITIAL ENCOUNTER: ICD-10-CM

## 2017-07-26 DIAGNOSIS — Z88.5 ALLERGY STATUS TO NARCOTIC AGENT: ICD-10-CM

## 2017-07-26 DIAGNOSIS — F10.239 ALCOHOL DEPENDENCE WITH WITHDRAWAL, UNSPECIFIED: ICD-10-CM

## 2017-07-26 DIAGNOSIS — J45.909 UNSPECIFIED ASTHMA, UNCOMPLICATED: ICD-10-CM

## 2017-07-26 DIAGNOSIS — Z78.1 PHYSICAL RESTRAINT STATUS: ICD-10-CM

## 2017-07-26 DIAGNOSIS — F12.90 CANNABIS USE, UNSPECIFIED, UNCOMPLICATED: ICD-10-CM

## 2017-07-26 DIAGNOSIS — J96.00 ACUTE RESPIRATORY FAILURE, UNSPECIFIED WHETHER WITH HYPOXIA OR HYPERCAPNIA: ICD-10-CM

## 2017-07-26 DIAGNOSIS — R45.1 RESTLESSNESS AND AGITATION: ICD-10-CM

## 2017-07-26 DIAGNOSIS — N39.0 URINARY TRACT INFECTION, SITE NOT SPECIFIED: ICD-10-CM

## 2017-07-26 DIAGNOSIS — G40.909 EPILEPSY, UNSPECIFIED, NOT INTRACTABLE, WITHOUT STATUS EPILEPTICUS: ICD-10-CM

## 2017-07-26 DIAGNOSIS — K85.20 ALCOHOL INDUCED ACUTE PANCREATITIS WITHOUT NECROSIS OR INFECTION: ICD-10-CM

## 2017-07-26 DIAGNOSIS — F10.10 ALCOHOL ABUSE, UNCOMPLICATED: ICD-10-CM

## 2017-07-26 DIAGNOSIS — R73.9 HYPERGLYCEMIA, UNSPECIFIED: ICD-10-CM
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
## 2017-07-30 DIAGNOSIS — E87.6 HYPOKALEMIA: ICD-10-CM

## 2017-07-30 DIAGNOSIS — F17.210 NICOTINE DEPENDENCE, CIGARETTES, UNCOMPLICATED: ICD-10-CM

## 2017-07-30 DIAGNOSIS — F10.231 ALCOHOL DEPENDENCE WITH WITHDRAWAL DELIRIUM: ICD-10-CM

## 2017-07-30 DIAGNOSIS — K52.9 NONINFECTIVE GASTROENTERITIS AND COLITIS, UNSPECIFIED: ICD-10-CM

## 2017-07-30 DIAGNOSIS — K70.0 ALCOHOLIC FATTY LIVER: ICD-10-CM
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
## 2017-10-30 ENCOUNTER — INPATIENT (INPATIENT)
Facility: HOSPITAL | Age: 37
LOS: 14 days | Discharge: HOME | DRG: 438 | End: 2017-11-14
Attending: HOSPITALIST | Admitting: HOSPITALIST
Payer: MEDICAID

## 2017-10-30 VITALS — HEIGHT: 69 IN | WEIGHT: 164.91 LBS

## 2017-10-30 DIAGNOSIS — K85.20 ALCOHOL INDUCED ACUTE PANCREATITIS WITHOUT NECROSIS OR INFECTION: ICD-10-CM

## 2017-10-30 DIAGNOSIS — D69.6 THROMBOCYTOPENIA, UNSPECIFIED: ICD-10-CM

## 2017-10-30 DIAGNOSIS — R73.03 PREDIABETES: ICD-10-CM

## 2017-10-30 DIAGNOSIS — I16.9 HYPERTENSIVE CRISIS, UNSPECIFIED: ICD-10-CM

## 2017-10-30 DIAGNOSIS — F10.20 ALCOHOL DEPENDENCE, UNCOMPLICATED: ICD-10-CM

## 2017-10-30 DIAGNOSIS — K70.0 ALCOHOLIC FATTY LIVER: ICD-10-CM

## 2017-10-30 DIAGNOSIS — Z29.9 ENCOUNTER FOR PROPHYLACTIC MEASURES, UNSPECIFIED: ICD-10-CM

## 2017-10-30 LAB
ALBUMIN SERPL ELPH-MCNC: 4.1 G/DL — SIGNIFICANT CHANGE UP (ref 3.5–5)
ALP SERPL-CCNC: 132 U/L — HIGH (ref 40–120)
ALT FLD-CCNC: 66 U/L DA — HIGH (ref 10–60)
ANION GAP SERPL CALC-SCNC: 10 MMOL/L — SIGNIFICANT CHANGE UP (ref 5–17)
ANION GAP SERPL CALC-SCNC: 10 MMOL/L — SIGNIFICANT CHANGE UP (ref 5–17)
APPEARANCE UR: CLEAR — SIGNIFICANT CHANGE UP
AST SERPL-CCNC: 60 U/L — HIGH (ref 10–40)
BILIRUB SERPL-MCNC: 0.8 MG/DL — SIGNIFICANT CHANGE UP (ref 0.2–1.2)
BILIRUB UR-MCNC: NEGATIVE — SIGNIFICANT CHANGE UP
BUN SERPL-MCNC: 3 MG/DL — LOW (ref 7–18)
BUN SERPL-MCNC: 3 MG/DL — LOW (ref 7–18)
CALCIUM SERPL-MCNC: 8.9 MG/DL — SIGNIFICANT CHANGE UP (ref 8.4–10.5)
CALCIUM SERPL-MCNC: 9.6 MG/DL — SIGNIFICANT CHANGE UP (ref 8.4–10.5)
CHLORIDE SERPL-SCNC: 96 MMOL/L — SIGNIFICANT CHANGE UP (ref 96–108)
CHLORIDE SERPL-SCNC: 99 MMOL/L — SIGNIFICANT CHANGE UP (ref 96–108)
CHOLEST SERPL-MCNC: 203 MG/DL — HIGH (ref 10–199)
CO2 SERPL-SCNC: 24 MMOL/L — SIGNIFICANT CHANGE UP (ref 22–31)
CO2 SERPL-SCNC: 25 MMOL/L — SIGNIFICANT CHANGE UP (ref 22–31)
COLOR SPEC: YELLOW — SIGNIFICANT CHANGE UP
CREAT SERPL-MCNC: 0.6 MG/DL — SIGNIFICANT CHANGE UP (ref 0.5–1.3)
CREAT SERPL-MCNC: 0.72 MG/DL — SIGNIFICANT CHANGE UP (ref 0.5–1.3)
DIFF PNL FLD: ABNORMAL
ETHANOL SERPL-MCNC: 4 MG/DL — SIGNIFICANT CHANGE UP (ref 0–10)
GLUCOSE SERPL-MCNC: 116 MG/DL — HIGH (ref 70–99)
GLUCOSE SERPL-MCNC: 117 MG/DL — HIGH (ref 70–99)
GLUCOSE UR QL: NEGATIVE — SIGNIFICANT CHANGE UP
HCT VFR BLD CALC: 49.8 % — SIGNIFICANT CHANGE UP (ref 39–50)
HCT VFR BLD CALC: 51.9 % — HIGH (ref 39–50)
HDLC SERPL-MCNC: 155 MG/DL — HIGH (ref 40–125)
HGB BLD-MCNC: 17.9 G/DL — HIGH (ref 13–17)
HGB BLD-MCNC: 18 G/DL — HIGH (ref 13–17)
KETONES UR-MCNC: ABNORMAL
LEUKOCYTE ESTERASE UR-ACNC: ABNORMAL
LIDOCAIN IGE QN: 5599 U/L — HIGH (ref 73–393)
LIPID PNL WITH DIRECT LDL SERPL: 37 MG/DL — SIGNIFICANT CHANGE UP
MAGNESIUM SERPL-MCNC: 1.8 MG/DL — SIGNIFICANT CHANGE UP (ref 1.6–2.6)
MCHC RBC-ENTMCNC: 33.7 PG — SIGNIFICANT CHANGE UP (ref 27–34)
MCHC RBC-ENTMCNC: 34.4 PG — HIGH (ref 27–34)
MCHC RBC-ENTMCNC: 34.6 GM/DL — SIGNIFICANT CHANGE UP (ref 32–36)
MCHC RBC-ENTMCNC: 36 GM/DL — SIGNIFICANT CHANGE UP (ref 32–36)
MCV RBC AUTO: 95.5 FL — SIGNIFICANT CHANGE UP (ref 80–100)
MCV RBC AUTO: 97.4 FL — SIGNIFICANT CHANGE UP (ref 80–100)
NITRITE UR-MCNC: NEGATIVE — SIGNIFICANT CHANGE UP
PH UR: 6 — SIGNIFICANT CHANGE UP (ref 5–8)
PHOSPHATE SERPL-MCNC: 3.3 MG/DL — SIGNIFICANT CHANGE UP (ref 2.5–4.5)
PLATELET # BLD AUTO: 82 K/UL — LOW (ref 150–400)
PLATELET # BLD AUTO: 88 K/UL — LOW (ref 150–400)
POTASSIUM SERPL-MCNC: 3.7 MMOL/L — SIGNIFICANT CHANGE UP (ref 3.5–5.3)
POTASSIUM SERPL-MCNC: 3.9 MMOL/L — SIGNIFICANT CHANGE UP (ref 3.5–5.3)
POTASSIUM SERPL-SCNC: 3.7 MMOL/L — SIGNIFICANT CHANGE UP (ref 3.5–5.3)
POTASSIUM SERPL-SCNC: 3.9 MMOL/L — SIGNIFICANT CHANGE UP (ref 3.5–5.3)
PROT SERPL-MCNC: 8.4 G/DL — HIGH (ref 6–8.3)
PROT UR-MCNC: 500 MG/DL
RBC # BLD: 5.22 M/UL — SIGNIFICANT CHANGE UP (ref 4.2–5.8)
RBC # BLD: 5.33 M/UL — SIGNIFICANT CHANGE UP (ref 4.2–5.8)
RBC # FLD: 11.8 % — SIGNIFICANT CHANGE UP (ref 10.3–14.5)
RBC # FLD: 12.1 % — SIGNIFICANT CHANGE UP (ref 10.3–14.5)
SODIUM SERPL-SCNC: 131 MMOL/L — LOW (ref 135–145)
SODIUM SERPL-SCNC: 133 MMOL/L — LOW (ref 135–145)
SP GR SPEC: 1.01 — SIGNIFICANT CHANGE UP (ref 1.01–1.02)
TOTAL CHOLESTEROL/HDL RATIO MEASUREMENT: 1.3 RATIO — LOW (ref 3.4–9.6)
TRIGL SERPL-MCNC: 57 MG/DL — SIGNIFICANT CHANGE UP (ref 10–149)
TSH SERPL-MCNC: 0.65 UU/ML — SIGNIFICANT CHANGE UP (ref 0.34–4.82)
UROBILINOGEN FLD QL: 1
WBC # BLD: 6.3 K/UL — SIGNIFICANT CHANGE UP (ref 3.8–10.5)
WBC # BLD: 6.4 K/UL — SIGNIFICANT CHANGE UP (ref 3.8–10.5)
WBC # FLD AUTO: 6.3 K/UL — SIGNIFICANT CHANGE UP (ref 3.8–10.5)
WBC # FLD AUTO: 6.4 K/UL — SIGNIFICANT CHANGE UP (ref 3.8–10.5)

## 2017-10-30 PROCEDURE — 74177 CT ABD & PELVIS W/CONTRAST: CPT | Mod: 26

## 2017-10-30 PROCEDURE — 99284 EMERGENCY DEPT VISIT MOD MDM: CPT

## 2017-10-30 PROCEDURE — 99223 1ST HOSP IP/OBS HIGH 75: CPT | Mod: GC

## 2017-10-30 RX ORDER — KETOROLAC TROMETHAMINE 30 MG/ML
15 SYRINGE (ML) INJECTION EVERY 6 HOURS
Qty: 0 | Refills: 0 | Status: DISCONTINUED | OUTPATIENT
Start: 2017-10-30 | End: 2017-11-01

## 2017-10-30 RX ORDER — SODIUM CHLORIDE 9 MG/ML
1000 INJECTION, SOLUTION INTRAVENOUS
Qty: 0 | Refills: 0 | Status: DISCONTINUED | OUTPATIENT
Start: 2017-10-30 | End: 2017-11-05

## 2017-10-30 RX ORDER — SODIUM CHLORIDE 9 MG/ML
1000 INJECTION INTRAMUSCULAR; INTRAVENOUS; SUBCUTANEOUS ONCE
Qty: 0 | Refills: 0 | Status: COMPLETED | OUTPATIENT
Start: 2017-10-30 | End: 2017-10-30

## 2017-10-30 RX ORDER — DEXTROSE 50 % IN WATER 50 %
25 SYRINGE (ML) INTRAVENOUS ONCE
Qty: 0 | Refills: 0 | Status: DISCONTINUED | OUTPATIENT
Start: 2017-10-30 | End: 2017-11-01

## 2017-10-30 RX ORDER — SODIUM CHLORIDE 9 MG/ML
1000 INJECTION, SOLUTION INTRAVENOUS
Qty: 0 | Refills: 0 | Status: DISCONTINUED | OUTPATIENT
Start: 2017-10-30 | End: 2017-10-30

## 2017-10-30 RX ORDER — GLUCAGON INJECTION, SOLUTION 0.5 MG/.1ML
1 INJECTION, SOLUTION SUBCUTANEOUS ONCE
Qty: 0 | Refills: 0 | Status: DISCONTINUED | OUTPATIENT
Start: 2017-10-30 | End: 2017-11-01

## 2017-10-30 RX ORDER — SODIUM CHLORIDE 9 MG/ML
1000 INJECTION INTRAMUSCULAR; INTRAVENOUS; SUBCUTANEOUS
Qty: 0 | Refills: 0 | Status: DISCONTINUED | OUTPATIENT
Start: 2017-10-30 | End: 2017-10-30

## 2017-10-30 RX ORDER — PANTOPRAZOLE SODIUM 20 MG/1
40 TABLET, DELAYED RELEASE ORAL
Qty: 0 | Refills: 0 | Status: DISCONTINUED | OUTPATIENT
Start: 2017-10-30 | End: 2017-11-01

## 2017-10-30 RX ORDER — DEXTROSE 50 % IN WATER 50 %
12.5 SYRINGE (ML) INTRAVENOUS ONCE
Qty: 0 | Refills: 0 | Status: DISCONTINUED | OUTPATIENT
Start: 2017-10-30 | End: 2017-11-01

## 2017-10-30 RX ORDER — SODIUM CHLORIDE 9 MG/ML
1000 INJECTION INTRAMUSCULAR; INTRAVENOUS; SUBCUTANEOUS
Qty: 0 | Refills: 0 | Status: DISCONTINUED | OUTPATIENT
Start: 2017-10-30 | End: 2017-10-31

## 2017-10-30 RX ORDER — HYDRALAZINE HCL 50 MG
5 TABLET ORAL ONCE
Qty: 0 | Refills: 0 | Status: COMPLETED | OUTPATIENT
Start: 2017-10-30 | End: 2017-10-30

## 2017-10-30 RX ORDER — KETOROLAC TROMETHAMINE 30 MG/ML
30 SYRINGE (ML) INJECTION ONCE
Qty: 0 | Refills: 0 | Status: DISCONTINUED | OUTPATIENT
Start: 2017-10-30 | End: 2017-10-30

## 2017-10-30 RX ORDER — INSULIN LISPRO 100/ML
VIAL (ML) SUBCUTANEOUS EVERY 6 HOURS
Qty: 0 | Refills: 0 | Status: DISCONTINUED | OUTPATIENT
Start: 2017-10-30 | End: 2017-10-31

## 2017-10-30 RX ORDER — KETOROLAC TROMETHAMINE 30 MG/ML
15 SYRINGE (ML) INJECTION ONCE
Qty: 0 | Refills: 0 | Status: DISCONTINUED | OUTPATIENT
Start: 2017-10-30 | End: 2017-10-30

## 2017-10-30 RX ORDER — HYDROMORPHONE HYDROCHLORIDE 2 MG/ML
0.5 INJECTION INTRAMUSCULAR; INTRAVENOUS; SUBCUTANEOUS EVERY 6 HOURS
Qty: 0 | Refills: 0 | Status: DISCONTINUED | OUTPATIENT
Start: 2017-10-30 | End: 2017-11-01

## 2017-10-30 RX ORDER — HYDRALAZINE HCL 50 MG
5 TABLET ORAL EVERY 6 HOURS
Qty: 0 | Refills: 0 | Status: DISCONTINUED | OUTPATIENT
Start: 2017-10-30 | End: 2017-11-01

## 2017-10-30 RX ORDER — NICOTINE POLACRILEX 2 MG
1 GUM BUCCAL DAILY
Qty: 0 | Refills: 0 | Status: DISCONTINUED | OUTPATIENT
Start: 2017-10-30 | End: 2017-11-02

## 2017-10-30 RX ORDER — SODIUM CHLORIDE 9 MG/ML
1000 INJECTION, SOLUTION INTRAVENOUS
Qty: 0 | Refills: 0 | Status: DISCONTINUED | OUTPATIENT
Start: 2017-10-30 | End: 2017-10-31

## 2017-10-30 RX ORDER — FOLIC ACID 0.8 MG
1 TABLET ORAL DAILY
Qty: 0 | Refills: 0 | Status: DISCONTINUED | OUTPATIENT
Start: 2017-10-30 | End: 2017-11-01

## 2017-10-30 RX ORDER — DEXTROSE 50 % IN WATER 50 %
1 SYRINGE (ML) INTRAVENOUS ONCE
Qty: 0 | Refills: 0 | Status: DISCONTINUED | OUTPATIENT
Start: 2017-10-30 | End: 2017-11-01

## 2017-10-30 RX ORDER — INFLUENZA VIRUS VACCINE 15; 15; 15; 15 UG/.5ML; UG/.5ML; UG/.5ML; UG/.5ML
0.5 SUSPENSION INTRAMUSCULAR ONCE
Qty: 0 | Refills: 0 | Status: DISCONTINUED | OUTPATIENT
Start: 2017-10-30 | End: 2017-11-14

## 2017-10-30 RX ORDER — THIAMINE MONONITRATE (VIT B1) 100 MG
100 TABLET ORAL DAILY
Qty: 0 | Refills: 0 | Status: DISCONTINUED | OUTPATIENT
Start: 2017-10-30 | End: 2017-11-14

## 2017-10-30 RX ORDER — ONDANSETRON 8 MG/1
4 TABLET, FILM COATED ORAL ONCE
Qty: 0 | Refills: 0 | Status: COMPLETED | OUTPATIENT
Start: 2017-10-30 | End: 2017-10-30

## 2017-10-30 RX ADMIN — Medication 15 MILLIGRAM(S): at 18:08

## 2017-10-30 RX ADMIN — Medication 1 MILLIGRAM(S): at 13:44

## 2017-10-30 RX ADMIN — Medication 30 MILLIGRAM(S): at 10:18

## 2017-10-30 RX ADMIN — Medication 15 MILLIGRAM(S): at 12:42

## 2017-10-30 RX ADMIN — SODIUM CHLORIDE 4000 MILLILITER(S): 9 INJECTION INTRAMUSCULAR; INTRAVENOUS; SUBCUTANEOUS at 10:18

## 2017-10-30 RX ADMIN — SODIUM CHLORIDE 1000 MILLILITER(S): 9 INJECTION INTRAMUSCULAR; INTRAVENOUS; SUBCUTANEOUS at 10:18

## 2017-10-30 RX ADMIN — ONDANSETRON 4 MILLIGRAM(S): 8 TABLET, FILM COATED ORAL at 10:18

## 2017-10-30 RX ADMIN — Medication 15 MILLIGRAM(S): at 19:12

## 2017-10-30 RX ADMIN — Medication 5 MILLIGRAM(S): at 18:12

## 2017-10-30 RX ADMIN — Medication 15 MILLIGRAM(S): at 18:13

## 2017-10-30 RX ADMIN — Medication 1 TABLET(S): at 13:44

## 2017-10-30 RX ADMIN — Medication 50 MILLIGRAM(S): at 13:44

## 2017-10-30 RX ADMIN — SODIUM CHLORIDE 125 MILLILITER(S): 9 INJECTION, SOLUTION INTRAVENOUS at 13:44

## 2017-10-30 RX ADMIN — HYDROMORPHONE HYDROCHLORIDE 0.5 MILLIGRAM(S): 2 INJECTION INTRAMUSCULAR; INTRAVENOUS; SUBCUTANEOUS at 21:01

## 2017-10-30 RX ADMIN — HYDROMORPHONE HYDROCHLORIDE 0.5 MILLIGRAM(S): 2 INJECTION INTRAMUSCULAR; INTRAVENOUS; SUBCUTANEOUS at 22:02

## 2017-10-30 RX ADMIN — Medication 100 MILLIGRAM(S): at 10:24

## 2017-10-30 RX ADMIN — Medication 50 MILLIGRAM(S): at 21:02

## 2017-10-30 RX ADMIN — Medication 100 MILLIGRAM(S): at 13:44

## 2017-10-30 RX ADMIN — Medication 1 PATCH: at 12:24

## 2017-10-30 RX ADMIN — SODIUM CHLORIDE 125 MILLILITER(S): 9 INJECTION, SOLUTION INTRAVENOUS at 21:03

## 2017-10-30 RX ADMIN — Medication 5 MILLIGRAM(S): at 22:06

## 2017-10-30 RX ADMIN — Medication 5 MILLIGRAM(S): at 18:13

## 2017-10-30 RX ADMIN — Medication 30 MILLIGRAM(S): at 13:38

## 2017-10-30 NOTE — H&P ADULT - NSHPPHYSICALEXAM_GEN_ALL_CORE
PHYSICAL EXAM:  GENERAL: NAD, well-groomed, well-developed  HEAD:  Atraumatic, Normocephalic  EYES: EOMI, PERRLA, conjunctiva and sclera clear  ENMT: Moist mucous membranes, Good dentition, No lesions  NECK: Supple, No JVD, Normal thyroid  NERVOUS SYSTEM:  Alert & Oriented X3, Good concentration; Motor Strength 5/5 B/L upper and lower extremities  CHEST/LUNG: Clear to percussion bilaterally; No rales, rhonchi, wheezing, or rubs  HEART: Regular rate and rhythm; No murmurs, rubs, or gallops  ABDOMEN: Soft, Nondistended; Bowel sounds present. Epigastric tenderness on deep palpation  EXTREMITIES:  2+ Peripheral Pulses bilaterally, No clubbing, cyanosis, or edema  LYMPH: No lymphadenopathy noted  SKIN: No rashes or lesions    T(C): 36.4 (10-30-17 @ 09:38), Max: 36.4 (10-30-17 @ 09:38)  HR: 97 (10-30-17 @ 09:38) (97 - 97)  BP: 196/118 (10-30-17 @ 09:38) (196/118 - 196/118)  RR: 20 (10-30-17 @ 09:38) (20 - 20)  SpO2: 100% (10-30-17 @ 09:38) (100% - 100%)  Wt(kg): --  I&O's Summary PHYSICAL EXAM:  GENERAL: NAD, well-groomed, well-developed  HEAD:  Atraumatic, Normocephalic  EYES: EOMI, PERRLA, conjunctiva and sclera clear  ENMT: Moist mucous membranes, Good dentition, No lesions  NECK: Supple, No JVD, Normal thyroid  NERVOUS SYSTEM:  Alert & Oriented X3, Good concentration; Motor Strength 5/5 B/L upper and lower extremities  CHEST/LUNG: Clear to percussion bilaterally; No rales, rhonchi, wheezing, or rubs  HEART: Regular rate and rhythm; No murmurs, rubs, or gallops  ABDOMEN: Soft, Nondistended; Bowel sounds present. Epigastric tenderness on deep palpation  EXTREMITIES:  2+ Peripheral Pulses bilaterally, No clubbing, cyanosis, or edema  LYMPH: No lymphadenopathy noted  SKIN: No gross rashes or lesions noted; warm and dry skin    T(C): 36.4 (10-30-17 @ 09:38), Max: 36.4 (10-30-17 @ 09:38)  HR: 97 (10-30-17 @ 09:38) (97 - 97)  BP: 196/118 (10-30-17 @ 09:38) (196/118 - 196/118)  RR: 20 (10-30-17 @ 09:38) (20 - 20)  SpO2: 100% (10-30-17 @ 09:38) (100% - 100%)  Wt(kg): --  I&O's Summary

## 2017-10-30 NOTE — H&P ADULT - NSHPLABSRESULTS_GEN_ALL_CORE
LABS:                        18.0   6.4   )-----------( 88       ( 30 Oct 2017 10:25 )             51.9     10-30    131<L>  |  96  |  3<L>  ----------------------------<  117<H>  3.9   |  25  |  0.72    Ca    9.6      30 Oct 2017 10:25    TPro  8.4<H>  /  Alb  4.1  /  TBili  0.8  /  DBili  x   /  AST  60<H>  /  ALT  66<H>  /  AlkPhos  132<H>  10-30      Urinalysis Basic - ( 30 Oct 2017 10:25 )    Color: Yellow / Appearance: Clear / S.015 / pH: x  Gluc: x / Ketone: Large  / Bili: Negative / Urobili: 1   Blood: x / Protein: 500 mg/dL / Nitrite: Negative   Leuk Esterase: Trace / RBC: 0-2 /HPF / WBC 0-2 /HPF   Sq Epi: x / Non Sq Epi: Occasional /HPF / Bacteria: x      CAPILLARY BLOOD GLUCOSE        LIVER FUNCTIONS - ( 30 Oct 2017 10:25 )  Alb: 4.1 g/dL / Pro: 8.4 g/dL / ALK PHOS: 132 U/L / ALT: 66 U/L DA / AST: 60 U/L / GGT: x    EKG: NSR 83 BPM

## 2017-10-30 NOTE — H&P ADULT - HISTORY OF PRESENT ILLNESS
37 years old male from home lives with girl friend, with PMH of EtOH abuse, recurrent alcohol induced pancreatitis, Fatty Liver, EtOH withdrawal seizure(intubated 2 times), pre-DM(not on med), and gastroenteritis presented to ED c/o worsening epigastric pain over 2 days. Pain is throbbing, epigastric area radiating to the back,10/10 when most severe, and is not related to the food intake. It was initially intermittent but became constant since this morning. Denies having nausea, vomiting, fever, diarrhea, SOB, or any other symptoms. Reports last drink was this morning.    In ED, patient is hypertensive to 180-190, other vitals stable, labs significant for lipase of 5599, AST/ALT 60/66, CT abdomen showed: Peripancreatic fluid and stranding, compatible with acute pancreatitis. No evidence for pancreatic necrosis. Interposing between the pancreatic head and the second part of duodenum, there is a small 1.5 x 1.3 cm fluid collection; this may represent a duodenal diverticulum or a pseudocyst.    Patient is admitted to the medicine floor for EtOH induced acute pancreatitis. 37 years old male from home lives with girl friend, with PMH of EtOH abuse, recurrent alcohol induced pancreatitis, Fatty Liver, EtOH withdrawal seizure(intubated 2 times), pre-DM(not on med), and gastroenteritis presented to ED c/o worsening epigastric pain over 2 days. Pain is throbbing, epigastric area radiating to the back,10/10 when most severe, and is not related to food intake. It was initially intermittent but became constant since this morning. Denies having nausea, vomiting, fever, diarrhea, SOB, or any other symptoms. Reports last drink was this morning, he has been drinking 10 beers everyday over the last month.    In ED, patient is hypertensive to 180-190, other vitals stable, labs significant for lipase of 5599, AST/ALT 60/66, CT abdomen showed: Peripancreatic fluid and stranding, compatible with acute pancreatitis. No evidence for pancreatic necrosis. Interposing between the pancreatic head and the second part of duodenum, there is a small 1.5 x 1.3 cm fluid collection; this may represent a duodenal diverticulum or a pseudocyst.    Patient is admitted to the medicine floor for EtOH induced acute pancreatitis.

## 2017-10-30 NOTE — H&P ADULT - PROBLEM SELECTOR PLAN 2
-Patient stopped taking metformin as his PMD recommended diet control for his DM  -Monitor accucheck, starting humalog sliding scale.  -Check Hba1c -Patient stopped taking metformin since last discharge, as his PMD recommended diet control for his DM.   -Monitor accucheck, starting humalog sliding scale.  -Check Hba1c BP elevated- possibly due to acute pain from ongoing pancreatitis.  optimize pain control.  monitor BP closely

## 2017-10-30 NOTE — H&P ADULT - ATTENDING COMMENTS
Patient was seen and examined by myself with team. Case was discussed with house staff in details.  This is a 36 y/o M with recurrent hospitalization for abdominal pain due to acute pancreatitis.  1. Acute pancreatitis  2. elevated BP  3. acute pain in abdomen  4. Thrombocytopenia  5. Alcohol dependence with risk of withdrawal  - plan as outlined above  - [facility] protocol  - monitor BP closely  - Pain control  - alcohol cessation counselling.  Patient started on Librium due to risk of ETOH withdrawal.  Pain expresses concern about Ativan stating that he was intubated in the past following trt with Ativan

## 2017-10-30 NOTE — H&P ADULT - PMH
Alcohol induced acute pancreatitis    Alcoholism    Fatty liver    Gastroenteritis    Pancreatitis    Prediabetes

## 2017-10-30 NOTE — H&P ADULT - PROBLEM SELECTOR PLAN 1
-NPO for now, with IV fluid resuscitation.  -Check lipid panel  -CIWA protocol  -Pt reports ativan intolerance(unsure if it is allergy), saying that he was intubated after getting ativan; will put him on Librium for now(tolerated in ED) and give PRN ativan for high CIWA score with impending withdrawal seizure  -Social work consult for EtOH abuse -BISAP score 2, <2% mortality.  -NPO for now, with IV fluid resuscitation.  -Check lipid panel  -CIWA protocol  -Pt reports ativan allergy vs intolerance, saying that he was intubated after getting ativan last time; will put him on standing dose of Librium for now(tolerated in ED). His CIWA score now is 0, will need to closely monitor him for withdrawal symptoms especially for the next 24-48hrs. May need ICU consult if patient actively withdraws from EtOH, as ativan cannot be given due to reported reaction.  -Social work consult for EtOH abuse -BISAP score 2, <2% mortality.  -NPO for now, with IV fluid resuscitation.  -Check lipid panel  -CIWA protocol  -Pt reports ativan allergy and possible intolerance, saying that he was intubated after getting ativan last time; will put him on standing dose of Librium for now(tolerated in ED). His CIWA score now is 0, will need to closely monitor him for withdrawal symptoms especially for the next 24-48hrs. May need ICU consult if patient actively withdraws from EtOH, as ativan cannot be given due to reported reaction.  -Social work consult for EtOH abuse

## 2017-10-30 NOTE — H&P ADULT - PROBLEM SELECTOR PLAN 4
-IMPROVE score 1, not starting chemical DVT prophylaxis -Plans as above, continue with [facility] protocol

## 2017-10-30 NOTE — ED PROVIDER NOTE - OBJECTIVE STATEMENT
36 y/o male with PMHx of ETOH abuse, Alcohol induced Pancreatitis, Fatty Liver, and Gastroenteritis, and no abdominal surgeries presents to the ED c/o abd pain and chills since yesterday. Pt notes he was trying to avoid withdrawal symptoms so he had a beer this morning. Pt denies fever, CP, SOB, vomiting, diarrhea, urinary symptoms, or any other complaints. NKDA. GI: Dr. Whaley.

## 2017-10-30 NOTE — H&P ADULT - ASSESSMENT
Patient is a 37y old  Male who presents with a chief complaint of worsening epigastric pain, admitted to medicine floor for acute pancreatitis likely from EtOH induced. Patient is a 37y old  Male who presents with a chief complaint of worsening epigastric pain, admitted to medicine floor for EtOH induced acute pancreatitis.

## 2017-10-30 NOTE — H&P ADULT - PROBLEM SELECTOR PLAN 3
-Plans as above, continue with [facility] protocol -Patient stopped taking metformin since last discharge, as his PMD recommended diet control for his DM.   -Monitor Accu-Chek, starting Humalog sliding scale.  -Check Hba1c

## 2017-10-31 DIAGNOSIS — I10 ESSENTIAL (PRIMARY) HYPERTENSION: ICD-10-CM

## 2017-10-31 LAB
24R-OH-CALCIDIOL SERPL-MCNC: 40.6 NG/ML — SIGNIFICANT CHANGE UP (ref 30–80)
ANION GAP SERPL CALC-SCNC: 11 MMOL/L — SIGNIFICANT CHANGE UP (ref 5–17)
APTT BLD: 28.4 SEC — SIGNIFICANT CHANGE UP (ref 27.5–37.4)
BUN SERPL-MCNC: 5 MG/DL — LOW (ref 7–18)
CALCIUM SERPL-MCNC: 9.4 MG/DL — SIGNIFICANT CHANGE UP (ref 8.4–10.5)
CHLORIDE SERPL-SCNC: 99 MMOL/L — SIGNIFICANT CHANGE UP (ref 96–108)
CO2 SERPL-SCNC: 26 MMOL/L — SIGNIFICANT CHANGE UP (ref 22–31)
CREAT SERPL-MCNC: 0.57 MG/DL — SIGNIFICANT CHANGE UP (ref 0.5–1.3)
GGT SERPL-CCNC: 54 U/L — HIGH (ref 9–50)
GLUCOSE SERPL-MCNC: 80 MG/DL — SIGNIFICANT CHANGE UP (ref 70–99)
HAV IGM SER-ACNC: SIGNIFICANT CHANGE UP
HBA1C BLD-MCNC: 5.2 % — SIGNIFICANT CHANGE UP (ref 4–5.6)
HBV CORE IGM SER-ACNC: SIGNIFICANT CHANGE UP
HBV SURFACE AG SER-ACNC: SIGNIFICANT CHANGE UP
HCT VFR BLD CALC: 49.4 % — SIGNIFICANT CHANGE UP (ref 39–50)
HCV AB S/CO SERPL IA: 0.16 S/CO — SIGNIFICANT CHANGE UP
HCV AB SERPL-IMP: SIGNIFICANT CHANGE UP
HGB BLD-MCNC: 17.3 G/DL — HIGH (ref 13–17)
INR BLD: 0.96 RATIO — SIGNIFICANT CHANGE UP (ref 0.88–1.16)
LIDOCAIN IGE QN: 5822 U/L — HIGH (ref 73–393)
MAGNESIUM SERPL-MCNC: 1.8 MG/DL — SIGNIFICANT CHANGE UP (ref 1.6–2.6)
MCHC RBC-ENTMCNC: 34 PG — SIGNIFICANT CHANGE UP (ref 27–34)
MCHC RBC-ENTMCNC: 34.9 GM/DL — SIGNIFICANT CHANGE UP (ref 32–36)
MCV RBC AUTO: 97.4 FL — SIGNIFICANT CHANGE UP (ref 80–100)
PHOSPHATE SERPL-MCNC: 3.1 MG/DL — SIGNIFICANT CHANGE UP (ref 2.5–4.5)
PLATELET # BLD AUTO: 82 K/UL — LOW (ref 150–400)
POTASSIUM SERPL-MCNC: 3.6 MMOL/L — SIGNIFICANT CHANGE UP (ref 3.5–5.3)
POTASSIUM SERPL-SCNC: 3.6 MMOL/L — SIGNIFICANT CHANGE UP (ref 3.5–5.3)
PROTHROM AB SERPL-ACNC: 10.5 SEC — SIGNIFICANT CHANGE UP (ref 9.8–12.7)
RBC # BLD: 5.07 M/UL — SIGNIFICANT CHANGE UP (ref 4.2–5.8)
RBC # FLD: 12.2 % — SIGNIFICANT CHANGE UP (ref 10.3–14.5)
SODIUM SERPL-SCNC: 136 MMOL/L — SIGNIFICANT CHANGE UP (ref 135–145)
VIT B12 SERPL-MCNC: 847 PG/ML — SIGNIFICANT CHANGE UP (ref 243–894)
WBC # BLD: 7 K/UL — SIGNIFICANT CHANGE UP (ref 3.8–10.5)
WBC # FLD AUTO: 7 K/UL — SIGNIFICANT CHANGE UP (ref 3.8–10.5)

## 2017-10-31 PROCEDURE — 99233 SBSQ HOSP IP/OBS HIGH 50: CPT | Mod: GC

## 2017-10-31 RX ORDER — SODIUM CHLORIDE 9 MG/ML
1000 INJECTION, SOLUTION INTRAVENOUS
Qty: 0 | Refills: 0 | Status: DISCONTINUED | OUTPATIENT
Start: 2017-10-31 | End: 2017-11-01

## 2017-10-31 RX ORDER — NICOTINE POLACRILEX 2 MG
1 GUM BUCCAL DAILY
Qty: 0 | Refills: 0 | Status: DISCONTINUED | OUTPATIENT
Start: 2017-10-31 | End: 2017-11-14

## 2017-10-31 RX ORDER — INSULIN LISPRO 100/ML
VIAL (ML) SUBCUTANEOUS
Qty: 0 | Refills: 0 | Status: DISCONTINUED | OUTPATIENT
Start: 2017-10-31 | End: 2017-11-12

## 2017-10-31 RX ORDER — LOSARTAN POTASSIUM 100 MG/1
25 TABLET, FILM COATED ORAL DAILY
Qty: 0 | Refills: 0 | Status: DISCONTINUED | OUTPATIENT
Start: 2017-10-31 | End: 2017-11-01

## 2017-10-31 RX ADMIN — HYDROMORPHONE HYDROCHLORIDE 0.5 MILLIGRAM(S): 2 INJECTION INTRAMUSCULAR; INTRAVENOUS; SUBCUTANEOUS at 13:02

## 2017-10-31 RX ADMIN — Medication 1 TABLET(S): at 12:11

## 2017-10-31 RX ADMIN — Medication: at 19:00

## 2017-10-31 RX ADMIN — HYDROMORPHONE HYDROCHLORIDE 0.5 MILLIGRAM(S): 2 INJECTION INTRAMUSCULAR; INTRAVENOUS; SUBCUTANEOUS at 12:26

## 2017-10-31 RX ADMIN — Medication 15 MILLIGRAM(S): at 08:12

## 2017-10-31 RX ADMIN — Medication 1 PATCH: at 12:10

## 2017-10-31 RX ADMIN — SODIUM CHLORIDE 150 MILLILITER(S): 9 INJECTION, SOLUTION INTRAVENOUS at 22:22

## 2017-10-31 RX ADMIN — Medication 15 MILLIGRAM(S): at 00:56

## 2017-10-31 RX ADMIN — HYDROMORPHONE HYDROCHLORIDE 0.5 MILLIGRAM(S): 2 INJECTION INTRAMUSCULAR; INTRAVENOUS; SUBCUTANEOUS at 05:42

## 2017-10-31 RX ADMIN — Medication 15 MILLIGRAM(S): at 08:30

## 2017-10-31 RX ADMIN — Medication 15 MILLIGRAM(S): at 17:32

## 2017-10-31 RX ADMIN — Medication 15 MILLIGRAM(S): at 17:50

## 2017-10-31 RX ADMIN — Medication 1 TABLET(S): at 13:01

## 2017-10-31 RX ADMIN — LOSARTAN POTASSIUM 25 MILLIGRAM(S): 100 TABLET, FILM COATED ORAL at 23:27

## 2017-10-31 RX ADMIN — Medication 100 MILLIGRAM(S): at 13:01

## 2017-10-31 RX ADMIN — Medication 15 MILLIGRAM(S): at 00:26

## 2017-10-31 RX ADMIN — PANTOPRAZOLE SODIUM 40 MILLIGRAM(S): 20 TABLET, DELAYED RELEASE ORAL at 05:10

## 2017-10-31 RX ADMIN — Medication 1 MILLIGRAM(S): at 12:11

## 2017-10-31 RX ADMIN — Medication 50 MILLIGRAM(S): at 23:27

## 2017-10-31 RX ADMIN — Medication 1: at 00:15

## 2017-10-31 RX ADMIN — Medication 50 MILLIGRAM(S): at 05:08

## 2017-10-31 RX ADMIN — Medication 50 MILLIGRAM(S): at 17:20

## 2017-10-31 RX ADMIN — HYDROMORPHONE HYDROCHLORIDE 0.5 MILLIGRAM(S): 2 INJECTION INTRAMUSCULAR; INTRAVENOUS; SUBCUTANEOUS at 22:22

## 2017-10-31 RX ADMIN — HYDROMORPHONE HYDROCHLORIDE 0.5 MILLIGRAM(S): 2 INJECTION INTRAMUSCULAR; INTRAVENOUS; SUBCUTANEOUS at 22:52

## 2017-10-31 RX ADMIN — SODIUM CHLORIDE 150 MILLILITER(S): 9 INJECTION, SOLUTION INTRAVENOUS at 05:08

## 2017-10-31 RX ADMIN — HYDROMORPHONE HYDROCHLORIDE 0.5 MILLIGRAM(S): 2 INJECTION INTRAMUSCULAR; INTRAVENOUS; SUBCUTANEOUS at 05:12

## 2017-10-31 RX ADMIN — Medication 5 MILLIGRAM(S): at 05:08

## 2017-10-31 RX ADMIN — Medication 50 MILLIGRAM(S): at 12:11

## 2017-10-31 NOTE — PROGRESS NOTE ADULT - SUBJECTIVE AND OBJECTIVE BOX
INTERVAL HPI/OVERNIGHT EVENTS:    Hospital course -   37 years old male from home lives with girl friend, with PMH of EtOH abuse, recurrent alcohol induced pancreatitis, Fatty Liver, EtOH withdrawal seizure(intubated 2 times), pre-DM (not on med), and gastroenteritis presented to ED c/o worsening epigastric pain over 2 days.  Last drink 3 days ago. Upon admission - pt had 5599, AST/ALT 60/66, CT abdomen showed: Peripancreatic fluid and stranding,  Admitted for Acute Pancreatitis . s/p banana bag , on Po multivitamins, IV hydration , pain meds.     Subjective - Pt currently feels better with epigastric pain, which he says improved. He is no more having nausea/vomiting. He would like to eat food. No new complaints.      VITAL SIGNS:  T(F): 97.1 (10-31-17 @ 05:05)  HR: 87 (10-31-17 @ 06:55)  BP: 159/83 (10-31-17 @ 06:55)  RR: 16 (10-31-17 @ 06:55)  SpO2: 96% (10-31-17 @ 06:55)  Wt(kg): --    PHYSICAL EXAM:  Constitutional: AAO x3, NAD.    Eyes: PERRLA, EOMI, pupils dilated but reactive.    Neck: supple, no JVD    Respiratory: CTA b/l clear,     Cardiovascular: S1, S2, NO M/R/G    Gastrointestinal: NT ND , BS +nt in all 4Qs, no organomegaly    Extremities: No edema , No cyanosis , No clubbing    Vascular: + DP pulses palpable     Neurological: Non focal neuro exam, sensations intact.     Lymph Nodes: No LN palpable      MEDICATIONS  (STANDING):  chlordiazePOXIDE 50 milliGRAM(s) Oral every 6 hours  dextrose 5%. 1000 milliLiter(s) (50 mL/Hr) IV Continuous <Continuous>  dextrose 50% Injectable 12.5 Gram(s) IV Push once  dextrose 50% Injectable 25 Gram(s) IV Push once  dextrose 50% Injectable 25 Gram(s) IV Push once  enalapril 5 milliGRAM(s) Oral daily  folic acid 1 milliGRAM(s) Oral daily  influenza   Vaccine 0.5 milliLiter(s) IntraMuscular once  insulin lispro (HumaLOG) corrective regimen sliding scale   SubCutaneous every 6 hours  lactated ringers. 1000 milliLiter(s) (150 mL/Hr) IV Continuous <Continuous>  multivitamin 1 Tablet(s) Oral daily  nicotine - 21 mG/24Hr(s) Patch 1 patch Transdermal daily  nicotine - 21 mG/24Hr(s) Patch 1 patch Transdermal daily  pantoprazole    Tablet 40 milliGRAM(s) Oral before breakfast  thiamine 100 milliGRAM(s) Oral daily  vitamin B complex with vitamin C 1 Tablet(s) Oral daily    MEDICATIONS  (PRN):  dextrose Gel 1 Dose(s) Oral once PRN Blood Glucose LESS THAN 70 milliGRAM(s)/deciLiter  glucagon  Injectable 1 milliGRAM(s) IntraMuscular once PRN Glucose <70 milliGRAM(s)/deciLiter  hydrALAZINE Injectable 5 milliGRAM(s) IV Push every 6 hours PRN SBP >150  HYDROmorphone  Injectable 0.5 milliGRAM(s) IV Push every 6 hours PRN Severe Pain (7 - 10)  ketorolac   Injectable 15 milliGRAM(s) IV Push every 6 hours PRN Moderate Pain (4 - 6)      Allergies    Ativan (Short breath)    Intolerances    morphine (Other)      LABS:                        17.3   7.0   )-----------( 82       ( 31 Oct 2017 08:00 )             49.4     10-    136  |  99  |  5<L>  ----------------------------<  80  3.6   |  26  |  0.57    Ca    9.4      31 Oct 2017 08:00  Phos  3.1     10-31  Mg     1.8     10    TPro  8.4<H>  /  Alb  4.1  /  TBili  0.8  /  DBili  x   /  AST  60<H>  /  ALT  66<H>  /  AlkPhos  132<H>  10-30    PT/INR - ( 31 Oct 2017 08:00 )   PT: 10.5 sec;   INR: 0.96 ratio         PTT - ( 31 Oct 2017 08:00 )  PTT:28.4 sec  Urinalysis Basic - ( 30 Oct 2017 10:25 )    Color: Yellow / Appearance: Clear / S.015 / pH: x  Gluc: x / Ketone: Large  / Bili: Negative / Urobili: 1   Blood: x / Protein: 500 mg/dL / Nitrite: Negative   Leuk Esterase: Trace / RBC: 0-2 /HPF / WBC 0-2 /HPF   Sq Epi: x / Non Sq Epi: Occasional /HPF / Bacteria: x

## 2017-10-31 NOTE — PROGRESS NOTE ADULT - ATTENDING COMMENTS
Patient was seen and examined by myself with team. Case was discussed with house staff in details. Patient was seen and examined by myself with team. Case was discussed with house staff in details.  36 y/o M admitted for   1. acute alcohol pancreatitis  2. acute pain  3. Elevated BP - possibly due to pain; optimize pan control; start Losartan for BP control if remains elevated  4. Thrombocytopenia- due to alcoholic liver disease; no bleeding; monitor.  5. Alcohol withdrawal  - continue CIWA monitoring and Librium; librium increased to 50mg every 6 hrs due to elevated CIWA score  - trial of clear liquids  - Pain control with IV Dilaudid PRN  - continue with IVF today; trend lipase.  other plan as outlined above in Dr Asif's note

## 2017-10-31 NOTE — PROGRESS NOTE ADULT - PROBLEM SELECTOR PLAN 1
Epigastric pain and physical exam improved  lipase slightly trending down   plan is to start diet in evening ( clears )   will cut down pain med - Dilaudid, and IV hydration as pt tolerates clears   f/u am CBC, BMP, LFT, lipase Epigastric pain and physical exam improved  lipase slightly trending up  afebrile  plan is to start diet in evening ( clears )   will cut down pain med - Dilaudid, and IV hydration as pt tolerates clears   f/u am CBC, BMP, LFT, lipase

## 2017-10-31 NOTE — PROGRESS NOTE ADULT - ASSESSMENT
37 years old male from home lives with girl friend, with PMH of EtOH abuse, recurrent alcohol induced pancreatitis, Fatty Liver, EtOH withdrawal seizure(intubated 2 times), pre-DM (not on med), and gastroenteritis presented to ED c/o worsening epigastric pain     Admitted for Acute Pancreatitis

## 2017-10-31 NOTE — PROGRESS NOTE ADULT - PROBLEM SELECTOR PLAN 3
last drink 3 days ago  pt mentioned being "allergic" to ativan   says he was intubated last time when he got ativan  so giving librium to prevent DTs as d/w attending   c/w PO muti vitamins , thiamin , folic acid   [facility] protocol

## 2017-11-01 DIAGNOSIS — F10.239 ALCOHOL DEPENDENCE WITH WITHDRAWAL, UNSPECIFIED: ICD-10-CM

## 2017-11-01 DIAGNOSIS — F10.231 ALCOHOL DEPENDENCE WITH WITHDRAWAL DELIRIUM: ICD-10-CM

## 2017-11-01 LAB
ALBUMIN SERPL ELPH-MCNC: 3.7 G/DL — SIGNIFICANT CHANGE UP (ref 3.5–5)
ALP SERPL-CCNC: 106 U/L — SIGNIFICANT CHANGE UP (ref 40–120)
ALT FLD-CCNC: 40 U/L DA — SIGNIFICANT CHANGE UP (ref 10–60)
ANION GAP SERPL CALC-SCNC: 8 MMOL/L — SIGNIFICANT CHANGE UP (ref 5–17)
AST SERPL-CCNC: 29 U/L — SIGNIFICANT CHANGE UP (ref 10–40)
BASOPHILS # BLD AUTO: 0 K/UL — SIGNIFICANT CHANGE UP (ref 0–0.2)
BASOPHILS NFR BLD AUTO: 0.6 % — SIGNIFICANT CHANGE UP (ref 0–2)
BILIRUB DIRECT SERPL-MCNC: 0.2 MG/DL — SIGNIFICANT CHANGE UP (ref 0–0.2)
BILIRUB INDIRECT FLD-MCNC: 0.6 MG/DL — SIGNIFICANT CHANGE UP (ref 0.2–1)
BILIRUB SERPL-MCNC: 0.8 MG/DL — SIGNIFICANT CHANGE UP (ref 0.2–1.2)
BUN SERPL-MCNC: 4 MG/DL — LOW (ref 7–18)
CALCIUM SERPL-MCNC: 9.5 MG/DL — SIGNIFICANT CHANGE UP (ref 8.4–10.5)
CHLORIDE SERPL-SCNC: 101 MMOL/L — SIGNIFICANT CHANGE UP (ref 96–108)
CO2 SERPL-SCNC: 27 MMOL/L — SIGNIFICANT CHANGE UP (ref 22–31)
CREAT SERPL-MCNC: 0.61 MG/DL — SIGNIFICANT CHANGE UP (ref 0.5–1.3)
EOSINOPHIL # BLD AUTO: 0.1 K/UL — SIGNIFICANT CHANGE UP (ref 0–0.5)
EOSINOPHIL NFR BLD AUTO: 1.6 % — SIGNIFICANT CHANGE UP (ref 0–6)
GLUCOSE BLDC GLUCOMTR-MCNC: 119 MG/DL — HIGH (ref 70–99)
GLUCOSE BLDC GLUCOMTR-MCNC: 126 MG/DL — HIGH (ref 70–99)
GLUCOSE BLDC GLUCOMTR-MCNC: 128 MG/DL — HIGH (ref 70–99)
GLUCOSE SERPL-MCNC: 101 MG/DL — HIGH (ref 70–99)
HCT VFR BLD CALC: 44.6 % — SIGNIFICANT CHANGE UP (ref 39–50)
HGB BLD-MCNC: 15.6 G/DL — SIGNIFICANT CHANGE UP (ref 13–17)
LIDOCAIN IGE QN: 1865 U/L — HIGH (ref 73–393)
LYMPHOCYTES # BLD AUTO: 1.2 K/UL — SIGNIFICANT CHANGE UP (ref 1–3.3)
LYMPHOCYTES # BLD AUTO: 16.4 % — SIGNIFICANT CHANGE UP (ref 13–44)
MAGNESIUM SERPL-MCNC: 1.8 MG/DL — SIGNIFICANT CHANGE UP (ref 1.6–2.6)
MCHC RBC-ENTMCNC: 34.7 PG — HIGH (ref 27–34)
MCHC RBC-ENTMCNC: 35 GM/DL — SIGNIFICANT CHANGE UP (ref 32–36)
MCV RBC AUTO: 99.1 FL — SIGNIFICANT CHANGE UP (ref 80–100)
MONOCYTES # BLD AUTO: 0.6 K/UL — SIGNIFICANT CHANGE UP (ref 0–0.9)
MONOCYTES NFR BLD AUTO: 8.3 % — SIGNIFICANT CHANGE UP (ref 2–14)
NEUTROPHILS # BLD AUTO: 5.4 K/UL — SIGNIFICANT CHANGE UP (ref 1.8–7.4)
NEUTROPHILS NFR BLD AUTO: 73.1 % — SIGNIFICANT CHANGE UP (ref 43–77)
PHOSPHATE SERPL-MCNC: 3.1 MG/DL — SIGNIFICANT CHANGE UP (ref 2.5–4.5)
PLATELET # BLD AUTO: 88 K/UL — LOW (ref 150–400)
POTASSIUM SERPL-MCNC: 3.4 MMOL/L — LOW (ref 3.5–5.3)
POTASSIUM SERPL-SCNC: 3.4 MMOL/L — LOW (ref 3.5–5.3)
PROT SERPL-MCNC: 7.8 G/DL — SIGNIFICANT CHANGE UP (ref 6–8.3)
RBC # BLD: 4.5 M/UL — SIGNIFICANT CHANGE UP (ref 4.2–5.8)
RBC # FLD: 12.5 % — SIGNIFICANT CHANGE UP (ref 10.3–14.5)
SODIUM SERPL-SCNC: 136 MMOL/L — SIGNIFICANT CHANGE UP (ref 135–145)
WBC # BLD: 7.3 K/UL — SIGNIFICANT CHANGE UP (ref 3.8–10.5)
WBC # FLD AUTO: 7.3 K/UL — SIGNIFICANT CHANGE UP (ref 3.8–10.5)

## 2017-11-01 PROCEDURE — 71010: CPT | Mod: 26

## 2017-11-01 RX ORDER — FENTANYL CITRATE 50 UG/ML
1 INJECTION INTRAVENOUS
Qty: 2500 | Refills: 0 | Status: DISCONTINUED | OUTPATIENT
Start: 2017-11-01 | End: 2017-11-01

## 2017-11-01 RX ORDER — EPINEPHRINE 0.3 MG/.3ML
0.3 INJECTION INTRAMUSCULAR; SUBCUTANEOUS ONCE
Qty: 0 | Refills: 0 | Status: COMPLETED | OUTPATIENT
Start: 2017-11-01 | End: 2017-11-01

## 2017-11-01 RX ORDER — PIPERACILLIN AND TAZOBACTAM 4; .5 G/20ML; G/20ML
3.38 INJECTION, POWDER, LYOPHILIZED, FOR SOLUTION INTRAVENOUS EVERY 8 HOURS
Qty: 0 | Refills: 0 | Status: DISCONTINUED | OUTPATIENT
Start: 2017-11-01 | End: 2017-11-02

## 2017-11-01 RX ORDER — HALOPERIDOL DECANOATE 100 MG/ML
4 INJECTION INTRAMUSCULAR ONCE
Qty: 0 | Refills: 0 | Status: COMPLETED | OUTPATIENT
Start: 2017-11-01 | End: 2017-11-01

## 2017-11-01 RX ORDER — PROPOFOL 10 MG/ML
5 INJECTION, EMULSION INTRAVENOUS
Qty: 1000 | Refills: 0 | Status: DISCONTINUED | OUTPATIENT
Start: 2017-11-01 | End: 2017-11-02

## 2017-11-01 RX ORDER — SODIUM CHLORIDE 9 MG/ML
1000 INJECTION, SOLUTION INTRAVENOUS ONCE
Qty: 0 | Refills: 0 | Status: COMPLETED | OUTPATIENT
Start: 2017-11-01 | End: 2017-11-01

## 2017-11-01 RX ORDER — HALOPERIDOL DECANOATE 100 MG/ML
5 INJECTION INTRAMUSCULAR ONCE
Qty: 0 | Refills: 0 | Status: COMPLETED | OUTPATIENT
Start: 2017-11-01 | End: 2017-11-01

## 2017-11-01 RX ORDER — ENOXAPARIN SODIUM 100 MG/ML
40 INJECTION SUBCUTANEOUS DAILY
Qty: 0 | Refills: 0 | Status: DISCONTINUED | OUTPATIENT
Start: 2017-11-01 | End: 2017-11-14

## 2017-11-01 RX ORDER — FENTANYL CITRATE 50 UG/ML
2 INJECTION INTRAVENOUS
Qty: 2500 | Refills: 0 | Status: DISCONTINUED | OUTPATIENT
Start: 2017-11-01 | End: 2017-11-03

## 2017-11-01 RX ORDER — SODIUM CHLORIDE 9 MG/ML
1000 INJECTION, SOLUTION INTRAVENOUS
Qty: 0 | Refills: 0 | Status: DISCONTINUED | OUTPATIENT
Start: 2017-11-01 | End: 2017-11-02

## 2017-11-01 RX ORDER — PIPERACILLIN AND TAZOBACTAM 4; .5 G/20ML; G/20ML
3.38 INJECTION, POWDER, LYOPHILIZED, FOR SOLUTION INTRAVENOUS ONCE
Qty: 0 | Refills: 0 | Status: COMPLETED | OUTPATIENT
Start: 2017-11-01 | End: 2017-11-01

## 2017-11-01 RX ORDER — PANTOPRAZOLE SODIUM 20 MG/1
40 TABLET, DELAYED RELEASE ORAL DAILY
Qty: 0 | Refills: 0 | Status: DISCONTINUED | OUTPATIENT
Start: 2017-11-01 | End: 2017-11-12

## 2017-11-01 RX ORDER — QUETIAPINE FUMARATE 200 MG/1
25 TABLET, FILM COATED ORAL EVERY 12 HOURS
Qty: 0 | Refills: 0 | Status: DISCONTINUED | OUTPATIENT
Start: 2017-11-01 | End: 2017-11-06

## 2017-11-01 RX ORDER — SODIUM CHLORIDE 9 MG/ML
1000 INJECTION, SOLUTION INTRAVENOUS
Qty: 0 | Refills: 0 | Status: COMPLETED | OUTPATIENT
Start: 2017-11-01 | End: 2017-11-01

## 2017-11-01 RX ORDER — SODIUM CHLORIDE 9 MG/ML
1000 INJECTION, SOLUTION INTRAVENOUS
Qty: 0 | Refills: 0 | Status: DISCONTINUED | OUTPATIENT
Start: 2017-11-01 | End: 2017-11-01

## 2017-11-01 RX ADMIN — Medication 100 MILLIGRAM(S): at 11:38

## 2017-11-01 RX ADMIN — PIPERACILLIN AND TAZOBACTAM 200 GRAM(S): 4; .5 INJECTION, POWDER, LYOPHILIZED, FOR SOLUTION INTRAVENOUS at 11:38

## 2017-11-01 RX ADMIN — SODIUM CHLORIDE 200 MILLILITER(S): 9 INJECTION, SOLUTION INTRAVENOUS at 19:25

## 2017-11-01 RX ADMIN — PIPERACILLIN AND TAZOBACTAM 25 GRAM(S): 4; .5 INJECTION, POWDER, LYOPHILIZED, FOR SOLUTION INTRAVENOUS at 22:31

## 2017-11-01 RX ADMIN — SODIUM CHLORIDE 1333.33 MILLILITER(S): 9 INJECTION, SOLUTION INTRAVENOUS at 14:22

## 2017-11-01 RX ADMIN — Medication 5 MILLIGRAM(S): at 05:01

## 2017-11-01 RX ADMIN — Medication 2 MILLIGRAM(S): at 10:35

## 2017-11-01 RX ADMIN — SODIUM CHLORIDE 150 MILLILITER(S): 9 INJECTION, SOLUTION INTRAVENOUS at 11:38

## 2017-11-01 RX ADMIN — Medication 4 MILLIGRAM(S): at 08:15

## 2017-11-01 RX ADMIN — QUETIAPINE FUMARATE 25 MILLIGRAM(S): 200 TABLET, FILM COATED ORAL at 12:16

## 2017-11-01 RX ADMIN — SODIUM CHLORIDE 75 MILLILITER(S): 9 INJECTION, SOLUTION INTRAVENOUS at 14:21

## 2017-11-01 RX ADMIN — Medication 1 TABLET(S): at 12:16

## 2017-11-01 RX ADMIN — PROPOFOL 2.24 MICROGRAM(S)/KG/MIN: 10 INJECTION, EMULSION INTRAVENOUS at 19:23

## 2017-11-01 RX ADMIN — Medication 15 MILLIGRAM(S): at 05:01

## 2017-11-01 RX ADMIN — HALOPERIDOL DECANOATE 5 MILLIGRAM(S): 100 INJECTION INTRAMUSCULAR at 10:32

## 2017-11-01 RX ADMIN — QUETIAPINE FUMARATE 25 MILLIGRAM(S): 200 TABLET, FILM COATED ORAL at 17:05

## 2017-11-01 RX ADMIN — Medication 2 MILLIGRAM(S): at 10:38

## 2017-11-01 RX ADMIN — Medication 1 PATCH: at 11:38

## 2017-11-01 RX ADMIN — PIPERACILLIN AND TAZOBACTAM 25 GRAM(S): 4; .5 INJECTION, POWDER, LYOPHILIZED, FOR SOLUTION INTRAVENOUS at 14:21

## 2017-11-01 RX ADMIN — Medication 15 MILLIGRAM(S): at 05:31

## 2017-11-01 RX ADMIN — Medication 2 MILLIGRAM(S): at 10:28

## 2017-11-01 RX ADMIN — ENOXAPARIN SODIUM 40 MILLIGRAM(S): 100 INJECTION SUBCUTANEOUS at 11:38

## 2017-11-01 RX ADMIN — Medication 50 MILLIGRAM(S): at 10:11

## 2017-11-01 RX ADMIN — Medication 50 MILLIGRAM(S): at 05:01

## 2017-11-01 RX ADMIN — FENTANYL CITRATE 7.48 MICROGRAM(S)/KG/HR: 50 INJECTION INTRAVENOUS at 11:40

## 2017-11-01 RX ADMIN — HALOPERIDOL DECANOATE 4 MILLIGRAM(S): 100 INJECTION INTRAMUSCULAR at 09:50

## 2017-11-01 RX ADMIN — Medication 2 MILLIGRAM(S): at 10:21

## 2017-11-01 RX ADMIN — Medication 1 TABLET(S): at 11:38

## 2017-11-01 RX ADMIN — Medication 1 PATCH: at 11:39

## 2017-11-01 RX ADMIN — LOSARTAN POTASSIUM 25 MILLIGRAM(S): 100 TABLET, FILM COATED ORAL at 05:01

## 2017-11-01 RX ADMIN — PANTOPRAZOLE SODIUM 40 MILLIGRAM(S): 20 TABLET, DELAYED RELEASE ORAL at 05:01

## 2017-11-01 RX ADMIN — PROPOFOL 2.24 MICROGRAM(S)/KG/MIN: 10 INJECTION, EMULSION INTRAVENOUS at 11:35

## 2017-11-01 RX ADMIN — PANTOPRAZOLE SODIUM 40 MILLIGRAM(S): 20 TABLET, DELAYED RELEASE ORAL at 19:23

## 2017-11-01 RX ADMIN — FENTANYL CITRATE 14.94 MICROGRAM(S)/KG/HR: 50 INJECTION INTRAVENOUS at 19:25

## 2017-11-01 NOTE — DIETITIAN INITIAL EVALUATION ADULT. - PROBLEM SELECTOR PLAN 2
BP elevated- possibly due to acute pain from ongoing pancreatitis.  optimize pain control.  monitor BP closely

## 2017-11-01 NOTE — CONSULT NOTE ADULT - PROBLEM SELECTOR RECOMMENDATION 9
CRESCENCIO 7 at time of my exam  last drink 4 days ago  continue with librium 50 q6 and ativan doses as needed   stable to be managed on medical floor at this time   was previously intubated for delerium tremens  c/w PO muti vitamins , thiamin , folic acid   CRESCENCIO protocol.

## 2017-11-01 NOTE — DIETITIAN INITIAL EVALUATION ADULT. - PROBLEM SELECTOR PLAN 3
-Patient stopped taking metformin since last discharge, as his PMD recommended diet control for his DM.   -Monitor Accu-Chek, starting Humalog sliding scale.  -Check Hba1c

## 2017-11-01 NOTE — PROGRESS NOTE ADULT - PROBLEM SELECTOR PLAN 1
Patient was physically aggressive and agitated behiavior despite multiple doses of Ativan . He did not have any allergic reaction to ativan or haldol. His delirium progressed and due to concern for injury to self as he was trying to jump out of the window, ICU was contacted. Patient was sedated and required intubation for airway protection.  He was therefore moved to MICU level of care due to acute progressive alcohol withdrawal delirium not responding to Librium and ativan with haldol.  I discussed with family multiple times during the course of the day.

## 2017-11-01 NOTE — CONSULT NOTE ADULT - ATTENDING COMMENTS
37 years old male from home lives with girl friend, with PMH of EtOH abuse, recurrent alcohol induced pancreatitis, Fatty Liver, EtOH withdrawal seizure(intubated 2 times), pre-DM (not on med), and gastroenteritis presented to ED c/o worsening epigastric pain .Admitted for Acute Pancreatitis.   Patient was seen and examined, reported that he feels well and wants to go home, however is tremulous and looks agitated , and  was trying to walk out . He given 2mg ativan IV push with no improvement of tremor, and was given additional 2mg ativan IV push with solumedrol and epi pen at bedside in case of allergic reaction previously documented. After 5 minutes and 10 minutes, no shortness of breath or flushing noted, removed ativan as allergy from patient's profile.  At time of my exam. Patient is resting comfortably in bed, vitals stable before and after ativan administration.Stable to be managed on medical floor at this time          Problem/Recommendation - 1:  Problem: Alcohol withdrawal. Recommendation: CIWA 7 at time of my exam  last drink 4 days ago  continue with librium 50 q6 and ativan doses as needed   stable to be managed on medical floor at this time   was previously intubated for delerium tremens  c/w PO muti vitamins , thiamin , folic acid   CIWA protocol.     Problem/Recommendation - 2:  ·  Problem: Alcohol-induced acute pancreatitis, unspecified complication status.  Recommendation: Epigastric pain resolved per patient   afebrile  Tolerating clears . Advance diet as tolerated.      Problem/Recommendation - 3:  ·  Problem: Thrombocytopenia.  Recommendation: 2/2 ETOH abuse   no active bleeding.      Problem/Recommendation - 4:  ·  Problem: Need for prophylactic measure.  Recommendation: not on chemical DVT ppx due to thrombocytopenia  IMPROVE score - 0.     Electronic Signatures for Addendum Section:   Luisa Garner) (Signed Addendum 01-Nov-2017 12:26)  	RRT called at 10.20 am as patient became very agitated , violent with staff and at risk of causing bodily harm to himself and others . He was intubated for airway protection and started on propofol for sedation . Patient also vomited once during intubation .Started zosyn for possible aspiration PNA . ET tube in place [confirmed with cxr ] . Will monitor in MICU

## 2017-11-01 NOTE — CHART NOTE - NSCHARTNOTEFT_GEN_A_CORE
Patient was seen and examined, reported that he feels well and wants to go home, however disoriented and have visible tremors, walking in the hallway. Was informed that he will need medication prior to discharge, was given 2mg ativan IV push with no improvement of tremor, and was given additional 2mg ativan IV push with solumedrol and epi pen at bedside in case of allergic reaction previously documented. After 5 minutes and 10 minutes, no shortness of breath or flushing noted, removed ativan as allergy from patient's profile. CIWA 10, starting 4 ativan q4 standing and 2 ativan q2 PRN for agitation. Patient is resting comfortably in bed, vitals stable before and after ativan administration.

## 2017-11-01 NOTE — DIETITIAN INITIAL EVALUATION ADULT. - PROBLEM SELECTOR PLAN 1
-BISAP score 2, <2% mortality.  -NPO for now, with IV fluid resuscitation.  -Check lipid panel  -CIWA protocol  -Pt reports ativan allergy and possible intolerance, saying that he was intubated after getting ativan last time; will put him on standing dose of Librium for now(tolerated in ED). His CIWA score now is 0, will need to closely monitor him for withdrawal symptoms especially for the next 24-48hrs. May need ICU consult if patient actively withdraws from EtOH, as ativan cannot be given due to reported reaction.  -Social work consult for EtOH abuse

## 2017-11-01 NOTE — CONSULT NOTE ADULT - ASSESSMENT
37 years old male from home lives with girl friend, with PMH of EtOH abuse, recurrent alcohol induced pancreatitis, Fatty Liver, EtOH withdrawal seizure(intubated 2 times), pre-DM (not on med), and gastroenteritis presented to ED c/o worsening epigastric pain .Admitted for Acute Pancreatitis.   Patient was seen and examined, reported that he feels well and wants to go home, however is tremulous and looks agitated , and  was trying to walk out . He given 2mg ativan IV push with no improvement of tremor, and was given additional 2mg ativan IV push with solumedrol and epi pen at bedside in case of allergic reaction previously documented. After 5 minutes and 10 minutes, no shortness of breath or flushing noted, removed ativan as allergy from patient's profile.  At time of my exam. Patient is resting comfortably in bed, vitals stable before and after ativan administration.Stable to be managed on medical floor at this time

## 2017-11-01 NOTE — DIETITIAN INITIAL EVALUATION ADULT. - PROBLEM SELECTOR PLAN 5
platelet level noted to be < 100 significantly decereased compared to recent hospital labs.  Monitor for bleeding   thrombocytopenia likely related to alcoholic liver disease

## 2017-11-01 NOTE — CONSULT NOTE ADULT - SUBJECTIVE AND OBJECTIVE BOX
Patient is a 37y old  Male who presents with a chief complaint of epigastric pain started 2 days ago (30 Oct 2017 12:48)      Initial HPI on admission:  HPI:  37 years old male from home lives with girl friend, with PMH of EtOH abuse, recurrent alcohol induced pancreatitis, Fatty Liver, EtOH withdrawal seizure(intubated 2 times), pre-DM(not on med), and gastroenteritis presented to ED c/o worsening epigastric pain over 2 days. Pain is throbbing, epigastric area radiating to the back,10/10 when most severe, and is not related to food intake. It was initially intermittent but became constant since this morning. Denies having nausea, vomiting, fever, diarrhea, SOB, or any other symptoms. Reports last drink was this morning, he has been drinking 10 beers everyday over the last month.    In ED, patient is hypertensive to 180-190, other vitals stable, labs significant for lipase of 5599, AST/ALT 60/66, CT abdomen showed: Peripancreatic fluid and stranding, compatible with acute pancreatitis. No evidence for pancreatic necrosis. Interposing between the pancreatic head and the second part of duodenum, there is a small 1.5 x 1.3 cm fluid collection; this may represent a duodenal diverticulum or a pseudocyst.    Patient is admitted to the medicine floor for EtOH induced acute pancreatitis. (30 Oct 2017 12:48)      BRIEF HOSPITAL COURSE:   37 years old male from home lives with girl friend, with PMH of EtOH abuse, recurrent alcohol induced pancreatitis, Fatty Liver, EtOH withdrawal seizure(intubated 2 times), pre-DM (not on med), and gastroenteritis presented to ED c/o worsening epigastric pain. Admitted for Acute Pancreatitis. History of alcohol withdrawl on librium .Was agitated with ciwa of 10 this morning .Patient endorsed being allergic to ativan but tolerated 4 mg this morning without any adverse event. MICU was consulted for alchohol withdrawl with CIWA of 10        PAST MEDICAL & SURGICAL HISTORY:  Prediabetes  Alcohol induced acute pancreatitis  Pancreatitis  Alcoholism  Fatty liver  Gastroenteritis  No significant past surgical history    Allergies    No Known Allergies    Intolerances    morphine (Other)    FAMILY HISTORY:  Diabetes mellitus (Grandparent)    Social history reviewed: ***    Review of Systems:  CONSTITUTIONAL: No fever, chills, or fatigue  EYES: No eye pain, visual disturbances, or discharge  ENMT:  No difficulty hearing, tinnitus, vertigo; No sinus or throat pain  NECK: No pain or stiffness  RESPIRATORY: No cough, wheezing, chills or hemoptysis; No shortness of breath  CARDIOVASCULAR: No chest pain, palpitations, dizziness, or leg swelling  GASTROINTESTINAL: No abdominal or epigastric pain. No nausea, vomiting, or hematemesis; No diarrhea or constipation. No melena or hematochezia.  GENITOURINARY: No dysuria, frequency, hematuria, or incontinence  NEUROLOGICAL: No headaches, memory loss, loss of strength, numbness, or tremors  SKIN: No itching, burning, rashes, or lesions   MUSCULOSKELETAL: No joint pain or swelling; No muscle, back, or extremity pain  PSYCHIATRIC: No depression, anxiety, mood swings, or difficulty sleeping      Medications:  chlordiazePOXIDE 50 milliGRAM(s) Oral every 6 hours  dextrose 5%. 1000 milliLiter(s) IV Continuous <Continuous>  dextrose 50% Injectable 12.5 Gram(s) IV Push once  dextrose 50% Injectable 25 Gram(s) IV Push once  dextrose 50% Injectable 25 Gram(s) IV Push once  dextrose Gel 1 Dose(s) Oral once PRN  enalapril 5 milliGRAM(s) Oral daily  EPINEPHrine   1 mG/mL (1:1,000) Injectable 0.3 milliGRAM(s) IntraMuscular once  folic acid 1 milliGRAM(s) Oral daily  glucagon  Injectable 1 milliGRAM(s) IntraMuscular once PRN  hydrALAZINE Injectable 5 milliGRAM(s) IV Push every 6 hours PRN  HYDROmorphone  Injectable 0.5 milliGRAM(s) IV Push every 6 hours PRN  influenza   Vaccine 0.5 milliLiter(s) IntraMuscular once  insulin lispro (HumaLOG) corrective regimen sliding scale   SubCutaneous Before meals and at bedtime  ketorolac   Injectable 15 milliGRAM(s) IV Push every 6 hours PRN  lactated ringers. 1000 milliLiter(s) IV Continuous <Continuous>  losartan 25 milliGRAM(s) Oral daily  methylPREDNISolone sodium succinate Injectable 40 milliGRAM(s) IV Push once  multivitamin 1 Tablet(s) Oral daily  nicotine - 21 mG/24Hr(s) Patch 1 patch Transdermal daily  nicotine - 21 mG/24Hr(s) Patch 1 patch Transdermal daily  pantoprazole    Tablet 40 milliGRAM(s) Oral before breakfast  thiamine 100 milliGRAM(s) Oral daily  vitamin B complex with vitamin C 1 Tablet(s) Oral daily      vent settings      Vital Signs Last 24 Hrs  T(C): 36.5 (2017 08:21), Max: 36.7 (31 Oct 2017 14:44)  T(F): 97.7 (2017 08:21), Max: 98 (31 Oct 2017 14:44)  HR: 107 (2017 08:30) (78 - 107)  BP: 140/83 (2017 08:30) (133/88 - 177/92)  BP(mean): --  RR: 18 (2017 08:21) (16 - 18)  SpO2: 100% (2017 08:21) (98% - 100%)              LABS:                        15.6   7.3   )-----------( 88       ( 2017 07:20 )             44.6     11    136  |  101  |  4<L>  ----------------------------<  101<H>  3.4<L>   |  27  |  0.61    Ca    9.5      2017 07:20  Phos  3.1     11  Mg     1.8     11    TPro  7.8  /  Alb  3.7  /  TBili  0.8  /  DBili  0.2  /  AST  29  /  ALT  40  /  AlkPhos  106  1101          CAPILLARY BLOOD GLUCOSE      POCT Blood Glucose.: 112 mg/dL (2017 07:40)    PT/INR - ( 31 Oct 2017 08:00 )   PT: 10.5 sec;   INR: 0.96 ratio         PTT - ( 31 Oct 2017 08:00 )  PTT:28.4 sec  Urinalysis Basic - ( 30 Oct 2017 10:25 )    Color: Yellow / Appearance: Clear / S.015 / pH: x  Gluc: x / Ketone: Large  / Bili: Negative / Urobili: 1   Blood: x / Protein: 500 mg/dL / Nitrite: Negative   Leuk Esterase: Trace / RBC: 0-2 /HPF / WBC 0-2 /HPF   Sq Epi: x / Non Sq Epi: Occasional /HPF / Bacteria: x      CULTURES:        Physical Examination:    General: No acute distress.      HEENT: Pupils equal, reactive to light.  Symmetric.    PULM: Clear to auscultation bilaterally, no significant sputum production    CVS: Regular rate and rhythm, no murmurs, rubs, or gallops    ABD: Soft, nondistended, nontender, normoactive bowel sounds, no masses    EXT: No edema, nontender    SKIN: Warm and well perfused, no rashes noted.    NEURO: Alert, oriented, interactive, nonfocal    RADIOLOGY REVIEWED ***    CRITICAL CARE TIME SPENT: 35 minutes Patient is a 37y old  Male who presents with a chief complaint of epigastric pain started 2 days ago (30 Oct 2017 12:48)      Initial HPI on admission:  HPI:  37 years old male from home lives with girl friend, with PMH of EtOH abuse, recurrent alcohol induced pancreatitis, Fatty Liver, EtOH withdrawal seizure(intubated 2 times), pre-DM(not on med), and gastroenteritis presented to ED c/o worsening epigastric pain over 2 days. Pain is throbbing, epigastric area radiating to the back,10/10 when most severe, and is not related to food intake. It was initially intermittent but became constant since this morning. Denies having nausea, vomiting, fever, diarrhea, SOB, or any other symptoms. Reports last drink was this morning, he has been drinking 10 beers everyday over the last month.    In ED, patient is hypertensive to 180-190, other vitals stable, labs significant for lipase of 5599, AST/ALT 60/66, CT abdomen showed: Peripancreatic fluid and stranding, compatible with acute pancreatitis. No evidence for pancreatic necrosis. Interposing between the pancreatic head and the second part of duodenum, there is a small 1.5 x 1.3 cm fluid collection; this may represent a duodenal diverticulum or a pseudocyst.    Patient is admitted to the medicine floor for EtOH induced acute pancreatitis. (30 Oct 2017 12:48)      BRIEF HOSPITAL COURSE:   37 years old male from home lives with girl friend, with PMH of EtOH abuse, recurrent alcohol induced pancreatitis, Fatty Liver, EtOH withdrawal seizure(intubated 2 times), pre-DM (not on med), and gastroenteritis presented to ED c/o worsening epigastric pain. Admitted for Acute Pancreatitis. History of alcohol withdrawl on librium .Was agitated with ciwa of 10 this morning .Patient endorsed being allergic to ativan but tolerated 4 mg this morning without any adverse event. MICU was consulted for alchohol withdrawl with CIWA of 10        PAST MEDICAL & SURGICAL HISTORY:  Prediabetes  Alcohol induced acute pancreatitis  Pancreatitis  Alcoholism  Fatty liver  Gastroenteritis  No significant past surgical history    Allergies    No Known Allergies    Intolerances    morphine (Other)    FAMILY HISTORY:  Diabetes mellitus (Grandparent)    Social history reviewed: ***    Review of Systems:  CONSTITUTIONAL: No fevers  RESPIRATORY: No cough, wheezing, chills or hemoptysis; No shortness of breath  CARDIOVASCULAR: No chest pain, palpitations, dizziness, or leg swelling  GASTROINTESTINAL; abdominal pain improved . Tolerating   Not able to obtain any further history at this time     Medications:  chlordiazePOXIDE 50 milliGRAM(s) Oral every 6 hours  dextrose 5%. 1000 milliLiter(s) IV Continuous <Continuous>  dextrose 50% Injectable 12.5 Gram(s) IV Push once  dextrose 50% Injectable 25 Gram(s) IV Push once  dextrose 50% Injectable 25 Gram(s) IV Push once  dextrose Gel 1 Dose(s) Oral once PRN  enalapril 5 milliGRAM(s) Oral daily  EPINEPHrine   1 mG/mL (1:1,000) Injectable 0.3 milliGRAM(s) IntraMuscular once  folic acid 1 milliGRAM(s) Oral daily  glucagon  Injectable 1 milliGRAM(s) IntraMuscular once PRN  hydrALAZINE Injectable 5 milliGRAM(s) IV Push every 6 hours PRN  HYDROmorphone  Injectable 0.5 milliGRAM(s) IV Push every 6 hours PRN  influenza   Vaccine 0.5 milliLiter(s) IntraMuscular once  insulin lispro (HumaLOG) corrective regimen sliding scale   SubCutaneous Before meals and at bedtime  ketorolac   Injectable 15 milliGRAM(s) IV Push every 6 hours PRN  lactated ringers. 1000 milliLiter(s) IV Continuous <Continuous>  losartan 25 milliGRAM(s) Oral daily  methylPREDNISolone sodium succinate Injectable 40 milliGRAM(s) IV Push once  multivitamin 1 Tablet(s) Oral daily  nicotine - 21 mG/24Hr(s) Patch 1 patch Transdermal daily  nicotine - 21 mG/24Hr(s) Patch 1 patch Transdermal daily  pantoprazole    Tablet 40 milliGRAM(s) Oral before breakfast  thiamine 100 milliGRAM(s) Oral daily  vitamin B complex with vitamin C 1 Tablet(s) Oral daily      vent settings      Vital Signs Last 24 Hrs  T(C): 36.5 (2017 08:21), Max: 36.7 (31 Oct 2017 14:44)  T(F): 97.7 (2017 08:21), Max: 98 (31 Oct 2017 14:44)  HR: 107 (2017 08:30) (78 - 107)  BP: 140/83 (2017 08:30) (133/88 - 177/92)  BP(mean): --  RR: 18 (2017 08:21) (16 - 18)  SpO2: 100% (2017 08:21) (98% - 100%)              LABS:                        15.6   7.3   )-----------( 88       ( 2017 07:20 )             44.6         136  |  101  |  4<L>  ----------------------------<  101<H>  3.4<L>   |  27  |  0.61    Ca    9.5      2017 07:20  Phos  3.1       Mg     1.8         TPro  7.8  /  Alb  3.7  /  TBili  0.8  /  DBili  0.2  /  AST  29  /  ALT  40  /  AlkPhos  106            CAPILLARY BLOOD GLUCOSE      POCT Blood Glucose.: 112 mg/dL (2017 07:40)    PT/INR - ( 31 Oct 2017 08:00 )   PT: 10.5 sec;   INR: 0.96 ratio         PTT - ( 31 Oct 2017 08:00 )  PTT:28.4 sec  Urinalysis Basic - ( 30 Oct 2017 10:25 )    Color: Yellow / Appearance: Clear / S.015 / pH: x  Gluc: x / Ketone: Large  / Bili: Negative / Urobili: 1   Blood: x / Protein: 500 mg/dL / Nitrite: Negative   Leuk Esterase: Trace / RBC: 0-2 /HPF / WBC 0-2 /HPF   Sq Epi: x / Non Sq Epi: Occasional /HPF / Bacteria: x      CULTURES:        Physical Examination: { 8.30 am}    General: No acute distress.      HEENT: Pupils equal, reactive to light.  Symmetric.    PULM: Clear to auscultation bilaterally, no significant sputum production    CVS: Regular rate and rhythm, no murmurs, rubs, or gallops    ABD: Soft, nondistended, nontender, normoactive bowel sounds, no masses    NEURO: Tremulous but Alert, oriented to name and place . Not co-operating with any further exam     CRITICAL CARE TIME SPENT: 35 minutes

## 2017-11-01 NOTE — PROGRESS NOTE ADULT - SUBJECTIVE AND OBJECTIVE BOX
MEDICAL ATTENDING NOTE    Patient is a 37y old  Male who presents with a chief complaint of epigastric pain started 2 days ago (30 Oct 2017 12:48)      INTERVAL HPI/OVERNIGHT EVENTS: no new complaints    MEDICATIONS  (STANDING):  dextrose 5%. 1000 milliLiter(s) (50 mL/Hr) IV Continuous <Continuous>  enoxaparin Injectable 40 milliGRAM(s) SubCutaneous daily  fentaNYL   Infusion 1 MICROgram(s)/kG/Hr (7.48 mL/Hr) IV Continuous <Continuous>  influenza   Vaccine 0.5 milliLiter(s) IntraMuscular once  insulin lispro (HumaLOG) corrective regimen sliding scale   SubCutaneous Before meals and at bedtime  lactated ringers. 1000 milliLiter(s) (200 mL/Hr) IV Continuous <Continuous>  multivitamin 1 Tablet(s) Oral daily  nicotine - 21 mG/24Hr(s) Patch 1 patch Transdermal daily  nicotine - 21 mG/24Hr(s) Patch 1 patch Transdermal daily  pantoprazole  Injectable 40 milliGRAM(s) IV Push daily  piperacillin/tazobactam IVPB. 3.375 Gram(s) IV Intermittent every 8 hours  propofol Infusion 5 MICROgram(s)/kG/Min (2.244 mL/Hr) IV Continuous <Continuous>  QUEtiapine 25 milliGRAM(s) Oral every 12 hours  thiamine 100 milliGRAM(s) Oral daily  vitamin B complex with vitamin C 1 Tablet(s) Oral daily    MEDICATIONS  (PRN):      __________________________________________________  REVIEW OF SYSTEMS:    CONSTITUTIONAL: No fever,   RESPIRATORY: No cough; No shortness of breath  CARDIOVASCULAR: No chest pain, no palpitations  NEUROLOGICAL: No headache   MUSCULOSKELETAL: No joint pain, no muscle pain      ALL OTHER  ROS negative        Vital Signs Last 24 Hrs  T(C): 36.5 (01 Nov 2017 08:21), Max: 36.5 (31 Oct 2017 21:30)  T(F): 97.7 (01 Nov 2017 08:21), Max: 97.7 (31 Oct 2017 21:30)  HR: 57 (01 Nov 2017 17:00) (57 - 107)  BP: 111/60 (01 Nov 2017 17:00) (88/43 - 168/107)  BP(mean): 71 (01 Nov 2017 17:00) (51 - 71)  RR: 14 (01 Nov 2017 17:00) (14 - 18)  SpO2: 100% (01 Nov 2017 17:00) (100% - 100%)    ________________________________________________  PHYSICAL EXAM:  GENERAL: agitated; hyperactive  HEENT: Normocephalic;  conjunctivae and sclerae clear; moist mucous membranes;   NECK : supple  CHEST/LUNG: Clear to auscultation   HEART: S1 S2  +  ABDOMEN: Soft, Nontender, Nondistended; Bowel sounds present  EXTREMITIES: no cyanosis; no edema; no calf tenderness  SKIN: warm and dry  NERVOUS SYSTEM:  Awake and alert; Oriented  to place and person;     _________________________________________________  LABS:                        15.6   7.3   )-----------( 88       ( 01 Nov 2017 07:20 )             44.6     11-01    136  |  101  |  4<L>  ----------------------------<  101<H>  3.4<L>   |  27  |  0.61    Ca    9.5      01 Nov 2017 07:20  Phos  3.1     11-01  Mg     1.8     11-01    TPro  7.8  /  Alb  3.7  /  TBili  0.8  /  DBili  0.2  /  AST  29  /  ALT  40  /  AlkPhos  106  11-01    PT/INR - ( 31 Oct 2017 08:00 )   PT: 10.5 sec;   INR: 0.96 ratio         PTT - ( 31 Oct 2017 08:00 )  PTT:28.4 sec    CAPILLARY BLOOD GLUCOSE  126 (01 Nov 2017 16:00)  128 (01 Nov 2017 11:00)      POCT Blood Glucose.: 126 mg/dL (01 Nov 2017 16:19)  POCT Blood Glucose.: 128 mg/dL (01 Nov 2017 13:04)  POCT Blood Glucose.: 112 mg/dL (01 Nov 2017 07:40)  POCT Blood Glucose.: 134 mg/dL (31 Oct 2017 22:14)  POCT Blood Glucose.: 184 mg/dL (31 Oct 2017 19:24)        RADIOLOGY & ADDITIONAL TESTS:    Imaging Personally Reviewed:  YES    Consultant(s) Notes Reviewed:   YES    Care Discussed with Consultants :     Plan of care was discussed with patient ; all questions and concerns were addressed and care was aligned with patient's wishes. MEDICAL ATTENDING NOTE- LATE ENTRY NOTE    Patient is a 37y old  Male who presents with a chief complaint of epigastric pain started 2 days ago (30 Oct 2017 12:48)      INTERVAL HPI/OVERNIGHT EVENTS: was very agitated this morning. He left nursing unit and was found by security in the lobby. Nursing and medical team alerted and appropriate care and interventions offered. Patient noted to be very confused.   He was disoriented and very agitated. He was eventually agreeable to going back to his hospital unit .  He was treated with Ativan and haldol for agitated behavior with concern for injurious risk to self and others      MEDICATIONS  (STANDING):  dextrose 5%. 1000 milliLiter(s) (50 mL/Hr) IV Continuous <Continuous>  enoxaparin Injectable 40 milliGRAM(s) SubCutaneous daily  fentaNYL   Infusion 1 MICROgram(s)/kG/Hr (7.48 mL/Hr) IV Continuous <Continuous>  influenza   Vaccine 0.5 milliLiter(s) IntraMuscular once  insulin lispro (HumaLOG) corrective regimen sliding scale   SubCutaneous Before meals and at bedtime  lactated ringers. 1000 milliLiter(s) (200 mL/Hr) IV Continuous <Continuous>  multivitamin 1 Tablet(s) Oral daily  nicotine - 21 mG/24Hr(s) Patch 1 patch Transdermal daily  nicotine - 21 mG/24Hr(s) Patch 1 patch Transdermal daily  pantoprazole  Injectable 40 milliGRAM(s) IV Push daily  piperacillin/tazobactam IVPB. 3.375 Gram(s) IV Intermittent every 8 hours  propofol Infusion 5 MICROgram(s)/kG/Min (2.244 mL/Hr) IV Continuous <Continuous>  QUEtiapine 25 milliGRAM(s) Oral every 12 hours  thiamine 100 milliGRAM(s) Oral daily  vitamin B complex with vitamin C 1 Tablet(s) Oral daily    MEDICATIONS  (PRN):      __________________________________________________  REVIEW OF SYSTEMS:    CONSTITUTIONAL: No fever,   RESPIRATORY: No cough; No shortness of breath  CARDIOVASCULAR: No chest pain, no palpitations  NEUROLOGICAL: No headache   MUSCULOSKELETAL: No joint pain, no muscle pain  PSCYCH: very confused and mostly  uncooperative with staff          Vital Signs Last 24 Hrs  T(C): 36.5 (01 Nov 2017 08:21), Max: 36.5 (31 Oct 2017 21:30)  T(F): 97.7 (01 Nov 2017 08:21), Max: 97.7 (31 Oct 2017 21:30)  HR: 57 (01 Nov 2017 17:00) (57 - 107)  BP: 111/60 (01 Nov 2017 17:00) (88/43 - 168/107)  BP(mean): 71 (01 Nov 2017 17:00) (51 - 71)  RR: 14 (01 Nov 2017 17:00) (14 - 18)  SpO2: 100% (01 Nov 2017 17:00) (100% - 100%)    ________________________________________________  PHYSICAL EXAM:  GENERAL: agitated; hyperactive  HEENT: Normocephalic;  conjunctivae and sclerae clear; moist mucous membranes;   NECK : supple  CHEST/LUNG: Clear to auscultation   HEART: S1 S2  +  ABDOMEN: Soft, Nontender, Nondistended; Bowel sounds present  EXTREMITIES: no cyanosis; no edema; no calf tenderness  SKIN: warm and dry  NERVOUS SYSTEM:  Awake and alert; Oriented  to place and person;     _________________________________________________  LABS:                        15.6   7.3   )-----------( 88       ( 01 Nov 2017 07:20 )             44.6     11-01    136  |  101  |  4<L>  ----------------------------<  101<H>  3.4<L>   |  27  |  0.61    Ca    9.5      01 Nov 2017 07:20  Phos  3.1     11-01  Mg     1.8     11-01    TPro  7.8  /  Alb  3.7  /  TBili  0.8  /  DBili  0.2  /  AST  29  /  ALT  40  /  AlkPhos  106  11-01    PT/INR - ( 31 Oct 2017 08:00 )   PT: 10.5 sec;   INR: 0.96 ratio         PTT - ( 31 Oct 2017 08:00 )  PTT:28.4 sec    CAPILLARY BLOOD GLUCOSE  126 (01 Nov 2017 16:00)  128 (01 Nov 2017 11:00)      POCT Blood Glucose.: 126 mg/dL (01 Nov 2017 16:19)  POCT Blood Glucose.: 128 mg/dL (01 Nov 2017 13:04)  POCT Blood Glucose.: 112 mg/dL (01 Nov 2017 07:40)  POCT Blood Glucose.: 134 mg/dL (31 Oct 2017 22:14)  POCT Blood Glucose.: 184 mg/dL (31 Oct 2017 19:24)        RADIOLOGY & ADDITIONAL TESTS:    Imaging Personally Reviewed:  YES    Consultant(s) Notes Reviewed:   YES    Care Discussed with Consultants :     Plan of care was discussed with patient ; all questions and concerns were addressed and care was aligned with patient's wishes.

## 2017-11-01 NOTE — DIETITIAN INITIAL EVALUATION ADULT. - OTHER INFO
Pt known to RD via past ICU admission July 2017. Pt on propofol 17.8 ml/hr. CCM expects to continue, and may be increased. CCM requesting TF recommendations.

## 2017-11-02 LAB
ANION GAP SERPL CALC-SCNC: 6 MMOL/L — SIGNIFICANT CHANGE UP (ref 5–17)
ANION GAP SERPL CALC-SCNC: 7 MMOL/L — SIGNIFICANT CHANGE UP (ref 5–17)
BASE EXCESS BLDA CALC-SCNC: 3.1 MMOL/L — HIGH (ref -2–2)
BLOOD GAS COMMENTS ARTERIAL: SIGNIFICANT CHANGE UP
BUN SERPL-MCNC: 2 MG/DL — LOW (ref 7–18)
BUN SERPL-MCNC: 2 MG/DL — LOW (ref 7–18)
CALCIUM SERPL-MCNC: 8.5 MG/DL — SIGNIFICANT CHANGE UP (ref 8.4–10.5)
CALCIUM SERPL-MCNC: 8.6 MG/DL — SIGNIFICANT CHANGE UP (ref 8.4–10.5)
CHLORIDE SERPL-SCNC: 111 MMOL/L — HIGH (ref 96–108)
CHLORIDE SERPL-SCNC: 111 MMOL/L — HIGH (ref 96–108)
CO2 SERPL-SCNC: 26 MMOL/L — SIGNIFICANT CHANGE UP (ref 22–31)
CO2 SERPL-SCNC: 27 MMOL/L — SIGNIFICANT CHANGE UP (ref 22–31)
CREAT SERPL-MCNC: 0.41 MG/DL — LOW (ref 0.5–1.3)
CREAT SERPL-MCNC: 0.49 MG/DL — LOW (ref 0.5–1.3)
GLUCOSE BLDC GLUCOMTR-MCNC: 115 MG/DL — HIGH (ref 70–99)
GLUCOSE BLDC GLUCOMTR-MCNC: 70 MG/DL — SIGNIFICANT CHANGE UP (ref 70–99)
GLUCOSE BLDC GLUCOMTR-MCNC: 76 MG/DL — SIGNIFICANT CHANGE UP (ref 70–99)
GLUCOSE BLDC GLUCOMTR-MCNC: 80 MG/DL — SIGNIFICANT CHANGE UP (ref 70–99)
GLUCOSE SERPL-MCNC: 125 MG/DL — HIGH (ref 70–99)
GLUCOSE SERPL-MCNC: 76 MG/DL — SIGNIFICANT CHANGE UP (ref 70–99)
HCO3 BLDA-SCNC: 27 MMOL/L — SIGNIFICANT CHANGE UP (ref 23–27)
HCT VFR BLD CALC: 35.5 % — LOW (ref 39–50)
HGB BLD-MCNC: 12.3 G/DL — LOW (ref 13–17)
HOROWITZ INDEX BLDA+IHG-RTO: 40 — SIGNIFICANT CHANGE UP
MAGNESIUM SERPL-MCNC: 1.5 MG/DL — LOW (ref 1.6–2.6)
MAGNESIUM SERPL-MCNC: 1.9 MG/DL — SIGNIFICANT CHANGE UP (ref 1.6–2.6)
MCHC RBC-ENTMCNC: 33.8 PG — SIGNIFICANT CHANGE UP (ref 27–34)
MCHC RBC-ENTMCNC: 34.5 GM/DL — SIGNIFICANT CHANGE UP (ref 32–36)
MCV RBC AUTO: 97.9 FL — SIGNIFICANT CHANGE UP (ref 80–100)
PCO2 BLDA: 40 MMHG — SIGNIFICANT CHANGE UP (ref 32–46)
PH BLDA: 7.44 — SIGNIFICANT CHANGE UP (ref 7.35–7.45)
PHOSPHATE SERPL-MCNC: 2 MG/DL — LOW (ref 2.5–4.5)
PHOSPHATE SERPL-MCNC: 2.9 MG/DL — SIGNIFICANT CHANGE UP (ref 2.5–4.5)
PLATELET # BLD AUTO: 78 K/UL — LOW (ref 150–400)
PO2 BLDA: 121 MMHG — HIGH (ref 74–108)
POTASSIUM SERPL-MCNC: 3 MMOL/L — LOW (ref 3.5–5.3)
POTASSIUM SERPL-MCNC: 4.1 MMOL/L — SIGNIFICANT CHANGE UP (ref 3.5–5.3)
POTASSIUM SERPL-SCNC: 3 MMOL/L — LOW (ref 3.5–5.3)
POTASSIUM SERPL-SCNC: 4.1 MMOL/L — SIGNIFICANT CHANGE UP (ref 3.5–5.3)
RBC # BLD: 3.63 M/UL — LOW (ref 4.2–5.8)
RBC # FLD: 12.3 % — SIGNIFICANT CHANGE UP (ref 10.3–14.5)
SAO2 % BLDA: 98 % — HIGH (ref 92–96)
SODIUM SERPL-SCNC: 144 MMOL/L — SIGNIFICANT CHANGE UP (ref 135–145)
SODIUM SERPL-SCNC: 144 MMOL/L — SIGNIFICANT CHANGE UP (ref 135–145)
WBC # BLD: 3.8 K/UL — SIGNIFICANT CHANGE UP (ref 3.8–10.5)
WBC # FLD AUTO: 3.8 K/UL — SIGNIFICANT CHANGE UP (ref 3.8–10.5)

## 2017-11-02 PROCEDURE — 71010: CPT | Mod: 26

## 2017-11-02 RX ORDER — MAGNESIUM SULFATE 500 MG/ML
1 VIAL (ML) INJECTION
Qty: 0 | Refills: 0 | Status: COMPLETED | OUTPATIENT
Start: 2017-11-02 | End: 2017-11-02

## 2017-11-02 RX ORDER — PROPOFOL 10 MG/ML
5.01 INJECTION, EMULSION INTRAVENOUS
Qty: 1000 | Refills: 0 | Status: DISCONTINUED | OUTPATIENT
Start: 2017-11-02 | End: 2017-11-02

## 2017-11-02 RX ORDER — POTASSIUM CHLORIDE 20 MEQ
40 PACKET (EA) ORAL EVERY 4 HOURS
Qty: 0 | Refills: 0 | Status: COMPLETED | OUTPATIENT
Start: 2017-11-02 | End: 2017-11-02

## 2017-11-02 RX ORDER — POTASSIUM CHLORIDE 20 MEQ
40 PACKET (EA) ORAL EVERY 4 HOURS
Qty: 0 | Refills: 0 | Status: DISCONTINUED | OUTPATIENT
Start: 2017-11-02 | End: 2017-11-02

## 2017-11-02 RX ORDER — POTASSIUM CHLORIDE 20 MEQ
10 PACKET (EA) ORAL
Qty: 0 | Refills: 0 | Status: DISCONTINUED | OUTPATIENT
Start: 2017-11-02 | End: 2017-11-02

## 2017-11-02 RX ORDER — POTASSIUM CHLORIDE 20 MEQ
10 PACKET (EA) ORAL
Qty: 0 | Refills: 0 | Status: COMPLETED | OUTPATIENT
Start: 2017-11-02 | End: 2017-11-02

## 2017-11-02 RX ORDER — MIDAZOLAM HYDROCHLORIDE 1 MG/ML
0.1 INJECTION, SOLUTION INTRAMUSCULAR; INTRAVENOUS
Qty: 100 | Refills: 0 | Status: DISCONTINUED | OUTPATIENT
Start: 2017-11-02 | End: 2017-11-06

## 2017-11-02 RX ORDER — SODIUM CHLORIDE 9 MG/ML
1000 INJECTION, SOLUTION INTRAVENOUS
Qty: 0 | Refills: 0 | Status: DISCONTINUED | OUTPATIENT
Start: 2017-11-02 | End: 2017-11-06

## 2017-11-02 RX ORDER — MAGNESIUM SULFATE 500 MG/ML
2 VIAL (ML) INJECTION ONCE
Qty: 0 | Refills: 0 | Status: DISCONTINUED | OUTPATIENT
Start: 2017-11-02 | End: 2017-11-02

## 2017-11-02 RX ORDER — SODIUM CHLORIDE 9 MG/ML
1000 INJECTION, SOLUTION INTRAVENOUS
Qty: 0 | Refills: 0 | Status: DISCONTINUED | OUTPATIENT
Start: 2017-11-02 | End: 2017-11-03

## 2017-11-02 RX ADMIN — PANTOPRAZOLE SODIUM 40 MILLIGRAM(S): 20 TABLET, DELAYED RELEASE ORAL at 12:01

## 2017-11-02 RX ADMIN — ENOXAPARIN SODIUM 40 MILLIGRAM(S): 100 INJECTION SUBCUTANEOUS at 12:01

## 2017-11-02 RX ADMIN — SODIUM CHLORIDE 200 MILLILITER(S): 9 INJECTION, SOLUTION INTRAVENOUS at 06:08

## 2017-11-02 RX ADMIN — Medication 100 GRAM(S): at 05:42

## 2017-11-02 RX ADMIN — Medication 1 PATCH: at 11:57

## 2017-11-02 RX ADMIN — Medication 100 GRAM(S): at 08:17

## 2017-11-02 RX ADMIN — FENTANYL CITRATE 14.94 MICROGRAM(S)/KG/HR: 50 INJECTION INTRAVENOUS at 05:36

## 2017-11-02 RX ADMIN — Medication 100 MILLIEQUIVALENT(S): at 08:16

## 2017-11-02 RX ADMIN — FENTANYL CITRATE 14.94 MICROGRAM(S)/KG/HR: 50 INJECTION INTRAVENOUS at 21:52

## 2017-11-02 RX ADMIN — Medication 100 MILLIEQUIVALENT(S): at 05:35

## 2017-11-02 RX ADMIN — MIDAZOLAM HYDROCHLORIDE 0.1 MG/HR: 1 INJECTION, SOLUTION INTRAMUSCULAR; INTRAVENOUS at 12:01

## 2017-11-02 RX ADMIN — QUETIAPINE FUMARATE 25 MILLIGRAM(S): 200 TABLET, FILM COATED ORAL at 18:49

## 2017-11-02 RX ADMIN — Medication 1 TABLET(S): at 11:56

## 2017-11-02 RX ADMIN — SODIUM CHLORIDE 100 MILLILITER(S): 9 INJECTION, SOLUTION INTRAVENOUS at 13:43

## 2017-11-02 RX ADMIN — SODIUM CHLORIDE 200 MILLILITER(S): 9 INJECTION, SOLUTION INTRAVENOUS at 12:25

## 2017-11-02 RX ADMIN — Medication 40 MILLIEQUIVALENT(S): at 10:27

## 2017-11-02 RX ADMIN — Medication 100 GRAM(S): at 05:34

## 2017-11-02 RX ADMIN — PIPERACILLIN AND TAZOBACTAM 25 GRAM(S): 4; .5 INJECTION, POWDER, LYOPHILIZED, FOR SOLUTION INTRAVENOUS at 05:36

## 2017-11-02 RX ADMIN — SODIUM CHLORIDE 100 MILLILITER(S): 9 INJECTION, SOLUTION INTRAVENOUS at 21:53

## 2017-11-02 RX ADMIN — Medication 100 MILLIEQUIVALENT(S): at 05:42

## 2017-11-02 RX ADMIN — Medication 100 MILLIGRAM(S): at 11:56

## 2017-11-02 RX ADMIN — Medication 1 PATCH: at 12:02

## 2017-11-02 RX ADMIN — QUETIAPINE FUMARATE 25 MILLIGRAM(S): 200 TABLET, FILM COATED ORAL at 05:35

## 2017-11-02 RX ADMIN — Medication 40 MILLIEQUIVALENT(S): at 05:39

## 2017-11-02 NOTE — PROGRESS NOTE ADULT - PROBLEM SELECTOR PLAN 1
C/w banana bag  -patient currently sedated with propofol, versed for DT  -ventilator support as patient was intubated for airway protection  -ABG showing well oxygenated on current vent settings  -CXR shows no evidence of consolidation so Zosyn was DC as there is no evidence of aspiration pneumonia

## 2017-11-02 NOTE — PROGRESS NOTE ADULT - PROBLEM SELECTOR PLAN 2
Was tolerating food before intubation  -NGT placed  -will start NGT feeds Will continue with hydration  Since FS is decreasing, will give LR and D5W @ 100 cc/hr each making a total of 200 cc/hr fluids  -will plan to start tube feeds in AM tomorrow

## 2017-11-02 NOTE — PROGRESS NOTE ADULT - SUBJECTIVE AND OBJECTIVE BOX
INTERVAL HPI/OVERNIGHT EVENTS: patient has been sedated and calm overnight, patient had a little hypotension yesterday but was given fluids    PRESSORS: [] YES [x] NO  WHICH:    Antimicrobial:    Cardiovascular:    Pulmonary:    Hematalogic:  enoxaparin Injectable 40 milliGRAM(s) SubCutaneous daily    Other:  dextrose 5%. 1000 milliLiter(s) IV Continuous <Continuous>  fentaNYL   Infusion 2 MICROgram(s)/kG/Hr IV Continuous <Continuous>  influenza   Vaccine 0.5 milliLiter(s) IntraMuscular once  insulin lispro (HumaLOG) corrective regimen sliding scale   SubCutaneous Before meals and at bedtime  lactated ringers. 1000 milliLiter(s) IV Continuous <Continuous>  midazolam Infusion 0.1 mG/Hr IV Continuous <Continuous>  multivitamin 1 Tablet(s) Oral daily  nicotine - 21 mG/24Hr(s) Patch 1 patch Transdermal daily  pantoprazole  Injectable 40 milliGRAM(s) IV Push daily  propofol Infusion 5.007 MICROgram(s)/kG/Min IV Continuous <Continuous>  QUEtiapine 25 milliGRAM(s) Oral every 12 hours  thiamine 100 milliGRAM(s) Oral daily  vitamin B complex with vitamin C 1 Tablet(s) Oral daily    dextrose 5%. 1000 milliLiter(s) IV Continuous <Continuous>  enoxaparin Injectable 40 milliGRAM(s) SubCutaneous daily  fentaNYL   Infusion 2 MICROgram(s)/kG/Hr IV Continuous <Continuous>  influenza   Vaccine 0.5 milliLiter(s) IntraMuscular once  insulin lispro (HumaLOG) corrective regimen sliding scale   SubCutaneous Before meals and at bedtime  lactated ringers. 1000 milliLiter(s) IV Continuous <Continuous>  midazolam Infusion 0.1 mG/Hr IV Continuous <Continuous>  multivitamin 1 Tablet(s) Oral daily  nicotine - 21 mG/24Hr(s) Patch 1 patch Transdermal daily  pantoprazole  Injectable 40 milliGRAM(s) IV Push daily  propofol Infusion 5.007 MICROgram(s)/kG/Min IV Continuous <Continuous>  QUEtiapine 25 milliGRAM(s) Oral every 12 hours  thiamine 100 milliGRAM(s) Oral daily  vitamin B complex with vitamin C 1 Tablet(s) Oral daily    Drug Dosing Weight  Height (cm): 175.26 (01 Nov 2017 12:00)  Weight (kg): 74.7 (01 Nov 2017 12:00)  BMI (kg/m2): 24.3 (01 Nov 2017 12:00)  BSA (m2): 1.9 (01 Nov 2017 12:00)    CENTRAL LINE: [] YES [] NO  LOCATION:   DATE INSERTED:  REMOVE: [] YES [] NO  EXPLAIN:    RHODES: [] YES [] NO    DATE INSERTED:  REMOVE:  [] YES [] NO  EXPLAIN:    A-LINE:  [] YES [] NO  LOCATION:   DATE INSERTED:  REMOVE:  [] YES [] NO  EXPLAIN:    PMH -reviewed admission note, no change since admission  PAST MEDICAL & SURGICAL HISTORY:  Prediabetes  Alcohol induced acute pancreatitis  Pancreatitis  Alcoholism  Fatty liver  Gastroenteritis  No significant past surgical history      ICU Vital Signs Last 24 Hrs  T(C): 36.1 (02 Nov 2017 08:00), Max: 36.1 (02 Nov 2017 08:00)  T(F): 97 (02 Nov 2017 08:00), Max: 97 (02 Nov 2017 08:00)  HR: 58 (02 Nov 2017 12:00) (45 - 67)  BP: 94/48 (02 Nov 2017 12:00) (87/48 - 127/74)  BP(mean): 59 (02 Nov 2017 12:00) (51 - 85)  ABP: --  ABP(mean): --  RR: 14 (02 Nov 2017 12:00) (14 - 16)  SpO2: 100% (02 Nov 2017 12:00) (100% - 100%)      ABG - ( 02 Nov 2017 04:57 )  pH: 7.44  /  pCO2: 40    /  pO2: 121   / HCO3: 27    / Base Excess: 3.1   /  SaO2: 98                    11-01 @ 07:01  -  11-02 @ 07:00  --------------------------------------------------------  IN: 5253.5 mL / OUT: 4105 mL / NET: 1148.5 mL        Mode: AC/ CMV (Assist Control/ Continuous Mandatory Ventilation)  RR (machine): 14  TV (machine): 500  FiO2: 40  PEEP: 5  ITime: 1  MAP: 9  PIP: 18      PHYSICAL EXAM:    GENERAL: [x]NAD, []well-groomed, []well-developed  HEAD:  [x]Atraumatic, [x]Normocephalic  EYES: []EOMI, [x]PERRLA, [x]conjunctiva and sclera clear  ENMT: []No tonsillar erythema, exudates, or enlargement; [x]Moist mucous membranes, [x]Good dentition, []No lesions  NECK: [x]Supple, normal appearance, []No JVD; [ ]Normal thyroid; [x]Trachea midline  NERVOUS SYSTEM:  [x]Sedated, []Good concentration; []Motor Strength 5/5 B/L upper and lower extremities; []DTRs 2+ intact and symmetric  CHEST/LUNG: [x]No chest deformity; [x]Normal percussion bilaterally; [x]No rales, rhonchi, wheezing   HEART: [x]Regular rate and rhythm; [x]No murmurs, rubs, or gallops  ABDOMEN: [x]Soft, Nontender, Nondistended; [x]Bowel sounds present  EXTREMITIES:  [x]2+ Peripheral Pulses, [x]No clubbing, cyanosis, or edema  LYMPH: []No lymphadenopathy noted  SKIN: []No rashes or lesions; [x]Good capillary refill      LABS:  CBC Full  -  ( 02 Nov 2017 04:20 )  WBC Count : 3.8 K/uL  Hemoglobin : 12.3 g/dL  Hematocrit : 35.5 %  Platelet Count - Automated : 78 K/uL  Mean Cell Volume : 97.9 fl  Mean Cell Hemoglobin : 33.8 pg  Mean Cell Hemoglobin Concentration : 34.5 gm/dL  Auto Neutrophil # : x  Auto Lymphocyte # : x  Auto Monocyte # : x  Auto Eosinophil # : x  Auto Basophil # : x  Auto Neutrophil % : x  Auto Lymphocyte % : x  Auto Monocyte % : x  Auto Eosinophil % : x  Auto Basophil % : x    11-02    144  |  111<H>  |  2<L>  ----------------------------<  125<H>  3.0<L>   |  26  |  0.41<L>    Ca    8.5      02 Nov 2017 04:20  Phos  2.9     11-02  Mg     1.5     11-02    TPro  7.8  /  Alb  3.7  /  TBili  0.8  /  DBili  0.2  /  AST  29  /  ALT  40  /  AlkPhos  106  11-01            RADIOLOGY & ADDITIONAL STUDIES REVIEWED:  CXR shows atelectasis on right base, no consolidation seen    []GOALS OF CARE DISCUSSION WITH PATIENT/FAMILY/PROXY:    CRITICAL CARE TIME SPENT: 35 minutes

## 2017-11-02 NOTE — PROGRESS NOTE ADULT - ATTENDING COMMENTS
37 years old male from home lives with girl friend, with PMH of EtOH abuse, recurrent alcohol induced pancreatitis, Fatty Liver, EtOH withdrawal seizure(intubated 2 times), pre-DM (not on med), and gastroenteritis presented to ED c/o worsening epigastric pain .Admitted for Acute Pancreatitis.   Patient was seen and examined, reported that he feels well and wants to go home, however is tremulous and looks agitated , and  was trying to walk out . He given 2mg ativan IV push with no improvement of tremor, and was given additional 2mg ativan IV push with solumedrol and epi pen at bedside in case of allergic reaction previously documented. After 5 minutes and 10 minutes, no shortness of breath or flushing noted, removed ativan as allergy from patient's profile.  At time of my exam. Patient is resting comfortably in bed, vitals stable before and after ativan administration.Stable to be managed on medical floor at this time          Problem/Plan - 1:  ·  Problem: Alcohol withdrawal.  Plan: C/w banana bag  -patient currently sedated with propofol, versed for DT  -ventilator support as patient was intubated for airway protection  -ABG showing well oxygenated on current vent settings  -CXR shows no evidence of consolidation so Zosyn was DC as there is no evidence of aspiration pneumonia.      Problem/Plan - 2:  ·  Problem: Alcohol-induced acute pancreatitis, unspecified complication status.  Plan: Will continue with hydration  Since FS is decreasing, will give LR and D5W @ 100 cc/hr each making a total of 200 cc/hr fluids  -will plan to start tube feeds in AM tomorrow.     Problem/Plan - 3:  ·  Problem: Thrombocytopenia.  Plan: likely 2/2 to alcohol, no active bleed at this time.      Problem/Plan - 4:  ·  Problem: Need for prophylactic measure.  Plan: on lovenox and scd 2/2 to intubation  PPI 2/2 to intubation.

## 2017-11-03 LAB
ANION GAP SERPL CALC-SCNC: 7 MMOL/L — SIGNIFICANT CHANGE UP (ref 5–17)
BASE EXCESS BLDA CALC-SCNC: 4.2 MMOL/L — HIGH (ref -2–2)
BASOPHILS # BLD AUTO: 0.1 K/UL — SIGNIFICANT CHANGE UP (ref 0–0.2)
BASOPHILS NFR BLD AUTO: 0.9 % — SIGNIFICANT CHANGE UP (ref 0–2)
BLOOD GAS COMMENTS ARTERIAL: SIGNIFICANT CHANGE UP
BUN SERPL-MCNC: 3 MG/DL — LOW (ref 7–18)
CALCIUM SERPL-MCNC: 8.4 MG/DL — SIGNIFICANT CHANGE UP (ref 8.4–10.5)
CHLORIDE SERPL-SCNC: 105 MMOL/L — SIGNIFICANT CHANGE UP (ref 96–108)
CO2 SERPL-SCNC: 27 MMOL/L — SIGNIFICANT CHANGE UP (ref 22–31)
CREAT SERPL-MCNC: 0.53 MG/DL — SIGNIFICANT CHANGE UP (ref 0.5–1.3)
EOSINOPHIL # BLD AUTO: 0.2 K/UL — SIGNIFICANT CHANGE UP (ref 0–0.5)
EOSINOPHIL NFR BLD AUTO: 2.9 % — SIGNIFICANT CHANGE UP (ref 0–6)
GLUCOSE BLDC GLUCOMTR-MCNC: 78 MG/DL — SIGNIFICANT CHANGE UP (ref 70–99)
GLUCOSE BLDC GLUCOMTR-MCNC: 79 MG/DL — SIGNIFICANT CHANGE UP (ref 70–99)
GLUCOSE BLDC GLUCOMTR-MCNC: 87 MG/DL — SIGNIFICANT CHANGE UP (ref 70–99)
GLUCOSE SERPL-MCNC: 86 MG/DL — SIGNIFICANT CHANGE UP (ref 70–99)
HCO3 BLDA-SCNC: 28 MMOL/L — HIGH (ref 23–27)
HCT VFR BLD CALC: 38.8 % — LOW (ref 39–50)
HGB BLD-MCNC: 13.1 G/DL — SIGNIFICANT CHANGE UP (ref 13–17)
HOROWITZ INDEX BLDA+IHG-RTO: 21 — SIGNIFICANT CHANGE UP
LIDOCAIN IGE QN: 335 U/L — SIGNIFICANT CHANGE UP (ref 73–393)
LYMPHOCYTES # BLD AUTO: 1.6 K/UL — SIGNIFICANT CHANGE UP (ref 1–3.3)
LYMPHOCYTES # BLD AUTO: 28.9 % — SIGNIFICANT CHANGE UP (ref 13–44)
MAGNESIUM SERPL-MCNC: 1.6 MG/DL — SIGNIFICANT CHANGE UP (ref 1.6–2.6)
MCHC RBC-ENTMCNC: 33.6 PG — SIGNIFICANT CHANGE UP (ref 27–34)
MCHC RBC-ENTMCNC: 33.9 GM/DL — SIGNIFICANT CHANGE UP (ref 32–36)
MCV RBC AUTO: 99.1 FL — SIGNIFICANT CHANGE UP (ref 80–100)
MONOCYTES # BLD AUTO: 0.6 K/UL — SIGNIFICANT CHANGE UP (ref 0–0.9)
MONOCYTES NFR BLD AUTO: 10.8 % — SIGNIFICANT CHANGE UP (ref 2–14)
NEUTROPHILS # BLD AUTO: 3.2 K/UL — SIGNIFICANT CHANGE UP (ref 1.8–7.4)
NEUTROPHILS NFR BLD AUTO: 56.5 % — SIGNIFICANT CHANGE UP (ref 43–77)
PCO2 BLDA: 41 MMHG — SIGNIFICANT CHANGE UP (ref 32–46)
PH BLDA: 7.45 — SIGNIFICANT CHANGE UP (ref 7.35–7.45)
PHOSPHATE SERPL-MCNC: 2.4 MG/DL — LOW (ref 2.5–4.5)
PLATELET # BLD AUTO: 92 K/UL — LOW (ref 150–400)
PO2 BLDA: 116 MMHG — HIGH (ref 74–108)
POTASSIUM SERPL-MCNC: 3.7 MMOL/L — SIGNIFICANT CHANGE UP (ref 3.5–5.3)
POTASSIUM SERPL-SCNC: 3.7 MMOL/L — SIGNIFICANT CHANGE UP (ref 3.5–5.3)
RBC # BLD: 3.91 M/UL — LOW (ref 4.2–5.8)
RBC # FLD: 12.6 % — SIGNIFICANT CHANGE UP (ref 10.3–14.5)
SAO2 % BLDA: 98 % — HIGH (ref 92–96)
SODIUM SERPL-SCNC: 139 MMOL/L — SIGNIFICANT CHANGE UP (ref 135–145)
WBC # BLD: 5.6 K/UL — SIGNIFICANT CHANGE UP (ref 3.8–10.5)
WBC # FLD AUTO: 5.6 K/UL — SIGNIFICANT CHANGE UP (ref 3.8–10.5)

## 2017-11-03 PROCEDURE — 71010: CPT | Mod: 26

## 2017-11-03 RX ORDER — FENTANYL CITRATE 50 UG/ML
6 INJECTION INTRAVENOUS
Qty: 2500 | Refills: 0 | Status: DISCONTINUED | OUTPATIENT
Start: 2017-11-03 | End: 2017-11-06

## 2017-11-03 RX ORDER — FENTANYL CITRATE 50 UG/ML
4 INJECTION INTRAVENOUS
Qty: 2500 | Refills: 0 | Status: DISCONTINUED | OUTPATIENT
Start: 2017-11-03 | End: 2017-11-03

## 2017-11-03 RX ADMIN — PANTOPRAZOLE SODIUM 40 MILLIGRAM(S): 20 TABLET, DELAYED RELEASE ORAL at 12:22

## 2017-11-03 RX ADMIN — Medication 1 PATCH: at 12:45

## 2017-11-03 RX ADMIN — Medication 1 TABLET(S): at 12:22

## 2017-11-03 RX ADMIN — ENOXAPARIN SODIUM 40 MILLIGRAM(S): 100 INJECTION SUBCUTANEOUS at 12:23

## 2017-11-03 RX ADMIN — FENTANYL CITRATE 29.88 MICROGRAM(S)/KG/HR: 50 INJECTION INTRAVENOUS at 17:30

## 2017-11-03 RX ADMIN — Medication 100 MILLIGRAM(S): at 12:23

## 2017-11-03 RX ADMIN — QUETIAPINE FUMARATE 25 MILLIGRAM(S): 200 TABLET, FILM COATED ORAL at 05:18

## 2017-11-03 RX ADMIN — QUETIAPINE FUMARATE 25 MILLIGRAM(S): 200 TABLET, FILM COATED ORAL at 17:30

## 2017-11-03 RX ADMIN — FENTANYL CITRATE 44.82 MICROGRAM(S)/KG/HR: 50 INJECTION INTRAVENOUS at 23:54

## 2017-11-03 RX ADMIN — SODIUM CHLORIDE 100 MILLILITER(S): 9 INJECTION, SOLUTION INTRAVENOUS at 17:30

## 2017-11-03 RX ADMIN — SODIUM CHLORIDE 100 MILLILITER(S): 9 INJECTION, SOLUTION INTRAVENOUS at 05:19

## 2017-11-03 RX ADMIN — Medication 1 PATCH: at 12:22

## 2017-11-03 NOTE — PROGRESS NOTE ADULT - PROBLEM SELECTOR PLAN 1
C/w banana bag  -patient was on fentanyl and versed however was awake and at a RASS of 1 so sedation was increased  -ventilator support as patient was intubated for airway protection  -ABG showing well oxygenated on current vent settings  -CXR shows no evidence of consolidation so Zosyn was DC as there is no evidence of aspiration pneumonia    Will keep patient intubated and sedated until past critical period of DTs at which time will try SAT/SBT

## 2017-11-03 NOTE — PROGRESS NOTE ADULT - SUBJECTIVE AND OBJECTIVE BOX
INTERVAL HPI/OVERNIGHT EVENTS: patient was calm overnight. However, in AM, patient was alert and awake despite being on 2mcg/kg of Fentanyl and 4 mg/hr of Versed so sedation was increased until patient is passed the period of DTs    PRESSORS: [] YES [x] NO  WHICH:    ANTIBIOTICS:                  DATE STARTED:  ANTIBIOTICS:                  DATE STARTED:  ANTIBIOTICS:                  DATE STARTED:    Antimicrobial:    Cardiovascular:    Pulmonary:    Hematalogic:  enoxaparin Injectable 40 milliGRAM(s) SubCutaneous daily    Other:  dextrose 5%. 1000 milliLiter(s) IV Continuous <Continuous>  dextrose 5%. 1000 milliLiter(s) IV Continuous <Continuous>  fentaNYL   Infusion 4 MICROgram(s)/kG/Hr IV Continuous <Continuous>  influenza   Vaccine 0.5 milliLiter(s) IntraMuscular once  insulin lispro (HumaLOG) corrective regimen sliding scale   SubCutaneous Before meals and at bedtime  lactated ringers. 1000 milliLiter(s) IV Continuous <Continuous>  midazolam Infusion 0.1 mG/Hr IV Continuous <Continuous>  multivitamin 1 Tablet(s) Oral daily  nicotine - 21 mG/24Hr(s) Patch 1 patch Transdermal daily  pantoprazole  Injectable 40 milliGRAM(s) IV Push daily  QUEtiapine 25 milliGRAM(s) Oral every 12 hours  thiamine 100 milliGRAM(s) Oral daily  vitamin B complex with vitamin C 1 Tablet(s) Oral daily    dextrose 5%. 1000 milliLiter(s) IV Continuous <Continuous>  dextrose 5%. 1000 milliLiter(s) IV Continuous <Continuous>  enoxaparin Injectable 40 milliGRAM(s) SubCutaneous daily  fentaNYL   Infusion 4 MICROgram(s)/kG/Hr IV Continuous <Continuous>  influenza   Vaccine 0.5 milliLiter(s) IntraMuscular once  insulin lispro (HumaLOG) corrective regimen sliding scale   SubCutaneous Before meals and at bedtime  lactated ringers. 1000 milliLiter(s) IV Continuous <Continuous>  midazolam Infusion 0.1 mG/Hr IV Continuous <Continuous>  multivitamin 1 Tablet(s) Oral daily  nicotine - 21 mG/24Hr(s) Patch 1 patch Transdermal daily  pantoprazole  Injectable 40 milliGRAM(s) IV Push daily  QUEtiapine 25 milliGRAM(s) Oral every 12 hours  thiamine 100 milliGRAM(s) Oral daily  vitamin B complex with vitamin C 1 Tablet(s) Oral daily    Drug Dosing Weight  Height (cm): 175.26 (01 Nov 2017 12:00)  Weight (kg): 74.7 (01 Nov 2017 12:00)  BMI (kg/m2): 24.3 (01 Nov 2017 12:00)  BSA (m2): 1.9 (01 Nov 2017 12:00)    CENTRAL LINE: [] YES [x] NO  LOCATION:   DATE INSERTED:  REMOVE: [] YES [] NO  EXPLAIN:    RHODES: [] YES [x] NO    DATE INSERTED:  REMOVE:  [] YES [] NO  EXPLAIN:    A-LINE:  [] YES [x] NO  LOCATION:   DATE INSERTED:  REMOVE:  [] YES [] NO  EXPLAIN:    PMH -reviewed admission note, no change since admission  PAST MEDICAL & SURGICAL HISTORY:  Prediabetes  Alcohol induced acute pancreatitis  Pancreatitis  Alcoholism  Fatty liver  Gastroenteritis  No significant past surgical history      ICU Vital Signs Last 24 Hrs  T(C): 37.7 (03 Nov 2017 04:00), Max: 37.7 (03 Nov 2017 04:00)  T(F): 99.9 (03 Nov 2017 04:00), Max: 99.9 (03 Nov 2017 04:00)  HR: 74 (03 Nov 2017 07:00) (54 - 81)  BP: 122/73 (03 Nov 2017 07:00) (87/48 - 127/80)  BP(mean): 85 (03 Nov 2017 07:00) (56 - 91)  ABP: --  ABP(mean): --  RR: 16 (03 Nov 2017 07:00) (14 - 16)  SpO2: 100% (03 Nov 2017 07:00) (100% - 100%)      ABG - ( 03 Nov 2017 04:57 )  pH: 7.45  /  pCO2: 41    /  pO2: 116   / HCO3: 28    / Base Excess: 4.2   /  SaO2: 98                    11-02 @ 07:01  -  11-03 @ 07:00  --------------------------------------------------------  IN: 5754.5 mL / OUT: 2885 mL / NET: 2869.5 mL        Mode: AC/ CMV (Assist Control/ Continuous Mandatory Ventilation)  RR (machine): 14  TV (machine): 500  FiO2: 40  PEEP: 5  ITime: 1  MAP: 10  PIP: 20      PHYSICAL EXAM:    GENERAL: [x]NAD, [x]well-groomed, [x]well-developed  HEAD:  [x]Atraumatic, [x]Normocephalic  EYES: []EOMI, [x]PERRLA, [x]conjunctiva and sclera clear  ENMT: []No tonsillar erythema, exudates, or enlargement; [x]Moist mucous membranes, [x]Good dentition, []No lesions  NECK: []Supple, normal appearance, []No JVD; [ ]Normal thyroid; []Trachea midline  NERVOUS SYSTEM:  [x]awake and answer questions with yes/no, []Good concentration; []Motor Strength 5/5 B/L upper and lower extremities; []DTRs 2+ intact and symmetric  CHEST/LUNG: [x]No chest deformity; [x]Normal percussion bilaterally; [x]No rales, rhonchi, wheezing   HEART: [x]Regular rate and rhythm; [x]No murmurs, rubs, or gallops  ABDOMEN: [x]Soft, Nontender, Nondistended; [x]Bowel sounds present  EXTREMITIES:  [x]2+ Peripheral Pulses, [x]No clubbing, cyanosis, or edema  LYMPH: [x]No lymphadenopathy noted  SKIN: []No rashes or lesions; [x]Good capillary refill      LABS:  CBC Full  -  ( 03 Nov 2017 05:33 )  WBC Count : 5.6 K/uL  Hemoglobin : 13.1 g/dL  Hematocrit : 38.8 %  Platelet Count - Automated : x  Mean Cell Volume : 99.1 fl  Mean Cell Hemoglobin : 33.6 pg  Mean Cell Hemoglobin Concentration : 33.9 gm/dL  Auto Neutrophil # : 3.2 K/uL  Auto Lymphocyte # : 1.6 K/uL  Auto Monocyte # : 0.6 K/uL  Auto Eosinophil # : 0.2 K/uL  Auto Basophil # : 0.1 K/uL  Auto Neutrophil % : 56.5 %  Auto Lymphocyte % : 28.9 %  Auto Monocyte % : 10.8 %  Auto Eosinophil % : 2.9 %  Auto Basophil % : 0.9 %    11-03    139  |  105  |  3<L>  ----------------------------<  86  3.7   |  27  |  0.53    Ca    8.4      03 Nov 2017 05:33  Phos  2.4     11-03  Mg     1.6     11-03              RADIOLOGY & ADDITIONAL STUDIES REVIEWED:  cxr still stable    [x]GOALS OF CARE DISCUSSION WITH PATIENT/FAMILY/PROXY: full code    CRITICAL CARE TIME SPENT: 35 minutes INTERVAL HPI/OVERNIGHT EVENTS: patient was calm overnight. However, in AM, patient was alert and awake despite being on 2mcg/kg of Fentanyl and 4 mg/hr of Versed so sedation was increased until patient is passed the period of DTs    PRESSORS: [] YES [x] NO  WHICH:  Antimicrobial:    Cardiovascular:    Pulmonary:    Hematalogic:  enoxaparin Injectable 40 milliGRAM(s) SubCutaneous daily    Other:  dextrose 5%. 1000 milliLiter(s) IV Continuous <Continuous>  dextrose 5%. 1000 milliLiter(s) IV Continuous <Continuous>  fentaNYL   Infusion 4 MICROgram(s)/kG/Hr IV Continuous <Continuous>  influenza   Vaccine 0.5 milliLiter(s) IntraMuscular once  insulin lispro (HumaLOG) corrective regimen sliding scale   SubCutaneous Before meals and at bedtime  lactated ringers. 1000 milliLiter(s) IV Continuous <Continuous>  midazolam Infusion 0.1 mG/Hr IV Continuous <Continuous>  multivitamin 1 Tablet(s) Oral daily  nicotine - 21 mG/24Hr(s) Patch 1 patch Transdermal daily  pantoprazole  Injectable 40 milliGRAM(s) IV Push daily  QUEtiapine 25 milliGRAM(s) Oral every 12 hours  thiamine 100 milliGRAM(s) Oral daily  vitamin B complex with vitamin C 1 Tablet(s) Oral daily    dextrose 5%. 1000 milliLiter(s) IV Continuous <Continuous>  dextrose 5%. 1000 milliLiter(s) IV Continuous <Continuous>  enoxaparin Injectable 40 milliGRAM(s) SubCutaneous daily  fentaNYL   Infusion 4 MICROgram(s)/kG/Hr IV Continuous <Continuous>  influenza   Vaccine 0.5 milliLiter(s) IntraMuscular once  insulin lispro (HumaLOG) corrective regimen sliding scale   SubCutaneous Before meals and at bedtime  lactated ringers. 1000 milliLiter(s) IV Continuous <Continuous>  midazolam Infusion 0.1 mG/Hr IV Continuous <Continuous>  multivitamin 1 Tablet(s) Oral daily  nicotine - 21 mG/24Hr(s) Patch 1 patch Transdermal daily  pantoprazole  Injectable 40 milliGRAM(s) IV Push daily  QUEtiapine 25 milliGRAM(s) Oral every 12 hours  thiamine 100 milliGRAM(s) Oral daily  vitamin B complex with vitamin C 1 Tablet(s) Oral daily    Drug Dosing Weight  Height (cm): 175.26 (01 Nov 2017 12:00)  Weight (kg): 74.7 (01 Nov 2017 12:00)  BMI (kg/m2): 24.3 (01 Nov 2017 12:00)  BSA (m2): 1.9 (01 Nov 2017 12:00)    CENTRAL LINE: [] YES [x] NO  LOCATION:   DATE INSERTED:  REMOVE: [] YES [] NO  EXPLAIN:    RHODES: [x] YES [] NO    DATE INSERTED: 10/31  REMOVE:  [] YES [x] NO  EXPLAIN: intubated, critically ill    A-LINE:  [] YES [x] NO  LOCATION:   DATE INSERTED:  REMOVE:  [] YES [] NO  EXPLAIN:    PMH -reviewed admission note, no change since admission  PAST MEDICAL & SURGICAL HISTORY:  Prediabetes  Alcohol induced acute pancreatitis  Pancreatitis  Alcoholism  Fatty liver  Gastroenteritis  No significant past surgical history      ICU Vital Signs Last 24 Hrs  T(C): 37.7 (03 Nov 2017 04:00), Max: 37.7 (03 Nov 2017 04:00)  T(F): 99.9 (03 Nov 2017 04:00), Max: 99.9 (03 Nov 2017 04:00)  HR: 74 (03 Nov 2017 07:00) (54 - 81)  BP: 122/73 (03 Nov 2017 07:00) (87/48 - 127/80)  BP(mean): 85 (03 Nov 2017 07:00) (56 - 91)  ABP: --  ABP(mean): --  RR: 16 (03 Nov 2017 07:00) (14 - 16)  SpO2: 100% (03 Nov 2017 07:00) (100% - 100%)      ABG - ( 03 Nov 2017 04:57 )  pH: 7.45  /  pCO2: 41    /  pO2: 116   / HCO3: 28    / Base Excess: 4.2   /  SaO2: 98                    11-02 @ 07:01  -  11-03 @ 07:00  --------------------------------------------------------  IN: 5754.5 mL / OUT: 2885 mL / NET: 2869.5 mL        Mode: AC/ CMV (Assist Control/ Continuous Mandatory Ventilation)  RR (machine): 14  TV (machine): 500  FiO2: 40  PEEP: 5  ITime: 1  MAP: 10  PIP: 20      PHYSICAL EXAM:    GENERAL: [x]NAD, [x]well-groomed, [x]well-developed  HEAD:  [x]Atraumatic, [x]Normocephalic  EYES: []EOMI, [x]PERRLA, [x]conjunctiva and sclera clear  ENMT: []No tonsillar erythema, exudates, or enlargement; [x]Moist mucous membranes, [x]Good dentition, []No lesions  NECK: []Supple, normal appearance, []No JVD; [x]Normal thyroid; [x]Trachea midline  NERVOUS SYSTEM:  [x]awake and answer questions with yes/no, []Good concentration; []Motor Strength 5/5 B/L upper and lower extremities; []DTRs 2+ intact and symmetric  CHEST/LUNG: [x]No chest deformity; [x]Normal percussion bilaterally; [x]No rales, rhonchi, wheezing   HEART: [x]Regular rate and rhythm; [x]No murmurs, rubs, or gallops  ABDOMEN: [x]Soft, Nontender, Nondistended; [x]Bowel sounds present  EXTREMITIES:  [x]2+ Peripheral Pulses, [x]No clubbing, cyanosis, or edema  LYMPH: [x]No lymphadenopathy noted  SKIN: []No rashes or lesions; [x]Good capillary refill      LABS:  CBC Full  -  ( 03 Nov 2017 05:33 )  WBC Count : 5.6 K/uL  Hemoglobin : 13.1 g/dL  Hematocrit : 38.8 %  Platelet Count - Automated : x  Mean Cell Volume : 99.1 fl  Mean Cell Hemoglobin : 33.6 pg  Mean Cell Hemoglobin Concentration : 33.9 gm/dL  Auto Neutrophil # : 3.2 K/uL  Auto Lymphocyte # : 1.6 K/uL  Auto Monocyte # : 0.6 K/uL  Auto Eosinophil # : 0.2 K/uL  Auto Basophil # : 0.1 K/uL  Auto Neutrophil % : 56.5 %  Auto Lymphocyte % : 28.9 %  Auto Monocyte % : 10.8 %  Auto Eosinophil % : 2.9 %  Auto Basophil % : 0.9 %    11-03    139  |  105  |  3<L>  ----------------------------<  86  3.7   |  27  |  0.53    Ca    8.4      03 Nov 2017 05:33  Phos  2.4     11-03  Mg     1.6     11-03              RADIOLOGY & ADDITIONAL STUDIES REVIEWED:  cxr still stable    [x]GOALS OF CARE DISCUSSION WITH PATIENT/FAMILY/PROXY: full code    CRITICAL CARE TIME SPENT: 35 minutes

## 2017-11-03 NOTE — PROGRESS NOTE ADULT - PROBLEM SELECTOR PLAN 2
Will continue with hydration  Since FS is decreasing, will give LR and D5W @ 100 cc/hr each making a total of 200 cc/hr fluids  -will plan to start tube feeds in AM tomorrow

## 2017-11-04 LAB
ANION GAP SERPL CALC-SCNC: 7 MMOL/L — SIGNIFICANT CHANGE UP (ref 5–17)
ANION GAP SERPL CALC-SCNC: 7 MMOL/L — SIGNIFICANT CHANGE UP (ref 5–17)
APPEARANCE UR: CLEAR — SIGNIFICANT CHANGE UP
BASE EXCESS BLDA CALC-SCNC: 1.5 MMOL/L — SIGNIFICANT CHANGE UP (ref -2–2)
BILIRUB UR-MCNC: NEGATIVE — SIGNIFICANT CHANGE UP
BLOOD GAS COMMENTS ARTERIAL: SIGNIFICANT CHANGE UP
BUN SERPL-MCNC: 6 MG/DL — LOW (ref 7–18)
BUN SERPL-MCNC: 7 MG/DL — SIGNIFICANT CHANGE UP (ref 7–18)
CALCIUM SERPL-MCNC: 8.4 MG/DL — SIGNIFICANT CHANGE UP (ref 8.4–10.5)
CALCIUM SERPL-MCNC: 8.9 MG/DL — SIGNIFICANT CHANGE UP (ref 8.4–10.5)
CHLORIDE SERPL-SCNC: 102 MMOL/L — SIGNIFICANT CHANGE UP (ref 96–108)
CHLORIDE SERPL-SCNC: 107 MMOL/L — SIGNIFICANT CHANGE UP (ref 96–108)
CO2 SERPL-SCNC: 27 MMOL/L — SIGNIFICANT CHANGE UP (ref 22–31)
CO2 SERPL-SCNC: 29 MMOL/L — SIGNIFICANT CHANGE UP (ref 22–31)
COLOR SPEC: YELLOW — SIGNIFICANT CHANGE UP
CREAT SERPL-MCNC: 0.55 MG/DL — SIGNIFICANT CHANGE UP (ref 0.5–1.3)
CREAT SERPL-MCNC: 0.79 MG/DL — SIGNIFICANT CHANGE UP (ref 0.5–1.3)
DIFF PNL FLD: ABNORMAL
GLUCOSE BLDC GLUCOMTR-MCNC: 152 MG/DL — HIGH (ref 70–99)
GLUCOSE BLDC GLUCOMTR-MCNC: 86 MG/DL — SIGNIFICANT CHANGE UP (ref 70–99)
GLUCOSE BLDC GLUCOMTR-MCNC: 92 MG/DL — SIGNIFICANT CHANGE UP (ref 70–99)
GLUCOSE SERPL-MCNC: 80 MG/DL — SIGNIFICANT CHANGE UP (ref 70–99)
GLUCOSE SERPL-MCNC: 86 MG/DL — SIGNIFICANT CHANGE UP (ref 70–99)
GLUCOSE UR QL: NEGATIVE — SIGNIFICANT CHANGE UP
HCO3 BLDA-SCNC: 28 MMOL/L — HIGH (ref 23–27)
HCT VFR BLD CALC: 27.5 % — LOW (ref 39–50)
HCT VFR BLD CALC: 36.6 % — LOW (ref 39–50)
HGB BLD-MCNC: 12.6 G/DL — LOW (ref 13–17)
HGB BLD-MCNC: 9.1 G/DL — LOW (ref 13–17)
HOROWITZ INDEX BLDA+IHG-RTO: 40 — SIGNIFICANT CHANGE UP
KETONES UR-MCNC: NEGATIVE — SIGNIFICANT CHANGE UP
LEUKOCYTE ESTERASE UR-ACNC: NEGATIVE — SIGNIFICANT CHANGE UP
MAGNESIUM SERPL-MCNC: 1.5 MG/DL — LOW (ref 1.6–2.6)
MCHC RBC-ENTMCNC: 33.1 GM/DL — SIGNIFICANT CHANGE UP (ref 32–36)
MCHC RBC-ENTMCNC: 33.9 PG — SIGNIFICANT CHANGE UP (ref 27–34)
MCHC RBC-ENTMCNC: 34.3 GM/DL — SIGNIFICANT CHANGE UP (ref 32–36)
MCHC RBC-ENTMCNC: 34.5 PG — HIGH (ref 27–34)
MCV RBC AUTO: 100.6 FL — HIGH (ref 80–100)
MCV RBC AUTO: 102.6 FL — HIGH (ref 80–100)
NITRITE UR-MCNC: NEGATIVE — SIGNIFICANT CHANGE UP
PCO2 BLDA: 54 MMHG — HIGH (ref 32–46)
PH BLDA: 7.34 — LOW (ref 7.35–7.45)
PH UR: 8 — SIGNIFICANT CHANGE UP (ref 5–8)
PHOSPHATE SERPL-MCNC: 3.9 MG/DL — SIGNIFICANT CHANGE UP (ref 2.5–4.5)
PLATELET # BLD AUTO: 107 K/UL — LOW (ref 150–400)
PLATELET # BLD AUTO: ABNORMAL (ref 150–400)
PO2 BLDA: 65 MMHG — LOW (ref 74–108)
POTASSIUM SERPL-MCNC: 3.4 MMOL/L — LOW (ref 3.5–5.3)
POTASSIUM SERPL-MCNC: 3.5 MMOL/L — SIGNIFICANT CHANGE UP (ref 3.5–5.3)
POTASSIUM SERPL-SCNC: 3.4 MMOL/L — LOW (ref 3.5–5.3)
POTASSIUM SERPL-SCNC: 3.5 MMOL/L — SIGNIFICANT CHANGE UP (ref 3.5–5.3)
PROT UR-MCNC: NEGATIVE — SIGNIFICANT CHANGE UP
RBC # BLD: 2.68 M/UL — LOW (ref 4.2–5.8)
RBC # BLD: 3.64 M/UL — LOW (ref 4.2–5.8)
RBC # FLD: 12 % — SIGNIFICANT CHANGE UP (ref 10.3–14.5)
RBC # FLD: 12.3 % — SIGNIFICANT CHANGE UP (ref 10.3–14.5)
SAO2 % BLDA: 90 % — LOW (ref 92–96)
SODIUM SERPL-SCNC: 138 MMOL/L — SIGNIFICANT CHANGE UP (ref 135–145)
SODIUM SERPL-SCNC: 141 MMOL/L — SIGNIFICANT CHANGE UP (ref 135–145)
SP GR SPEC: 1.01 — SIGNIFICANT CHANGE UP (ref 1.01–1.02)
UROBILINOGEN FLD QL: 1
WBC # BLD: 3.1 K/UL — LOW (ref 3.8–10.5)
WBC # BLD: 4.5 K/UL — SIGNIFICANT CHANGE UP (ref 3.8–10.5)
WBC # FLD AUTO: 3.1 K/UL — LOW (ref 3.8–10.5)
WBC # FLD AUTO: 4.5 K/UL — SIGNIFICANT CHANGE UP (ref 3.8–10.5)

## 2017-11-04 PROCEDURE — 71010: CPT | Mod: 26,76

## 2017-11-04 RX ORDER — VANCOMYCIN HCL 1 G
1000 VIAL (EA) INTRAVENOUS EVERY 12 HOURS
Qty: 0 | Refills: 0 | Status: DISCONTINUED | OUTPATIENT
Start: 2017-11-04 | End: 2017-11-07

## 2017-11-04 RX ORDER — POTASSIUM CHLORIDE 20 MEQ
20 PACKET (EA) ORAL
Qty: 0 | Refills: 0 | Status: DISCONTINUED | OUTPATIENT
Start: 2017-11-04 | End: 2017-11-04

## 2017-11-04 RX ORDER — POLYETHYLENE GLYCOL 3350 17 G/17G
17 POWDER, FOR SOLUTION ORAL ONCE
Qty: 0 | Refills: 0 | Status: COMPLETED | OUTPATIENT
Start: 2017-11-04 | End: 2017-11-04

## 2017-11-04 RX ORDER — PIPERACILLIN AND TAZOBACTAM 4; .5 G/20ML; G/20ML
3.38 INJECTION, POWDER, LYOPHILIZED, FOR SOLUTION INTRAVENOUS EVERY 8 HOURS
Qty: 0 | Refills: 0 | Status: DISCONTINUED | OUTPATIENT
Start: 2017-11-04 | End: 2017-11-14

## 2017-11-04 RX ORDER — DEXMEDETOMIDINE HYDROCHLORIDE IN 0.9% SODIUM CHLORIDE 4 UG/ML
0.1 INJECTION INTRAVENOUS
Qty: 200 | Refills: 0 | Status: DISCONTINUED | OUTPATIENT
Start: 2017-11-04 | End: 2017-11-04

## 2017-11-04 RX ORDER — POTASSIUM CHLORIDE 20 MEQ
20 PACKET (EA) ORAL
Qty: 0 | Refills: 0 | Status: COMPLETED | OUTPATIENT
Start: 2017-11-04 | End: 2017-11-04

## 2017-11-04 RX ORDER — DEXMEDETOMIDINE HYDROCHLORIDE IN 0.9% SODIUM CHLORIDE 4 UG/ML
0.2 INJECTION INTRAVENOUS
Qty: 200 | Refills: 0 | Status: DISCONTINUED | OUTPATIENT
Start: 2017-11-04 | End: 2017-11-05

## 2017-11-04 RX ORDER — PIPERACILLIN AND TAZOBACTAM 4; .5 G/20ML; G/20ML
3.38 INJECTION, POWDER, LYOPHILIZED, FOR SOLUTION INTRAVENOUS ONCE
Qty: 0 | Refills: 0 | Status: COMPLETED | OUTPATIENT
Start: 2017-11-04 | End: 2017-11-04

## 2017-11-04 RX ORDER — VANCOMYCIN HCL 1 G
1000 VIAL (EA) INTRAVENOUS ONCE
Qty: 0 | Refills: 0 | Status: COMPLETED | OUTPATIENT
Start: 2017-11-04 | End: 2017-11-04

## 2017-11-04 RX ORDER — MAGNESIUM SULFATE 500 MG/ML
1 VIAL (ML) INJECTION
Qty: 0 | Refills: 0 | Status: COMPLETED | OUTPATIENT
Start: 2017-11-04 | End: 2017-11-04

## 2017-11-04 RX ORDER — ACETAMINOPHEN 500 MG
650 TABLET ORAL ONCE
Qty: 0 | Refills: 0 | Status: COMPLETED | OUTPATIENT
Start: 2017-11-04 | End: 2017-11-04

## 2017-11-04 RX ADMIN — FENTANYL CITRATE 44.82 MICROGRAM(S)/KG/HR: 50 INJECTION INTRAVENOUS at 21:39

## 2017-11-04 RX ADMIN — Medication 1 TABLET(S): at 12:10

## 2017-11-04 RX ADMIN — PIPERACILLIN AND TAZOBACTAM 200 GRAM(S): 4; .5 INJECTION, POWDER, LYOPHILIZED, FOR SOLUTION INTRAVENOUS at 17:05

## 2017-11-04 RX ADMIN — PANTOPRAZOLE SODIUM 40 MILLIGRAM(S): 20 TABLET, DELAYED RELEASE ORAL at 12:10

## 2017-11-04 RX ADMIN — SODIUM CHLORIDE 100 MILLILITER(S): 9 INJECTION, SOLUTION INTRAVENOUS at 05:10

## 2017-11-04 RX ADMIN — Medication 100 GRAM(S): at 19:28

## 2017-11-04 RX ADMIN — Medication 250 MILLIGRAM(S): at 17:05

## 2017-11-04 RX ADMIN — SODIUM CHLORIDE 100 MILLILITER(S): 9 INJECTION, SOLUTION INTRAVENOUS at 21:43

## 2017-11-04 RX ADMIN — Medication 650 MILLIGRAM(S): at 16:55

## 2017-11-04 RX ADMIN — Medication 100 MILLIGRAM(S): at 12:10

## 2017-11-04 RX ADMIN — PIPERACILLIN AND TAZOBACTAM 25 GRAM(S): 4; .5 INJECTION, POWDER, LYOPHILIZED, FOR SOLUTION INTRAVENOUS at 21:39

## 2017-11-04 RX ADMIN — Medication 2 MILLIGRAM(S): at 14:00

## 2017-11-04 RX ADMIN — QUETIAPINE FUMARATE 25 MILLIGRAM(S): 200 TABLET, FILM COATED ORAL at 17:19

## 2017-11-04 RX ADMIN — Medication 1: at 21:53

## 2017-11-04 RX ADMIN — MIDAZOLAM HYDROCHLORIDE 0.1 MG/HR: 1 INJECTION, SOLUTION INTRAMUSCULAR; INTRAVENOUS at 05:11

## 2017-11-04 RX ADMIN — Medication 1 PATCH: at 13:30

## 2017-11-04 RX ADMIN — Medication 100 GRAM(S): at 17:05

## 2017-11-04 RX ADMIN — FENTANYL CITRATE 44.82 MICROGRAM(S)/KG/HR: 50 INJECTION INTRAVENOUS at 05:08

## 2017-11-04 RX ADMIN — ENOXAPARIN SODIUM 40 MILLIGRAM(S): 100 INJECTION SUBCUTANEOUS at 12:10

## 2017-11-04 RX ADMIN — Medication 2 MILLIGRAM(S): at 12:10

## 2017-11-04 RX ADMIN — DEXMEDETOMIDINE HYDROCHLORIDE IN 0.9% SODIUM CHLORIDE 3.73 MICROGRAM(S)/KG/HR: 4 INJECTION INTRAVENOUS at 17:01

## 2017-11-04 RX ADMIN — Medication 20 MILLIEQUIVALENT(S): at 09:22

## 2017-11-04 RX ADMIN — Medication 100 GRAM(S): at 17:44

## 2017-11-04 RX ADMIN — FENTANYL CITRATE 44.82 MICROGRAM(S)/KG/HR: 50 INJECTION INTRAVENOUS at 10:51

## 2017-11-04 RX ADMIN — Medication 20 MILLIEQUIVALENT(S): at 10:49

## 2017-11-04 RX ADMIN — Medication 20 MILLIEQUIVALENT(S): at 12:11

## 2017-11-04 RX ADMIN — QUETIAPINE FUMARATE 25 MILLIGRAM(S): 200 TABLET, FILM COATED ORAL at 05:09

## 2017-11-04 RX ADMIN — Medication 1 PATCH: at 12:10

## 2017-11-04 NOTE — PROGRESS NOTE ADULT - ASSESSMENT
37 years old male from home lives with girl friend, with PMH of EtOH abuse, recurrent alcohol induced pancreatitis, Fatty Liver, EtOH withdrawal seizure(intubated 2 times), pre-DM (not on med), and gastroenteritis presented to ED c/o worsening epigastric pain .Admitted for Acute Pancreatitis.   Patient was seen and examined, reported that he feels well and wants to go home, however is tremulous and looks agitated , and  was trying to walk out . He given 2mg ativan IV push with no improvement of tremor, and was given additional 2mg ativan IV push with solumedrol and epi pen at bedside in case of allergic reaction previously documented. After 5 minutes and 10 minutes, no shortness of breath or flushing noted, removed ativan as allergy from patient's profile.  At time of my exam. Patient is resting comfortably in bed, vitals stable before and after ativan administration.Stable to be managed on medical floor at this time 37 years old male from home lives with girl friend, with PMH of EtOH abuse, recurrent alcohol induced pancreatitis, Fatty Liver, EtOH withdrawal seizure(intubated 2 times), pre-DM (not on med), and gastroenteritis presented to ED c/o worsening epigastric pain .Admitted for Acute Pancreatitis.   Patient was seen and examined, reported that he feels well and wants to go home, however is tremulous and looks agitated , and  was trying to walk out . He given 2mg ativan IV push with no improvement of tremor, and was given additional 2mg ativan IV push with solumedrol and epi pen at bedside in case of allergic reaction previously documented. After 5 minutes and 10 minutes, no shortness of breath or flushing noted, removed ativan as allergy from patient's profile.  At time of my exam. Patient is resting comfortably in bed, vitals stable before and after ativan administration.Stable to be managed on medical floor at this time .

## 2017-11-04 NOTE — PROGRESS NOTE ADULT - PROBLEM SELECTOR PLAN 1
C/w banana bag  -patient is better sedated on Fentanyl and Versed  -ventilator support as patient was intubated for airway protection  -ABG showing well oxygenated on current vent settings  -CXR shows no evidence of consolidation so Zosyn was DC as there is no evidence of aspiration pneumonia    Will keep patient intubated and sedated until past critical period of DTs at which time will try SAT/SBT -patient is better sedated on Fentanyl and Versed  -ventilator support as patient was intubated for airway protection  -ABG showing well oxygenated on current vent settings  -CXR shows no evidence of consolidation so Zosyn was DC as there is no evidence of aspiration pneumonia    Will keep patient intubated and sedated until past critical period of DTs at which time will try SAT/SBT

## 2017-11-04 NOTE — PROGRESS NOTE ADULT - SUBJECTIVE AND OBJECTIVE BOX
INTERVAL HPI/OVERNIGHT EVENTS: patient continues to be heavily sedated with planned extubation next week to avoid the full force of his DTs    PRESSORS: [] YES [x] NO  WHICH:      Antimicrobial:    Cardiovascular:    Pulmonary:    Hematalogic:  enoxaparin Injectable 40 milliGRAM(s) SubCutaneous daily    Other:  dextrose 5%. 1000 milliLiter(s) IV Continuous <Continuous>  fentaNYL   Infusion 6 MICROgram(s)/kG/Hr IV Continuous <Continuous>  influenza   Vaccine 0.5 milliLiter(s) IntraMuscular once  insulin lispro (HumaLOG) corrective regimen sliding scale   SubCutaneous Before meals and at bedtime  lactated ringers. 1000 milliLiter(s) IV Continuous <Continuous>  midazolam Infusion 0.1 mG/Hr IV Continuous <Continuous>  multivitamin 1 Tablet(s) Oral daily  nicotine - 21 mG/24Hr(s) Patch 1 patch Transdermal daily  pantoprazole  Injectable 40 milliGRAM(s) IV Push daily  QUEtiapine 25 milliGRAM(s) Oral every 12 hours  thiamine 100 milliGRAM(s) Oral daily  vitamin B complex with vitamin C 1 Tablet(s) Oral daily    dextrose 5%. 1000 milliLiter(s) IV Continuous <Continuous>  enoxaparin Injectable 40 milliGRAM(s) SubCutaneous daily  fentaNYL   Infusion 6 MICROgram(s)/kG/Hr IV Continuous <Continuous>  influenza   Vaccine 0.5 milliLiter(s) IntraMuscular once  insulin lispro (HumaLOG) corrective regimen sliding scale   SubCutaneous Before meals and at bedtime  lactated ringers. 1000 milliLiter(s) IV Continuous <Continuous>  midazolam Infusion 0.1 mG/Hr IV Continuous <Continuous>  multivitamin 1 Tablet(s) Oral daily  nicotine - 21 mG/24Hr(s) Patch 1 patch Transdermal daily  pantoprazole  Injectable 40 milliGRAM(s) IV Push daily  QUEtiapine 25 milliGRAM(s) Oral every 12 hours  thiamine 100 milliGRAM(s) Oral daily  vitamin B complex with vitamin C 1 Tablet(s) Oral daily    Drug Dosing Weight  Height (cm): 175.26 (01 Nov 2017 12:00)  Weight (kg): 74.7 (01 Nov 2017 12:00)  BMI (kg/m2): 24.3 (01 Nov 2017 12:00)  BSA (m2): 1.9 (01 Nov 2017 12:00)    CENTRAL LINE: [] YES [x] NO  LOCATION:   DATE INSERTED:  REMOVE: [] YES [] NO  EXPLAIN:    RHODES: [x] YES [] NO    DATE INSERTED: 11/1  REMOVE:  [] YES [x] NO  EXPLAIN: intubated    A-LINE:  [] YES [x] NO  LOCATION:   DATE INSERTED:  REMOVE:  [] YES [] NO  EXPLAIN:    PMH -reviewed admission note, no change since admission  PAST MEDICAL & SURGICAL HISTORY:  Prediabetes  Alcohol induced acute pancreatitis  Pancreatitis  Alcoholism  Fatty liver  Gastroenteritis  No significant past surgical history      ICU Vital Signs Last 24 Hrs  T(C): 37.1 (03 Nov 2017 20:52), Max: 37.7 (03 Nov 2017 04:00)  T(F): 98.8 (03 Nov 2017 20:52), Max: 99.9 (03 Nov 2017 04:00)  HR: 58 (04 Nov 2017 00:28) (56 - 86)  BP: 103/56 (04 Nov 2017 00:00) (103/51 - 127/80)  BP(mean): 67 (04 Nov 2017 00:00) (63 - 91)  ABP: --  ABP(mean): --  RR: 15 (04 Nov 2017 00:00) (14 - 16)  SpO2: 100% (04 Nov 2017 00:28) (100% - 100%)      ABG - ( 03 Nov 2017 04:57 )  pH: 7.45  /  pCO2: 41    /  pO2: 116   / HCO3: 28    / Base Excess: 4.2   /  SaO2: 98                    11-02 @ 07:01  -  11-03 @ 07:00  --------------------------------------------------------  IN: 5754.5 mL / OUT: 2885 mL / NET: 2869.5 mL        Mode: AC/ CMV (Assist Control/ Continuous Mandatory Ventilation)  RR (machine): 14  TV (machine): 500  FiO2: 40  PEEP: 5  ITime: 1  MAP: 9.3  PIP: 18      PHYSICAL EXAM:    GENERAL: [x]NAD, [x]well-groomed, [x]well-developed  HEAD:  [x]Atraumatic, [x]Normocephalic  EYES: []EOMI, [x]PERRLA, [x]conjunctiva and sclera clear  ENMT: []No tonsillar erythema, exudates, or enlargement; [x]Moist mucous membranes, [x]Good dentition, []No lesions  NECK: []Supple, normal appearance, []No JVD; [x]Normal thyroid; [x]Trachea midline  NERVOUS SYSTEM:  [x]awake and answer questions with yes/no, []Good concentration; []Motor Strength 5/5 B/L upper and lower extremities; []DTRs 2+ intact and symmetric  CHEST/LUNG: [x]No chest deformity; [x]Normal percussion bilaterally; [x]No rales, rhonchi, wheezing   HEART: [x]Regular rate and rhythm; [x]No murmurs, rubs, or gallops  ABDOMEN: [x]Soft, Nontender, Nondistended; [x]Bowel sounds present  EXTREMITIES:  [x]2+ Peripheral Pulses, [x]No clubbing, cyanosis, or edema  LYMPH: [x]No lymphadenopathy noted  SKIN: []No rashes or lesions; [x]Good capillary refill      LABS:  CBC Full  -  ( 03 Nov 2017 05:33 )  WBC Count : 5.6 K/uL  Hemoglobin : 13.1 g/dL  Hematocrit : 38.8 %  Platelet Count - Automated : 92 K/uL  Mean Cell Volume : 99.1 fl  Mean Cell Hemoglobin : 33.6 pg  Mean Cell Hemoglobin Concentration : 33.9 gm/dL  Auto Neutrophil # : 3.2 K/uL  Auto Lymphocyte # : 1.6 K/uL  Auto Monocyte # : 0.6 K/uL  Auto Eosinophil # : 0.2 K/uL  Auto Basophil # : 0.1 K/uL  Auto Neutrophil % : 56.5 %  Auto Lymphocyte % : 28.9 %  Auto Monocyte % : 10.8 %  Auto Eosinophil % : 2.9 %  Auto Basophil % : 0.9 %    11-03    139  |  105  |  3<L>  ----------------------------<  86  3.7   |  27  |  0.53    Ca    8.4      03 Nov 2017 05:33  Phos  2.4     11-03  Mg     1.6     11-03              RADIOLOGY & ADDITIONAL STUDIES REVIEWED:  ***    []GOALS OF CARE DISCUSSION WITH PATIENT/FAMILY/PROXY:    CRITICAL CARE TIME SPENT: 35 minutes

## 2017-11-04 NOTE — CONSULT NOTE ADULT - SUBJECTIVE AND OBJECTIVE BOX
A 37 years old male with PMH of EtOH abuse, recurrent alcohol induced pancreatitis, Fatty Liver, EtOH withdrawal seizure, h/o intubation x 2, presented to ER for evaluation of  worsening epigastric pain radiating to back. He has no fever or chills. In ER, found to have elevated lipase of 5599, AST/ALT 60/66, and CT abdomen shows Peripancreatic fluid and stranding, compatible with acute pancreatitis. No evidence for pancreatic necrosis. Interposing between the pancreatic head and the second part of duodenum, there is a small 1.5 x 1.3 cm fluid collection; this may represent a duodenal diverticulum or a pseudocyst. He is admitted to the floor for management of Acute Pancreatitis  and course complicated with ? DT's, s/p RRT for severe agitation due to DTs, required intubation and admitted to ICU on 17.Today, 17, he spiked fever and chest xray shows new Left base infiltrate. He has started on Vancomycin and Zosyn to cover HCAP, cultures pending. The ID consult requested to assist with further evaluation and antibiotic management.           REVIEW OF SYSTEMS: Unable to obtain since intubated          PAST MEDICAL & SURGICAL HISTORY:  Prediabetes  Alcohol induced acute pancreatitis  Pancreatitis  Alcoholism  Fatty liver  Gastroenteritis  No significant past surgical history            SOCIAL HISTORY  Alcohol: Does not drink  Tobacco: Does not Smoke  Illicit substance use: None             FAMILY HISTORY: Non contributory to the present illness        ALLERGIES: No Known Drug Allergies        INTERVAL HPI/OVERNIGHT EVENTS:  T(C): 39.8 (17 @ 15:33), Max: 39.8 (17 @ 15:33)  HR: 82 (17 @ 18:00) (54 - 143)  BP: 112/57 (17 @ 18:00) (94/43 - 149/89)  RR: 16 (17 @ 18:00) (14 - 18)  SpO2: 97% (17 @ 18:00) (92% - 100%)  Wt(kg): --  I&O's Summary        PHYSICAL EXAM:  GENERAL: Not in distress  CVS; s1 and s2 present  RESP: air entry b/L  GI: abdomen  soft and nontender  EXT:  CNS:              LABS:                        9.1    3.1   )-----------( CLUMPED    ( 2017 17:42 )             27.5           11    138  |  102  |  7   ----------------------------<  86  3.5   |  29  |  0.79    Ca    8.9      2017 17:11  Phos  3.9     11-04  Mg     1.5             Urinalysis Basic - ( 2017 17:11 )    Color: Yellow / Appearance: Clear / S.010 / pH: x  Gluc: x / Ketone: Negative  / Bili: Negative / Urobili: 1   Blood: x / Protein: Negative / Nitrite: Negative   Leuk Esterase: Negative / RBC: 10-25 /HPF / WBC 0-2 /HPF   Sq Epi: x / Non Sq Epi: Occasional /HPF / Bacteria: x        POCT Blood Glucose.: 86 mg/dL (2017 06:23)  POCT Blood Glucose.: 92 mg/dL (2017 23:49)    ABG - ( 2017 05:19 )  pH: 7.34  /  pCO2: 54    /  pO2: 65    / HCO3: 28    / Base Excess: 1.5   /  SaO2: 90              MEDICATIONS  (STANDING):  dexmedetomidine Infusion 0.2 MICROgram(s)/kG/Hr (3.735 mL/Hr) IV Continuous <Continuous>  dextrose 5%. 1000 milliLiter(s) (50 mL/Hr) IV Continuous <Continuous>  enoxaparin Injectable 40 milliGRAM(s) SubCutaneous daily  fentaNYL   Infusion 6 MICROgram(s)/kG/Hr (44.82 mL/Hr) IV Continuous <Continuous>  influenza   Vaccine 0.5 milliLiter(s) IntraMuscular once  insulin lispro (HumaLOG) corrective regimen sliding scale   SubCutaneous Before meals and at bedtime  lactated ringers. 1000 milliLiter(s) (100 mL/Hr) IV Continuous <Continuous>  LORazepam   Injectable 2 milliGRAM(s) IV Push every 4 hours  magnesium sulfate  IVPB 1 Gram(s) IV Intermittent every 1 hour  midazolam Infusion 0.1 mG/Hr (0.1 mL/Hr) IV Continuous <Continuous>  multivitamin 1 Tablet(s) Oral daily  nicotine - 21 mG/24Hr(s) Patch 1 patch Transdermal daily  pantoprazole  Injectable 40 milliGRAM(s) IV Push daily  piperacillin/tazobactam IVPB. 3.375 Gram(s) IV Intermittent every 8 hours  QUEtiapine 25 milliGRAM(s) Oral every 12 hours  thiamine 100 milliGRAM(s) Oral daily  vancomycin  IVPB 1000 milliGRAM(s) IV Intermittent every 12 hours  vitamin B complex with vitamin C 1 Tablet(s) Oral daily    MEDICATIONS  (PRN):            RADIOLOGY & ADDITIONAL TESTS:    17 : Xray Chest 1 View AP-PORTABLE IMMEDIATE (17 @ 11:32) :On  of this year there was some infiltrate at left base which is no longer evident. Lungs are presently clear.    17: Xray Chest 1 View AP-PORTABLE IMMEDIATE (17 @ 17:05) :New left base infiltrate. Feeding tube at least to stomach. Thank you for the courtesy of this referral. A 37 years old male with PMH of EtOH abuse, recurrent alcohol induced pancreatitis, Fatty Liver, EtOH withdrawal seizure, h/o intubation x 2, presented to ER for evaluation of  worsening epigastric pain radiating to back. He has no fever or chills. In ER, found to have elevated lipase of 5599, AST/ALT 60/66, and CT abdomen shows Peripancreatic fluid and stranding, compatible with acute pancreatitis. No evidence for pancreatic necrosis. Interposing between the pancreatic head and the second part of duodenum, there is a small 1.5 x 1.3 cm fluid collection; this may represent a duodenal diverticulum or a pseudocyst. He is admitted to the floor for management of Acute Pancreatitis  and course complicated with ? DT's, s/p RRT for severe agitation due to DTs, required intubation and admitted to ICU on 17.Today, 17, he spiked fever and chest xray shows new Left base infiltrate. He has started on Vancomycin and Zosyn to cover HCAP, cultures pending. The ID consult requested to assist with further evaluation and antibiotic management.           REVIEW OF SYSTEMS: Unable to obtain since patient is intubated/sedated          PAST MEDICAL & SURGICAL HISTORY:  Prediabetes  Alcohol induced acute pancreatitis  Pancreatitis  Alcoholism  Fatty liver  Gastroenteritis  No significant past surgical history            SOCIAL HISTORY  Alcohol: Does not drink  Tobacco: Does not Smoke  Illicit substance use: None             FAMILY HISTORY: Non contributory to the present illness        ALLERGIES: No Known Drug Allergies        INTERVAL HPI/OVERNIGHT EVENTS:  T(C): 39.8 (17 @ 15:33), Max: 39.8 (17 @ 15:33)  HR: 82 (17 @ 18:00) (54 - 143)  BP: 112/57 (17 @ 18:00) (94/43 - 149/89)  RR: 16 (17 @ 18:00) (14 - 18)  SpO2: 97% (17 @ 18:00) (92% - 100%)  Wt(kg): --  I&O's Summary          PHYSICAL EXAM:  GENERAL: intubated/sedated  CVS; s1 and s2 present  RESP: air entry b/L  GI: abdomen  soft and nontender  EXT: No pedal edema  CNS: sedated/intubated              LABS:                        9.1    3.1   )-----------( CLUMPED    ( 2017 17:42 )             27.5           11-04    138  |  102  |  7   ----------------------------<  86  3.5   |  29  |  0.79    Ca    8.9      2017 17:11  Phos  3.9     11-04  Mg     1.5     04        Urinalysis Basic - ( 2017 17:11 )    Color: Yellow / Appearance: Clear / S.010 / pH: x  Gluc: x / Ketone: Negative  / Bili: Negative / Urobili: 1   Blood: x / Protein: Negative / Nitrite: Negative   Leuk Esterase: Negative / RBC: 10-25 /HPF / WBC 0-2 /HPF   Sq Epi: x / Non Sq Epi: Occasional /HPF / Bacteria: x        POCT Blood Glucose.: 86 mg/dL (2017 06:23)  POCT Blood Glucose.: 92 mg/dL (2017 23:49)    ABG - ( 2017 05:19 )  pH: 7.34  /  pCO2: 54    /  pO2: 65    / HCO3: 28    / Base Excess: 1.5   /  SaO2: 90              MEDICATIONS  (STANDING):  dexmedetomidine Infusion 0.2 MICROgram(s)/kG/Hr (3.735 mL/Hr) IV Continuous <Continuous>  dextrose 5%. 1000 milliLiter(s) (50 mL/Hr) IV Continuous <Continuous>  enoxaparin Injectable 40 milliGRAM(s) SubCutaneous daily  fentaNYL   Infusion 6 MICROgram(s)/kG/Hr (44.82 mL/Hr) IV Continuous <Continuous>  influenza   Vaccine 0.5 milliLiter(s) IntraMuscular once  insulin lispro (HumaLOG) corrective regimen sliding scale   SubCutaneous Before meals and at bedtime  lactated ringers. 1000 milliLiter(s) (100 mL/Hr) IV Continuous <Continuous>  LORazepam   Injectable 2 milliGRAM(s) IV Push every 4 hours  magnesium sulfate  IVPB 1 Gram(s) IV Intermittent every 1 hour  midazolam Infusion 0.1 mG/Hr (0.1 mL/Hr) IV Continuous <Continuous>  multivitamin 1 Tablet(s) Oral daily  nicotine - 21 mG/24Hr(s) Patch 1 patch Transdermal daily  pantoprazole  Injectable 40 milliGRAM(s) IV Push daily  piperacillin/tazobactam IVPB. 3.375 Gram(s) IV Intermittent every 8 hours  QUEtiapine 25 milliGRAM(s) Oral every 12 hours  thiamine 100 milliGRAM(s) Oral daily  vancomycin  IVPB 1000 milliGRAM(s) IV Intermittent every 12 hours  vitamin B complex with vitamin C 1 Tablet(s) Oral daily    MEDICATIONS  (PRN):            RADIOLOGY & ADDITIONAL TESTS:    17 : Xray Chest 1 View AP-PORTABLE IMMEDIATE (17 @ 11:32) :On  of this year there was some infiltrate at left base which is no longer evident. Lungs are presently clear.    17: Xray Chest 1 View AP-PORTABLE IMMEDIATE (17 @ 17:05) :New left base infiltrate. Feeding tube at least to stomach. Thank you for the courtesy of this referral.

## 2017-11-04 NOTE — CONSULT NOTE ADULT - ASSESSMENT
A 37 years old male with PMH of EtOH abuse, recurrent alcohol induced pancreatitis, Fatty Liver, EtOH withdrawal seizure, h/o intubation x 2, presented to ER for evaluation of  worsening epigastric pain radiating to back. He has no fever or chills. In ER, found to have elevated lipase of 5599, AST/ALT 60/66, and CT abdomen shows Peripancreatic fluid and stranding, compatible with acute pancreatitis. No evidence for pancreatic necrosis. Interposing between the pancreatic head and the second part of duodenum, there is a small 1.5 x 1.3 cm fluid collection; this may represent a duodenal diverticulum or a pseudocyst. He is admitted to the floor for management of Acute Pancreatitis  and course complicated with ? DT's, s/p RRT for severe agitation due to DTs, required intubation and admitted to ICU on 11/1/17.Today, 11/4/17, he spiked fever and chest xray shows new Left base infiltrate. He has started on Vancomycin and Zosyn to cover HCAP, cultures pending. The ID consult requested to assist with further evaluation and antibiotic management.     # Pneumonia- Aspiration vs HCAP  # Acute pancreatitis  # DTs    Would recommend;  1. Obtain Sputum/tracheal aspirate culture  2. Follow up Blood culture  3. Urine Antigen for Legionella  4. RVP  5. Monitor Temp. and continue supportive care    d/w ICU team    will follow the patient with you and make further recommendation based on the clinical course and Lab results  Thank you for the opportunity to participate in Mr. Blount's care A 37 years old male with PMH of EtOH abuse, recurrent alcohol induced pancreatitis, Fatty Liver, EtOH withdrawal seizure, h/o intubation x 2, presented to ER for evaluation of  worsening epigastric pain radiating to back. He has no fever or chills. In ER, found to have elevated lipase of 5599, AST/ALT 60/66, and CT abdomen shows Peripancreatic fluid and stranding, compatible with acute pancreatitis. No evidence for pancreatic necrosis. Interposing between the pancreatic head and the second part of duodenum, there is a small 1.5 x 1.3 cm fluid collection; this may represent a duodenal diverticulum or a pseudocyst. He is admitted to the floor for management of Acute Pancreatitis  and course complicated with ? DT's, s/p RRT for severe agitation due to DTs, required intubation and admitted to ICU on 11/1/17.Today, 11/4/17, he spiked fever and chest xray shows new Left base infiltrate. He has started on Vancomycin and Zosyn to cover HCAP, cultures pending. The ID consult requested to assist with further evaluation and antibiotic management.     # Pneumonia- Aspiration vs HCAP  # Acute pancreatitis  # DTs    Would recommend;  1. Obtain Sputum/tracheal aspirate culture  2. Follow up Blood culture  3. Urine Antigen for Legionella  4. RVP  5. Monitor Temp. and continue supportive care  6. Aspiration precaution    d/w ICU team    will follow the patient with you and make further recommendation based on the clinical course and Lab results  Thank you for the opportunity to participate in Mr. Blount's care

## 2017-11-04 NOTE — PROGRESS NOTE ADULT - PROBLEM SELECTOR PLAN 2
Will continue with hydration  Since FS is decreasing, will give LR and D5W @ 100 cc/hr each making a total of 200 cc/hr fluids  -Started on tube feeds - patient is tolerating well

## 2017-11-05 DIAGNOSIS — J18.9 PNEUMONIA, UNSPECIFIED ORGANISM: ICD-10-CM

## 2017-11-05 LAB
ANION GAP SERPL CALC-SCNC: 7 MMOL/L — SIGNIFICANT CHANGE UP (ref 5–17)
BASE EXCESS BLDA CALC-SCNC: 2.9 MMOL/L — HIGH (ref -2–2)
BASOPHILS # BLD AUTO: 0.1 K/UL — SIGNIFICANT CHANGE UP (ref 0–0.2)
BLOOD GAS COMMENTS ARTERIAL: SIGNIFICANT CHANGE UP
BUN SERPL-MCNC: 5 MG/DL — LOW (ref 7–18)
CALCIUM SERPL-MCNC: 8.8 MG/DL — SIGNIFICANT CHANGE UP (ref 8.4–10.5)
CHLORIDE SERPL-SCNC: 105 MMOL/L — SIGNIFICANT CHANGE UP (ref 96–108)
CO2 SERPL-SCNC: 26 MMOL/L — SIGNIFICANT CHANGE UP (ref 22–31)
CREAT SERPL-MCNC: 0.69 MG/DL — SIGNIFICANT CHANGE UP (ref 0.5–1.3)
EOSINOPHIL # BLD AUTO: 0.2 K/UL — SIGNIFICANT CHANGE UP (ref 0–0.5)
EOSINOPHIL NFR BLD AUTO: 1 % — SIGNIFICANT CHANGE UP (ref 0–6)
GLUCOSE BLDC GLUCOMTR-MCNC: 104 MG/DL — HIGH (ref 70–99)
GLUCOSE BLDC GLUCOMTR-MCNC: 141 MG/DL — HIGH (ref 70–99)
GLUCOSE SERPL-MCNC: 137 MG/DL — HIGH (ref 70–99)
GRAM STN FLD: SIGNIFICANT CHANGE UP
HCO3 BLDA-SCNC: 27 MMOL/L — SIGNIFICANT CHANGE UP (ref 23–27)
HCT VFR BLD CALC: 42.8 % — SIGNIFICANT CHANGE UP (ref 39–50)
HGB BLD-MCNC: 14.6 G/DL — SIGNIFICANT CHANGE UP (ref 13–17)
HOROWITZ INDEX BLDA+IHG-RTO: 40 — SIGNIFICANT CHANGE UP
LYMPHOCYTES # BLD AUTO: 2.4 K/UL — SIGNIFICANT CHANGE UP (ref 1–3.3)
LYMPHOCYTES # BLD AUTO: 24 % — SIGNIFICANT CHANGE UP (ref 13–44)
MAGNESIUM SERPL-MCNC: 1.9 MG/DL — SIGNIFICANT CHANGE UP (ref 1.6–2.6)
MCHC RBC-ENTMCNC: 34 PG — SIGNIFICANT CHANGE UP (ref 27–34)
MCHC RBC-ENTMCNC: 34.1 GM/DL — SIGNIFICANT CHANGE UP (ref 32–36)
MCV RBC AUTO: 99.8 FL — SIGNIFICANT CHANGE UP (ref 80–100)
MONOCYTES # BLD AUTO: 2.7 K/UL — HIGH (ref 0–0.9)
MONOCYTES NFR BLD AUTO: 29 % — HIGH (ref 2–14)
NEUTROPHILS # BLD AUTO: 5.6 K/UL — SIGNIFICANT CHANGE UP (ref 1.8–7.4)
NEUTROPHILS NFR BLD AUTO: 36 % — LOW (ref 43–77)
PCO2 BLDA: 40 MMHG — SIGNIFICANT CHANGE UP (ref 32–46)
PH BLDA: 7.44 — SIGNIFICANT CHANGE UP (ref 7.35–7.45)
PHOSPHATE SERPL-MCNC: 3.5 MG/DL — SIGNIFICANT CHANGE UP (ref 2.5–4.5)
PLATELET # BLD AUTO: 145 K/UL — LOW (ref 150–400)
PO2 BLDA: 102 MMHG — SIGNIFICANT CHANGE UP (ref 74–108)
POTASSIUM SERPL-MCNC: 4.2 MMOL/L — SIGNIFICANT CHANGE UP (ref 3.5–5.3)
POTASSIUM SERPL-SCNC: 4.2 MMOL/L — SIGNIFICANT CHANGE UP (ref 3.5–5.3)
RBC # BLD: 4.29 M/UL — SIGNIFICANT CHANGE UP (ref 4.2–5.8)
RBC # FLD: 12 % — SIGNIFICANT CHANGE UP (ref 10.3–14.5)
SAO2 % BLDA: 98 % — HIGH (ref 92–96)
SODIUM SERPL-SCNC: 138 MMOL/L — SIGNIFICANT CHANGE UP (ref 135–145)
SPECIMEN SOURCE: SIGNIFICANT CHANGE UP
WBC # BLD: 11.4 K/UL — HIGH (ref 3.8–10.5)
WBC # FLD AUTO: 11.4 K/UL — HIGH (ref 3.8–10.5)

## 2017-11-05 PROCEDURE — 71010: CPT | Mod: 26

## 2017-11-05 RX ORDER — FOLIC ACID 0.8 MG
1 TABLET ORAL DAILY
Qty: 0 | Refills: 0 | Status: DISCONTINUED | OUTPATIENT
Start: 2017-11-05 | End: 2017-11-14

## 2017-11-05 RX ORDER — ALBUTEROL 90 UG/1
2 AEROSOL, METERED ORAL EVERY 6 HOURS
Qty: 0 | Refills: 0 | Status: DISCONTINUED | OUTPATIENT
Start: 2017-11-05 | End: 2017-11-09

## 2017-11-05 RX ORDER — IPRATROPIUM BROMIDE 0.2 MG/ML
1 SOLUTION, NON-ORAL INHALATION EVERY 6 HOURS
Qty: 0 | Refills: 0 | Status: DISCONTINUED | OUTPATIENT
Start: 2017-11-05 | End: 2017-11-09

## 2017-11-05 RX ADMIN — FENTANYL CITRATE 44.82 MICROGRAM(S)/KG/HR: 50 INJECTION INTRAVENOUS at 21:39

## 2017-11-05 RX ADMIN — PIPERACILLIN AND TAZOBACTAM 25 GRAM(S): 4; .5 INJECTION, POWDER, LYOPHILIZED, FOR SOLUTION INTRAVENOUS at 13:50

## 2017-11-05 RX ADMIN — Medication 250 MILLIGRAM(S): at 17:29

## 2017-11-05 RX ADMIN — PANTOPRAZOLE SODIUM 40 MILLIGRAM(S): 20 TABLET, DELAYED RELEASE ORAL at 12:32

## 2017-11-05 RX ADMIN — Medication 1 PATCH: at 12:33

## 2017-11-05 RX ADMIN — Medication 1 TABLET(S): at 12:29

## 2017-11-05 RX ADMIN — ENOXAPARIN SODIUM 40 MILLIGRAM(S): 100 INJECTION SUBCUTANEOUS at 12:31

## 2017-11-05 RX ADMIN — ALBUTEROL 2 PUFF(S): 90 AEROSOL, METERED ORAL at 20:15

## 2017-11-05 RX ADMIN — QUETIAPINE FUMARATE 25 MILLIGRAM(S): 200 TABLET, FILM COATED ORAL at 05:05

## 2017-11-05 RX ADMIN — Medication 1 MILLIGRAM(S): at 12:29

## 2017-11-05 RX ADMIN — Medication 100 MILLIGRAM(S): at 12:29

## 2017-11-05 RX ADMIN — Medication 250 MILLIGRAM(S): at 05:04

## 2017-11-05 RX ADMIN — Medication 1 PATCH: at 12:31

## 2017-11-05 RX ADMIN — QUETIAPINE FUMARATE 25 MILLIGRAM(S): 200 TABLET, FILM COATED ORAL at 17:30

## 2017-11-05 RX ADMIN — MIDAZOLAM HYDROCHLORIDE 0.1 MG/HR: 1 INJECTION, SOLUTION INTRAMUSCULAR; INTRAVENOUS at 01:29

## 2017-11-05 RX ADMIN — Medication 1 PUFF(S): at 20:15

## 2017-11-05 RX ADMIN — FENTANYL CITRATE 44.82 MICROGRAM(S)/KG/HR: 50 INJECTION INTRAVENOUS at 02:26

## 2017-11-05 RX ADMIN — PIPERACILLIN AND TAZOBACTAM 25 GRAM(S): 4; .5 INJECTION, POWDER, LYOPHILIZED, FOR SOLUTION INTRAVENOUS at 05:11

## 2017-11-05 RX ADMIN — PIPERACILLIN AND TAZOBACTAM 25 GRAM(S): 4; .5 INJECTION, POWDER, LYOPHILIZED, FOR SOLUTION INTRAVENOUS at 21:39

## 2017-11-05 RX ADMIN — ALBUTEROL 2 PUFF(S): 90 AEROSOL, METERED ORAL at 14:40

## 2017-11-05 RX ADMIN — Medication 1 PUFF(S): at 14:40

## 2017-11-05 RX ADMIN — SODIUM CHLORIDE 40 MILLILITER(S): 9 INJECTION, SOLUTION INTRAVENOUS at 21:40

## 2017-11-05 NOTE — PROGRESS NOTE ADULT - SUBJECTIVE AND OBJECTIVE BOX
Patient is seen and examined at the bed side, spiked fever but currently is afebrile. He remains intubated and not on pressor. The WBC count mildly elevated. The blood culture and Legionella urine Antigen is pending.          REVIEW OF SYSTEMS: Unable to obtain since patient is intubated/sedated          ICU Vital Signs Last 24 Hrs  T(C): 36.4 (2017 15:38), Max: 37.6 (2017 05:17)  T(F): 97.6 (2017 15:38), Max: 99.7 (2017 05:17)  HR: 72 (2017 16:00) (64 - 126)  BP: 109/70 (2017 16:00) (92/49 - 146/81)  BP(mean): 79 (2017 16:00) (58 - 97)  ABP: --  ABP(mean): --  RR: 16 (2017 16:00) (16 - 19)  SpO2: 100% (2017 16:00) (94% - 100%)          PHYSICAL EXAM:  GENERAL: intubated/sedated  CVS; s1 and s2 present  RESP: air entry b/L  GI: abdomen  soft and nontender  EXT: No pedal edema  CNS: sedated/intubated          ALLERGIES: No Known Drug Allergies            LABS:                        14.6   11.4  )-----------( 145      ( 2017 06:23 )             42.8                           9.1    3.1   )-----------( CLUMPED    ( 2017 17:42 )             27.5         11-05    138  |  105  |  5<L>  ----------------------------<  137<H>  4.2   |  26  |  0.69    Ca    8.8      2017 06:23  Phos  3.5     11-05  Mg     1.9     11-05      Urinalysis Basic - ( 2017 17:11 )    Color: Yellow / Appearance: Clear / S.010 / pH: x  Gluc: x / Ketone: Negative  / Bili: Negative / Urobili: 1   Blood: x / Protein: Negative / Nitrite: Negative   Leuk Esterase: Negative / RBC: 10-25 /HPF / WBC 0-2 /HPF   Sq Epi: x / Non Sq Epi: Occasional /HPF / Bacteria: x        POCT Blood Glucose.: 86 mg/dL (2017 06:23)  POCT Blood Glucose.: 92 mg/dL (2017 23:49)    ABG - ( 2017 05:19 )  pH: 7.34  /  pCO2: 54    /  pO2: 65    / HCO3: 28    / Base Excess: 1.5   /  SaO2: 90            MEDICATIONS  (STANDING):  ALBUTerol    90 MICROgram(s) HFA Inhaler 2 Puff(s) Inhalation every 6 hours  dexmedetomidine Infusion 0.2 MICROgram(s)/kG/Hr (3.735 mL/Hr) IV Continuous <Continuous>  enoxaparin Injectable 40 milliGRAM(s) SubCutaneous daily  fentaNYL   Infusion 6 MICROgram(s)/kG/Hr (44.82 mL/Hr) IV Continuous <Continuous>  folic acid 1 milliGRAM(s) Oral daily  influenza   Vaccine 0.5 milliLiter(s) IntraMuscular once  insulin lispro (HumaLOG) corrective regimen sliding scale   SubCutaneous Before meals and at bedtime  ipratropium 17 MICROgram(s) HFA Inhaler 1 Puff(s) Inhalation every 6 hours  lactated ringers. 1000 milliLiter(s) (40 mL/Hr) IV Continuous <Continuous>  midazolam Infusion 0.1 mG/Hr (0.1 mL/Hr) IV Continuous <Continuous>  multivitamin 1 Tablet(s) Oral daily  nicotine - 21 mG/24Hr(s) Patch 1 patch Transdermal daily  pantoprazole  Injectable 40 milliGRAM(s) IV Push daily  piperacillin/tazobactam IVPB. 3.375 Gram(s) IV Intermittent every 8 hours  QUEtiapine 25 milliGRAM(s) Oral every 12 hours  thiamine 100 milliGRAM(s) Oral daily  vancomycin  IVPB 1000 milliGRAM(s) IV Intermittent every 12 hours  vitamin B complex with vitamin C 1 Tablet(s) Oral daily    MEDICATIONS  (PRN):              RADIOLOGY & ADDITIONAL TESTS:    17 : Xray Chest 1 View AP-PORTABLE IMMEDIATE (17 @ 11:32) :On  of this year there was some infiltrate at left base which is no longer evident. Lungs are presently clear.    17: Xray Chest 1 View AP-PORTABLE IMMEDIATE (17 @ 17:05) :New left base infiltrate. Feeding tube at least to stomach. Thank you for the courtesy of this referral.          MICROBIOLOGY DATA:    Culture - Sputum . (17 @ 07:31)    Gram Stain:   Few polymorphonuclear leukocytes per low power field  No Squamous epithelial cells per low power field  Numerous Gram Positive Cocci in Clusters per oil power field    Specimen Source: .Sputum Sputum, trap

## 2017-11-05 NOTE — PROGRESS NOTE ADULT - PROBLEM SELECTOR PLAN 2
Will continue with hydration  will give LR and D5W @ 100 cc/hr each making a total of 200 cc/hr fluids  -Started on tube feeds - patient is tolerating well Will continue with hydration @ 40 cc  No propofol sec to pancreatitis   Started on tube feeds - patient is tolerating well

## 2017-11-05 NOTE — PROGRESS NOTE ADULT - PROBLEM SELECTOR PLAN 1
-patient is better sedated on Fentanyl and Versed, precedex was added yesterday but was held as patient was sedated to RASS of -3 without precedex  -ventilator support as patient was intubated for airway protection  -ABG showing well oxygenated on current vent settings  Will keep patient intubated and sedated until past critical period of DTs at which time will try SAT/SBT Patient is better sedated on Fentanyl and Versed  Off precedex today  Ventilator support as patient was intubated for airway protection  ABG showing well oxygenated on current vent settings  Keep a net negative for possible SBT in few days

## 2017-11-05 NOTE — PROGRESS NOTE ADULT - PROBLEM SELECTOR PLAN 3
likely 2/2 to alcohol, no active bleed at this time Fever of 103 overnight  CXR: LLL PNA secondary to intubation  C/w Zosyn and Vanc   F/u BCx, Sputum Cx and Legionella

## 2017-11-05 NOTE — PROGRESS NOTE ADULT - NSHPATTENDINGPLANDISCUSS_GEN_ALL_CORE
icu team on rounds
spoke with mother at bedside
icu team on rounds
patient and family; resident and primary nurse
icu team on rounds

## 2017-11-05 NOTE — PROGRESS NOTE ADULT - SUBJECTIVE AND OBJECTIVE BOX
INTERVAL /OVERNIGHT EVENTS: patient spiked temp of 103 f yesterday, no more fevers recorded overnight. otherwise no major events overnight,    PRESSORS: [ ] YES [ x] NO  WHICH:    ANTIBIOTICS: zosyn                 DATE STARTED:  ANTIBIOTICS:     vancomycin             DATE STARTED:   ANTIBIOTICS:                  DATE STARTED:    Antimicrobial:  piperacillin/tazobactam IVPB. 3.375 Gram(s) IV Intermittent every 8 hours  vancomycin  IVPB 1000 milliGRAM(s) IV Intermittent every 12 hours    Cardiovascular:    Pulmonary:    Hematalogic:  enoxaparin Injectable 40 milliGRAM(s) SubCutaneous daily    Other:  dexmedetomidine Infusion 0.2 MICROgram(s)/kG/Hr IV Continuous <Continuous>  dextrose 5%. 1000 milliLiter(s) IV Continuous <Continuous>  fentaNYL   Infusion 6 MICROgram(s)/kG/Hr IV Continuous <Continuous>  influenza   Vaccine 0.5 milliLiter(s) IntraMuscular once  insulin lispro (HumaLOG) corrective regimen sliding scale   SubCutaneous Before meals and at bedtime  lactated ringers. 1000 milliLiter(s) IV Continuous <Continuous>  midazolam Infusion 0.1 mG/Hr IV Continuous <Continuous>  multivitamin 1 Tablet(s) Oral daily  nicotine - 21 mG/24Hr(s) Patch 1 patch Transdermal daily  pantoprazole  Injectable 40 milliGRAM(s) IV Push daily  QUEtiapine 25 milliGRAM(s) Oral every 12 hours  thiamine 100 milliGRAM(s) Oral daily  vitamin B complex with vitamin C 1 Tablet(s) Oral daily    dexmedetomidine Infusion 0.2 MICROgram(s)/kG/Hr IV Continuous <Continuous>  dextrose 5%. 1000 milliLiter(s) IV Continuous <Continuous>  enoxaparin Injectable 40 milliGRAM(s) SubCutaneous daily  fentaNYL   Infusion 6 MICROgram(s)/kG/Hr IV Continuous <Continuous>  influenza   Vaccine 0.5 milliLiter(s) IntraMuscular once  insulin lispro (HumaLOG) corrective regimen sliding scale   SubCutaneous Before meals and at bedtime  lactated ringers. 1000 milliLiter(s) IV Continuous <Continuous>  midazolam Infusion 0.1 mG/Hr IV Continuous <Continuous>  multivitamin 1 Tablet(s) Oral daily  nicotine - 21 mG/24Hr(s) Patch 1 patch Transdermal daily  pantoprazole  Injectable 40 milliGRAM(s) IV Push daily  piperacillin/tazobactam IVPB. 3.375 Gram(s) IV Intermittent every 8 hours  QUEtiapine 25 milliGRAM(s) Oral every 12 hours  thiamine 100 milliGRAM(s) Oral daily  vancomycin  IVPB 1000 milliGRAM(s) IV Intermittent every 12 hours  vitamin B complex with vitamin C 1 Tablet(s) Oral daily    Drug Dosing Weight  Height (cm): 175.26 (2017 12:00)  Weight (kg): 74.7 (2017 12:00)  BMI (kg/m2): 24.3 (2017 12:00)  BSA (m2): 1.9 (2017 12:00)    CENTRAL LINE: [] YES [x] NO  LOCATION:   DATE INSERTED:  REMOVE: [] YES [] NO  EXPLAIN:    RHODES: [x] YES [] NO    DATE INSERTED:   REMOVE:  [] YES [x] NO  EXPLAIN: intubated    A-LINE:  [] YES [x] NO  LOCATION:   DATE INSERTED:  REMOVE:  [] YES [] NO  EXPLAIN:    PMH -reviewed admission note, no change since admission      ICU Vital Signs Last 24 Hrs  T(C): 36.2 (2017 23:57), Max: 40.4 (2017 16:00)  T(F): 97.2 (2017 23:57), Max: 104.7 (2017 16:00)  HR: 75 (2017 04:11) (64 - 143)  BP: 103/55 (2017 04:00) (92/49 - 149/89)  BP(mean): 66 (2017 04:00) (56 - 100)  ABP: --  ABP(mean): --  RR: 16 (2017 04:00) (14 - 19)  SpO2: 100% (2017 04:11) (92% - 100%)      ABG - ( 2017 05:04 )  pH: 7.44  /  pCO2: 40    /  pO2: 102   / HCO3: 27    / Base Excess: 2.9   /  SaO2: 98                     @ 07:01  -   @ 07:00  --------------------------------------------------------  IN: 4462.4 mL / OUT: 5725 mL / NET: -1262.6 mL        Mode: AC/ CMV (Assist Control/ Continuous Mandatory Ventilation)  RR (machine): 16  TV (machine): 500  FiO2: 40  PEEP: 5  ITime: 1  MAP: 9  PIP: 22      PHYSICAL EXAM:    GENERAL:   GENERAL: [x]NAD, [x]well-groomed, [x]well-developed  HEAD:  [x]Atraumatic, [x]Normocephalic  EYES: []EOMI, [x]PERRLA, [x]conjunctiva and sclera clear  ENMT: []No tonsillar erythema, exudates, or enlargement; [x]Moist mucous membranes, [x]Good dentition, []No lesions  NECK: []Supple, normal appearance, []No JVD; [x]Normal thyroid; [x]Trachea midline  NERVOUS SYSTEM:  [x]awake and answer questions with yes/no, []Good concentration; []Motor Strength 5/5 B/L upper and lower extremities; []DTRs 2+ intact and symmetric  CHEST/LUNG: [x]No chest deformity; [x]Normal percussion bilaterally; [x]No rales, rhonchi, wheezing   HEART: [x]Regular rate and rhythm; [x]No murmurs, rubs, or gallops  ABDOMEN: [x]Soft, Nontender, Nondistended; [x]Bowel sounds present  EXTREMITIES:  [x]2+ Peripheral Pulses, [x]No clubbing, cyanosis, or edema  LYMPH: [x]No lymphadenopathy noted  SKIN: []No rashes or lesions; [x]Good capillary refill        LABS:  CBC Full  -  ( 2017 17:42 )  WBC Count : 3.1 K/uL  Hemoglobin : 9.1 g/dL  Hematocrit : 27.5 %  Platelet Count - Automated : CLUMPED  Mean Cell Volume : 102.6 fl  Mean Cell Hemoglobin : 33.9 pg  Mean Cell Hemoglobin Concentration : 33.1 gm/dL  Auto Neutrophil # : x  Auto Lymphocyte # : x  Auto Monocyte # : x  Auto Eosinophil # : x  Auto Basophil # : x  Auto Neutrophil % : x  Auto Lymphocyte % : x  Auto Monocyte % : x  Auto Eosinophil % : x  Auto Basophil % : x    11-04    138  |  102  |  7   ----------------------------<  86  3.5   |  29  |  0.79    Ca    8.9      2017 17:11  Phos  3.9     11-04  Mg     1.5     11-04        Urinalysis Basic - ( 2017 17:11 )    Color: Yellow / Appearance: Clear / S.010 / pH: x  Gluc: x / Ketone: Negative  / Bili: Negative / Urobili: 1   Blood: x / Protein: Negative / Nitrite: Negative   Leuk Esterase: Negative / RBC: 10-25 /HPF / WBC 0-2 /HPF   Sq Epi: x / Non Sq Epi: Occasional /HPF / Bacteria: x          RADIOLOGY & ADDITIONAL STUDIES REVIEWED:     [ ]GOALS OF CARE DISCUSSION WITH PATIENT/FAMILY/PROXY:    CRITICAL CARE TIME SPENT: 35 minutes

## 2017-11-05 NOTE — PROGRESS NOTE ADULT - PROBLEM SELECTOR PLAN 4
on lovenox and scd 2/2 to intubation  PPI 2/2 to intubation likely 2/2 to alcohol, no active bleed at this time  Improving gradually

## 2017-11-05 NOTE — PROGRESS NOTE ADULT - ASSESSMENT
37 years old male from home lives with girl friend, with PMH of EtOH abuse, recurrent alcohol induced pancreatitis, Fatty Liver, EtOH withdrawal seizure(intubated 2 times), pre-DM (not on med), and gastroenteritis presented to ED c/o worsening epigastric pain .Admitted for Acute Pancreatitis, had RRT and was brought intubated for airway protection in delirium tremens. 37 years old male from home lives with girl friend, with PMH of EtOH abuse, recurrent alcohol induced pancreatitis, Fatty Liver, EtOH withdrawal seizure(intubated 2 times), pre-DM (not on med), and gastroenteritis presented to ED c/o worsening epigastric pain .Admitted for Acute Pancreatitis, had RRT and was brought intubated for airway protection in delirium tremens.    Spoke to the mother at bedside and updated her about the Fever due to PNA due to intubation.

## 2017-11-06 LAB
ALBUMIN SERPL ELPH-MCNC: 2.3 G/DL — LOW (ref 3.5–5)
ALP SERPL-CCNC: 108 U/L — SIGNIFICANT CHANGE UP (ref 40–120)
ALT FLD-CCNC: 43 U/L DA — SIGNIFICANT CHANGE UP (ref 10–60)
ANION GAP SERPL CALC-SCNC: 6 MMOL/L — SIGNIFICANT CHANGE UP (ref 5–17)
AST SERPL-CCNC: 57 U/L — HIGH (ref 10–40)
BASE EXCESS BLDA CALC-SCNC: 3 MMOL/L — HIGH (ref -2–2)
BILIRUB SERPL-MCNC: 0.3 MG/DL — SIGNIFICANT CHANGE UP (ref 0.2–1.2)
BLOOD GAS COMMENTS ARTERIAL: SIGNIFICANT CHANGE UP
BUN SERPL-MCNC: 4 MG/DL — LOW (ref 7–18)
CALCIUM SERPL-MCNC: 8.9 MG/DL — SIGNIFICANT CHANGE UP (ref 8.4–10.5)
CHLORIDE SERPL-SCNC: 105 MMOL/L — SIGNIFICANT CHANGE UP (ref 96–108)
CO2 SERPL-SCNC: 29 MMOL/L — SIGNIFICANT CHANGE UP (ref 22–31)
CREAT SERPL-MCNC: 0.49 MG/DL — LOW (ref 0.5–1.3)
GLUCOSE BLDC GLUCOMTR-MCNC: 104 MG/DL — HIGH (ref 70–99)
GLUCOSE BLDC GLUCOMTR-MCNC: 106 MG/DL — HIGH (ref 70–99)
GLUCOSE BLDC GLUCOMTR-MCNC: 118 MG/DL — HIGH (ref 70–99)
GLUCOSE BLDC GLUCOMTR-MCNC: 124 MG/DL — HIGH (ref 70–99)
GLUCOSE SERPL-MCNC: 99 MG/DL — SIGNIFICANT CHANGE UP (ref 70–99)
HCO3 BLDA-SCNC: 29 MMOL/L — HIGH (ref 23–27)
HCT VFR BLD CALC: 37.3 % — LOW (ref 39–50)
HGB BLD-MCNC: 12.5 G/DL — LOW (ref 13–17)
HOROWITZ INDEX BLDA+IHG-RTO: 40 — SIGNIFICANT CHANGE UP
LEGIONELLA AG UR QL: NEGATIVE — SIGNIFICANT CHANGE UP
MAGNESIUM SERPL-MCNC: 1.8 MG/DL — SIGNIFICANT CHANGE UP (ref 1.6–2.6)
MCHC RBC-ENTMCNC: 33.3 PG — SIGNIFICANT CHANGE UP (ref 27–34)
MCHC RBC-ENTMCNC: 33.4 GM/DL — SIGNIFICANT CHANGE UP (ref 32–36)
MCV RBC AUTO: 99.6 FL — SIGNIFICANT CHANGE UP (ref 80–100)
PCO2 BLDA: 56 MMHG — HIGH (ref 32–46)
PH BLDA: 7.34 — LOW (ref 7.35–7.45)
PHOSPHATE SERPL-MCNC: 4.1 MG/DL — SIGNIFICANT CHANGE UP (ref 2.5–4.5)
PLATELET # BLD AUTO: 184 K/UL — SIGNIFICANT CHANGE UP (ref 150–400)
PO2 BLDA: 115 MMHG — HIGH (ref 74–108)
POTASSIUM SERPL-MCNC: 3.6 MMOL/L — SIGNIFICANT CHANGE UP (ref 3.5–5.3)
POTASSIUM SERPL-SCNC: 3.6 MMOL/L — SIGNIFICANT CHANGE UP (ref 3.5–5.3)
PROT SERPL-MCNC: 6.5 G/DL — SIGNIFICANT CHANGE UP (ref 6–8.3)
RBC # BLD: 3.74 M/UL — LOW (ref 4.2–5.8)
RBC # FLD: 12 % — SIGNIFICANT CHANGE UP (ref 10.3–14.5)
SAO2 % BLDA: 98 % — HIGH (ref 92–96)
SODIUM SERPL-SCNC: 140 MMOL/L — SIGNIFICANT CHANGE UP (ref 135–145)
WBC # BLD: 6.2 K/UL — SIGNIFICANT CHANGE UP (ref 3.8–10.5)
WBC # FLD AUTO: 6.2 K/UL — SIGNIFICANT CHANGE UP (ref 3.8–10.5)

## 2017-11-06 PROCEDURE — 71010: CPT | Mod: 26

## 2017-11-06 RX ORDER — PHENOBARBITAL 60 MG
130 TABLET ORAL
Qty: 0 | Refills: 0 | Status: DISCONTINUED | OUTPATIENT
Start: 2017-11-06 | End: 2017-11-06

## 2017-11-06 RX ORDER — FENTANYL CITRATE 50 UG/ML
3 INJECTION INTRAVENOUS
Qty: 2500 | Refills: 0 | Status: DISCONTINUED | OUTPATIENT
Start: 2017-11-06 | End: 2017-11-06

## 2017-11-06 RX ORDER — NYSTATIN CREAM 100000 [USP'U]/G
1 CREAM TOPICAL ONCE
Qty: 0 | Refills: 0 | Status: DISCONTINUED | OUTPATIENT
Start: 2017-11-06 | End: 2017-11-06

## 2017-11-06 RX ORDER — SODIUM CHLORIDE 9 MG/ML
1000 INJECTION, SOLUTION INTRAVENOUS
Qty: 0 | Refills: 0 | Status: DISCONTINUED | OUTPATIENT
Start: 2017-11-06 | End: 2017-11-06

## 2017-11-06 RX ORDER — FENTANYL CITRATE 50 UG/ML
3 INJECTION INTRAVENOUS
Qty: 2500 | Refills: 0 | Status: DISCONTINUED | OUTPATIENT
Start: 2017-11-06 | End: 2017-11-07

## 2017-11-06 RX ORDER — DEXMEDETOMIDINE HYDROCHLORIDE IN 0.9% SODIUM CHLORIDE 4 UG/ML
0.1 INJECTION INTRAVENOUS
Qty: 200 | Refills: 0 | Status: DISCONTINUED | OUTPATIENT
Start: 2017-11-06 | End: 2017-11-09

## 2017-11-06 RX ORDER — NYSTATIN CREAM 100000 [USP'U]/G
1 CREAM TOPICAL ONCE
Qty: 0 | Refills: 0 | Status: DISCONTINUED | OUTPATIENT
Start: 2017-11-06 | End: 2017-11-14

## 2017-11-06 RX ORDER — MIDAZOLAM HYDROCHLORIDE 1 MG/ML
7 INJECTION, SOLUTION INTRAMUSCULAR; INTRAVENOUS
Qty: 100 | Refills: 0 | Status: DISCONTINUED | OUTPATIENT
Start: 2017-11-06 | End: 2017-11-07

## 2017-11-06 RX ORDER — PHENOBARBITAL 60 MG
130 TABLET ORAL EVERY 12 HOURS
Qty: 0 | Refills: 0 | Status: DISCONTINUED | OUTPATIENT
Start: 2017-11-06 | End: 2017-11-10

## 2017-11-06 RX ORDER — QUETIAPINE FUMARATE 200 MG/1
50 TABLET, FILM COATED ORAL EVERY 12 HOURS
Qty: 0 | Refills: 0 | Status: DISCONTINUED | OUTPATIENT
Start: 2017-11-06 | End: 2017-11-08

## 2017-11-06 RX ADMIN — DEXMEDETOMIDINE HYDROCHLORIDE IN 0.9% SODIUM CHLORIDE 1.87 MICROGRAM(S)/KG/HR: 4 INJECTION INTRAVENOUS at 22:00

## 2017-11-06 RX ADMIN — PIPERACILLIN AND TAZOBACTAM 25 GRAM(S): 4; .5 INJECTION, POWDER, LYOPHILIZED, FOR SOLUTION INTRAVENOUS at 05:38

## 2017-11-06 RX ADMIN — ALBUTEROL 2 PUFF(S): 90 AEROSOL, METERED ORAL at 08:36

## 2017-11-06 RX ADMIN — ALBUTEROL 2 PUFF(S): 90 AEROSOL, METERED ORAL at 13:46

## 2017-11-06 RX ADMIN — Medication 130 MILLIGRAM(S): at 17:37

## 2017-11-06 RX ADMIN — SODIUM CHLORIDE 80 MILLILITER(S): 9 INJECTION, SOLUTION INTRAVENOUS at 05:47

## 2017-11-06 RX ADMIN — Medication 1 PUFF(S): at 13:46

## 2017-11-06 RX ADMIN — Medication 1 PATCH: at 12:32

## 2017-11-06 RX ADMIN — ALBUTEROL 2 PUFF(S): 90 AEROSOL, METERED ORAL at 03:37

## 2017-11-06 RX ADMIN — Medication 1 PUFF(S): at 03:37

## 2017-11-06 RX ADMIN — PIPERACILLIN AND TAZOBACTAM 25 GRAM(S): 4; .5 INJECTION, POWDER, LYOPHILIZED, FOR SOLUTION INTRAVENOUS at 13:25

## 2017-11-06 RX ADMIN — FENTANYL CITRATE 44.82 MICROGRAM(S)/KG/HR: 50 INJECTION INTRAVENOUS at 07:05

## 2017-11-06 RX ADMIN — MIDAZOLAM HYDROCHLORIDE 7 MG/HR: 1 INJECTION, SOLUTION INTRAMUSCULAR; INTRAVENOUS at 22:03

## 2017-11-06 RX ADMIN — ALBUTEROL 2 PUFF(S): 90 AEROSOL, METERED ORAL at 20:29

## 2017-11-06 RX ADMIN — Medication 1 PUFF(S): at 08:36

## 2017-11-06 RX ADMIN — FENTANYL CITRATE 44.82 MICROGRAM(S)/KG/HR: 50 INJECTION INTRAVENOUS at 01:38

## 2017-11-06 RX ADMIN — Medication 1 TABLET(S): at 12:32

## 2017-11-06 RX ADMIN — PANTOPRAZOLE SODIUM 40 MILLIGRAM(S): 20 TABLET, DELAYED RELEASE ORAL at 12:39

## 2017-11-06 RX ADMIN — Medication 1 PUFF(S): at 20:29

## 2017-11-06 RX ADMIN — FENTANYL CITRATE 22.41 MICROGRAM(S)/KG/HR: 50 INJECTION INTRAVENOUS at 13:25

## 2017-11-06 RX ADMIN — ENOXAPARIN SODIUM 40 MILLIGRAM(S): 100 INJECTION SUBCUTANEOUS at 12:32

## 2017-11-06 RX ADMIN — DEXMEDETOMIDINE HYDROCHLORIDE IN 0.9% SODIUM CHLORIDE 1.87 MICROGRAM(S)/KG/HR: 4 INJECTION INTRAVENOUS at 23:38

## 2017-11-06 RX ADMIN — Medication 250 MILLIGRAM(S): at 05:36

## 2017-11-06 RX ADMIN — Medication 2 MILLIGRAM(S): at 13:20

## 2017-11-06 RX ADMIN — Medication 100 MILLIGRAM(S): at 12:32

## 2017-11-06 RX ADMIN — DEXMEDETOMIDINE HYDROCHLORIDE IN 0.9% SODIUM CHLORIDE 1.87 MICROGRAM(S)/KG/HR: 4 INJECTION INTRAVENOUS at 17:38

## 2017-11-06 RX ADMIN — Medication 250 MILLIGRAM(S): at 17:38

## 2017-11-06 RX ADMIN — Medication 1 MILLIGRAM(S): at 12:32

## 2017-11-06 RX ADMIN — QUETIAPINE FUMARATE 50 MILLIGRAM(S): 200 TABLET, FILM COATED ORAL at 17:37

## 2017-11-06 RX ADMIN — MIDAZOLAM HYDROCHLORIDE 0.1 MG/HR: 1 INJECTION, SOLUTION INTRAMUSCULAR; INTRAVENOUS at 07:48

## 2017-11-06 RX ADMIN — PIPERACILLIN AND TAZOBACTAM 25 GRAM(S): 4; .5 INJECTION, POWDER, LYOPHILIZED, FOR SOLUTION INTRAVENOUS at 22:01

## 2017-11-06 RX ADMIN — Medication 1 PATCH: at 12:39

## 2017-11-06 RX ADMIN — QUETIAPINE FUMARATE 25 MILLIGRAM(S): 200 TABLET, FILM COATED ORAL at 05:37

## 2017-11-06 NOTE — PROGRESS NOTE ADULT - PROBLEM SELECTOR PLAN 3
Fever of 103 overnight  CXR: LLL PNA secondary to intubation vs possible fluid shifts  CXR has changed significantly over course of 3 days for the better - possibly fluid  F/u BCx and Legionella  Sputum Cx shows staph Aureus

## 2017-11-06 NOTE — PROGRESS NOTE ADULT - ASSESSMENT
A 37 years old male with PMH of EtOH abuse, recurrent alcohol induced pancreatitis, Fatty Liver, EtOH withdrawal seizure, h/o intubation x 2, presented to ER for evaluation of  worsening epigastric pain radiating to back. He has no fever or chills. In ER, found to have elevated lipase of 5599, AST/ALT 60/66, and CT abdomen shows Peripancreatic fluid and stranding, compatible with acute pancreatitis. No evidence for pancreatic necrosis. Interposing between the pancreatic head and the second part of duodenum, there is a small 1.5 x 1.3 cm fluid collection; this may represent a duodenal diverticulum or a pseudocyst. He is admitted to the floor for management of Acute Pancreatitis  and course complicated with ? DT's, s/p RRT for severe agitation due to DTs, required intubation and admitted to ICU on 11/1/17.Today, 11/4/17, he spiked fever and chest xray shows new Left base infiltrate. He has started on Vancomycin and Zosyn to cover HCAP, cultures pending. The ID consult requested to assist with further evaluation and antibiotic management.     # Pneumonia- Aspiration vs HCAP- Staph.aureus  # s/p Intubated  # Acute pancreatitis  # DTs    Would recommend;  1. Follow up  Sensitivity of Staph. aureus  2. Continue Vancomycin and zosyn until sensitivity of Staph.aureus is available  3. Monitor Temp. and continue supportive care  4. Aspiration precaution  5. Vent. Management as per ICU team    d/w ICU team    will follow the patient with you

## 2017-11-06 NOTE — PROGRESS NOTE ADULT - SUBJECTIVE AND OBJECTIVE BOX
INTERVAL HPI/OVERNIGHT EVENTS: no new fevers    PRESSORS: [] YES [x] NO  WHICH:    ANTIBIOTICS: Vanc                 DATE STARTED:   ANTIBIOTICS: Zosyn                 DATE STARTED:   ANTIBIOTICS:                  DATE STARTED:    Antimicrobial:  piperacillin/tazobactam IVPB. 3.375 Gram(s) IV Intermittent every 8 hours  vancomycin  IVPB 1000 milliGRAM(s) IV Intermittent every 12 hours    Cardiovascular:    Pulmonary:  ALBUTerol    90 MICROgram(s) HFA Inhaler 2 Puff(s) Inhalation every 6 hours  ipratropium 17 MICROgram(s) HFA Inhaler 1 Puff(s) Inhalation every 6 hours    Hematalogic:  enoxaparin Injectable 40 milliGRAM(s) SubCutaneous daily    Other:  fentaNYL   Infusion 3 MICROgram(s)/kG/Hr IV Continuous <Continuous>  folic acid 1 milliGRAM(s) Oral daily  influenza   Vaccine 0.5 milliLiter(s) IntraMuscular once  insulin lispro (HumaLOG) corrective regimen sliding scale   SubCutaneous Before meals and at bedtime  midazolam Infusion 0.1 mG/Hr IV Continuous <Continuous>  multivitamin 1 Tablet(s) Oral daily  nicotine - 21 mG/24Hr(s) Patch 1 patch Transdermal daily  nystatin Powder 1 Application(s) Topical once PRN  pantoprazole  Injectable 40 milliGRAM(s) IV Push daily  QUEtiapine 50 milliGRAM(s) Oral every 12 hours  thiamine 100 milliGRAM(s) Oral daily  vitamin B complex with vitamin C 1 Tablet(s) Oral daily    ALBUTerol    90 MICROgram(s) HFA Inhaler 2 Puff(s) Inhalation every 6 hours  enoxaparin Injectable 40 milliGRAM(s) SubCutaneous daily  fentaNYL   Infusion 3 MICROgram(s)/kG/Hr IV Continuous <Continuous>  folic acid 1 milliGRAM(s) Oral daily  influenza   Vaccine 0.5 milliLiter(s) IntraMuscular once  insulin lispro (HumaLOG) corrective regimen sliding scale   SubCutaneous Before meals and at bedtime  ipratropium 17 MICROgram(s) HFA Inhaler 1 Puff(s) Inhalation every 6 hours  midazolam Infusion 0.1 mG/Hr IV Continuous <Continuous>  multivitamin 1 Tablet(s) Oral daily  nicotine - 21 mG/24Hr(s) Patch 1 patch Transdermal daily  nystatin Powder 1 Application(s) Topical once PRN  pantoprazole  Injectable 40 milliGRAM(s) IV Push daily  piperacillin/tazobactam IVPB. 3.375 Gram(s) IV Intermittent every 8 hours  QUEtiapine 50 milliGRAM(s) Oral every 12 hours  thiamine 100 milliGRAM(s) Oral daily  vancomycin  IVPB 1000 milliGRAM(s) IV Intermittent every 12 hours  vitamin B complex with vitamin C 1 Tablet(s) Oral daily    Drug Dosing Weight  Height (cm): 175.26 (2017 12:00)  Weight (kg): 74.7 (2017 12:00)  BMI (kg/m2): 24.3 (2017 12:00)  BSA (m2): 1.9 (2017 12:00)    CENTRAL LINE: [] YES [x] NO  LOCATION:   DATE INSERTED:  REMOVE: [] YES [] NO  EXPLAIN:    RHODES: [x] YES [] NO    DATE INSERTED:   REMOVE:  [x] YES [] NO  EXPLAIN: change to [place]    A-LINE:  [] YES [x] NO  LOCATION:   DATE INSERTED:  REMOVE:  [] YES [] NO  EXPLAIN:    PMH -reviewed admission note, no change since admission  PAST MEDICAL & SURGICAL HISTORY:  Prediabetes  Alcohol induced acute pancreatitis  Pancreatitis  Alcoholism  Fatty liver  Gastroenteritis  No significant past surgical history      ICU Vital Signs Last 24 Hrs  T(C): 36.9 (2017 07:00), Max: 36.9 (2017 23:00)  T(F): 98.5 (2017 07:00), Max: 98.5 (2017 23:00)  HR: 97 (2017 11:00) (61 - 126)  BP: 115/70 (2017 11:00) (107/58 - 133/68)  BP(mean): 79 (2017 11:00) (70 - 84)  ABP: --  ABP(mean): --  RR: 17 (2017 11:00) (15 - 17)  SpO2: 97% (2017 11:00) (90% - 100%)      ABG - ( 2017 04:49 )  pH: 7.34  /  pCO2: 56    /  pO2: 115   / HCO3: 29    / Base Excess: 3.0   /  SaO2: 98                     @ 07:01  -   @ 07:00  --------------------------------------------------------  IN: 4116.6 mL / OUT: 3935 mL / NET: 181.6 mL        Mode: AC/ CMV (Assist Control/ Continuous Mandatory Ventilation)  RR (machine): 16  TV (machine): 500  FiO2: 30  PEEP: 5  ITime: 1  MAP: 11  PIP: 27      PHYSICAL EXAM:    GENERAL: [x]NAD, [x]well-groomed, [x]well-developed  HEAD:  [x]Atraumatic, [x]Normocephalic  EYES: []EOMI, [x]PERRLA, [x]conjunctiva and sclera clear  ENMT: []No tonsillar erythema, exudates, or enlargement; [x]Moist mucous membranes, [x]Good dentition, []No lesions  NECK: []Supple, normal appearance, []No JVD; [x]Normal thyroid; [x]Trachea midline  NERVOUS SYSTEM:  [x]awake and answer questions with yes/no, []Good concentration; []Motor Strength 5/5 B/L upper and lower extremities; []DTRs 2+ intact and symmetric  CHEST/LUNG: [x]No chest deformity; [x]Normal percussion bilaterally; [x]No rales, rhonchi, wheezing   HEART: [x]Regular rate and rhythm; [x]No murmurs, rubs, or gallops  ABDOMEN: [x]Soft, Nontender, Nondistended; [x]Bowel sounds present  EXTREMITIES:  [x]2+ Peripheral Pulses, [x]No clubbing, cyanosis, or edema  LYMPH: [x]No lymphadenopathy noted  SKIN: []No rashes or lesions; [x]Good capillary refill        LABS:  CBC Full  -  ( 2017 06:03 )  WBC Count : 6.2 K/uL  Hemoglobin : 12.5 g/dL  Hematocrit : 37.3 %  Platelet Count - Automated : 184 K/uL  Mean Cell Volume : 99.6 fl  Mean Cell Hemoglobin : 33.3 pg  Mean Cell Hemoglobin Concentration : 33.4 gm/dL  Auto Neutrophil # : x  Auto Lymphocyte # : x  Auto Monocyte # : x  Auto Eosinophil # : x  Auto Basophil # : x  Auto Neutrophil % : x  Auto Lymphocyte % : x  Auto Monocyte % : x  Auto Eosinophil % : x  Auto Basophil % : x    11-06    140  |  105  |  4<L>  ----------------------------<  99  3.6   |  29  |  0.49<L>    Ca    8.9      2017 06:03  Phos  4.1       Mg     1.8         TPro  6.5  /  Alb  2.3<L>  /  TBili  0.3  /  DBili  x   /  AST  57<H>  /  ALT  43  /  AlkPhos  108        Urinalysis Basic - ( 2017 17:11 )    Color: Yellow / Appearance: Clear / S.010 / pH: x  Gluc: x / Ketone: Negative  / Bili: Negative / Urobili: 1   Blood: x / Protein: Negative / Nitrite: Negative   Leuk Esterase: Negative / RBC: 10-25 /HPF / WBC 0-2 /HPF   Sq Epi: x / Non Sq Epi: Occasional /HPF / Bacteria: x      Culture Results:   Numerous Staphylococcus aureus ( @ 07:31)  Culture Results:   No growth to date. ( @ 23:06)  Culture Results:   No growth to date. ( @ 23:06)      RADIOLOGY & ADDITIONAL STUDIES REVIEWED: cxr shows large change in LLL haziness    []GOALS OF CARE DISCUSSION WITH PATIENT/FAMILY/PROXY:    CRITICAL CARE TIME SPENT: 35 minutes

## 2017-11-06 NOTE — CONSULT NOTE ADULT - SUBJECTIVE AND OBJECTIVE BOX
37 years old male from home lives with girl friend, with PMH of EtOH abuse, recurrent alcohol induced pancreatitis, Fatty Liver, EtOH withdrawal seizure(intubated 2 times), pre-DM(not on med), and gastroenteritis presented to ED c/o worsening epigastric pain over 2 days. Pain is throbbing, epigastric area radiating to the back,10/10 when most severe, and is not related to food intake. It was initially intermittent but became constant since this morning. Denies having nausea, vomiting, fever, diarrhea, SOB, or any other symptoms. Reports last drink was this morning, he has been drinking 10 beers everyday over the last month.  Admitted to the medicine floor for EtOH induced acute pancreatitis. Consulted for right hallux swelling and redness. Patient intubated and on a vent.     Review of Systems:  Other Review of Systems: All other review of systems negative, except as noted in HPI      Allergies and Intolerances:        Allergies:  	No Known Allergies:        Intolerances:  	morphine: Drug, Other, palpitations, agitations  	Ativan: Drug, Unknown    Home Medications:   * Patient Currently Takes Medications as of 30-Oct-2017 12:10 documented in Structured Notes  · 	omeprazole 40 mg oral delayed release capsule: 1 cap(s) orally once a day  · 	nicotine 21 mg/24 hr transdermal film, extended release:  transdermal   · 	Multiple Vitamins oral tablet: 1 tab(s) orally once a day  · 	folic acid 1 mg oral tablet: 1 tab(s) orally once a day  · 	thiamine 100 mg oral tablet: 1 tab(s) orally once a day  · 	B Complex 50 oral tablet, extended release: 1 tab(s) orally once a day    .    Patient History:    Past Medical History:  Alcohol induced acute pancreatitis    Alcoholism    Fatty liver    Gastroenteritis    Pancreatitis    Prediabetes.     Past Surgical History:  No significant past surgical history.     Family History:  Grandparent  Still living? Unknown  Diabetes mellitus, Age at diagnosis: Age Unknown.     Social History:  Social History (marital status, living situation, occupation, tobacco use, alcohol and drug use, and sexual history): EtOH; daily use, 10 beers per a day  	SmokinPPD x 20 years  No illicit drug use reported     Tobacco Screening:  · Core Measure Site	Yes  · Has the patient used tobacco in the past 30 days?	Yes  · Tobacco Cessation Education/Counseling	Offered and provided  · Tobacco Cessation Medication	Offered and patient accepted    PHYSICAL EXAM:  37y year old Male seen at bedside intubated.   Admitted with ETOH induced acute pancreatitis    ICU Vital Signs Last 24 Hrs  T(C): 37.6 (2017 15:24), Max: 37.6 (2017 15:24)  T(F): 99.6 (2017 15:24), Max: 99.6 (2017 15:24)  HR: 91 (2017 18:00) (61 - 126)  BP: 110/58 (2017 18:00) (107/58 - 144/68)  BP(mean): 69 (2017 18:00) (69 - 91)  ABP: --  ABP(mean): --  RR: 16 (2017 18:00) (13 - 17)  SpO2: 97% (2017 18:00) (90% - 100%)    O:   General: Intubated   Integument:  Skin warm, dry and supple bilateral; diffused erythema with mild swelling right big toe. No open wound, no eec  Vascular: Dorsalis Pedis and Posterior Tibial pulses 2/4 bilateral;  Capillary re-fill time less then 3 seconds digits 1-5 bilateral.    Neuro: Protective sensation unable to assess intubated  M/S: Lateral deviated hallux bilateral and dorsally contracted digits 2-5 bilateral    Labs:                   12.5   6.2   )-----------( 184      ( 2017 06:03 )             37.3             140  |  105  |  4<L>  ----------------------------<  99  3.6   |  29  |  0.49    Ca    8.9      2017   Mg     1.8

## 2017-11-06 NOTE — PROGRESS NOTE ADULT - ASSESSMENT
37 years old male from home lives with girl friend, with PMH of EtOH abuse, recurrent alcohol induced pancreatitis, Fatty Liver, EtOH withdrawal seizure(intubated 2 times), pre-DM (not on med), and gastroenteritis presented to ED c/o worsening epigastric pain .Admitted for Acute Pancreatitis, had RRT and was brought intubated for airway protection in delirium tremens.    Spoke to the mother at bedside and updated her about the Fever due to PNA due to intubation.

## 2017-11-06 NOTE — PROGRESS NOTE ADULT - PROBLEM SELECTOR PLAN 1
Patient is better sedated on Fentanyl and Versed  -will decrease fentanyl and keep versed for now  -increased seroquel  Ventilator support as patient was intubated for airway protection  ABG showing well oxygenated on current vent settings  Keep a net negative for possible SBT in few days  -c/w vitamins

## 2017-11-06 NOTE — CONSULT NOTE ADULT - ASSESSMENT
A: Right hallux venous congestion  H/O EtOH abuse, recurrent alcohol induced pancreatitis, Fatty Liver, EtOH withdrawal seizure, pre-DM, and gastroenteritis     P:   Chart reviewed and Patient evaluated  Recommend elevation of extremities B/L  Off load bilateral heel.    Will monitor

## 2017-11-06 NOTE — PROGRESS NOTE ADULT - SUBJECTIVE AND OBJECTIVE BOX
Patient is seen and examined at the bed side, remains afebrile. He remains intubated and sedated. The WBC count trended down to normal. The sputum culture growing Numerous Staph. aureus.          REVIEW OF SYSTEMS: Unable to obtain since patient is intubated/sedated          ICU Vital Signs Last 24 Hrs  T(C): 37.6 (2017 15:24), Max: 37.6 (2017 15:24)  T(F): 99.6 (2017 15:24), Max: 99.6 (2017 15:24)  HR: 91 (2017 18:00) (61 - 126)  BP: 110/58 (2017 18:00) (107/58 - 144/68)  BP(mean): 69 (2017 18:00) (69 - 91)  ABP: --  ABP(mean): --  RR: 16 (2017 18:00) (13 - 17)  SpO2: 97% (2017 18:00) (90% - 100%)            PHYSICAL EXAM:  GENERAL: intubated/sedated  CVS; s1 and s2 present  RESP: air entry b/L  GI: abdomen  soft and nontender  EXT: No pedal edema  CNS: sedated/intubated          ALLERGIES: No Known Drug Allergies            LABS:                        12.5   6.2   )-----------( 184      ( 2017 06:03 )             37.3                           14.6   11.4  )-----------( 145      ( 2017 06:23 )             42.8                           9.1    3.1   )-----------( CLUMPED    ( 2017 17:42 )             27.5         11-05    138  |  105  |  5<L>  ----------------------------<  137<H>  4.2   |  26  |  0.69    Ca    8.8      2017 06:23  Phos  3.5     11-05  Mg     1.9     11-05      Urinalysis Basic - ( 2017 17:11 )    Color: Yellow / Appearance: Clear / S.010 / pH: x  Gluc: x / Ketone: Negative  / Bili: Negative / Urobili: 1   Blood: x / Protein: Negative / Nitrite: Negative   Leuk Esterase: Negative / RBC: 10-25 /HPF / WBC 0-2 /HPF   Sq Epi: x / Non Sq Epi: Occasional /HPF / Bacteria: x        POCT Blood Glucose.: 86 mg/dL (2017 06:23)  POCT Blood Glucose.: 92 mg/dL (2017 23:49)    ABG - ( 2017 05:19 )  pH: 7.34  /  pCO2: 54    /  pO2: 65    / HCO3: 28    / Base Excess: 1.5   /  SaO2: 90            MEDICATIONS  (STANDING):  ALBUTerol    90 MICROgram(s) HFA Inhaler 2 Puff(s) Inhalation every 6 hours  dexmedetomidine Infusion 0.1 MICROgram(s)/kG/Hr (1.868 mL/Hr) IV Continuous <Continuous>  enoxaparin Injectable 40 milliGRAM(s) SubCutaneous daily  fentaNYL   Infusion 3 MICROgram(s)/kG/Hr (22.41 mL/Hr) IV Continuous <Continuous>  folic acid 1 milliGRAM(s) Oral daily  influenza   Vaccine 0.5 milliLiter(s) IntraMuscular once  insulin lispro (HumaLOG) corrective regimen sliding scale   SubCutaneous Before meals and at bedtime  ipratropium 17 MICROgram(s) HFA Inhaler 1 Puff(s) Inhalation every 6 hours  midazolam Infusion 7 mG/Hr (7 mL/Hr) IV Continuous <Continuous>  nicotine - 21 mG/24Hr(s) Patch 1 patch Transdermal daily  pantoprazole  Injectable 40 milliGRAM(s) IV Push daily  PHENobarbital Injectable 130 milliGRAM(s) IV Push every 12 hours  piperacillin/tazobactam IVPB. 3.375 Gram(s) IV Intermittent every 8 hours  QUEtiapine 50 milliGRAM(s) Oral every 12 hours  thiamine 100 milliGRAM(s) Oral daily  vancomycin  IVPB 1000 milliGRAM(s) IV Intermittent every 12 hours  vitamin B complex with vitamin C 1 Tablet(s) Oral daily    MEDICATIONS  (PRN):  LORazepam   Injectable 2 milliGRAM(s) IntraMuscular every 4 hours PRN Agitation  nystatin Powder 1 Application(s) Topical once PRN itching          RADIOLOGY & ADDITIONAL TESTS:    17 : Xray Chest 1 View AP-PORTABLE IMMEDIATE (17 @ 11:32) :On  of this year there was some infiltrate at left base which is no longer evident. Lungs are presently clear.    17: Xray Chest 1 View AP-PORTABLE IMMEDIATE (17 @ 17:05) :New left base infiltrate. Feeding tube at least to stomach. Thank you for the courtesy of this referral.          MICROBIOLOGY DATA:    Legionella pneumophila Antigen, Urine (17 @ 12:22)    Legionella Antigen, Urine: Negative    Culture - Sputum . (17 @ 07:31)    Gram Stain:   Few polymorphonuclear leukocytes per low power field  No Squamous epithelial cells per low power field  Numerous Gram Positive Cocci in Clusters per oil power field    Specimen Source: .Sputum Sputum, trap    Culture Results:   Numerous Staphylococcus aureus        Culture - Sputum . (17 @ 07:31)    Gram Stain:   Few polymorphonuclear leukocytes per low power field  No Squamous epithelial cells per low power field  Numerous Gram Positive Cocci in Clusters per oil power field    Specimen Source: .Sputum Sputum, trap

## 2017-11-06 NOTE — PROGRESS NOTE ADULT - ATTENDING COMMENTS
37 male with hx of ETOH abuse, multiple prior episodes of withdrawal, now with delirium tremens/ETOH withdrawal, acute respiratory failure.  Also with left sided opacity on CXR concerning for possible aspiration pneumonia, growing staph in sputum.  Will titrate sedation as tolerated.  Check procalcitonin, repeat cultures, continue abx.    Continue tube feeds, GI and DVT prophylaxis.    Total CC time 35 min.

## 2017-11-06 NOTE — PROGRESS NOTE ADULT - PROBLEM SELECTOR PLAN 2
Will continue with hydration @ 40 cc  No propofol sec to pancreatitis   Started on tube feeds - patient is tolerating well

## 2017-11-07 LAB
-  AMPICILLIN/SULBACTAM: SIGNIFICANT CHANGE UP
-  CEFAZOLIN: SIGNIFICANT CHANGE UP
-  CIPROFLOXACIN: SIGNIFICANT CHANGE UP
-  CLINDAMYCIN: SIGNIFICANT CHANGE UP
-  ERYTHROMYCIN: SIGNIFICANT CHANGE UP
-  GENTAMICIN: SIGNIFICANT CHANGE UP
-  LEVOFLOXACIN: SIGNIFICANT CHANGE UP
-  MOXIFLOXACIN(AEROBIC): SIGNIFICANT CHANGE UP
-  OXACILLIN: SIGNIFICANT CHANGE UP
-  PENICILLIN: SIGNIFICANT CHANGE UP
-  RIFAMPIN: SIGNIFICANT CHANGE UP
-  TETRACYCLINE: SIGNIFICANT CHANGE UP
-  TRIMETHOPRIM/SULFAMETHOXAZOLE: SIGNIFICANT CHANGE UP
-  VANCOMYCIN: SIGNIFICANT CHANGE UP
ANION GAP SERPL CALC-SCNC: 8 MMOL/L — SIGNIFICANT CHANGE UP (ref 5–17)
BASE EXCESS BLDA CALC-SCNC: 2.1 MMOL/L — HIGH (ref -2–2)
BLOOD GAS COMMENTS ARTERIAL: SIGNIFICANT CHANGE UP
BUN SERPL-MCNC: 5 MG/DL — LOW (ref 7–18)
CALCIUM SERPL-MCNC: 8.9 MG/DL — SIGNIFICANT CHANGE UP (ref 8.4–10.5)
CHLORIDE SERPL-SCNC: 106 MMOL/L — SIGNIFICANT CHANGE UP (ref 96–108)
CO2 SERPL-SCNC: 27 MMOL/L — SIGNIFICANT CHANGE UP (ref 22–31)
CREAT SERPL-MCNC: 0.52 MG/DL — SIGNIFICANT CHANGE UP (ref 0.5–1.3)
CULTURE RESULTS: SIGNIFICANT CHANGE UP
GLUCOSE BLDC GLUCOMTR-MCNC: 105 MG/DL — HIGH (ref 70–99)
GLUCOSE BLDC GLUCOMTR-MCNC: 139 MG/DL — HIGH (ref 70–99)
GLUCOSE BLDC GLUCOMTR-MCNC: 173 MG/DL — HIGH (ref 70–99)
GLUCOSE BLDC GLUCOMTR-MCNC: 86 MG/DL — SIGNIFICANT CHANGE UP (ref 70–99)
GLUCOSE SERPL-MCNC: 163 MG/DL — HIGH (ref 70–99)
HCO3 BLDA-SCNC: 26 MMOL/L — SIGNIFICANT CHANGE UP (ref 23–27)
HCT VFR BLD CALC: 37 % — LOW (ref 39–50)
HGB BLD-MCNC: 12.7 G/DL — LOW (ref 13–17)
HOROWITZ INDEX BLDA+IHG-RTO: 30 — SIGNIFICANT CHANGE UP
MAGNESIUM SERPL-MCNC: 1.8 MG/DL — SIGNIFICANT CHANGE UP (ref 1.6–2.6)
MCHC RBC-ENTMCNC: 33.6 PG — SIGNIFICANT CHANGE UP (ref 27–34)
MCHC RBC-ENTMCNC: 34.4 GM/DL — SIGNIFICANT CHANGE UP (ref 32–36)
MCV RBC AUTO: 97.5 FL — SIGNIFICANT CHANGE UP (ref 80–100)
METHOD TYPE: SIGNIFICANT CHANGE UP
ORGANISM # SPEC MICROSCOPIC CNT: SIGNIFICANT CHANGE UP
ORGANISM # SPEC MICROSCOPIC CNT: SIGNIFICANT CHANGE UP
PCO2 BLDA: 42 MMHG — SIGNIFICANT CHANGE UP (ref 32–46)
PH BLDA: 7.41 — SIGNIFICANT CHANGE UP (ref 7.35–7.45)
PHOSPHATE SERPL-MCNC: 3.8 MG/DL — SIGNIFICANT CHANGE UP (ref 2.5–4.5)
PLATELET # BLD AUTO: 260 K/UL — SIGNIFICANT CHANGE UP (ref 150–400)
PO2 BLDA: 90 MMHG — SIGNIFICANT CHANGE UP (ref 74–108)
POTASSIUM SERPL-MCNC: 3.6 MMOL/L — SIGNIFICANT CHANGE UP (ref 3.5–5.3)
POTASSIUM SERPL-SCNC: 3.6 MMOL/L — SIGNIFICANT CHANGE UP (ref 3.5–5.3)
RBC # BLD: 3.79 M/UL — LOW (ref 4.2–5.8)
RBC # FLD: 11.8 % — SIGNIFICANT CHANGE UP (ref 10.3–14.5)
SAO2 % BLDA: 96 % — SIGNIFICANT CHANGE UP (ref 92–96)
SODIUM SERPL-SCNC: 141 MMOL/L — SIGNIFICANT CHANGE UP (ref 135–145)
SPECIMEN SOURCE: SIGNIFICANT CHANGE UP
WBC # BLD: 7.3 K/UL — SIGNIFICANT CHANGE UP (ref 3.8–10.5)
WBC # FLD AUTO: 7.3 K/UL — SIGNIFICANT CHANGE UP (ref 3.8–10.5)

## 2017-11-07 PROCEDURE — 71010: CPT | Mod: 26,77

## 2017-11-07 PROCEDURE — 71010: CPT | Mod: 26

## 2017-11-07 RX ORDER — PHENOBARBITAL 60 MG
65 TABLET ORAL ONCE
Qty: 0 | Refills: 0 | Status: DISCONTINUED | OUTPATIENT
Start: 2017-11-07 | End: 2017-11-07

## 2017-11-07 RX ORDER — MIDAZOLAM HYDROCHLORIDE 1 MG/ML
5 INJECTION, SOLUTION INTRAMUSCULAR; INTRAVENOUS
Qty: 100 | Refills: 0 | Status: DISCONTINUED | OUTPATIENT
Start: 2017-11-07 | End: 2017-11-07

## 2017-11-07 RX ORDER — FENTANYL CITRATE 50 UG/ML
1 INJECTION INTRAVENOUS
Qty: 2500 | Refills: 0 | Status: DISCONTINUED | OUTPATIENT
Start: 2017-11-07 | End: 2017-11-08

## 2017-11-07 RX ORDER — MIDAZOLAM HYDROCHLORIDE 1 MG/ML
5 INJECTION, SOLUTION INTRAMUSCULAR; INTRAVENOUS
Qty: 100 | Refills: 0 | Status: DISCONTINUED | OUTPATIENT
Start: 2017-11-07 | End: 2017-11-08

## 2017-11-07 RX ADMIN — MIDAZOLAM HYDROCHLORIDE 5 MG/HR: 1 INJECTION, SOLUTION INTRAMUSCULAR; INTRAVENOUS at 15:02

## 2017-11-07 RX ADMIN — Medication 1 MILLIGRAM(S): at 13:43

## 2017-11-07 RX ADMIN — DEXMEDETOMIDINE HYDROCHLORIDE IN 0.9% SODIUM CHLORIDE 1.87 MICROGRAM(S)/KG/HR: 4 INJECTION INTRAVENOUS at 15:03

## 2017-11-07 RX ADMIN — ENOXAPARIN SODIUM 40 MILLIGRAM(S): 100 INJECTION SUBCUTANEOUS at 13:09

## 2017-11-07 RX ADMIN — Medication 65 MILLIGRAM(S): at 18:04

## 2017-11-07 RX ADMIN — Medication 130 MILLIGRAM(S): at 17:00

## 2017-11-07 RX ADMIN — Medication 100 MILLIGRAM(S): at 13:43

## 2017-11-07 RX ADMIN — Medication 1 PUFF(S): at 14:01

## 2017-11-07 RX ADMIN — PANTOPRAZOLE SODIUM 40 MILLIGRAM(S): 20 TABLET, DELAYED RELEASE ORAL at 13:09

## 2017-11-07 RX ADMIN — Medication 130 MILLIGRAM(S): at 05:05

## 2017-11-07 RX ADMIN — DEXMEDETOMIDINE HYDROCHLORIDE IN 0.9% SODIUM CHLORIDE 1.87 MICROGRAM(S)/KG/HR: 4 INJECTION INTRAVENOUS at 07:15

## 2017-11-07 RX ADMIN — PIPERACILLIN AND TAZOBACTAM 25 GRAM(S): 4; .5 INJECTION, POWDER, LYOPHILIZED, FOR SOLUTION INTRAVENOUS at 21:12

## 2017-11-07 RX ADMIN — DEXMEDETOMIDINE HYDROCHLORIDE IN 0.9% SODIUM CHLORIDE 1.87 MICROGRAM(S)/KG/HR: 4 INJECTION INTRAVENOUS at 05:04

## 2017-11-07 RX ADMIN — DEXMEDETOMIDINE HYDROCHLORIDE IN 0.9% SODIUM CHLORIDE 1.87 MICROGRAM(S)/KG/HR: 4 INJECTION INTRAVENOUS at 00:41

## 2017-11-07 RX ADMIN — MIDAZOLAM HYDROCHLORIDE 5 MG/HR: 1 INJECTION, SOLUTION INTRAMUSCULAR; INTRAVENOUS at 13:08

## 2017-11-07 RX ADMIN — ALBUTEROL 2 PUFF(S): 90 AEROSOL, METERED ORAL at 21:30

## 2017-11-07 RX ADMIN — Medication 1 TABLET(S): at 13:43

## 2017-11-07 RX ADMIN — DEXMEDETOMIDINE HYDROCHLORIDE IN 0.9% SODIUM CHLORIDE 1.87 MICROGRAM(S)/KG/HR: 4 INJECTION INTRAVENOUS at 13:08

## 2017-11-07 RX ADMIN — FENTANYL CITRATE 22.41 MICROGRAM(S)/KG/HR: 50 INJECTION INTRAVENOUS at 00:39

## 2017-11-07 RX ADMIN — Medication 1 PATCH: at 13:09

## 2017-11-07 RX ADMIN — Medication 4 MILLIGRAM(S): at 21:10

## 2017-11-07 RX ADMIN — Medication 1 PUFF(S): at 02:08

## 2017-11-07 RX ADMIN — DEXMEDETOMIDINE HYDROCHLORIDE IN 0.9% SODIUM CHLORIDE 1.87 MICROGRAM(S)/KG/HR: 4 INJECTION INTRAVENOUS at 20:00

## 2017-11-07 RX ADMIN — DEXMEDETOMIDINE HYDROCHLORIDE IN 0.9% SODIUM CHLORIDE 1.87 MICROGRAM(S)/KG/HR: 4 INJECTION INTRAVENOUS at 23:00

## 2017-11-07 RX ADMIN — Medication 1 PATCH: at 13:08

## 2017-11-07 RX ADMIN — Medication 250 MILLIGRAM(S): at 05:06

## 2017-11-07 RX ADMIN — PIPERACILLIN AND TAZOBACTAM 25 GRAM(S): 4; .5 INJECTION, POWDER, LYOPHILIZED, FOR SOLUTION INTRAVENOUS at 05:07

## 2017-11-07 RX ADMIN — Medication 1: at 05:05

## 2017-11-07 RX ADMIN — ALBUTEROL 2 PUFF(S): 90 AEROSOL, METERED ORAL at 09:07

## 2017-11-07 RX ADMIN — ALBUTEROL 2 PUFF(S): 90 AEROSOL, METERED ORAL at 14:01

## 2017-11-07 RX ADMIN — Medication 1 PUFF(S): at 21:30

## 2017-11-07 RX ADMIN — QUETIAPINE FUMARATE 50 MILLIGRAM(S): 200 TABLET, FILM COATED ORAL at 05:06

## 2017-11-07 RX ADMIN — QUETIAPINE FUMARATE 50 MILLIGRAM(S): 200 TABLET, FILM COATED ORAL at 17:00

## 2017-11-07 RX ADMIN — FENTANYL CITRATE 7.47 MICROGRAM(S)/KG/HR: 50 INJECTION INTRAVENOUS at 18:31

## 2017-11-07 RX ADMIN — Medication 1 PUFF(S): at 09:07

## 2017-11-07 RX ADMIN — ALBUTEROL 2 PUFF(S): 90 AEROSOL, METERED ORAL at 02:08

## 2017-11-07 RX ADMIN — PIPERACILLIN AND TAZOBACTAM 25 GRAM(S): 4; .5 INJECTION, POWDER, LYOPHILIZED, FOR SOLUTION INTRAVENOUS at 13:09

## 2017-11-07 NOTE — PROGRESS NOTE ADULT - ASSESSMENT
A 37 years old male with PMH of EtOH abuse, recurrent alcohol induced pancreatitis, Fatty Liver, EtOH withdrawal seizure, h/o intubation x 2, presented to ER for evaluation of  worsening epigastric pain radiating to back. He has no fever or chills. In ER, found to have elevated lipase of 5599, AST/ALT 60/66, and CT abdomen shows Peripancreatic fluid and stranding, compatible with acute pancreatitis. No evidence for pancreatic necrosis. Interposing between the pancreatic head and the second part of duodenum, there is a small 1.5 x 1.3 cm fluid collection; this may represent a duodenal diverticulum or a pseudocyst. He is admitted to the floor for management of Acute Pancreatitis  and course complicated with ? DT's, s/p RRT for severe agitation due to DTs, required intubation and admitted to ICU on 11/1/17.Today, 11/4/17, he spiked fever and chest xray shows new Left base infiltrate. He has started on Vancomycin and Zosyn to cover HCAP, cultures pending. The ID consult requested to assist with further evaluation and antibiotic management.     # Pneumonia- Aspiration vs HCAP- MSSA  # s/p Intubated  # Acute pancreatitis  # DTs    Would recommend;  1. Discontinue Vancomycin since no MRSA  2. Continue Zosyn to cover MSSA and anaerobes  3. Aspiration precaution  4. Vent. Management as per ICU team    d/w ICU team    will follow the patient with you

## 2017-11-07 NOTE — PROGRESS NOTE ADULT - SUBJECTIVE AND OBJECTIVE BOX
INTERVAL HPI/OVERNIGHT EVENTS: sedation decreased and patient became a little agitated but calmed down after starting phenobarb    PRESSORS: [] YES [x] NO  WHICH:    ANTIBIOTICS:  Vanc                DATE STARTED: 11/4  ANTIBIOTICS:  Zosyn                DATE STARTED: 11/4  ANTIBIOTICS:                  DATE STARTED:    Antimicrobial:  piperacillin/tazobactam IVPB. 3.375 Gram(s) IV Intermittent every 8 hours  vancomycin  IVPB 1000 milliGRAM(s) IV Intermittent every 12 hours    Cardiovascular:    Pulmonary:  ALBUTerol    90 MICROgram(s) HFA Inhaler 2 Puff(s) Inhalation every 6 hours  ipratropium 17 MICROgram(s) HFA Inhaler 1 Puff(s) Inhalation every 6 hours    Hematalogic:  enoxaparin Injectable 40 milliGRAM(s) SubCutaneous daily    Other:  dexmedetomidine Infusion 0.1 MICROgram(s)/kG/Hr IV Continuous <Continuous>  fentaNYL   Infusion 3 MICROgram(s)/kG/Hr IV Continuous <Continuous>  folic acid 1 milliGRAM(s) Oral daily  influenza   Vaccine 0.5 milliLiter(s) IntraMuscular once  insulin lispro (HumaLOG) corrective regimen sliding scale   SubCutaneous Before meals and at bedtime  LORazepam   Injectable 2 milliGRAM(s) IntraMuscular every 4 hours PRN  midazolam Infusion 7 mG/Hr IV Continuous <Continuous>  nicotine - 21 mG/24Hr(s) Patch 1 patch Transdermal daily  nystatin Powder 1 Application(s) Topical once PRN  pantoprazole  Injectable 40 milliGRAM(s) IV Push daily  PHENobarbital Injectable 130 milliGRAM(s) IV Push every 12 hours  QUEtiapine 50 milliGRAM(s) Oral every 12 hours  thiamine 100 milliGRAM(s) Oral daily  vitamin B complex with vitamin C 1 Tablet(s) Oral daily    ALBUTerol    90 MICROgram(s) HFA Inhaler 2 Puff(s) Inhalation every 6 hours  dexmedetomidine Infusion 0.1 MICROgram(s)/kG/Hr IV Continuous <Continuous>  enoxaparin Injectable 40 milliGRAM(s) SubCutaneous daily  fentaNYL   Infusion 3 MICROgram(s)/kG/Hr IV Continuous <Continuous>  folic acid 1 milliGRAM(s) Oral daily  influenza   Vaccine 0.5 milliLiter(s) IntraMuscular once  insulin lispro (HumaLOG) corrective regimen sliding scale   SubCutaneous Before meals and at bedtime  ipratropium 17 MICROgram(s) HFA Inhaler 1 Puff(s) Inhalation every 6 hours  LORazepam   Injectable 2 milliGRAM(s) IntraMuscular every 4 hours PRN  midazolam Infusion 7 mG/Hr IV Continuous <Continuous>  nicotine - 21 mG/24Hr(s) Patch 1 patch Transdermal daily  nystatin Powder 1 Application(s) Topical once PRN  pantoprazole  Injectable 40 milliGRAM(s) IV Push daily  PHENobarbital Injectable 130 milliGRAM(s) IV Push every 12 hours  piperacillin/tazobactam IVPB. 3.375 Gram(s) IV Intermittent every 8 hours  QUEtiapine 50 milliGRAM(s) Oral every 12 hours  thiamine 100 milliGRAM(s) Oral daily  vancomycin  IVPB 1000 milliGRAM(s) IV Intermittent every 12 hours  vitamin B complex with vitamin C 1 Tablet(s) Oral daily    Drug Dosing Weight  Height (cm): 175.26 (01 Nov 2017 12:00)  Weight (kg): 74.7 (01 Nov 2017 12:00)  BMI (kg/m2): 24.3 (01 Nov 2017 12:00)  BSA (m2): 1.9 (01 Nov 2017 12:00)    CENTRAL LINE: [] YES [x] NO  LOCATION:   DATE INSERTED:  REMOVE: [] YES [] NO  EXPLAIN:    RHODES: [] YES [x] NO    DATE INSERTED:  REMOVE:  [] YES [] NO  EXPLAIN:    A-LINE:  [] YES [x] NO  LOCATION:   DATE INSERTED:  REMOVE:  [] YES [] NO  EXPLAIN:    PMH -reviewed admission note, no change since admission  PAST MEDICAL & SURGICAL HISTORY:  Prediabetes  Alcohol induced acute pancreatitis  Pancreatitis  Alcoholism  Fatty liver  Gastroenteritis  No significant past surgical history      ICU Vital Signs Last 24 Hrs  T(C): 35.8 (07 Nov 2017 07:00), Max: 37.6 (06 Nov 2017 15:24)  T(F): 96.5 (07 Nov 2017 07:00), Max: 99.6 (06 Nov 2017 15:24)  HR: 75 (07 Nov 2017 09:09) (60 - 123)  BP: 120/80 (07 Nov 2017 09:00) (100/66 - 167/94)  BP(mean): 90 (07 Nov 2017 09:00) (69 - 109)  ABP: --  ABP(mean): --  RR: 16 (07 Nov 2017 09:00) (13 - 17)  SpO2: 100% (07 Nov 2017 09:09) (94% - 100%)      ABG - ( 07 Nov 2017 04:42 )  pH: 7.41  /  pCO2: 42    /  pO2: 90    / HCO3: 26    / Base Excess: 2.1   /  SaO2: 96                    11-06 @ 07:01  -  11-07 @ 07:00  --------------------------------------------------------  IN: 2333.7 mL / OUT: 2140 mL / NET: 193.7 mL        Mode: AC/ CMV (Assist Control/ Continuous Mandatory Ventilation)  RR (machine): 16  TV (machine): 500  FiO2: 30  PEEP: 5  ITime: 1  MAP: 14  PIP: 27      PHYSICAL EXAM:    GENERAL: [x]NAD, [x]well-groomed, [x]well-developed  HEAD:  [x]Atraumatic, [x]Normocephalic  EYES: []EOMI, [x]PERRLA, []conjunctiva and sclera clear  ENMT: []No tonsillar erythema, exudates, or enlargement; [x]Moist mucous membranes, []Good dentition, []No lesions  NECK: []Supple, normal appearance, [x]No JVD; [ ]Normal thyroid; []Trachea midline  NERVOUS SYSTEM:  [x]Sedated, []Good concentration; []Motor Strength 5/5 B/L upper and lower extremities; []DTRs 2+ intact and symmetric  CHEST/LUNG: [x]No chest deformity; [x]Normal percussion bilaterally; [x]No rales, rhonchi, wheezing   HEART: [x]Regular rate and rhythm; [x]No murmurs, rubs, or gallops  ABDOMEN: [x]Soft, Nontender, Nondistended; [x]Bowel sounds present  EXTREMITIES:  [x]2+ Peripheral Pulses, [x]No clubbing, cyanosis, or edema  LYMPH: []No lymphadenopathy noted  SKIN: []No rashes or lesions; []Good capillary refill      LABS:  CBC Full  -  ( 07 Nov 2017 06:03 )  WBC Count : 7.3 K/uL  Hemoglobin : 12.7 g/dL  Hematocrit : 37.0 %  Platelet Count - Automated : 260 K/uL  Mean Cell Volume : 97.5 fl  Mean Cell Hemoglobin : 33.6 pg  Mean Cell Hemoglobin Concentration : 34.4 gm/dL  Auto Neutrophil # : x  Auto Lymphocyte # : x  Auto Monocyte # : x  Auto Eosinophil # : x  Auto Basophil # : x  Auto Neutrophil % : x  Auto Lymphocyte % : x  Auto Monocyte % : x  Auto Eosinophil % : x  Auto Basophil % : x    11-07    141  |  106  |  5<L>  ----------------------------<  163<H>  3.6   |  27  |  0.52    Ca    8.9      07 Nov 2017 06:03  Phos  3.8     11-07  Mg     1.8     11-07    TPro  6.5  /  Alb  2.3<L>  /  TBili  0.3  /  DBili  x   /  AST  57<H>  /  ALT  43  /  AlkPhos  108  11-06        Culture Results:   Numerous Staphylococcus aureus (11-05 @ 07:31)  Culture Results:   No growth to date. (11-04 @ 23:06)  Culture Results:   No growth to date. (11-04 @ 23:06)      RADIOLOGY & ADDITIONAL STUDIES REVIEWED:  stable left lower lobe opacity, right medial lower lobe opacity    []GOALS OF CARE DISCUSSION WITH PATIENT/FAMILY/PROXY:    CRITICAL CARE TIME SPENT: 35 minutes

## 2017-11-07 NOTE — PROGRESS NOTE ADULT - SUBJECTIVE AND OBJECTIVE BOX
Patient is seen and examined at the bed side, is afebrile. He remains intubated and sedated.  The sputum culture MSSA.          REVIEW OF SYSTEMS: Unable to obtain since patient is intubated/sedated          ICU Vital Signs Last 24 Hrs  T(C): 36.7 (2017 17:00), Max: 36.7 (2017 17:00)  T(F): 98.1 (2017 17:00), Max: 98.1 (2017 17:00)  HR: 75 (2017 21:22) (56 - 130)  BP: 107/63 (2017 19:00) (100/66 - 186/93)  BP(mean): 73 (2017 19:00) (73 - 111)  ABP: --  ABP(mean): --  RR: 16 (2017 19:00) (16 - 35)  SpO2: 98% (2017 21:22) (93% - 100%)            PHYSICAL EXAM:  GENERAL: intubated/sedated  CVS; s1 and s2 present  RESP: air entry b/L  GI: abdomen  soft and nontender  EXT: No pedal edema  CNS: sedated/intubated          ALLERGIES: No Known Drug Allergies            LABS:                        12.7   7.3   )-----------( 260      ( 2017 06:03 )             37.0                           12.5   6.2   )-----------( 184      ( 2017 06:03 )             37.3                           14.6   11.4  )-----------( 145      ( 2017 06:23 )             42.8                      141  |  106  |  5<L>  ----------------------------<  163<H>  3.6   |  27  |  0.52    Ca    8.9      2017 06:03  Phos  3.8       Mg     1.8         TPro  6.5  /  Alb  2.3<L>  /  TBili  0.3  /  DBili  x   /  AST  57<H>  /  ALT  43  /  AlkPhos  108          Urinalysis Basic - ( 2017 17:11 )    Color: Yellow / Appearance: Clear / S.010 / pH: x  Gluc: x / Ketone: Negative  / Bili: Negative / Urobili: 1   Blood: x / Protein: Negative / Nitrite: Negative   Leuk Esterase: Negative / RBC: 10-25 /HPF / WBC 0-2 /HPF   Sq Epi: x / Non Sq Epi: Occasional /HPF / Bacteria: x        POCT Blood Glucose.: 86 mg/dL (2017 06:23)  POCT Blood Glucose.: 92 mg/dL (2017 23:49)    ABG - ( 2017 05:19 )  pH: 7.34  /  pCO2: 54    /  pO2: 65    / HCO3: 28    / Base Excess: 1.5   /  SaO2: 90              MEDICATIONS  (STANDING):  ALBUTerol    90 MICROgram(s) HFA Inhaler 2 Puff(s) Inhalation every 6 hours  dexmedetomidine Infusion 0.1 MICROgram(s)/kG/Hr (1.868 mL/Hr) IV Continuous <Continuous>  enoxaparin Injectable 40 milliGRAM(s) SubCutaneous daily  fentaNYL   Infusion 1 MICROgram(s)/kG/Hr (7.47 mL/Hr) IV Continuous <Continuous>  folic acid 1 milliGRAM(s) Oral daily  influenza   Vaccine 0.5 milliLiter(s) IntraMuscular once  insulin lispro (HumaLOG) corrective regimen sliding scale   SubCutaneous Before meals and at bedtime  ipratropium 17 MICROgram(s) HFA Inhaler 1 Puff(s) Inhalation every 6 hours  midazolam Infusion 7 mG/Hr (7 mL/Hr) IV Continuous <Continuous>  nicotine - 21 mG/24Hr(s) Patch 1 patch Transdermal daily  pantoprazole  Injectable 40 milliGRAM(s) IV Push daily  PHENobarbital Injectable 130 milliGRAM(s) IV Push every 12 hours  piperacillin/tazobactam IVPB. 3.375 Gram(s) IV Intermittent every 8 hours  QUEtiapine 50 milliGRAM(s) Oral every 12 hours  thiamine 100 milliGRAM(s) Oral daily  vitamin B complex with vitamin C 1 Tablet(s) Oral daily    MEDICATIONS  (PRN):  LORazepam   Injectable 2 milliGRAM(s) IntraMuscular every 4 hours PRN Agitation  nystatin Powder 1 Application(s) Topical once PRN itching          RADIOLOGY & ADDITIONAL TESTS:    17 : Xray Chest 1 View AP-PORTABLE IMMEDIATE (17 @ 11:32) :On  of this year there was some infiltrate at left base which is no longer evident. Lungs are presently clear.    17: Xray Chest 1 View AP-PORTABLE IMMEDIATE (17 @ 17:05) :New left base infiltrate. Feeding tube at least to stomach. Thank you for the courtesy of this referral.          MICROBIOLOGY DATA:    Culture - Sputum . (17 @ 07:31)    -  Penicillin: R >8    -  RIF- Rifampin: S <=1    -  Tetra/Doxy: S <=1    -  Trimethoprim/Sulfamethoxazole: S <=0.5/9.5    -  Vancomycin: S 2    -  Cefazolin: S <=4    -  Ciprofloxacin: S <=1    -  Clindamycin: S 0.5    -  Erythromycin: S 0.5    -  Gentamicin: S <=1    -  Levofloxacin: S <=0.5    -  Moxifloxacin(Aerobic): S <=0.5    -  Oxacillin: S 0.5    Gram Stain:   Few polymorphonuclear leukocytes per low power field  No Squamous epithelial cells per low power field  Numerous Gram Positive Cocci in Clusters per oil power field    -  Ampicillin/Sulbactam: S <=8/4    Specimen Source: .Sputum Sputum, trap    Culture Results:   Numerous Staphylococcus aureus  No routine respiratory tomasz Isolated    Organism Identification: Staphylococcus aureus    Organism: Staphylococcus aureus    Method Type: WILBERTO        Legionella pneumophila Antigen, Urine (17 @ 12:22)    Legionella Antigen, Urine: Negative        Culture - Sputum . (17 @ 07:31)    Gram Stain:   Few polymorphonuclear leukocytes per low power field  No Squamous epithelial cells per low power field  Numerous Gram Positive Cocci in Clusters per oil power field    Specimen Source: .Sputum Sputum, trap

## 2017-11-07 NOTE — PROGRESS NOTE ADULT - ATTENDING COMMENTS
37 male with hx of ETOH abuse, multiple prior episodes of withdrawal, now with delirium tremens/ETOH withdrawal, acute respiratory failure.  Also with left sided opacity on CXR concerning for possible aspiration pneumonia, growing MSSA in sputum.  Will d/c fentanyl, titrate down versed, continue phenobarb.  Continue tube feeds, GI and DVT prophylaxis.    Discussed with girlfriend and mother.  Total CC time 35 min.

## 2017-11-07 NOTE — PROGRESS NOTE ADULT - SUBJECTIVE AND OBJECTIVE BOX
37y year old Male seen at bedside intubated.   Admitted with ETOH induced acute pancreatitis    ICU Vital Signs Last 24 Hrs  T(C): 37.6 (06 Nov 2017 15:24), Max: 37.6 (06 Nov 2017 15:24)  T(F): 99.6 (06 Nov 2017 15:24), Max: 99.6 (06 Nov 2017 15:24)  HR: 91 (06 Nov 2017 18:00) (61 - 126)  BP: 110/58 (06 Nov 2017 18:00) (107/58 - 144/68)  BP(mean): 69 (06 Nov 2017 18:00) (69 - 91)  ABP: --  ABP(mean): --  RR: 16 (06 Nov 2017 18:00) (13 - 17)  SpO2: 97% (06 Nov 2017 18:00) (90% - 100%)    O:   General: Intubated   Integument:  Skin warm, dry and supple bilateral; diffused erythema with mild swelling right big toe improving, less congestion noted, resolving erythema.  No open wound.  Vascular: Dorsalis Pedis and Posterior Tibial pulses 2/4 bilateral;  Capillary re-fill time less then 3 seconds digits 1-5 bilateral.    Neuro: Protective sensation unable to assess intubated  M/S: Lateral deviated hallux bilateral and dorsally contracted digits 2-5 bilateral    Labs:                  LABS:  CBC Full  -  ( 07 Nov 2017 06:03 )  WBC Count : 7.3 K/uL  Hemoglobin : 12.7 g/dL  Hematocrit : 37.0 %  Platelet Count - Automated : 260 K/uL  Mean Cell Volume : 97.5 fl  Mean Cell Hemoglobin : 33.6 pg  Mean Cell Hemoglobin Concentration : 34.4 gm/dL  Auto Neutrophil # : x  Auto Lymphocyte # : x  Auto Monocyte # : x  Auto Eosinophil # : x  Auto Basophil # : x  Auto Neutrophil % : x  Auto Lymphocyte % : x  Auto Monocyte % : x  Auto Eosinophil % : x  Auto Basophil % : x    11-07    141  |  106  |  5<L>  ----------------------------<  163<H>  3.6   |  27  |  0.52    Ca    8.9      07 Nov 2017 06:03  Phos  3.8     11-07  Mg     1.8     11-07    TPro  6.5  /  Alb  2.3<L>  /  TBili  0.3  /  DBili  x   /  AST  57<H>  /  ALT  43  /  AlkPhos  108  11-06

## 2017-11-07 NOTE — PROGRESS NOTE ADULT - PROBLEM SELECTOR PLAN 1
Patient is better sedated on Fentanyl and Versed  -will decrease fentanyl and keep versed for now, stable after adding phenobarb with added ativan pushes  -increased seroquel  Ventilator support as patient was intubated for airway protection  ABG showing well oxygenated on current vent settings  Keep a net negative for possible SBT in few days  -c/w vitamins  -will plan for dc fentanyl

## 2017-11-07 NOTE — PROGRESS NOTE ADULT - PROBLEM SELECTOR PLAN 3
Fever of 103 overnight  CXR: LLL PNA secondary to intubation vs possible fluid shifts  F/u BCx and Legionella - negative so far  Sputum Cx shows staph Aureus - awaiting sensitivities

## 2017-11-08 LAB
ANION GAP SERPL CALC-SCNC: 11 MMOL/L — SIGNIFICANT CHANGE UP (ref 5–17)
BASE EXCESS BLDA CALC-SCNC: 4 MMOL/L — HIGH (ref -2–2)
BLOOD GAS COMMENTS ARTERIAL: SIGNIFICANT CHANGE UP
BUN SERPL-MCNC: 5 MG/DL — LOW (ref 7–18)
CALCIUM SERPL-MCNC: 9.2 MG/DL — SIGNIFICANT CHANGE UP (ref 8.4–10.5)
CHLORIDE SERPL-SCNC: 106 MMOL/L — SIGNIFICANT CHANGE UP (ref 96–108)
CO2 SERPL-SCNC: 25 MMOL/L — SIGNIFICANT CHANGE UP (ref 22–31)
CREAT SERPL-MCNC: 0.48 MG/DL — LOW (ref 0.5–1.3)
GLUCOSE BLDC GLUCOMTR-MCNC: 102 MG/DL — HIGH (ref 70–99)
GLUCOSE BLDC GLUCOMTR-MCNC: 109 MG/DL — HIGH (ref 70–99)
GLUCOSE BLDC GLUCOMTR-MCNC: 124 MG/DL — HIGH (ref 70–99)
GLUCOSE BLDC GLUCOMTR-MCNC: 92 MG/DL — SIGNIFICANT CHANGE UP (ref 70–99)
GLUCOSE SERPL-MCNC: 82 MG/DL — SIGNIFICANT CHANGE UP (ref 70–99)
HCO3 BLDA-SCNC: 28 MMOL/L — HIGH (ref 23–27)
HCT VFR BLD CALC: 38.3 % — LOW (ref 39–50)
HGB BLD-MCNC: 13 G/DL — SIGNIFICANT CHANGE UP (ref 13–17)
HOROWITZ INDEX BLDA+IHG-RTO: 30 — SIGNIFICANT CHANGE UP
MAGNESIUM SERPL-MCNC: 1.8 MG/DL — SIGNIFICANT CHANGE UP (ref 1.6–2.6)
MCHC RBC-ENTMCNC: 33.6 PG — SIGNIFICANT CHANGE UP (ref 27–34)
MCHC RBC-ENTMCNC: 33.9 GM/DL — SIGNIFICANT CHANGE UP (ref 32–36)
MCV RBC AUTO: 99.1 FL — SIGNIFICANT CHANGE UP (ref 80–100)
PCO2 BLDA: 41 MMHG — SIGNIFICANT CHANGE UP (ref 32–46)
PH BLDA: 7.45 — SIGNIFICANT CHANGE UP (ref 7.35–7.45)
PHOSPHATE SERPL-MCNC: 3.6 MG/DL — SIGNIFICANT CHANGE UP (ref 2.5–4.5)
PLATELET # BLD AUTO: 307 K/UL — SIGNIFICANT CHANGE UP (ref 150–400)
PO2 BLDA: 77 MMHG — SIGNIFICANT CHANGE UP (ref 74–108)
POTASSIUM SERPL-MCNC: 3.7 MMOL/L — SIGNIFICANT CHANGE UP (ref 3.5–5.3)
POTASSIUM SERPL-SCNC: 3.7 MMOL/L — SIGNIFICANT CHANGE UP (ref 3.5–5.3)
RBC # BLD: 3.86 M/UL — LOW (ref 4.2–5.8)
RBC # FLD: 11.7 % — SIGNIFICANT CHANGE UP (ref 10.3–14.5)
SAO2 % BLDA: 95 % — SIGNIFICANT CHANGE UP (ref 92–96)
SODIUM SERPL-SCNC: 142 MMOL/L — SIGNIFICANT CHANGE UP (ref 135–145)
WBC # BLD: 7.1 K/UL — SIGNIFICANT CHANGE UP (ref 3.8–10.5)
WBC # FLD AUTO: 7.1 K/UL — SIGNIFICANT CHANGE UP (ref 3.8–10.5)

## 2017-11-08 PROCEDURE — 71010: CPT | Mod: 26

## 2017-11-08 RX ORDER — QUETIAPINE FUMARATE 200 MG/1
100 TABLET, FILM COATED ORAL EVERY 12 HOURS
Qty: 0 | Refills: 0 | Status: DISCONTINUED | OUTPATIENT
Start: 2017-11-08 | End: 2017-11-14

## 2017-11-08 RX ADMIN — Medication 1 PUFF(S): at 14:41

## 2017-11-08 RX ADMIN — Medication 1 MILLIGRAM(S): at 11:29

## 2017-11-08 RX ADMIN — Medication 100 MILLIGRAM(S): at 11:29

## 2017-11-08 RX ADMIN — DEXMEDETOMIDINE HYDROCHLORIDE IN 0.9% SODIUM CHLORIDE 1.87 MICROGRAM(S)/KG/HR: 4 INJECTION INTRAVENOUS at 10:42

## 2017-11-08 RX ADMIN — Medication 130 MILLIGRAM(S): at 05:09

## 2017-11-08 RX ADMIN — ALBUTEROL 2 PUFF(S): 90 AEROSOL, METERED ORAL at 03:57

## 2017-11-08 RX ADMIN — ALBUTEROL 2 PUFF(S): 90 AEROSOL, METERED ORAL at 08:04

## 2017-11-08 RX ADMIN — DEXMEDETOMIDINE HYDROCHLORIDE IN 0.9% SODIUM CHLORIDE 1.87 MICROGRAM(S)/KG/HR: 4 INJECTION INTRAVENOUS at 15:24

## 2017-11-08 RX ADMIN — DEXMEDETOMIDINE HYDROCHLORIDE IN 0.9% SODIUM CHLORIDE 1.87 MICROGRAM(S)/KG/HR: 4 INJECTION INTRAVENOUS at 05:09

## 2017-11-08 RX ADMIN — PIPERACILLIN AND TAZOBACTAM 12.5 GRAM(S): 4; .5 INJECTION, POWDER, LYOPHILIZED, FOR SOLUTION INTRAVENOUS at 21:33

## 2017-11-08 RX ADMIN — DEXMEDETOMIDINE HYDROCHLORIDE IN 0.9% SODIUM CHLORIDE 1.87 MICROGRAM(S)/KG/HR: 4 INJECTION INTRAVENOUS at 01:18

## 2017-11-08 RX ADMIN — Medication 2 MILLIGRAM(S): at 01:19

## 2017-11-08 RX ADMIN — FENTANYL CITRATE 7.47 MICROGRAM(S)/KG/HR: 50 INJECTION INTRAVENOUS at 11:07

## 2017-11-08 RX ADMIN — DEXMEDETOMIDINE HYDROCHLORIDE IN 0.9% SODIUM CHLORIDE 1.87 MICROGRAM(S)/KG/HR: 4 INJECTION INTRAVENOUS at 21:33

## 2017-11-08 RX ADMIN — DEXMEDETOMIDINE HYDROCHLORIDE IN 0.9% SODIUM CHLORIDE 1.87 MICROGRAM(S)/KG/HR: 4 INJECTION INTRAVENOUS at 07:24

## 2017-11-08 RX ADMIN — Medication 1 PATCH: at 11:30

## 2017-11-08 RX ADMIN — DEXMEDETOMIDINE HYDROCHLORIDE IN 0.9% SODIUM CHLORIDE 1.87 MICROGRAM(S)/KG/HR: 4 INJECTION INTRAVENOUS at 17:22

## 2017-11-08 RX ADMIN — Medication 2 MILLIGRAM(S): at 05:40

## 2017-11-08 RX ADMIN — PIPERACILLIN AND TAZOBACTAM 25 GRAM(S): 4; .5 INJECTION, POWDER, LYOPHILIZED, FOR SOLUTION INTRAVENOUS at 05:10

## 2017-11-08 RX ADMIN — DEXMEDETOMIDINE HYDROCHLORIDE IN 0.9% SODIUM CHLORIDE 1.87 MICROGRAM(S)/KG/HR: 4 INJECTION INTRAVENOUS at 19:46

## 2017-11-08 RX ADMIN — DEXMEDETOMIDINE HYDROCHLORIDE IN 0.9% SODIUM CHLORIDE 1.87 MICROGRAM(S)/KG/HR: 4 INJECTION INTRAVENOUS at 09:25

## 2017-11-08 RX ADMIN — DEXMEDETOMIDINE HYDROCHLORIDE IN 0.9% SODIUM CHLORIDE 1.87 MICROGRAM(S)/KG/HR: 4 INJECTION INTRAVENOUS at 03:45

## 2017-11-08 RX ADMIN — ALBUTEROL 2 PUFF(S): 90 AEROSOL, METERED ORAL at 14:41

## 2017-11-08 RX ADMIN — Medication 130 MILLIGRAM(S): at 17:18

## 2017-11-08 RX ADMIN — Medication 1 PUFF(S): at 03:57

## 2017-11-08 RX ADMIN — Medication 1 PUFF(S): at 08:04

## 2017-11-08 RX ADMIN — Medication 1 TABLET(S): at 11:29

## 2017-11-08 RX ADMIN — QUETIAPINE FUMARATE 100 MILLIGRAM(S): 200 TABLET, FILM COATED ORAL at 17:18

## 2017-11-08 RX ADMIN — MIDAZOLAM HYDROCHLORIDE 5 MG/HR: 1 INJECTION, SOLUTION INTRAMUSCULAR; INTRAVENOUS at 07:25

## 2017-11-08 RX ADMIN — PIPERACILLIN AND TAZOBACTAM 12.5 GRAM(S): 4; .5 INJECTION, POWDER, LYOPHILIZED, FOR SOLUTION INTRAVENOUS at 14:38

## 2017-11-08 RX ADMIN — PANTOPRAZOLE SODIUM 40 MILLIGRAM(S): 20 TABLET, DELAYED RELEASE ORAL at 11:29

## 2017-11-08 RX ADMIN — QUETIAPINE FUMARATE 50 MILLIGRAM(S): 200 TABLET, FILM COATED ORAL at 05:09

## 2017-11-08 RX ADMIN — ENOXAPARIN SODIUM 40 MILLIGRAM(S): 100 INJECTION SUBCUTANEOUS at 11:30

## 2017-11-08 RX ADMIN — Medication 1 PATCH: at 12:54

## 2017-11-08 RX ADMIN — Medication 100 MILLIGRAM(S): at 13:07

## 2017-11-08 RX ADMIN — Medication 100 MILLIGRAM(S): at 21:34

## 2017-11-08 RX ADMIN — Medication 2 MILLIGRAM(S): at 19:46

## 2017-11-08 RX ADMIN — DEXMEDETOMIDINE HYDROCHLORIDE IN 0.9% SODIUM CHLORIDE 1.87 MICROGRAM(S)/KG/HR: 4 INJECTION INTRAVENOUS at 12:49

## 2017-11-08 NOTE — PROGRESS NOTE ADULT - ATTENDING COMMENTS
37 male with hx of ETOH abuse, multiple prior episodes of withdrawal, now with delirium tremens/ETOH withdrawal, acute respiratory failure.  Also with left sided opacity on CXR concerning for possible aspiration pneumonia, growing MSSA in sputum.  Continue to try to wean down IV sedation, continue librium and phenobarb.    Continue tube feeds, GI and DVT prophylaxis.    Discussed with girlfriend and mother.  Total CC time 35 min.

## 2017-11-08 NOTE — PROGRESS NOTE ADULT - SUBJECTIVE AND OBJECTIVE BOX
Patient is seen and examined at the bed side, is afebrile. He is s/p extubated but sedated.  The WBC count stay normal.          REVIEW OF SYSTEMS: Unable to obtain since patient is intubated/sedated          ICU Vital Signs Last 24 Hrs  T(C): 37.3 (2017 15:00), Max: 37.3 (2017 15:00)  T(F): 99.1 (2017 15:00), Max: 99.1 (2017 15:00)  HR: 123 (2017 20:00) (59 - 124)  BP: 170/102 (2017 19:00) (110/57 - 170/102)  BP(mean): 118 (2017 19:00) (69 - 118)  ABP: --  ABP(mean): --  RR: 42 (2017 20:00) (16 - 42)  SpO2: 99% (2017 20:00) (94% - 100%)          PHYSICAL EXAM:  GENERAL: Extubated /sedated  HEENT: NGT in placed  CVS; s1 and s2 present  RESP: air entry b/L  GI: abdomen  soft and nontender  EXT: No pedal edema  CNS: sedated          ALLERGIES: No Known Drug Allergies            LABS:                        13.0   7.1   )-----------( 307      ( 2017 06:43 )             38.3                           12.7   7.3   )-----------( 260      ( 2017 06:03 )             37.0                                     14.6   11.4  )-----------( 145      ( 2017 06:23 )             42.8                11    142  |  106  |  5<L>  ----------------------------<  82  3.7   |  25  |  0.48<L>    Ca    9.2      2017 06:43  Phos  3.6     11  Mg     1.8           TPro  6.5  /  Alb  2.3<L>  /  TBili  0.3  /  DBili  x   /  AST  57<H>  /  ALT  43  /  AlkPhos  108  11-06        Urinalysis Basic - ( 2017 17:11 )    Color: Yellow / Appearance: Clear / S.010 / pH: x  Gluc: x / Ketone: Negative  / Bili: Negative / Urobili: 1   Blood: x / Protein: Negative / Nitrite: Negative   Leuk Esterase: Negative / RBC: 10-25 /HPF / WBC 0-2 /HPF   Sq Epi: x / Non Sq Epi: Occasional /HPF / Bacteria: x        POCT Blood Glucose.: 86 mg/dL (2017 06:23)  POCT Blood Glucose.: 92 mg/dL (2017 23:49)    ABG - ( 2017 05:19 )  pH: 7.34  /  pCO2: 54    /  pO2: 65    / HCO3: 28    / Base Excess: 1.5   /  SaO2: 90            MEDICATIONS  (STANDING):  ALBUTerol    90 MICROgram(s) HFA Inhaler 2 Puff(s) Inhalation every 6 hours  chlordiazePOXIDE 100 milliGRAM(s) Oral every 8 hours  dexmedetomidine Infusion 0.1 MICROgram(s)/kG/Hr (1.868 mL/Hr) IV Continuous <Continuous>  enoxaparin Injectable 40 milliGRAM(s) SubCutaneous daily  folic acid 1 milliGRAM(s) Oral daily  influenza   Vaccine 0.5 milliLiter(s) IntraMuscular once  insulin lispro (HumaLOG) corrective regimen sliding scale   SubCutaneous Before meals and at bedtime  ipratropium 17 MICROgram(s) HFA Inhaler 1 Puff(s) Inhalation every 6 hours  nicotine - 21 mG/24Hr(s) Patch 1 patch Transdermal daily  pantoprazole  Injectable 40 milliGRAM(s) IV Push daily  PHENobarbital Injectable 130 milliGRAM(s) IV Push every 12 hours  piperacillin/tazobactam IVPB. 3.375 Gram(s) IV Intermittent every 8 hours  QUEtiapine 100 milliGRAM(s) Oral every 12 hours  thiamine 100 milliGRAM(s) Oral daily  vitamin B complex with vitamin C 1 Tablet(s) Oral daily    MEDICATIONS  (PRN):  LORazepam   Injectable 2 milliGRAM(s) IntraMuscular every 4 hours PRN Agitation  nystatin Powder 1 Application(s) Topical once PRN itching          RADIOLOGY & ADDITIONAL TESTS:    17 : Xray Chest 1 View AP-PORTABLE IMMEDIATE (17 @ 11:32) :On  of this year there was some infiltrate at left base which is no longer evident. Lungs are presently clear.    17: Xray Chest 1 View AP-PORTABLE IMMEDIATE (17 @ 17:05) :New left base infiltrate. Feeding tube at least to stomach. Thank you for the courtesy of this referral.          MICROBIOLOGY DATA:    Culture - Sputum . (17 @ 07:31)    -  Penicillin: R >8    -  RIF- Rifampin: S <=1    -  Tetra/Doxy: S <=1    -  Trimethoprim/Sulfamethoxazole: S <=0.5/9.5    -  Vancomycin: S 2    -  Cefazolin: S <=4    -  Ciprofloxacin: S <=1    -  Clindamycin: S 0.5    -  Erythromycin: S 0.5    -  Gentamicin: S <=1    -  Levofloxacin: S <=0.5    -  Moxifloxacin(Aerobic): S <=0.5    -  Oxacillin: S 0.5    Gram Stain:   Few polymorphonuclear leukocytes per low power field  No Squamous epithelial cells per low power field  Numerous Gram Positive Cocci in Clusters per oil power field    -  Ampicillin/Sulbactam: S <=8/4    Specimen Source: .Sputum Sputum, trap    Culture Results:   Numerous Staphylococcus aureus  No routine respiratory tomasz Isolated    Organism Identification: Staphylococcus aureus    Organism: Staphylococcus aureus    Method Type: WILBERTO        Legionella pneumophila Antigen, Urine (17 @ 12:22)    Legionella Antigen, Urine: Negative        Culture - Sputum . (17 @ 07:31)    Gram Stain:   Few polymorphonuclear leukocytes per low power field  No Squamous epithelial cells per low power field  Numerous Gram Positive Cocci in Clusters per oil power field    Specimen Source: .Sputum Sputum, trap

## 2017-11-08 NOTE — PROGRESS NOTE ADULT - SUBJECTIVE AND OBJECTIVE BOX
37y year old Male seen at bedside s/p extubation but sedated  Admitted with ETOH induced acute pancreatitis      ICU Vital Signs Last 24 Hrs  T(C): 37.3 (08 Nov 2017 15:00), Max: 37.3 (08 Nov 2017 15:00)  T(F): 99.1 (08 Nov 2017 15:00), Max: 99.1 (08 Nov 2017 15:00)  HR: 123 (08 Nov 2017 20:00) (59 - 124)  BP: 170/102 (08 Nov 2017 19:00) (110/57 - 170/102)  BP(mean): 118 (08 Nov 2017 19:00) (69 - 118)  ABP: --  ABP(mean): --  RR: 42 (08 Nov 2017 20:00) (16 - 42)  SpO2: 99% (08 Nov 2017 20:00) (94% - 100%)      O:   General: Intubated   Integument:  Skin warm, dry and supple bilateral; diffused erythema with mild swelling right big toe improving, less congestion noted, resolved erythema.  No open wound.  Vascular: Dorsalis Pedis and Posterior Tibial pulses 2/4 bilateral;  Capillary re-fill time less then 3 seconds digits 1-5 bilateral.    Neuro: Protective sensation unable to assess intubated  M/S: Lateral deviated hallux bilateral and dorsally contracted digits 2-5 bilateral    Labs:                              13.0   7.1   )-----------( 307      ( 08 Nov 2017 06:43 )             38.3                           12.7   7.3   )-----------( 260      ( 07 Nov 2017 06:03 )             37.0                                     14.6   11.4  )-----------( 145      ( 05 Nov 2017 06:23 )             42.8                11-08    142  |  106  |  5<L>  ----------------------------<  82  3.7   |  25  |  0.48<L>    Ca    9.2      08 Nov 2017 06:43  Phos  3.6     11-08  Mg     1.8     11-08      TPro  6.5  /  Alb  2.3<L>  /  TBili  0.3  /  DBili  x   /  AST  57<H>  /  ALT  43  /  AlkPhos  108  11-06

## 2017-11-08 NOTE — PROGRESS NOTE ADULT - ASSESSMENT
A 37 years old male with PMH of EtOH abuse, recurrent alcohol induced pancreatitis, Fatty Liver, EtOH withdrawal seizure, h/o intubation x 2, presented to ER for evaluation of  worsening epigastric pain radiating to back. He has no fever or chills. In ER, found to have elevated lipase of 5599, AST/ALT 60/66, and CT abdomen shows Peripancreatic fluid and stranding, compatible with acute pancreatitis. No evidence for pancreatic necrosis. Interposing between the pancreatic head and the second part of duodenum, there is a small 1.5 x 1.3 cm fluid collection; this may represent a duodenal diverticulum or a pseudocyst. He is admitted to the floor for management of Acute Pancreatitis  and course complicated with ? DT's, s/p RRT for severe agitation due to DTs, required intubation and admitted to ICU on 11/1/17.Today, 11/4/17, he spiked fever and chest xray shows new Left base infiltrate. He has started on Vancomycin and Zosyn to cover HCAP, cultures pending. The ID consult requested to assist with further evaluation and antibiotic management.     # Pneumonia- Aspiration vs HCAP- MSSA  # s/p Intubated  # Acute pancreatitis  # DTs    Would recommend;  1. Continue Zosyn to cover MSSA and anaerobes  2. Aspiration precaution  3. May change to Dicloxacillin when is able to take orally to complete the course    d/w ICU team    will follow the patient with you

## 2017-11-08 NOTE — PROGRESS NOTE ADULT - SUBJECTIVE AND OBJECTIVE BOX
INTERVAL HPI/OVERNIGHT EVENTS: patient was agitated last pm due to frequent cleanings after acevedo removed so it was replaced as condom cath is not staying on well    PRESSORS: [] YES [x] NO  WHICH:    ANTIBIOTICS: Zosyn                 DATE STARTED:11/4  ANTIBIOTICS:                  DATE STARTED:  ANTIBIOTICS:                  DATE STARTED:    Antimicrobial:  piperacillin/tazobactam IVPB. 3.375 Gram(s) IV Intermittent every 8 hours    Cardiovascular:    Pulmonary:  ALBUTerol    90 MICROgram(s) HFA Inhaler 2 Puff(s) Inhalation every 6 hours  ipratropium 17 MICROgram(s) HFA Inhaler 1 Puff(s) Inhalation every 6 hours    Hematalogic:  enoxaparin Injectable 40 milliGRAM(s) SubCutaneous daily    Other:  dexmedetomidine Infusion 0.1 MICROgram(s)/kG/Hr IV Continuous <Continuous>  fentaNYL   Infusion 1 MICROgram(s)/kG/Hr IV Continuous <Continuous>  folic acid 1 milliGRAM(s) Oral daily  influenza   Vaccine 0.5 milliLiter(s) IntraMuscular once  insulin lispro (HumaLOG) corrective regimen sliding scale   SubCutaneous Before meals and at bedtime  LORazepam   Injectable 2 milliGRAM(s) IntraMuscular every 4 hours PRN  midazolam Infusion 5 mG/Hr IV Continuous <Continuous>  nicotine - 21 mG/24Hr(s) Patch 1 patch Transdermal daily  nystatin Powder 1 Application(s) Topical once PRN  pantoprazole  Injectable 40 milliGRAM(s) IV Push daily  PHENobarbital Injectable 130 milliGRAM(s) IV Push every 12 hours  QUEtiapine 50 milliGRAM(s) Oral every 12 hours  thiamine 100 milliGRAM(s) Oral daily  vitamin B complex with vitamin C 1 Tablet(s) Oral daily    ALBUTerol    90 MICROgram(s) HFA Inhaler 2 Puff(s) Inhalation every 6 hours  dexmedetomidine Infusion 0.1 MICROgram(s)/kG/Hr IV Continuous <Continuous>  enoxaparin Injectable 40 milliGRAM(s) SubCutaneous daily  fentaNYL   Infusion 1 MICROgram(s)/kG/Hr IV Continuous <Continuous>  folic acid 1 milliGRAM(s) Oral daily  influenza   Vaccine 0.5 milliLiter(s) IntraMuscular once  insulin lispro (HumaLOG) corrective regimen sliding scale   SubCutaneous Before meals and at bedtime  ipratropium 17 MICROgram(s) HFA Inhaler 1 Puff(s) Inhalation every 6 hours  LORazepam   Injectable 2 milliGRAM(s) IntraMuscular every 4 hours PRN  midazolam Infusion 5 mG/Hr IV Continuous <Continuous>  nicotine - 21 mG/24Hr(s) Patch 1 patch Transdermal daily  nystatin Powder 1 Application(s) Topical once PRN  pantoprazole  Injectable 40 milliGRAM(s) IV Push daily  PHENobarbital Injectable 130 milliGRAM(s) IV Push every 12 hours  piperacillin/tazobactam IVPB. 3.375 Gram(s) IV Intermittent every 8 hours  QUEtiapine 50 milliGRAM(s) Oral every 12 hours  thiamine 100 milliGRAM(s) Oral daily  vitamin B complex with vitamin C 1 Tablet(s) Oral daily    Drug Dosing Weight  Height (cm): 175.26 (01 Nov 2017 12:00)  Weight (kg): 74.7 (01 Nov 2017 12:00)  BMI (kg/m2): 24.3 (01 Nov 2017 12:00)  BSA (m2): 1.9 (01 Nov 2017 12:00)    CENTRAL LINE: [] YES [x] NO  LOCATION:   DATE INSERTED:  REMOVE: [] YES [] NO  EXPLAIN:    ACEVEDO: [x] YES [] NO    DATE INSERTED: 11/7  REMOVE:  [] YES [x] NO  EXPLAIN: intubated    A-LINE:  [] YES [x] NO  LOCATION:   DATE INSERTED:  REMOVE:  [] YES [] NO  EXPLAIN:    PMH -reviewed admission note, no change since admission  PAST MEDICAL & SURGICAL HISTORY:  Prediabetes  Alcohol induced acute pancreatitis  Pancreatitis  Alcoholism  Fatty liver  Gastroenteritis  No significant past surgical history      ICU Vital Signs Last 24 Hrs  T(C): 36.5 (08 Nov 2017 08:00), Max: 37.1 (07 Nov 2017 21:43)  T(F): 97.7 (08 Nov 2017 08:00), Max: 98.8 (07 Nov 2017 21:43)  HR: 77 (08 Nov 2017 08:00) (56 - 130)  BP: 134/80 (08 Nov 2017 08:00) (107/63 - 186/93)  BP(mean): 91 (08 Nov 2017 08:00) (73 - 122)  ABP: --  ABP(mean): --  RR: 16 (08 Nov 2017 08:00) (16 - 35)  SpO2: 98% (08 Nov 2017 08:00) (93% - 100%)      ABG - ( 08 Nov 2017 05:13 )  pH: 7.45  /  pCO2: 41    /  pO2: 77    / HCO3: 28    / Base Excess: 4.0   /  SaO2: 95                    11-07 @ 07:01  -  11-08 @ 07:00  --------------------------------------------------------  IN: 1419.1 mL / OUT: 1510 mL / NET: -90.9 mL        Mode: AC/ CMV (Assist Control/ Continuous Mandatory Ventilation)  RR (machine): 16  TV (machine): 500  FiO2: 30  PEEP: 5  ITime: 1  MAP: 12  PIP: 24      PHYSICAL EXAM:    GENERAL: [x]NAD, [x]well-groomed, [x]well-developed  HEAD:  [x]Atraumatic, [x]Normocephalic  EYES: []EOMI, [x]PERRLA, []conjunctiva and sclera clear  ENMT: []No tonsillar erythema, exudates, or enlargement; [x]Moist mucous membranes, []Good dentition, []No lesions  NECK: [x]Supple, normal appearance, []No JVD; []Normal thyroid; [x]Trachea midline  NERVOUS SYSTEM:  [x]Sedated, []Good concentration; []Motor Strength 5/5 B/L upper and lower extremities; []DTRs 2+ intact and symmetric  CHEST/LUNG: [x]No chest deformity; [x]Normal percussion bilaterally; [x]No rales, rhonchi, wheezing   HEART: [x]Regular rate and rhythm; [x]No murmurs, rubs, or gallops  ABDOMEN: [x]Soft, Nontender, Nondistended; [x]Bowel sounds present  EXTREMITIES:  [x]2+ Peripheral Pulses, [x]No clubbing, cyanosis, or edema  LYMPH: []No lymphadenopathy noted  SKIN: []No rashes or lesions; []Good capillary refill      LABS:  CBC Full  -  ( 08 Nov 2017 06:43 )  WBC Count : 7.1 K/uL  Hemoglobin : 13.0 g/dL  Hematocrit : 38.3 %  Platelet Count - Automated : 307 K/uL  Mean Cell Volume : 99.1 fl  Mean Cell Hemoglobin : 33.6 pg  Mean Cell Hemoglobin Concentration : 33.9 gm/dL  Auto Neutrophil # : x  Auto Lymphocyte # : x  Auto Monocyte # : x  Auto Eosinophil # : x  Auto Basophil # : x  Auto Neutrophil % : x  Auto Lymphocyte % : x  Auto Monocyte % : x  Auto Eosinophil % : x  Auto Basophil % : x     11-08    142  |  106  |  5<L>  ----------------------------<  82  3.7   |  25  |  0.48<L>    Ca    9.2      08 Nov 2017 06:43  Phos  3.6     11-08  Mg     1.8     11-08              RADIOLOGY & ADDITIONAL STUDIES REVIEWED:  increase in left lower lobe opacification    []GOALS OF CARE DISCUSSION WITH PATIENT/FAMILY/PROXY:    CRITICAL CARE TIME SPENT: 35 minutes

## 2017-11-08 NOTE — PROGRESS NOTE ADULT - PROBLEM SELECTOR PLAN 3
Fever of 103 before  CXR: LLL PNA secondary to intubation vs possible fluid shifts  F/u BCx and Legionella - negative so far  Sputum Cx shows staph Aureus - MSSA  -c/w Zosyn as per ID rec

## 2017-11-08 NOTE — PROGRESS NOTE ADULT - PROBLEM SELECTOR PLAN 1
Patient is better sedated on Fentanyl and Versed  -will decrease fentanyl and keep versed for now, stable after adding phenobarb with added ativan pushes  -increased seroquel  Ventilator support as patient was intubated for airway protection  ABG showing well oxygenated on current vent settings  Keep a net negative for possible SBT in few days  -c/w vitamins  -will plan for titrate versed

## 2017-11-08 NOTE — PROGRESS NOTE ADULT - ASSESSMENT
A: Right hallux venous congestion improving  H/O EtOH abuse, recurrent alcohol induced pancreatitis, Fatty Liver, EtOH withdrawal seizure, pre-DM, and gastroenteritis     P:   Chart reviewed and Patient evaluated  Recommend elevation of extremities B/L  Compression dressings  Off load bilateral heel.    Will monitor

## 2017-11-09 LAB
ANION GAP SERPL CALC-SCNC: 11 MMOL/L — SIGNIFICANT CHANGE UP (ref 5–17)
BUN SERPL-MCNC: 8 MG/DL — SIGNIFICANT CHANGE UP (ref 7–18)
CALCIUM SERPL-MCNC: 9.4 MG/DL — SIGNIFICANT CHANGE UP (ref 8.4–10.5)
CHLORIDE SERPL-SCNC: 103 MMOL/L — SIGNIFICANT CHANGE UP (ref 96–108)
CO2 SERPL-SCNC: 24 MMOL/L — SIGNIFICANT CHANGE UP (ref 22–31)
CREAT SERPL-MCNC: 0.5 MG/DL — SIGNIFICANT CHANGE UP (ref 0.5–1.3)
CULTURE RESULTS: SIGNIFICANT CHANGE UP
CULTURE RESULTS: SIGNIFICANT CHANGE UP
GLUCOSE BLDC GLUCOMTR-MCNC: 104 MG/DL — HIGH (ref 70–99)
GLUCOSE BLDC GLUCOMTR-MCNC: 109 MG/DL — HIGH (ref 70–99)
GLUCOSE BLDC GLUCOMTR-MCNC: 116 MG/DL — HIGH (ref 70–99)
GLUCOSE BLDC GLUCOMTR-MCNC: 127 MG/DL — HIGH (ref 70–99)
GLUCOSE SERPL-MCNC: 95 MG/DL — SIGNIFICANT CHANGE UP (ref 70–99)
HCT VFR BLD CALC: 40.6 % — SIGNIFICANT CHANGE UP (ref 39–50)
HGB BLD-MCNC: 14.1 G/DL — SIGNIFICANT CHANGE UP (ref 13–17)
MAGNESIUM SERPL-MCNC: 1.8 MG/DL — SIGNIFICANT CHANGE UP (ref 1.6–2.6)
MCHC RBC-ENTMCNC: 32.9 PG — SIGNIFICANT CHANGE UP (ref 27–34)
MCHC RBC-ENTMCNC: 34.6 GM/DL — SIGNIFICANT CHANGE UP (ref 32–36)
MCV RBC AUTO: 95 FL — SIGNIFICANT CHANGE UP (ref 80–100)
PHOSPHATE SERPL-MCNC: 4.2 MG/DL — SIGNIFICANT CHANGE UP (ref 2.5–4.5)
PLATELET # BLD AUTO: 395 K/UL — SIGNIFICANT CHANGE UP (ref 150–400)
POTASSIUM SERPL-MCNC: 3.7 MMOL/L — SIGNIFICANT CHANGE UP (ref 3.5–5.3)
POTASSIUM SERPL-SCNC: 3.7 MMOL/L — SIGNIFICANT CHANGE UP (ref 3.5–5.3)
RBC # BLD: 4.27 M/UL — SIGNIFICANT CHANGE UP (ref 4.2–5.8)
RBC # FLD: 11.2 % — SIGNIFICANT CHANGE UP (ref 10.3–14.5)
SODIUM SERPL-SCNC: 138 MMOL/L — SIGNIFICANT CHANGE UP (ref 135–145)
SPECIMEN SOURCE: SIGNIFICANT CHANGE UP
SPECIMEN SOURCE: SIGNIFICANT CHANGE UP
WBC # BLD: 10.3 K/UL — SIGNIFICANT CHANGE UP (ref 3.8–10.5)
WBC # FLD AUTO: 10.3 K/UL — SIGNIFICANT CHANGE UP (ref 3.8–10.5)

## 2017-11-09 RX ORDER — HALOPERIDOL DECANOATE 100 MG/ML
2 INJECTION INTRAMUSCULAR EVERY 4 HOURS
Qty: 0 | Refills: 0 | Status: DISCONTINUED | OUTPATIENT
Start: 2017-11-09 | End: 2017-11-13

## 2017-11-09 RX ORDER — ACETYLCYSTEINE 200 MG/ML
4 VIAL (ML) MISCELLANEOUS EVERY 6 HOURS
Qty: 0 | Refills: 0 | Status: COMPLETED | OUTPATIENT
Start: 2017-11-09 | End: 2017-11-10

## 2017-11-09 RX ORDER — DEXMEDETOMIDINE HYDROCHLORIDE IN 0.9% SODIUM CHLORIDE 4 UG/ML
0.9 INJECTION INTRAVENOUS
Qty: 200 | Refills: 0 | Status: DISCONTINUED | OUTPATIENT
Start: 2017-11-09 | End: 2017-11-09

## 2017-11-09 RX ORDER — HALOPERIDOL DECANOATE 100 MG/ML
5 INJECTION INTRAMUSCULAR EVERY 6 HOURS
Qty: 0 | Refills: 0 | Status: DISCONTINUED | OUTPATIENT
Start: 2017-11-09 | End: 2017-11-09

## 2017-11-09 RX ORDER — HALOPERIDOL DECANOATE 100 MG/ML
5 INJECTION INTRAMUSCULAR EVERY 6 HOURS
Qty: 0 | Refills: 0 | Status: DISCONTINUED | OUTPATIENT
Start: 2017-11-09 | End: 2017-11-10

## 2017-11-09 RX ORDER — IPRATROPIUM/ALBUTEROL SULFATE 18-103MCG
3 AEROSOL WITH ADAPTER (GRAM) INHALATION EVERY 6 HOURS
Qty: 0 | Refills: 0 | Status: DISCONTINUED | OUTPATIENT
Start: 2017-11-09 | End: 2017-11-14

## 2017-11-09 RX ADMIN — Medication 3 MILLILITER(S): at 14:47

## 2017-11-09 RX ADMIN — PANTOPRAZOLE SODIUM 40 MILLIGRAM(S): 20 TABLET, DELAYED RELEASE ORAL at 12:02

## 2017-11-09 RX ADMIN — ENOXAPARIN SODIUM 40 MILLIGRAM(S): 100 INJECTION SUBCUTANEOUS at 12:02

## 2017-11-09 RX ADMIN — Medication 3 MILLILITER(S): at 21:21

## 2017-11-09 RX ADMIN — PIPERACILLIN AND TAZOBACTAM 12.5 GRAM(S): 4; .5 INJECTION, POWDER, LYOPHILIZED, FOR SOLUTION INTRAVENOUS at 05:48

## 2017-11-09 RX ADMIN — Medication 2 MILLIGRAM(S): at 00:30

## 2017-11-09 RX ADMIN — Medication 1 PATCH: at 12:04

## 2017-11-09 RX ADMIN — DEXMEDETOMIDINE HYDROCHLORIDE IN 0.9% SODIUM CHLORIDE 1.87 MICROGRAM(S)/KG/HR: 4 INJECTION INTRAVENOUS at 00:00

## 2017-11-09 RX ADMIN — Medication 130 MILLIGRAM(S): at 17:05

## 2017-11-09 RX ADMIN — Medication 75 MILLIGRAM(S): at 22:22

## 2017-11-09 RX ADMIN — Medication 75 MILLIGRAM(S): at 13:20

## 2017-11-09 RX ADMIN — HALOPERIDOL DECANOATE 5 MILLIGRAM(S): 100 INJECTION INTRAMUSCULAR at 12:02

## 2017-11-09 RX ADMIN — HALOPERIDOL DECANOATE 5 MILLIGRAM(S): 100 INJECTION INTRAMUSCULAR at 23:12

## 2017-11-09 RX ADMIN — HALOPERIDOL DECANOATE 5 MILLIGRAM(S): 100 INJECTION INTRAMUSCULAR at 17:05

## 2017-11-09 RX ADMIN — Medication 2 MILLIGRAM(S): at 05:00

## 2017-11-09 RX ADMIN — Medication 130 MILLIGRAM(S): at 05:49

## 2017-11-09 RX ADMIN — PIPERACILLIN AND TAZOBACTAM 12.5 GRAM(S): 4; .5 INJECTION, POWDER, LYOPHILIZED, FOR SOLUTION INTRAVENOUS at 13:20

## 2017-11-09 RX ADMIN — Medication 1 MILLIGRAM(S): at 12:02

## 2017-11-09 RX ADMIN — Medication 2 MILLIGRAM(S): at 18:07

## 2017-11-09 RX ADMIN — Medication 100 MILLIGRAM(S): at 05:50

## 2017-11-09 RX ADMIN — QUETIAPINE FUMARATE 100 MILLIGRAM(S): 200 TABLET, FILM COATED ORAL at 17:05

## 2017-11-09 RX ADMIN — DEXMEDETOMIDINE HYDROCHLORIDE IN 0.9% SODIUM CHLORIDE 1.87 MICROGRAM(S)/KG/HR: 4 INJECTION INTRAVENOUS at 02:07

## 2017-11-09 RX ADMIN — Medication 1 PATCH: at 12:03

## 2017-11-09 RX ADMIN — DEXMEDETOMIDINE HYDROCHLORIDE IN 0.9% SODIUM CHLORIDE 1.87 MICROGRAM(S)/KG/HR: 4 INJECTION INTRAVENOUS at 08:00

## 2017-11-09 RX ADMIN — Medication 1 TABLET(S): at 12:02

## 2017-11-09 RX ADMIN — Medication 4 MILLILITER(S): at 21:21

## 2017-11-09 RX ADMIN — Medication 100 MILLIGRAM(S): at 12:02

## 2017-11-09 RX ADMIN — PIPERACILLIN AND TAZOBACTAM 12.5 GRAM(S): 4; .5 INJECTION, POWDER, LYOPHILIZED, FOR SOLUTION INTRAVENOUS at 22:22

## 2017-11-09 RX ADMIN — QUETIAPINE FUMARATE 100 MILLIGRAM(S): 200 TABLET, FILM COATED ORAL at 05:49

## 2017-11-09 RX ADMIN — DEXMEDETOMIDINE HYDROCHLORIDE IN 0.9% SODIUM CHLORIDE 1.87 MICROGRAM(S)/KG/HR: 4 INJECTION INTRAVENOUS at 05:00

## 2017-11-09 RX ADMIN — DEXMEDETOMIDINE HYDROCHLORIDE IN 0.9% SODIUM CHLORIDE 16.81 MICROGRAM(S)/KG/HR: 4 INJECTION INTRAVENOUS at 10:31

## 2017-11-09 NOTE — PROGRESS NOTE ADULT - ASSESSMENT
A 37 years old male with PMH of EtOH abuse, recurrent alcohol induced pancreatitis, Fatty Liver, EtOH withdrawal seizure, h/o intubation x 2, presented to ER for evaluation of  worsening epigastric pain radiating to back. He has no fever or chills. In ER, found to have elevated lipase of 5599, AST/ALT 60/66, and CT abdomen shows Peripancreatic fluid and stranding, compatible with acute pancreatitis. No evidence for pancreatic necrosis. Interposing between the pancreatic head and the second part of duodenum, there is a small 1.5 x 1.3 cm fluid collection; this may represent a duodenal diverticulum or a pseudocyst. He is admitted to the floor for management of Acute Pancreatitis  and course complicated with ? DT's, s/p RRT for severe agitation due to DTs, required intubation and admitted to ICU on 11/1/17.Today, 11/4/17, he spiked fever and chest xray shows new Left base infiltrate. He has started on Vancomycin and Zosyn to cover HCAP, cultures pending. The ID consult requested to assist with further evaluation and antibiotic management.     # Pneumonia- Aspiration vs HCAP- MSSA  # s/p Intubated  # Acute pancreatitis  # DTs    Would recommend;  1.  Aspiration precaution  2. Continue Zosyn to cover MSSA and anaerobes  3. May change to Dicloxacillin when is able to take orally to complete the course      d/w ICU team    will follow the patient with you

## 2017-11-09 NOTE — PROGRESS NOTE ADULT - ATTENDING COMMENTS
37 male with hx of ETOH abuse, multiple prior episodes of withdrawal, now with delirium tremens/ETOH withdrawal, acute respiratory failure.  Also with left sided opacity on CXR concerning for possible aspiration pneumonia, growing MSSA in sputum.  Now off IV sedation.  Will taper phenobarbitol and librium.   Extubated 11/8.  Discussed with girlfriend and mother.

## 2017-11-09 NOTE — PROGRESS NOTE ADULT - SUBJECTIVE AND OBJECTIVE BOX
INTERVAL HPI/OVERNIGHT EVENTS: pt extubated yesterday but still agitated at times    PRESSORS: [] YES [x] NO  WHICH:    ANTIBIOTICS: zosyn                 DATE STARTED: 11/4  ANTIBIOTICS:                  DATE STARTED:  ANTIBIOTICS:                  DATE STARTED:    Antimicrobial:  piperacillin/tazobactam IVPB. 3.375 Gram(s) IV Intermittent every 8 hours    Cardiovascular:    Pulmonary:  acetylcysteine 20% Inhalation 4 milliLiter(s) Inhalation every 6 hours  ALBUTerol/ipratropium for Nebulization 3 milliLiter(s) Nebulizer every 6 hours    Hematalogic:  enoxaparin Injectable 40 milliGRAM(s) SubCutaneous daily    Other:  chlordiazePOXIDE 75 milliGRAM(s) Oral every 8 hours  dexmedetomidine Infusion 0.9 MICROgram(s)/kG/Hr IV Continuous <Continuous>  folic acid 1 milliGRAM(s) Oral daily  haloperidol     Tablet 5 milliGRAM(s) Oral every 6 hours  haloperidol    Injectable 2 milliGRAM(s) IV Push every 4 hours PRN  influenza   Vaccine 0.5 milliLiter(s) IntraMuscular once  insulin lispro (HumaLOG) corrective regimen sliding scale   SubCutaneous Before meals and at bedtime  nicotine - 21 mG/24Hr(s) Patch 1 patch Transdermal daily  nystatin Powder 1 Application(s) Topical once PRN  pantoprazole  Injectable 40 milliGRAM(s) IV Push daily  PHENobarbital Injectable 130 milliGRAM(s) IV Push every 12 hours  QUEtiapine 100 milliGRAM(s) Oral every 12 hours  thiamine 100 milliGRAM(s) Oral daily  vitamin B complex with vitamin C 1 Tablet(s) Oral daily    acetylcysteine 20% Inhalation 4 milliLiter(s) Inhalation every 6 hours  ALBUTerol/ipratropium for Nebulization 3 milliLiter(s) Nebulizer every 6 hours  chlordiazePOXIDE 75 milliGRAM(s) Oral every 8 hours  dexmedetomidine Infusion 0.9 MICROgram(s)/kG/Hr IV Continuous <Continuous>  enoxaparin Injectable 40 milliGRAM(s) SubCutaneous daily  folic acid 1 milliGRAM(s) Oral daily  haloperidol     Tablet 5 milliGRAM(s) Oral every 6 hours  haloperidol    Injectable 2 milliGRAM(s) IV Push every 4 hours PRN  influenza   Vaccine 0.5 milliLiter(s) IntraMuscular once  insulin lispro (HumaLOG) corrective regimen sliding scale   SubCutaneous Before meals and at bedtime  nicotine - 21 mG/24Hr(s) Patch 1 patch Transdermal daily  nystatin Powder 1 Application(s) Topical once PRN  pantoprazole  Injectable 40 milliGRAM(s) IV Push daily  PHENobarbital Injectable 130 milliGRAM(s) IV Push every 12 hours  piperacillin/tazobactam IVPB. 3.375 Gram(s) IV Intermittent every 8 hours  QUEtiapine 100 milliGRAM(s) Oral every 12 hours  thiamine 100 milliGRAM(s) Oral daily  vitamin B complex with vitamin C 1 Tablet(s) Oral daily    Drug Dosing Weight  Height (cm): 175.26 (01 Nov 2017 12:00)  Weight (kg): 74.7 (01 Nov 2017 12:00)  BMI (kg/m2): 24.3 (01 Nov 2017 12:00)  BSA (m2): 1.9 (01 Nov 2017 12:00)    CENTRAL LINE: [] YES [x] NO  LOCATION:   DATE INSERTED:  REMOVE: [] YES [] NO  EXPLAIN:    RHODES: [x] YES [] NO    DATE INSERTED: 11/7  REMOVE:  [] YES [x] NO  EXPLAIN: poor mental status    A-LINE:  [] YES [x] NO  LOCATION:   DATE INSERTED:  REMOVE:  [] YES [] NO  EXPLAIN:    PMH -reviewed admission note, no change since admission  PAST MEDICAL & SURGICAL HISTORY:  Prediabetes  Alcohol induced acute pancreatitis  Pancreatitis  Alcoholism  Fatty liver  Gastroenteritis  No significant past surgical history      ICU Vital Signs Last 24 Hrs  T(C): 35.8 (09 Nov 2017 08:00), Max: 37.3 (08 Nov 2017 15:00)  T(F): 96.4 (09 Nov 2017 08:00), Max: 99.1 (08 Nov 2017 15:00)  HR: 93 (09 Nov 2017 09:00) (78 - 124)  BP: 145/91 (09 Nov 2017 09:00) (110/57 - 170/104)  BP(mean): 103 (09 Nov 2017 09:00) (69 - 119)  ABP: --  ABP(mean): --  RR: 39 (09 Nov 2017 09:00) (16 - 54)  SpO2: 99% (09 Nov 2017 09:00) (92% - 100%)      ABG - ( 08 Nov 2017 05:13 )  pH: 7.45  /  pCO2: 41    /  pO2: 77    / HCO3: 28    / Base Excess: 4.0   /  SaO2: 95                    11-08 @ 07:01 - 11-09 @ 07:00  --------------------------------------------------------  IN: 1106.7 mL / OUT: 3055 mL / NET: -1948.3 mL        Mode: CPAP with PS  FiO2: 30  PEEP: 5  PS: 5      PHYSICAL EXAM:    GENERAL: [x]NAD, [x]well-groomed, [x]well-developed  HEAD:  [x]Atraumatic, [x]Normocephalic  EYES: [x]EOMI, [x]PERRLA, [x]conjunctiva and sclera clear  ENMT: []No tonsillar erythema, exudates, or enlargement; [x]Moist mucous membranes, []Good dentition, []No lesions  NECK: [x]Supple, normal appearance, []No JVD; []Normal thyroid; [x]Trachea midline  NERVOUS SYSTEM:  [x]Awake and arousable, []Good concentration; []Motor Strength 5/5 B/L upper and lower extremities; []DTRs 2+ intact and symmetric  CHEST/LUNG: [x]No chest deformity; [x]Normal percussion bilaterally; [x]No rales, rhonchi, wheezing   HEART: [x]Regular rate and rhythm; [x]No murmurs, rubs, or gallops  ABDOMEN: [x]Soft, Nontender, Nondistended; [x]Bowel sounds present  EXTREMITIES:  [x]2+ Peripheral Pulses, [x]No clubbing, cyanosis, or edema  LYMPH: []No lymphadenopathy noted  SKIN: []No rashes or lesions; [x]Good capillary refill      LABS:  CBC Full  -  ( 09 Nov 2017 06:17 )  WBC Count : 10.3 K/uL  Hemoglobin : 14.1 g/dL  Hematocrit : 40.6 %  Platelet Count - Automated : 395 K/uL  Mean Cell Volume : 95.0 fl  Mean Cell Hemoglobin : 32.9 pg  Mean Cell Hemoglobin Concentration : 34.6 gm/dL  Auto Neutrophil # : x  Auto Lymphocyte # : x  Auto Monocyte # : x  Auto Eosinophil # : x  Auto Basophil # : x  Auto Neutrophil % : x  Auto Lymphocyte % : x  Auto Monocyte % : x  Auto Eosinophil % : x  Auto Basophil % : x    11-09    138  |  103  |  8   ----------------------------<  95  3.7   |  24  |  0.50    Ca    9.4      09 Nov 2017 06:17  Phos  4.2     11-09  Mg     1.8     11-09              RADIOLOGY & ADDITIONAL STUDIES REVIEWED:  ***    []GOALS OF CARE DISCUSSION WITH PATIENT/FAMILY/PROXY:    CRITICAL CARE TIME SPENT: 35 minutes

## 2017-11-09 NOTE — PROGRESS NOTE ADULT - SUBJECTIVE AND OBJECTIVE BOX
Patient is seen and examined at the bed side, is afebrile. He remains extubated .  The tube feeding on hold, since residual is high.          REVIEW OF SYSTEMS: Unable to obtain since patient is not fully awake.          ICU Vital Signs Last 24 Hrs  T(C): 37.6 (2017 15:00), Max: 37.6 (2017 15:00)  T(F): 99.6 (2017 15:00), Max: 99.6 (2017 15:00)  HR: 127 (2017 19:00) (92 - 127)  BP: 151/83 (:00) (129/111 - 170/104)  BP(mean): 99 (:00) (88 - 119)  ABP: --  ABP(mean): --  RR: 33 (2017 19:00) (18 - 54)  SpO2: 97% (2017 19:00) (92% - 100%)          PHYSICAL EXAM:    GENERAL: No tin distress, on VM  HEENT: NGT in placed  CVS; s1 and s2 present  RESP: air entry b/L  GI: abdomen  soft and nontender  EXT: No pedal edema  CNS: mildly sedated          ALLERGIES: No Known Drug Allergies            LABS:                        14.1   10.3  )-----------( 395      ( 2017 06:17 )             40.6                           13.0   7.1   )-----------( 307      ( 2017 06:43 )             38.3                                     14.6   11.4  )-----------( 145      ( 2017 06:23 )             42.8                  11    138  |  103  |  8   ----------------------------<  95  3.7   |  24  |  0.50    Ca    9.4      2017 06:17  Phos  4.2       Mg     1.8             TPro  6.5  /  Alb  2.3<L>  /  TBili  0.3  /  DBili  x   /  AST  57<H>  /  ALT  43  /  AlkPhos  108  11-06        Urinalysis Basic - ( 2017 17:11 )    Color: Yellow / Appearance: Clear / S.010 / pH: x  Gluc: x / Ketone: Negative  / Bili: Negative / Urobili: 1   Blood: x / Protein: Negative / Nitrite: Negative   Leuk Esterase: Negative / RBC: 10-25 /HPF / WBC 0-2 /HPF   Sq Epi: x / Non Sq Epi: Occasional /HPF / Bacteria: x        POCT Blood Glucose.: 86 mg/dL (2017 06:23)  POCT Blood Glucose.: 92 mg/dL (2017 23:49)    ABG - ( 2017 05:19 )  pH: 7.34  /  pCO2: 54    /  pO2: 65    / HCO3: 28    / Base Excess: 1.5   /  SaO2: 90            MEDICATIONS  (STANDING):  acetylcysteine 20% Inhalation 4 milliLiter(s) Inhalation every 6 hours  ALBUTerol/ipratropium for Nebulization 3 milliLiter(s) Nebulizer every 6 hours  chlordiazePOXIDE 75 milliGRAM(s) Oral every 8 hours  enoxaparin Injectable 40 milliGRAM(s) SubCutaneous daily  folic acid 1 milliGRAM(s) Oral daily  haloperidol     Tablet 5 milliGRAM(s) Oral every 6 hours  influenza   Vaccine 0.5 milliLiter(s) IntraMuscular once  insulin lispro (HumaLOG) corrective regimen sliding scale   SubCutaneous Before meals and at bedtime  nicotine - 21 mG/24Hr(s) Patch 1 patch Transdermal daily  pantoprazole  Injectable 40 milliGRAM(s) IV Push daily  PHENobarbital Injectable 130 milliGRAM(s) IV Push every 12 hours  piperacillin/tazobactam IVPB. 3.375 Gram(s) IV Intermittent every 8 hours  QUEtiapine 100 milliGRAM(s) Oral every 12 hours  thiamine 100 milliGRAM(s) Oral daily  vitamin B complex with vitamin C 1 Tablet(s) Oral daily    MEDICATIONS  (PRN):  haloperidol    Injectable 2 milliGRAM(s) IV Push every 4 hours PRN Agitation  nystatin Powder 1 Application(s) Topical once PRN itching            RADIOLOGY & ADDITIONAL TESTS:    17 : Xray Chest 1 View AP-PORTABLE IMMEDIATE (17 @ 11:32) :On  of this year there was some infiltrate at left base which is no longer evident. Lungs are presently clear.    17: Xray Chest 1 View AP-PORTABLE IMMEDIATE (17 @ 17:05) :New left base infiltrate. Feeding tube at least to stomach. Thank you for the courtesy of this referral.          MICROBIOLOGY DATA:      Culture - Sputum . (17 @ 07:31)    -  Penicillin: R >8    -  RIF- Rifampin: S <=1    -  Tetra/Doxy: S <=1    -  Trimethoprim/Sulfamethoxazole: S <=0.5/9.5    -  Vancomycin: S 2    -  Cefazolin: S <=4    -  Ciprofloxacin: S <=1    -  Clindamycin: S 0.5    -  Erythromycin: S 0.5    -  Gentamicin: S <=1    -  Levofloxacin: S <=0.5    -  Moxifloxacin(Aerobic): S <=0.5    -  Oxacillin: S 0.5    Gram Stain:   Few polymorphonuclear leukocytes per low power field  No Squamous epithelial cells per low power field  Numerous Gram Positive Cocci in Clusters per oil power field    -  Ampicillin/Sulbactam: S <=8/4    Specimen Source: .Sputum Sputum, trap    Culture Results:   Numerous Staphylococcus aureus  No routine respiratory tomasz Isolated    Organism Identification: Staphylococcus aureus    Organism: Staphylococcus aureus    Method Type: WILBERTO        Legionella pneumophila Antigen, Urine (17 @ 12:22)    Legionella Antigen, Urine: Negative        Culture - Sputum . (17 @ 07:31)    Gram Stain:   Few polymorphonuclear leukocytes per low power field  No Squamous epithelial cells per low power field  Numerous Gram Positive Cocci in Clusters per oil power field    Specimen Source: .Sputum Sputum, trap

## 2017-11-09 NOTE — PROGRESS NOTE ADULT - ASSESSMENT
37 years old male from home lives with girl friend, with PMH of EtOH abuse, recurrent alcohol induced pancreatitis, Fatty Liver, EtOH withdrawal seizure(intubated 2 times), pre-DM (not on med), and gastroenteritis presented to ED c/o worsening epigastric pain .Admitted for Acute Pancreatitis, had RRT and was brought intubated for airway protection in delirium tremens.    Spoke to the mother at bedside and updated her about the Fever due to PNA due to intubation. 37 years old male from home lives with girl friend, with PMH of EtOH abuse, recurrent alcohol induced pancreatitis, Fatty Liver, EtOH withdrawal seizure(intubated 2 times), pre-DM (not on med), and gastroenteritis presented to ED c/o worsening epigastric pain .Admitted for Acute Pancreatitis, had RRT and was brought intubated for airway protection in delirium tremens.    Spoke to the mother at bedside and updated her about the Fever due to PNA.

## 2017-11-09 NOTE — PROGRESS NOTE ADULT - PROBLEM SELECTOR PLAN 1
Now that patient is extubated  -will keep patient on PO Haldol, Po Seroquel, Phenobarb, Librium and titrating precedex to 0.9  -currently calm but needs 1:1 for safety  -c/w monitoring

## 2017-11-10 LAB
ANION GAP SERPL CALC-SCNC: 11 MMOL/L — SIGNIFICANT CHANGE UP (ref 5–17)
BUN SERPL-MCNC: 10 MG/DL — SIGNIFICANT CHANGE UP (ref 7–18)
CALCIUM SERPL-MCNC: 9.3 MG/DL — SIGNIFICANT CHANGE UP (ref 8.4–10.5)
CHLORIDE SERPL-SCNC: 104 MMOL/L — SIGNIFICANT CHANGE UP (ref 96–108)
CO2 SERPL-SCNC: 25 MMOL/L — SIGNIFICANT CHANGE UP (ref 22–31)
CREAT SERPL-MCNC: 0.45 MG/DL — LOW (ref 0.5–1.3)
GLUCOSE BLDC GLUCOMTR-MCNC: 115 MG/DL — HIGH (ref 70–99)
GLUCOSE BLDC GLUCOMTR-MCNC: 125 MG/DL — HIGH (ref 70–99)
GLUCOSE BLDC GLUCOMTR-MCNC: 143 MG/DL — HIGH (ref 70–99)
GLUCOSE SERPL-MCNC: 119 MG/DL — HIGH (ref 70–99)
HCT VFR BLD CALC: 41.4 % — SIGNIFICANT CHANGE UP (ref 39–50)
HGB BLD-MCNC: 14.6 G/DL — SIGNIFICANT CHANGE UP (ref 13–17)
MAGNESIUM SERPL-MCNC: 2 MG/DL — SIGNIFICANT CHANGE UP (ref 1.6–2.6)
MCHC RBC-ENTMCNC: 33 PG — SIGNIFICANT CHANGE UP (ref 27–34)
MCHC RBC-ENTMCNC: 35.1 GM/DL — SIGNIFICANT CHANGE UP (ref 32–36)
MCV RBC AUTO: 94.1 FL — SIGNIFICANT CHANGE UP (ref 80–100)
PHOSPHATE SERPL-MCNC: 3.2 MG/DL — SIGNIFICANT CHANGE UP (ref 2.5–4.5)
PLATELET # BLD AUTO: 452 K/UL — HIGH (ref 150–400)
POTASSIUM SERPL-MCNC: 3.4 MMOL/L — LOW (ref 3.5–5.3)
POTASSIUM SERPL-SCNC: 3.4 MMOL/L — LOW (ref 3.5–5.3)
RBC # BLD: 4.4 M/UL — SIGNIFICANT CHANGE UP (ref 4.2–5.8)
RBC # FLD: 11.1 % — SIGNIFICANT CHANGE UP (ref 10.3–14.5)
SODIUM SERPL-SCNC: 140 MMOL/L — SIGNIFICANT CHANGE UP (ref 135–145)
WBC # BLD: 8.7 K/UL — SIGNIFICANT CHANGE UP (ref 3.8–10.5)
WBC # FLD AUTO: 8.7 K/UL — SIGNIFICANT CHANGE UP (ref 3.8–10.5)

## 2017-11-10 RX ORDER — POTASSIUM CHLORIDE 20 MEQ
40 PACKET (EA) ORAL EVERY 4 HOURS
Qty: 0 | Refills: 0 | Status: COMPLETED | OUTPATIENT
Start: 2017-11-10 | End: 2017-11-10

## 2017-11-10 RX ORDER — PHENOBARBITAL 60 MG
65 TABLET ORAL EVERY 12 HOURS
Qty: 0 | Refills: 0 | Status: DISCONTINUED | OUTPATIENT
Start: 2017-11-10 | End: 2017-11-12

## 2017-11-10 RX ADMIN — PIPERACILLIN AND TAZOBACTAM 12.5 GRAM(S): 4; .5 INJECTION, POWDER, LYOPHILIZED, FOR SOLUTION INTRAVENOUS at 22:28

## 2017-11-10 RX ADMIN — ENOXAPARIN SODIUM 40 MILLIGRAM(S): 100 INJECTION SUBCUTANEOUS at 12:11

## 2017-11-10 RX ADMIN — Medication 1 MILLIGRAM(S): at 12:12

## 2017-11-10 RX ADMIN — Medication 100 MILLIGRAM(S): at 12:12

## 2017-11-10 RX ADMIN — Medication 50 MILLIGRAM(S): at 14:08

## 2017-11-10 RX ADMIN — PIPERACILLIN AND TAZOBACTAM 12.5 GRAM(S): 4; .5 INJECTION, POWDER, LYOPHILIZED, FOR SOLUTION INTRAVENOUS at 14:09

## 2017-11-10 RX ADMIN — Medication 3 MILLILITER(S): at 21:07

## 2017-11-10 RX ADMIN — Medication 1 TABLET(S): at 12:10

## 2017-11-10 RX ADMIN — Medication 50 MILLIGRAM(S): at 22:28

## 2017-11-10 RX ADMIN — PANTOPRAZOLE SODIUM 40 MILLIGRAM(S): 20 TABLET, DELAYED RELEASE ORAL at 12:11

## 2017-11-10 RX ADMIN — Medication 40 MILLIEQUIVALENT(S): at 14:09

## 2017-11-10 RX ADMIN — Medication 75 MILLIGRAM(S): at 06:19

## 2017-11-10 RX ADMIN — Medication 40 MILLIEQUIVALENT(S): at 12:11

## 2017-11-10 RX ADMIN — Medication 1 PATCH: at 12:21

## 2017-11-10 RX ADMIN — Medication 4 MILLILITER(S): at 02:04

## 2017-11-10 RX ADMIN — QUETIAPINE FUMARATE 100 MILLIGRAM(S): 200 TABLET, FILM COATED ORAL at 06:19

## 2017-11-10 RX ADMIN — QUETIAPINE FUMARATE 100 MILLIGRAM(S): 200 TABLET, FILM COATED ORAL at 17:08

## 2017-11-10 RX ADMIN — Medication 130 MILLIGRAM(S): at 06:20

## 2017-11-10 RX ADMIN — Medication 3 MILLILITER(S): at 15:12

## 2017-11-10 RX ADMIN — Medication 3 MILLILITER(S): at 09:51

## 2017-11-10 RX ADMIN — Medication 65 MILLIGRAM(S): at 17:08

## 2017-11-10 RX ADMIN — Medication 1 PATCH: at 12:11

## 2017-11-10 RX ADMIN — Medication 3 MILLILITER(S): at 02:03

## 2017-11-10 RX ADMIN — HALOPERIDOL DECANOATE 5 MILLIGRAM(S): 100 INJECTION INTRAMUSCULAR at 06:19

## 2017-11-10 RX ADMIN — Medication 4 MILLILITER(S): at 09:55

## 2017-11-10 RX ADMIN — PIPERACILLIN AND TAZOBACTAM 12.5 GRAM(S): 4; .5 INJECTION, POWDER, LYOPHILIZED, FOR SOLUTION INTRAVENOUS at 06:20

## 2017-11-10 NOTE — PROGRESS NOTE ADULT - ASSESSMENT
A 37 years old male with PMH of EtOH abuse, recurrent alcohol induced pancreatitis, Fatty Liver, EtOH withdrawal seizure, h/o intubation x 2, presented to ER for evaluation of  worsening epigastric pain radiating to back. He has no fever or chills. In ER, found to have elevated lipase of 5599, AST/ALT 60/66, and CT abdomen shows Peripancreatic fluid and stranding, compatible with acute pancreatitis. No evidence for pancreatic necrosis. Interposing between the pancreatic head and the second part of duodenum, there is a small 1.5 x 1.3 cm fluid collection; this may represent a duodenal diverticulum or a pseudocyst. He is admitted to the floor for management of Acute Pancreatitis  and course complicated with ? DT's, s/p RRT for severe agitation due to DTs, required intubation and admitted to ICU on 11/1/17.Today, 11/4/17, he spiked fever and chest xray shows new Left base infiltrate. He has started on Vancomycin and Zosyn to cover HCAP, cultures pending. The ID consult requested to assist with further evaluation and antibiotic management.     # Pneumonia- Aspiration vs HCAP- MSSA  # s/p Intubated  # Acute pancreatitis  # DTs    Would recommend;  1.  Continue Zosyn to cover MSSA and anaerobes  2.  Aspiration precaution  3. May change to Dicloxacillin when is able to take orally to complete total of 10 days course  4. Fall precaution      d/w ICU team    will follow the patient with you

## 2017-11-10 NOTE — PROGRESS NOTE ADULT - ATTENDING COMMENTS
37 male with hx of ETOH abuse, multiple prior episodes of withdrawal, now with delirium tremens/ETOH withdrawal, acute respiratory failure.  Also with left sided opacity on CXR concerning for possible aspiration pneumonia, growing MSSA in sputum.  Now off IV sedation.  Taper librium and phenobarbitol. D/c standing haldol.   Extubated 11/8.

## 2017-11-10 NOTE — PROGRESS NOTE ADULT - PROBLEM SELECTOR PLAN 1
Now that patient is extubated  -will keep patient on PO Haldol, Po Seroquel, Phenobarb, Librium and titrating precedex to 0.9  -currently calm but needs 1:1 for safety  -c/w monitoring Now that patient is extubated  -decreasing dose of librium, phenobarb, dc standing haldol, keep prn haldol, precedex removed  -currently calm but needs 1:1 for safety  -c/w monitoring

## 2017-11-10 NOTE — PROGRESS NOTE ADULT - SUBJECTIVE AND OBJECTIVE BOX
INTERVAL HPI/OVERNIGHT EVENTS: mild agitation but nothing unusual overnight    PRESSORS: [] YES [x] NO  WHICH:    ANTIBIOTICS:  zosyn                DATE STARTED: 11/4  ANTIBIOTICS:                  DATE STARTED:  ANTIBIOTICS:                  DATE STARTED:    Antimicrobial:  piperacillin/tazobactam IVPB. 3.375 Gram(s) IV Intermittent every 8 hours    Cardiovascular:    Pulmonary:  acetylcysteine 20% Inhalation 4 milliLiter(s) Inhalation every 6 hours  ALBUTerol/ipratropium for Nebulization 3 milliLiter(s) Nebulizer every 6 hours    Hematalogic:  enoxaparin Injectable 40 milliGRAM(s) SubCutaneous daily    Other:  chlordiazePOXIDE 75 milliGRAM(s) Oral every 8 hours  folic acid 1 milliGRAM(s) Oral daily  haloperidol     Tablet 5 milliGRAM(s) Oral every 6 hours  haloperidol    Injectable 2 milliGRAM(s) IV Push every 4 hours PRN  influenza   Vaccine 0.5 milliLiter(s) IntraMuscular once  insulin lispro (HumaLOG) corrective regimen sliding scale   SubCutaneous Before meals and at bedtime  nicotine - 21 mG/24Hr(s) Patch 1 patch Transdermal daily  nystatin Powder 1 Application(s) Topical once PRN  pantoprazole  Injectable 40 milliGRAM(s) IV Push daily  PHENobarbital Injectable 130 milliGRAM(s) IV Push every 12 hours  QUEtiapine 100 milliGRAM(s) Oral every 12 hours  thiamine 100 milliGRAM(s) Oral daily  vitamin B complex with vitamin C 1 Tablet(s) Oral daily    acetylcysteine 20% Inhalation 4 milliLiter(s) Inhalation every 6 hours  ALBUTerol/ipratropium for Nebulization 3 milliLiter(s) Nebulizer every 6 hours  chlordiazePOXIDE 75 milliGRAM(s) Oral every 8 hours  enoxaparin Injectable 40 milliGRAM(s) SubCutaneous daily  folic acid 1 milliGRAM(s) Oral daily  haloperidol     Tablet 5 milliGRAM(s) Oral every 6 hours  haloperidol    Injectable 2 milliGRAM(s) IV Push every 4 hours PRN  influenza   Vaccine 0.5 milliLiter(s) IntraMuscular once  insulin lispro (HumaLOG) corrective regimen sliding scale   SubCutaneous Before meals and at bedtime  nicotine - 21 mG/24Hr(s) Patch 1 patch Transdermal daily  nystatin Powder 1 Application(s) Topical once PRN  pantoprazole  Injectable 40 milliGRAM(s) IV Push daily  PHENobarbital Injectable 130 milliGRAM(s) IV Push every 12 hours  piperacillin/tazobactam IVPB. 3.375 Gram(s) IV Intermittent every 8 hours  QUEtiapine 100 milliGRAM(s) Oral every 12 hours  thiamine 100 milliGRAM(s) Oral daily  vitamin B complex with vitamin C 1 Tablet(s) Oral daily    Drug Dosing Weight  Height (cm): 175.26 (01 Nov 2017 12:00)  Weight (kg): 74.7 (01 Nov 2017 12:00)  BMI (kg/m2): 24.3 (01 Nov 2017 12:00)  BSA (m2): 1.9 (01 Nov 2017 12:00)    CENTRAL LINE: [] YES [x] NO  LOCATION:   DATE INSERTED:  REMOVE: [] YES [] NO  EXPLAIN:    RHODES: [x] YES [] NO    DATE INSERTED: 11/7  REMOVE:  [] YES [x] NO  EXPLAIN: poor mental status    A-LINE:  [] YES [x] NO  LOCATION:   DATE INSERTED:  REMOVE:  [] YES [] NO  EXPLAIN:    PMH -reviewed admission note, no change since admission  PAST MEDICAL & SURGICAL HISTORY:  Prediabetes  Alcohol induced acute pancreatitis  Pancreatitis  Alcoholism  Fatty liver  Gastroenteritis  No significant past surgical history      ICU Vital Signs Last 24 Hrs  T(C): 36.1 (10 Nov 2017 05:00), Max: 37.6 (09 Nov 2017 15:00)  T(F): 97 (10 Nov 2017 05:00), Max: 99.6 (09 Nov 2017 15:00)  HR: 113 (10 Nov 2017 08:00) (92 - 127)  BP: 166/93 (10 Nov 2017 08:00) (146/82 - 177/88)  BP(mean): 108 (10 Nov 2017 08:00) (96 - 110)  ABP: --  ABP(mean): --  RR: 32 (10 Nov 2017 08:00) (22 - 51)  SpO2: 100% (10 Nov 2017 07:00) (94% - 100%)            11-09 @ 07:01  -  11-10 @ 07:00  --------------------------------------------------------  IN: 748 mL / OUT: 2005 mL / NET: -1257 mL            PHYSICAL EXAM:    GENERAL: [x]NAD, [x]well-groomed, [x]well-developed  HEAD:  [x]Atraumatic, [x]Normocephalic  EYES: [x]EOMI, [x]PERRLA, [x]conjunctiva and sclera clear  ENMT: []No tonsillar erythema, exudates, or enlargement; [x]Moist mucous membranes, []Good dentition, []No lesions  NECK: [x]Supple, normal appearance, []No JVD; []Normal thyroid; [x]Trachea midline  NERVOUS SYSTEM:  [x]Awake and arousable, [x]difficult and easily agitated; []Good concentration; []Motor Strength 5/5 B/L upper and lower extremities; []DTRs 2+ intact and symmetric  CHEST/LUNG: [x]No chest deformity; [x]Normal percussion bilaterally; [x]No rales, rhonchi, wheezing   HEART: [x]Regular rate and rhythm; [x]No murmurs, rubs, or gallops  ABDOMEN: [x]Soft, Nontender, Nondistended; [x]Bowel sounds present  EXTREMITIES:  [x]2+ Peripheral Pulses, [x]No clubbing, cyanosis, or edema  LYMPH: []No lymphadenopathy noted  SKIN: []No rashes or lesions; [x]Good capillary refill      LABS:  CBC Full  -  ( 10 Nov 2017 06:44 )  WBC Count : 8.7 K/uL  Hemoglobin : 14.6 g/dL  Hematocrit : 41.4 %  Platelet Count - Automated : 452 K/uL  Mean Cell Volume : 94.1 fl  Mean Cell Hemoglobin : 33.0 pg  Mean Cell Hemoglobin Concentration : 35.1 gm/dL  Auto Neutrophil # : x  Auto Lymphocyte # : x  Auto Monocyte # : x  Auto Eosinophil # : x  Auto Basophil # : x  Auto Neutrophil % : x  Auto Lymphocyte % : x  Auto Monocyte % : x  Auto Eosinophil % : x  Auto Basophil % : x    11-10    140  |  104  |  10  ----------------------------<  119<H>  3.4<L>   |  25  |  0.45<L>    Ca    9.3      10 Nov 2017 06:43  Phos  3.2     11-10  Mg     2.0     11-10              RADIOLOGY & ADDITIONAL STUDIES REVIEWED:  ***    []GOALS OF CARE DISCUSSION WITH PATIENT/FAMILY/PROXY:    CRITICAL CARE TIME SPENT: 35 minutes

## 2017-11-10 NOTE — PROGRESS NOTE ADULT - SUBJECTIVE AND OBJECTIVE BOX
Patient is seen and examined at the bed side, remains afebrile. He pulled the NGT out.           REVIEW OF SYSTEMS: Unable to obtain since patient is not fully awake.        ICU Vital Signs Last 24 Hrs  T(C): 36.2 (10 Nov 2017 19:33), Max: 37.1 (10 Nov 2017 16:29)  T(F): 97.2 (10 Nov 2017 19:33), Max: 98.8 (10 Nov 2017 16:29)  HR: 116 (10 Nov 2017 19:00) (98 - 120)  BP: 146/99 (10 Nov 2017 19:00) (120/70 - 177/88)  BP(mean): 105 (10 Nov 2017 19:00) (80 - 109)  ABP: --  ABP(mean): --  RR: 34 (10 Nov 2017 19:00) (20 - 51)  SpO2: 97% (10 Nov 2017 19:00) (94% - 100%)            PHYSICAL EXAM:    GENERAL: Not in distress, but trying to get out of bed  HEENT: NGT in placed  CVS; s1 and s2 present  RESP: air entry b/L  GI: abdomen  soft and nontender  EXT: No pedal edema  CNS: Restless and confused          ALLERGIES: No Known Drug Allergies            LABS:                        14.6   8.7   )-----------( 452      ( 10 Nov 2017 06:44 )             41.4                           14.1   10.3  )-----------( 395      ( 2017 06:17 )             40.6                                 14.6   11.4  )-----------( 145      ( 2017 06:23 )             42.8                  11-10    140  |  104  |  10  ----------------------------<  119<H>  3.4<L>   |  25  |  0.45<L>    Ca    9.3      10 Nov 2017 06:43  Phos  3.2     11-10  Mg     2.0     11-10        TPro  6.5  /  Alb  2.3<L>  /  TBili  0.3  /  DBili  x   /  AST  57<H>  /  ALT  43  /  AlkPhos  108  11-06        Urinalysis Basic - ( 2017 17:11 )    Color: Yellow / Appearance: Clear / S.010 / pH: x  Gluc: x / Ketone: Negative  / Bili: Negative / Urobili: 1   Blood: x / Protein: Negative / Nitrite: Negative   Leuk Esterase: Negative / RBC: 10-25 /HPF / WBC 0-2 /HPF   Sq Epi: x / Non Sq Epi: Occasional /HPF / Bacteria: x        POCT Blood Glucose.: 86 mg/dL (2017 06:23)  POCT Blood Glucose.: 92 mg/dL (2017 23:49)    ABG - ( 2017 05:19 )  pH: 7.34  /  pCO2: 54    /  pO2: 65    / HCO3: 28    / Base Excess: 1.5   /  SaO2: 90              MEDICATIONS  (STANDING):  ALBUTerol/ipratropium for Nebulization 3 milliLiter(s) Nebulizer every 6 hours  chlordiazePOXIDE 50 milliGRAM(s) Oral every 8 hours  enoxaparin Injectable 40 milliGRAM(s) SubCutaneous daily  folic acid 1 milliGRAM(s) Oral daily  influenza   Vaccine 0.5 milliLiter(s) IntraMuscular once  insulin lispro (HumaLOG) corrective regimen sliding scale   SubCutaneous Before meals and at bedtime  nicotine - 21 mG/24Hr(s) Patch 1 patch Transdermal daily  pantoprazole  Injectable 40 milliGRAM(s) IV Push daily  PHENobarbital Injectable 65 milliGRAM(s) IV Push every 12 hours  piperacillin/tazobactam IVPB. 3.375 Gram(s) IV Intermittent every 8 hours  QUEtiapine 100 milliGRAM(s) Oral every 12 hours  thiamine 100 milliGRAM(s) Oral daily  vitamin B complex with vitamin C 1 Tablet(s) Oral daily    MEDICATIONS  (PRN):  haloperidol    Injectable 2 milliGRAM(s) IV Push every 4 hours PRN Agitation  nystatin Powder 1 Application(s) Topical once PRN itching            RADIOLOGY & ADDITIONAL TESTS:    17 : Xray Chest 1 View AP-PORTABLE IMMEDIATE (17 @ 11:32) :On  of this year there was some infiltrate at left base which is no longer evident. Lungs are presently clear.    17: Xray Chest 1 View AP-PORTABLE IMMEDIATE (17 @ 17:05) :New left base infiltrate. Feeding tube at least to stomach. Thank you for the courtesy of this referral.          MICROBIOLOGY DATA:      Culture - Sputum . (17 @ 07:31)    -  Penicillin: R >8    -  RIF- Rifampin: S <=1    -  Tetra/Doxy: S <=1    -  Trimethoprim/Sulfamethoxazole: S <=0.5/9.5    -  Vancomycin: S 2    -  Cefazolin: S <=4    -  Ciprofloxacin: S <=1    -  Clindamycin: S 0.5    -  Erythromycin: S 0.5    -  Gentamicin: S <=1    -  Levofloxacin: S <=0.5    -  Moxifloxacin(Aerobic): S <=0.5    -  Oxacillin: S 0.5    Gram Stain:   Few polymorphonuclear leukocytes per low power field  No Squamous epithelial cells per low power field  Numerous Gram Positive Cocci in Clusters per oil power field    -  Ampicillin/Sulbactam: S <=8/4    Specimen Source: .Sputum Sputum, trap    Culture Results:   Numerous Staphylococcus aureus  No routine respiratory tomasz Isolated    Organism Identification: Staphylococcus aureus    Organism: Staphylococcus aureus    Method Type: WILBERTO        Legionella pneumophila Antigen, Urine (17 @ 12:22)    Legionella Antigen, Urine: Negative        Culture - Sputum . (17 @ 07:31)    Gram Stain:   Few polymorphonuclear leukocytes per low power field  No Squamous epithelial cells per low power field  Numerous Gram Positive Cocci in Clusters per oil power field    Specimen Source: .Sputum Sputum, trap

## 2017-11-10 NOTE — PROGRESS NOTE ADULT - PROBLEM SELECTOR PROBLEM 5
HTN (hypertension)
Need for prophylactic measure

## 2017-11-10 NOTE — PROGRESS NOTE ADULT - PROBLEM SELECTOR PLAN 2
Wound cleaned with wound cleanser and antibiotic ointment placed on abrasion. Will continue with hydration @ 40 cc  No propofol sec to pancreatitis   Started on tube feeds - patient is tolerating well

## 2017-11-10 NOTE — PROGRESS NOTE ADULT - ASSESSMENT
37 years old male from home lives with girl friend, with PMH of EtOH abuse, recurrent alcohol induced pancreatitis, Fatty Liver, EtOH withdrawal seizure(intubated 2 times), pre-DM (not on med), and gastroenteritis presented to ED c/o worsening epigastric pain .Admitted for Acute Pancreatitis, had RRT and was brought intubated for airway protection in delirium tremens.    Spoke to the mother at bedside and updated her about the Fever due to PNA.

## 2017-11-10 NOTE — PROGRESS NOTE ADULT - PROBLEM SELECTOR PLAN 5
pt has uncontrolled HTN , on hydralazine IV prn   will switch to PO meds once pt starts eating and tolerates diet.
on lovenox and scd 2/2 to intubation  PPI 2/2 to intubation

## 2017-11-11 LAB
ANION GAP SERPL CALC-SCNC: 11 MMOL/L — SIGNIFICANT CHANGE UP (ref 5–17)
BUN SERPL-MCNC: 11 MG/DL — SIGNIFICANT CHANGE UP (ref 7–18)
CALCIUM SERPL-MCNC: 9.7 MG/DL — SIGNIFICANT CHANGE UP (ref 8.4–10.5)
CHLORIDE SERPL-SCNC: 106 MMOL/L — SIGNIFICANT CHANGE UP (ref 96–108)
CO2 SERPL-SCNC: 25 MMOL/L — SIGNIFICANT CHANGE UP (ref 22–31)
CREAT SERPL-MCNC: 0.59 MG/DL — SIGNIFICANT CHANGE UP (ref 0.5–1.3)
GLUCOSE BLDC GLUCOMTR-MCNC: 106 MG/DL — HIGH (ref 70–99)
GLUCOSE BLDC GLUCOMTR-MCNC: 118 MG/DL — HIGH (ref 70–99)
GLUCOSE BLDC GLUCOMTR-MCNC: 118 MG/DL — HIGH (ref 70–99)
GLUCOSE BLDC GLUCOMTR-MCNC: 123 MG/DL — HIGH (ref 70–99)
GLUCOSE SERPL-MCNC: 114 MG/DL — HIGH (ref 70–99)
HCT VFR BLD CALC: 44.6 % — SIGNIFICANT CHANGE UP (ref 39–50)
HGB BLD-MCNC: 15.2 G/DL — SIGNIFICANT CHANGE UP (ref 13–17)
MAGNESIUM SERPL-MCNC: 2.3 MG/DL — SIGNIFICANT CHANGE UP (ref 1.6–2.6)
MCHC RBC-ENTMCNC: 32.5 PG — SIGNIFICANT CHANGE UP (ref 27–34)
MCHC RBC-ENTMCNC: 34 GM/DL — SIGNIFICANT CHANGE UP (ref 32–36)
MCV RBC AUTO: 95.6 FL — SIGNIFICANT CHANGE UP (ref 80–100)
PHOSPHATE SERPL-MCNC: 3.5 MG/DL — SIGNIFICANT CHANGE UP (ref 2.5–4.5)
PLATELET # BLD AUTO: 574 K/UL — HIGH (ref 150–400)
POTASSIUM SERPL-MCNC: 3.6 MMOL/L — SIGNIFICANT CHANGE UP (ref 3.5–5.3)
POTASSIUM SERPL-SCNC: 3.6 MMOL/L — SIGNIFICANT CHANGE UP (ref 3.5–5.3)
RBC # BLD: 4.66 M/UL — SIGNIFICANT CHANGE UP (ref 4.2–5.8)
RBC # FLD: 11.7 % — SIGNIFICANT CHANGE UP (ref 10.3–14.5)
SODIUM SERPL-SCNC: 142 MMOL/L — SIGNIFICANT CHANGE UP (ref 135–145)
WBC # BLD: 11.5 K/UL — HIGH (ref 3.8–10.5)
WBC # FLD AUTO: 11.5 K/UL — HIGH (ref 3.8–10.5)

## 2017-11-11 RX ORDER — SODIUM CHLORIDE 9 MG/ML
1000 INJECTION, SOLUTION INTRAVENOUS
Qty: 0 | Refills: 0 | Status: DISCONTINUED | OUTPATIENT
Start: 2017-11-11 | End: 2017-11-12

## 2017-11-11 RX ORDER — HALOPERIDOL DECANOATE 100 MG/ML
2 INJECTION INTRAMUSCULAR ONCE
Qty: 0 | Refills: 0 | Status: COMPLETED | OUTPATIENT
Start: 2017-11-11 | End: 2017-11-11

## 2017-11-11 RX ADMIN — Medication 1 PATCH: at 14:36

## 2017-11-11 RX ADMIN — Medication 3 MILLILITER(S): at 15:32

## 2017-11-11 RX ADMIN — Medication 65 MILLIGRAM(S): at 18:39

## 2017-11-11 RX ADMIN — Medication 50 MILLIGRAM(S): at 05:57

## 2017-11-11 RX ADMIN — PIPERACILLIN AND TAZOBACTAM 12.5 GRAM(S): 4; .5 INJECTION, POWDER, LYOPHILIZED, FOR SOLUTION INTRAVENOUS at 23:01

## 2017-11-11 RX ADMIN — Medication 3 MILLILITER(S): at 09:08

## 2017-11-11 RX ADMIN — Medication 2 MILLIGRAM(S): at 10:45

## 2017-11-11 RX ADMIN — Medication 1 PATCH: at 14:35

## 2017-11-11 RX ADMIN — Medication 3 MILLILITER(S): at 02:10

## 2017-11-11 RX ADMIN — HALOPERIDOL DECANOATE 2 MILLIGRAM(S): 100 INJECTION INTRAMUSCULAR at 09:00

## 2017-11-11 RX ADMIN — HALOPERIDOL DECANOATE 2 MILLIGRAM(S): 100 INJECTION INTRAMUSCULAR at 10:45

## 2017-11-11 RX ADMIN — QUETIAPINE FUMARATE 100 MILLIGRAM(S): 200 TABLET, FILM COATED ORAL at 05:57

## 2017-11-11 RX ADMIN — PIPERACILLIN AND TAZOBACTAM 12.5 GRAM(S): 4; .5 INJECTION, POWDER, LYOPHILIZED, FOR SOLUTION INTRAVENOUS at 05:57

## 2017-11-11 RX ADMIN — Medication 3 MILLILITER(S): at 20:31

## 2017-11-11 RX ADMIN — ENOXAPARIN SODIUM 40 MILLIGRAM(S): 100 INJECTION SUBCUTANEOUS at 12:50

## 2017-11-11 RX ADMIN — Medication 65 MILLIGRAM(S): at 05:57

## 2017-11-11 RX ADMIN — PANTOPRAZOLE SODIUM 40 MILLIGRAM(S): 20 TABLET, DELAYED RELEASE ORAL at 12:50

## 2017-11-11 RX ADMIN — PIPERACILLIN AND TAZOBACTAM 12.5 GRAM(S): 4; .5 INJECTION, POWDER, LYOPHILIZED, FOR SOLUTION INTRAVENOUS at 14:35

## 2017-11-11 NOTE — PROGRESS NOTE ADULT - ATTENDING COMMENTS
37 male with hx of ETOH abuse, multiple prior episodes of withdrawal, now with delirium tremens/ETOH withdrawal, acute respiratory failure.  Also with left sided opacity on CXR concerning for possible aspiration pneumonia, growing MSSA in sputum.  Now off IV sedation.  Taper librium and phenobarbital.    Extubated 11/8.

## 2017-11-11 NOTE — PROGRESS NOTE ADULT - SUBJECTIVE AND OBJECTIVE BOX
Patient is seen and examined at the bed side, remains afebrile. He is still confused.           REVIEW OF SYSTEMS: Unable to obtain since patient is not fully Alert.          ICU Vital Signs Last 24 Hrs  T(C): 36.5 (2017 15:35), Max: 36.9 (2017 06:00)  T(F): 97.7 (2017 15:35), Max: 98.5 (2017 06:00)  HR: 102 (2017 17:00) (99 - 126)  BP: 138/91 (2017 17:00) (138/79 - 171/87)  BP(mean): 100 (2017 17:00) (91 - 113)  ABP: --  ABP(mean): --  RR: 27 (2017 17:00) (20 - 42)  SpO2: 97% (2017 17:00) (95% - 100%)          PHYSICAL EXAM:    GENERAL: Not in distress, but trying to get out of bed  HEENT: NGT in placed  CVS; s1 and s2 present  RESP: air entry b/L  GI: abdomen  soft and nontender  EXT: No pedal edema  CNS: Restless and confused          ALLERGIES: No Known Drug Allergies            LABS:                        14.6   8.7   )-----------( 452      ( 10 Nov 2017 06:44 )             41.4                           14.1   10.3  )-----------( 395      ( 2017 06:17 )             40.6                                 14.6   11.4  )-----------( 145      ( 2017 06:23 )             42.8                  11-10    140  |  104  |  10  ----------------------------<  119<H>  3.4<L>   |  25  |  0.45<L>    Ca    9.3      10 Nov 2017 06:43  Phos  3.2     11-10  Mg     2.0     11-10        TPro  6.5  /  Alb  2.3<L>  /  TBili  0.3  /  DBili  x   /  AST  57<H>  /  ALT  43  /  AlkPhos  108  11-06        Urinalysis Basic - ( 2017 17:11 )    Color: Yellow / Appearance: Clear / S.010 / pH: x  Gluc: x / Ketone: Negative  / Bili: Negative / Urobili: 1   Blood: x / Protein: Negative / Nitrite: Negative   Leuk Esterase: Negative / RBC: 10-25 /HPF / WBC 0-2 /HPF   Sq Epi: x / Non Sq Epi: Occasional /HPF / Bacteria: x        POCT Blood Glucose.: 86 mg/dL (2017 06:23)  POCT Blood Glucose.: 92 mg/dL (2017 23:49)    ABG - ( 2017 05:19 )  pH: 7.34  /  pCO2: 54    /  pO2: 65    / HCO3: 28    / Base Excess: 1.5   /  SaO2: 90                MEDICATIONS  (STANDING):  ALBUTerol/ipratropium for Nebulization 3 milliLiter(s) Nebulizer every 6 hours  chlordiazePOXIDE 50 milliGRAM(s) Oral every 8 hours  dextrose 5% + sodium chloride 0.45%. 1000 milliLiter(s) (75 mL/Hr) IV Continuous <Continuous>  enoxaparin Injectable 40 milliGRAM(s) SubCutaneous daily  folic acid 1 milliGRAM(s) Oral daily  influenza   Vaccine 0.5 milliLiter(s) IntraMuscular once  insulin lispro (HumaLOG) corrective regimen sliding scale   SubCutaneous Before meals and at bedtime  nicotine - 21 mG/24Hr(s) Patch 1 patch Transdermal daily  pantoprazole  Injectable 40 milliGRAM(s) IV Push daily  PHENobarbital Injectable 65 milliGRAM(s) IV Push every 12 hours  piperacillin/tazobactam IVPB. 3.375 Gram(s) IV Intermittent every 8 hours  QUEtiapine 100 milliGRAM(s) Oral every 12 hours  thiamine 100 milliGRAM(s) Oral daily  vitamin B complex with vitamin C 1 Tablet(s) Oral daily    MEDICATIONS  (PRN):  haloperidol    Injectable 2 milliGRAM(s) IV Push every 4 hours PRN Agitation  nystatin Powder 1 Application(s) Topical once PRN itching            RADIOLOGY & ADDITIONAL TESTS:    17 : Xray Chest 1 View AP-PORTABLE IMMEDIATE (17 @ 11:32) :On  of this year there was some infiltrate at left base which is no longer evident. Lungs are presently clear.    17: Xray Chest 1 View AP-PORTABLE IMMEDIATE (17 @ 17:05) :New left base infiltrate. Feeding tube at least to stomach. Thank you for the courtesy of this referral.          MICROBIOLOGY DATA:      Culture - Sputum . (17 @ 07:31)    -  Penicillin: R >8    -  RIF- Rifampin: S <=1    -  Tetra/Doxy: S <=1    -  Trimethoprim/Sulfamethoxazole: S <=0.5/9.5    -  Vancomycin: S 2    -  Cefazolin: S <=4    -  Ciprofloxacin: S <=1    -  Clindamycin: S 0.5    -  Erythromycin: S 0.5    -  Gentamicin: S <=1    -  Levofloxacin: S <=0.5    -  Moxifloxacin(Aerobic): S <=0.5    -  Oxacillin: S 0.5    Gram Stain:   Few polymorphonuclear leukocytes per low power field  No Squamous epithelial cells per low power field  Numerous Gram Positive Cocci in Clusters per oil power field    -  Ampicillin/Sulbactam: S <=8/4    Specimen Source: .Sputum Sputum, trap    Culture Results:   Numerous Staphylococcus aureus  No routine respiratory tomasz Isolated    Organism Identification: Staphylococcus aureus    Organism: Staphylococcus aureus    Method Type: WILBERTO        Legionella pneumophila Antigen, Urine (17 @ 12:22)    Legionella Antigen, Urine: Negative        Culture - Sputum . (17 @ 07:31)    Gram Stain:   Few polymorphonuclear leukocytes per low power field  No Squamous epithelial cells per low power field  Numerous Gram Positive Cocci in Clusters per oil power field    Specimen Source: .Sputum Sputum, trap

## 2017-11-11 NOTE — PROGRESS NOTE ADULT - ASSESSMENT
A 37 years old male with PMH of EtOH abuse, recurrent alcohol induced pancreatitis, Fatty Liver, EtOH withdrawal seizure, h/o intubation x 2, presented to ER for evaluation of  worsening epigastric pain radiating to back. He has no fever or chills. In ER, found to have elevated lipase of 5599, AST/ALT 60/66, and CT abdomen shows Peripancreatic fluid and stranding, compatible with acute pancreatitis. No evidence for pancreatic necrosis. Interposing between the pancreatic head and the second part of duodenum, there is a small 1.5 x 1.3 cm fluid collection; this may represent a duodenal diverticulum or a pseudocyst. He is admitted to the floor for management of Acute Pancreatitis  and course complicated with ? DT's, s/p RRT for severe agitation due to DTs, required intubation and admitted to ICU on 11/1/17.Today, 11/4/17, he spiked fever and chest xray shows new Left base infiltrate. He has started on Vancomycin and Zosyn to cover HCAP, cultures pending. The ID consult requested to assist with further evaluation and antibiotic management.     # Pneumonia- Aspiration vs HCAP- MSSA  # s/p Intubated  # Acute pancreatitis  # DTs    Would recommend;  1. Aspiration precaution  2. Continue Zosyn to cover MSSA and anaerobes  3. May change to Dicloxacillin when is able to take orally to complete total of 10 days course  4. Fall precaution      d/w ICU team    will follow the patient with you

## 2017-11-11 NOTE — PROGRESS NOTE ADULT - SUBJECTIVE AND OBJECTIVE BOX
INTERVAL HPI/OVERNIGHT EVENTS: No overnight events    PRESSORS: [] YES [x] NO  WHICH:    ANTIBIOTICS:  zosyn                DATE STARTED: 11/4    ANTIBIOTICS:                  DATE STARTED:  ANTIBIOTICS:                  DATE STARTED:    Antimicrobial:  piperacillin/tazobactam IVPB. 3.375 Gram(s) IV Intermittent every 8 hours    Cardiovascular:    Pulmonary:  ALBUTerol/ipratropium for Nebulization 3 milliLiter(s) Nebulizer every 6 hours    Hematalogic:  enoxaparin Injectable 40 milliGRAM(s) SubCutaneous daily    Other:  chlordiazePOXIDE 50 milliGRAM(s) Oral every 8 hours  folic acid 1 milliGRAM(s) Oral daily  haloperidol    Injectable 2 milliGRAM(s) IV Push every 4 hours PRN  influenza   Vaccine 0.5 milliLiter(s) IntraMuscular once  insulin lispro (HumaLOG) corrective regimen sliding scale   SubCutaneous Before meals and at bedtime  nicotine - 21 mG/24Hr(s) Patch 1 patch Transdermal daily  nystatin Powder 1 Application(s) Topical once PRN  pantoprazole  Injectable 40 milliGRAM(s) IV Push daily  PHENobarbital Injectable 65 milliGRAM(s) IV Push every 12 hours  QUEtiapine 100 milliGRAM(s) Oral every 12 hours  thiamine 100 milliGRAM(s) Oral daily  vitamin B complex with vitamin C 1 Tablet(s) Oral daily    ALBUTerol/ipratropium for Nebulization 3 milliLiter(s) Nebulizer every 6 hours  chlordiazePOXIDE 50 milliGRAM(s) Oral every 8 hours  enoxaparin Injectable 40 milliGRAM(s) SubCutaneous daily  folic acid 1 milliGRAM(s) Oral daily  haloperidol    Injectable 2 milliGRAM(s) IV Push every 4 hours PRN  influenza   Vaccine 0.5 milliLiter(s) IntraMuscular once  insulin lispro (HumaLOG) corrective regimen sliding scale   SubCutaneous Before meals and at bedtime  nicotine - 21 mG/24Hr(s) Patch 1 patch Transdermal daily  nystatin Powder 1 Application(s) Topical once PRN  pantoprazole  Injectable 40 milliGRAM(s) IV Push daily  PHENobarbital Injectable 65 milliGRAM(s) IV Push every 12 hours  piperacillin/tazobactam IVPB. 3.375 Gram(s) IV Intermittent every 8 hours  QUEtiapine 100 milliGRAM(s) Oral every 12 hours  thiamine 100 milliGRAM(s) Oral daily  vitamin B complex with vitamin C 1 Tablet(s) Oral daily    Drug Dosing Weight  Height (cm): 175.26 (01 Nov 2017 12:00)  Weight (kg): 74.7 (01 Nov 2017 12:00)  BMI (kg/m2): 24.3 (01 Nov 2017 12:00)  BSA (m2): 1.9 (01 Nov 2017 12:00)    CENTRAL LINE: [] YES [x] NO  LOCATION:   DATE INSERTED:  REMOVE: [] YES [] NO  EXPLAIN:    RHODES: [x] YES [] NO    DATE INSERTED: 11/7  REMOVE:  [] YES [x] NO  EXPLAIN: poor mental status    A-LINE:  [] YES [x] NO  LOCATION:   DATE INSERTED:  REMOVE:  [] YES [] NO  EXPLAIN:    PMH -reviewed admission note, no change since admission      ICU Vital Signs Last 24 Hrs  T(C): 36.2 (10 Nov 2017 19:33), Max: 37.1 (10 Nov 2017 16:29)  T(F): 97.2 (10 Nov 2017 19:33), Max: 98.8 (10 Nov 2017 16:29)  HR: 109 (11 Nov 2017 01:00) (98 - 120)  BP: 154/89 (11 Nov 2017 01:00) (120/70 - 174/81)  BP(mean): 103 (11 Nov 2017 01:00) (80 - 109)  ABP: --  ABP(mean): --  RR: 32 (11 Nov 2017 01:00) (20 - 51)  SpO2: 100% (10 Nov 2017 23:00) (94% - 100%)            11-09 @ 07:01  -  11-10 @ 07:00  --------------------------------------------------------  IN: 748 mL / OUT: 2005 mL / NET: -1257 mL            PHYSICAL EXAM:    GENERAL: [x]NAD, [x]well-groomed, [x]well-developed  HEAD:  [x]Atraumatic, [x]Normocephalic  EYES: [x]EOMI, [x]PERRLA, [x]conjunctiva and sclera clear  ENMT: []No tonsillar erythema, exudates, or enlargement; [x]Moist mucous membranes, []Good dentition, []No lesions  NECK: [x]Supple, normal appearance, []No JVD; []Normal thyroid; [x]Trachea midline  NERVOUS SYSTEM:  [x]Awake and arousable, [x]difficult and easily agitated; []Good concentration; []Motor Strength 5/5 B/L upper and lower extremities; []DTRs 2+ intact and symmetric  CHEST/LUNG: [x]No chest deformity; [x]Normal percussion bilaterally; [x]No rales, rhonchi, wheezing   HEART: [x]Regular rate and rhythm; [x]No murmurs, rubs, or gallops  ABDOMEN: [x]Soft, Nontender, Nondistended; [x]Bowel sounds present  EXTREMITIES:  [x]2+ Peripheral Pulses, [x]No clubbing, cyanosis, or edema  LYMPH: []No lymphadenopathy noted  SKIN: []No rashes or lesions; [x]Good capillary refill      LABS:  CBC Full  -  ( 10 Nov 2017 06:44 )  WBC Count : 8.7 K/uL  Hemoglobin : 14.6 g/dL  Hematocrit : 41.4 %  Platelet Count - Automated : 452 K/uL  Mean Cell Volume : 94.1 fl  Mean Cell Hemoglobin : 33.0 pg  Mean Cell Hemoglobin Concentration : 35.1 gm/dL  Auto Neutrophil # : x  Auto Lymphocyte # : x  Auto Monocyte # : x  Auto Eosinophil # : x  Auto Basophil # : x  Auto Neutrophil % : x  Auto Lymphocyte % : x  Auto Monocyte % : x  Auto Eosinophil % : x  Auto Basophil % : x    11-10    140  |  104  |  10  ----------------------------<  119<H>  3.4<L>   |  25  |  0.45<L>    Ca    9.3      10 Nov 2017 06:43  Phos  3.2     11-10  Mg     2.0     11-10              RADIOLOGY & ADDITIONAL STUDIES REVIEWED:  ***    [ ]GOALS OF CARE DISCUSSION WITH PATIENT/FAMILY/PROXY:    CRITICAL CARE TIME SPENT: 35 minutes INTERVAL HPI/OVERNIGHT EVENTS: No overnight events    PRESSORS: [] YES [x] NO  WHICH:    ANTIBIOTICS:  zosyn                DATE STARTED: 11/4    ANTIBIOTICS:                  DATE STARTED:  ANTIBIOTICS:                  DATE STARTED:    Antimicrobial:  piperacillin/tazobactam IVPB. 3.375 Gram(s) IV Intermittent every 8 hours    Cardiovascular:    Pulmonary:  ALBUTerol/ipratropium for Nebulization 3 milliLiter(s) Nebulizer every 6 hours    Hematalogic:  enoxaparin Injectable 40 milliGRAM(s) SubCutaneous daily    Other:  chlordiazePOXIDE 50 milliGRAM(s) Oral every 8 hours  folic acid 1 milliGRAM(s) Oral daily  haloperidol    Injectable 2 milliGRAM(s) IV Push every 4 hours PRN  influenza   Vaccine 0.5 milliLiter(s) IntraMuscular once  insulin lispro (HumaLOG) corrective regimen sliding scale   SubCutaneous Before meals and at bedtime  nicotine - 21 mG/24Hr(s) Patch 1 patch Transdermal daily  nystatin Powder 1 Application(s) Topical once PRN  pantoprazole  Injectable 40 milliGRAM(s) IV Push daily  PHENobarbital Injectable 65 milliGRAM(s) IV Push every 12 hours  QUEtiapine 100 milliGRAM(s) Oral every 12 hours  thiamine 100 milliGRAM(s) Oral daily  vitamin B complex with vitamin C 1 Tablet(s) Oral daily    ALBUTerol/ipratropium for Nebulization 3 milliLiter(s) Nebulizer every 6 hours  chlordiazePOXIDE 50 milliGRAM(s) Oral every 8 hours  enoxaparin Injectable 40 milliGRAM(s) SubCutaneous daily  folic acid 1 milliGRAM(s) Oral daily  haloperidol    Injectable 2 milliGRAM(s) IV Push every 4 hours PRN  influenza   Vaccine 0.5 milliLiter(s) IntraMuscular once  insulin lispro (HumaLOG) corrective regimen sliding scale   SubCutaneous Before meals and at bedtime  nicotine - 21 mG/24Hr(s) Patch 1 patch Transdermal daily  nystatin Powder 1 Application(s) Topical once PRN  pantoprazole  Injectable 40 milliGRAM(s) IV Push daily  PHENobarbital Injectable 65 milliGRAM(s) IV Push every 12 hours  piperacillin/tazobactam IVPB. 3.375 Gram(s) IV Intermittent every 8 hours  QUEtiapine 100 milliGRAM(s) Oral every 12 hours  thiamine 100 milliGRAM(s) Oral daily  vitamin B complex with vitamin C 1 Tablet(s) Oral daily    Drug Dosing Weight  Height (cm): 175.26 (01 Nov 2017 12:00)  Weight (kg): 74.7 (01 Nov 2017 12:00)  BMI (kg/m2): 24.3 (01 Nov 2017 12:00)  BSA (m2): 1.9 (01 Nov 2017 12:00)    CENTRAL LINE: [] YES [x] NO  LOCATION:   DATE INSERTED:  REMOVE: [] YES [] NO  EXPLAIN:    RHODES: [x] YES [] NO    DATE INSERTED: 11/7  REMOVE:  [] YES [x] NO  EXPLAIN: poor mental status    A-LINE:  [] YES [x] NO  LOCATION:   DATE INSERTED:  REMOVE:  [] YES [] NO  EXPLAIN:    PMH -reviewed admission note, no change since admission      ICU Vital Signs Last 24 Hrs  T(C): 36.2 (10 Nov 2017 19:33), Max: 37.1 (10 Nov 2017 16:29)  T(F): 97.2 (10 Nov 2017 19:33), Max: 98.8 (10 Nov 2017 16:29)  HR: 109 (11 Nov 2017 01:00) (98 - 120)  BP: 154/89 (11 Nov 2017 01:00) (120/70 - 174/81)  BP(mean): 103 (11 Nov 2017 01:00) (80 - 109)  ABP: --  ABP(mean): --  RR: 32 (11 Nov 2017 01:00) (20 - 51)  SpO2: 100% (10 Nov 2017 23:00) (94% - 100%)            11-09 @ 07:01  -  11-10 @ 07:00  --------------------------------------------------------  IN: 748 mL / OUT: 2005 mL / NET: -1257 mL            PHYSICAL EXAM:    GENERAL: [x]NAD, [x]well-groomed, [x]well-developed  HEAD:  [x]Atraumatic, [x]Normocephalic  EYES: [x]EOMI, [x]PERRLA, [x]conjunctiva and sclera clear  ENMT: []No tonsillar erythema, exudates, or enlargement; [x]Moist mucous membranes, []Good dentition, []No lesions  NECK: [x]Supple, normal appearance, []No JVD; []Normal thyroid; [x]Trachea midline  NERVOUS SYSTEM:  [x]Awake and arousable, [x]difficult and easily agitated; []Good concentration; []Motor Strength 5/5 B/L upper and lower extremities; []DTRs 2+ intact and symmetric  CHEST/LUNG: [x]No chest deformity; [x]Normal percussion bilaterally; [x]No rales, rhonchi, wheezing   HEART: [x]Regular rate and rhythm; [x]No murmurs, rubs, or gallops  ABDOMEN: [x]Soft, Nontender, Nondistended; [x]Bowel sounds present  EXTREMITIES:  [x]2+ Peripheral Pulses, [x]No clubbing, cyanosis, or edema  LYMPH: []No lymphadenopathy noted  SKIN: []No rashes or lesions; [x]Good capillary refill      LABS:  CBC Full  -  ( 10 Nov 2017 06:44 )  WBC Count : 8.7 K/uL  Hemoglobin : 14.6 g/dL  Hematocrit : 41.4 %  Platelet Count - Automated : 452 K/uL  Mean Cell Volume : 94.1 fl  Mean Cell Hemoglobin : 33.0 pg  Mean Cell Hemoglobin Concentration : 35.1 gm/dL  Auto Neutrophil # : x  Auto Lymphocyte # : x  Auto Monocyte # : x  Auto Eosinophil # : x  Auto Basophil # : x  Auto Neutrophil % : x  Auto Lymphocyte % : x  Auto Monocyte % : x  Auto Eosinophil % : x  Auto Basophil % : x    11-10    140  |  104  |  10  ----------------------------<  119<H>  3.4<L>   |  25  |  0.45<L>    Ca    9.3      10 Nov 2017 06:43  Phos  3.2     11-10  Mg     2.0     11-10              RADIOLOGY & ADDITIONAL STUDIES REVIEWED:     [ ]GOALS OF CARE DISCUSSION WITH PATIENT/FAMILY/PROXY:    CRITICAL CARE TIME SPENT: 35 minutes

## 2017-11-11 NOTE — PROGRESS NOTE ADULT - ASSESSMENT
37 years old male from home lives with girl friend, with PMH of EtOH abuse, recurrent alcohol induced pancreatitis, Fatty Liver, EtOH withdrawal seizure(intubated 2 times), pre-DM (not on med), and gastroenteritis presented to ED c/o worsening epigastric pain .Admitted for Acute Pancreatitis, had RRT and was brought intubated for airway protection in delirium tremens.    Spoke to the mother at bedside and updated her about the Fever due to PNA.     Problem/Plan - 1:  ·  Problem: Alcohol withdrawal.  Plan: Now that patient is extubated  -decreasing dose of librium, phenobarb, dc standing haldol, keep prn haldol, precedex removed  -currently calm but needs 1:1 for safety  -c/w monitoring. Now that patient is extubated  -will keep patient on PO Haldol, Po Seroquel, Phenobarb, Librium and titrating precedex to 0.9  -currently calm but needs 1:1 for safety  -c/w monitoring      Problem/Plan - 2:  ·  Problem: Alcohol-induced acute pancreatitis, unspecified complication status.  Plan: Will continue with hydration @ 40 cc  No propofol sec to pancreatitis   Started on tube feeds - patient is tolerating well.      Problem/Plan - 3:  ·  Problem: Pneumonia.  Plan: Fever of 103 before  CXR: LLL PNA secondary to intubation vs possible fluid shifts  F/u BCx and Legionella - negative so far  Sputum Cx shows staph Aureus - MSSA  -c/w Zosyn as per ID rec.      Problem/Plan - 4:  ·  Problem: Thrombocytopenia.  Plan: likely 2/2 to alcohol, no active bleed at this time  Improving gradually.      Problem/Plan - 5:  ·  Problem: Need for prophylactic measure.  Plan: on lovenox and scd 2/2 to intubation  PPI 2/2 to intubation. 37 years old male from home lives with girl friend, with PMH of EtOH abuse, recurrent alcohol induced pancreatitis, Fatty Liver, EtOH withdrawal seizure(intubated 2 times), pre-DM (not on med), and gastroenteritis presented to ED c/o worsening epigastric pain .Admitted for Acute Pancreatitis, had RRT and was brought intubated for airway protection in delirium tremens.     Problem/Plan - 1:  ·  Problem: Alcohol withdrawal.  Plan: Now that patient is extubated  -decreasing dose of librium, phenobarb, dc standing haldol, keep prn haldol, precedex removed  -currently calm but occasionally agitated  -c/w monitoring. Now that patient is extubated  -will keep patient on PO Haldol, Po Seroquel, Phenobarb, Librium and titrating precedex to 0.9  -currently calm but needs 1:1 for safety  -c/w monitoring - will keep NPO due to mental status, start on diet with improvement in mental status     Problem/Plan - 2:  ·  Problem: Alcohol-induced acute pancreatitis, unspecified complication status.   resolved     Problem/Plan - 3:  ·  Problem: Pneumonia.  Plan: Fever of 103 before  CXR: LLL PNA secondary to intubation vs possible fluid shifts  F/u BCx and Legionella - negative so far  Sputum Cx shows staph Aureus - MSSA  -c/w Zosyn as per ID rec.     Patient found to have cloudy urine with mild leucocytosis, and no fevers, will continue with Zosyn and start on IVF for hydration and d/c acevedo, TOV     Problem/Plan - 4:  ·  Problem: Thrombocytopenia.  Plan: likely 2/2 to alcohol, no active bleed at this time  Improving gradually.      Problem/Plan - 5:  ·  Problem: Need for prophylactic measure.  Plan: on lovenox and scd for DVT prophylaxis  PPI for GI ppx

## 2017-11-12 LAB
ANION GAP SERPL CALC-SCNC: 8 MMOL/L — SIGNIFICANT CHANGE UP (ref 5–17)
BUN SERPL-MCNC: 11 MG/DL — SIGNIFICANT CHANGE UP (ref 7–18)
CALCIUM SERPL-MCNC: 9.6 MG/DL — SIGNIFICANT CHANGE UP (ref 8.4–10.5)
CHLORIDE SERPL-SCNC: 104 MMOL/L — SIGNIFICANT CHANGE UP (ref 96–108)
CO2 SERPL-SCNC: 27 MMOL/L — SIGNIFICANT CHANGE UP (ref 22–31)
CREAT SERPL-MCNC: 0.55 MG/DL — SIGNIFICANT CHANGE UP (ref 0.5–1.3)
GLUCOSE BLDC GLUCOMTR-MCNC: 108 MG/DL — HIGH (ref 70–99)
GLUCOSE BLDC GLUCOMTR-MCNC: 110 MG/DL — HIGH (ref 70–99)
GLUCOSE SERPL-MCNC: 111 MG/DL — HIGH (ref 70–99)
HCT VFR BLD CALC: 44.4 % — SIGNIFICANT CHANGE UP (ref 39–50)
HGB BLD-MCNC: 16.1 G/DL — SIGNIFICANT CHANGE UP (ref 13–17)
MAGNESIUM SERPL-MCNC: 2.1 MG/DL — SIGNIFICANT CHANGE UP (ref 1.6–2.6)
MCHC RBC-ENTMCNC: 35 PG — HIGH (ref 27–34)
MCHC RBC-ENTMCNC: 36.2 GM/DL — HIGH (ref 32–36)
MCV RBC AUTO: 96.8 FL — SIGNIFICANT CHANGE UP (ref 80–100)
PHOSPHATE SERPL-MCNC: 3.8 MG/DL — SIGNIFICANT CHANGE UP (ref 2.5–4.5)
PLATELET # BLD AUTO: 608 K/UL — HIGH (ref 150–400)
POTASSIUM SERPL-MCNC: 4 MMOL/L — SIGNIFICANT CHANGE UP (ref 3.5–5.3)
POTASSIUM SERPL-SCNC: 4 MMOL/L — SIGNIFICANT CHANGE UP (ref 3.5–5.3)
RBC # BLD: 4.59 M/UL — SIGNIFICANT CHANGE UP (ref 4.2–5.8)
RBC # FLD: 12 % — SIGNIFICANT CHANGE UP (ref 10.3–14.5)
SODIUM SERPL-SCNC: 139 MMOL/L — SIGNIFICANT CHANGE UP (ref 135–145)
WBC # BLD: 12.4 K/UL — HIGH (ref 3.8–10.5)
WBC # FLD AUTO: 12.4 K/UL — HIGH (ref 3.8–10.5)

## 2017-11-12 PROCEDURE — 71010: CPT | Mod: 26

## 2017-11-12 RX ORDER — PHENOBARBITAL 60 MG
32.4 TABLET ORAL EVERY 12 HOURS
Qty: 0 | Refills: 0 | Status: DISCONTINUED | OUTPATIENT
Start: 2017-11-12 | End: 2017-11-13

## 2017-11-12 RX ORDER — PANTOPRAZOLE SODIUM 20 MG/1
40 TABLET, DELAYED RELEASE ORAL
Qty: 0 | Refills: 0 | Status: DISCONTINUED | OUTPATIENT
Start: 2017-11-12 | End: 2017-11-14

## 2017-11-12 RX ADMIN — Medication 25 MILLIGRAM(S): at 13:04

## 2017-11-12 RX ADMIN — Medication 1 MILLIGRAM(S): at 11:25

## 2017-11-12 RX ADMIN — Medication 1 PATCH: at 11:26

## 2017-11-12 RX ADMIN — SODIUM CHLORIDE 75 MILLILITER(S): 9 INJECTION, SOLUTION INTRAVENOUS at 08:15

## 2017-11-12 RX ADMIN — Medication 25 MILLIGRAM(S): at 22:27

## 2017-11-12 RX ADMIN — Medication 32.4 MILLIGRAM(S): at 17:41

## 2017-11-12 RX ADMIN — PIPERACILLIN AND TAZOBACTAM 12.5 GRAM(S): 4; .5 INJECTION, POWDER, LYOPHILIZED, FOR SOLUTION INTRAVENOUS at 06:37

## 2017-11-12 RX ADMIN — Medication 1 DROP(S): at 22:52

## 2017-11-12 RX ADMIN — PIPERACILLIN AND TAZOBACTAM 12.5 GRAM(S): 4; .5 INJECTION, POWDER, LYOPHILIZED, FOR SOLUTION INTRAVENOUS at 13:04

## 2017-11-12 RX ADMIN — PANTOPRAZOLE SODIUM 40 MILLIGRAM(S): 20 TABLET, DELAYED RELEASE ORAL at 13:04

## 2017-11-12 RX ADMIN — Medication 3 MILLILITER(S): at 02:28

## 2017-11-12 RX ADMIN — QUETIAPINE FUMARATE 100 MILLIGRAM(S): 200 TABLET, FILM COATED ORAL at 18:37

## 2017-11-12 RX ADMIN — QUETIAPINE FUMARATE 100 MILLIGRAM(S): 200 TABLET, FILM COATED ORAL at 06:37

## 2017-11-12 RX ADMIN — Medication 3 MILLILITER(S): at 20:55

## 2017-11-12 RX ADMIN — Medication 50 MILLIGRAM(S): at 06:37

## 2017-11-12 RX ADMIN — Medication 65 MILLIGRAM(S): at 06:37

## 2017-11-12 RX ADMIN — Medication 3 MILLILITER(S): at 09:52

## 2017-11-12 RX ADMIN — Medication 100 MILLIGRAM(S): at 11:25

## 2017-11-12 RX ADMIN — ENOXAPARIN SODIUM 40 MILLIGRAM(S): 100 INJECTION SUBCUTANEOUS at 11:29

## 2017-11-12 RX ADMIN — Medication 1 TABLET(S): at 11:25

## 2017-11-12 RX ADMIN — PIPERACILLIN AND TAZOBACTAM 12.5 GRAM(S): 4; .5 INJECTION, POWDER, LYOPHILIZED, FOR SOLUTION INTRAVENOUS at 22:27

## 2017-11-12 RX ADMIN — Medication 1 PATCH: at 11:32

## 2017-11-12 NOTE — PROGRESS NOTE ADULT - ASSESSMENT
37 years old male from home lives with girl friend, with PMH of EtOH abuse, recurrent alcohol induced pancreatitis, Fatty Liver, EtOH withdrawal seizure(intubated 2 times), pre-DM (not on med), and gastroenteritis presented to ED c/o worsening epigastric pain .Admitted for Acute Pancreatitis, had RRT and was brought intubated for airway protection in delirium tremens.     Problem/Plan - 1:  ·  Problem: Alcohol withdrawal.  Plan: Now that patient is extubated  -decreasing dose of librium, phenobarb, dc standing haldol, keep prn haldol, precedex removed  -currently calm but occasionally agitated  -c/w monitoring. Now that patient is extubated  -will keep patient on PO Haldol, Po Seroquel, Phenobarb, Librium and titrating precedex to 0.9  -currently calm but needs 1:1 for safety  -c/w monitoring - mental status improved, will try a little clears     Problem/Plan - 2:  ·  Problem: Alcohol-induced acute pancreatitis, unspecified complication status.   resolved     Problem/Plan - 3:  ·  Problem: Pneumonia.  Plan: Fever of 103 before  CXR: LLL PNA secondary to intubation vs possible fluid shifts  F/u BCx and Legionella - negative so far  Sputum Cx shows staph Aureus - MSSA  -c/w Zosyn as per ID rec.     Patient found to have cloudy urine with mild leucocytosis, and no fevers, will continue with Zosyn and start on IVF for hydration and d/c acevedo, TOV passed     Problem/Plan - 4:  ·  Problem: Thrombocytopenia.  Plan: likely 2/2 to alcohol, no active bleed at this time  Improving gradually.      Problem/Plan - 5:  ·  Problem: Need for prophylactic measure.  Plan: on lovenox and scd for DVT prophylaxis  PPI for GI ppx

## 2017-11-12 NOTE — PROGRESS NOTE ADULT - SUBJECTIVE AND OBJECTIVE BOX
INTERVAL HPI/OVERNIGHT EVENTS: patient calm overnight    PRESSORS: [] YES [x] NO  WHICH:    ANTIBIOTICS: zosyn                   DATE STARTED: 11/4  ANTIBIOTICS:                  DATE STARTED:  ANTIBIOTICS:                  DATE STARTED:    Antimicrobial:  piperacillin/tazobactam IVPB. 3.375 Gram(s) IV Intermittent every 8 hours    Cardiovascular:    Pulmonary:  ALBUTerol/ipratropium for Nebulization 3 milliLiter(s) Nebulizer every 6 hours    Hematalogic:  enoxaparin Injectable 40 milliGRAM(s) SubCutaneous daily    Other:  chlordiazePOXIDE 50 milliGRAM(s) Oral every 8 hours  dextrose 5% + sodium chloride 0.45%. 1000 milliLiter(s) IV Continuous <Continuous>  folic acid 1 milliGRAM(s) Oral daily  haloperidol    Injectable 2 milliGRAM(s) IV Push every 4 hours PRN  influenza   Vaccine 0.5 milliLiter(s) IntraMuscular once  insulin lispro (HumaLOG) corrective regimen sliding scale   SubCutaneous Before meals and at bedtime  nicotine - 21 mG/24Hr(s) Patch 1 patch Transdermal daily  nystatin Powder 1 Application(s) Topical once PRN  pantoprazole  Injectable 40 milliGRAM(s) IV Push daily  PHENobarbital Injectable 65 milliGRAM(s) IV Push every 12 hours  QUEtiapine 100 milliGRAM(s) Oral every 12 hours  thiamine 100 milliGRAM(s) Oral daily  vitamin B complex with vitamin C 1 Tablet(s) Oral daily    ALBUTerol/ipratropium for Nebulization 3 milliLiter(s) Nebulizer every 6 hours  chlordiazePOXIDE 50 milliGRAM(s) Oral every 8 hours  dextrose 5% + sodium chloride 0.45%. 1000 milliLiter(s) IV Continuous <Continuous>  enoxaparin Injectable 40 milliGRAM(s) SubCutaneous daily  folic acid 1 milliGRAM(s) Oral daily  haloperidol    Injectable 2 milliGRAM(s) IV Push every 4 hours PRN  influenza   Vaccine 0.5 milliLiter(s) IntraMuscular once  insulin lispro (HumaLOG) corrective regimen sliding scale   SubCutaneous Before meals and at bedtime  nicotine - 21 mG/24Hr(s) Patch 1 patch Transdermal daily  nystatin Powder 1 Application(s) Topical once PRN  pantoprazole  Injectable 40 milliGRAM(s) IV Push daily  PHENobarbital Injectable 65 milliGRAM(s) IV Push every 12 hours  piperacillin/tazobactam IVPB. 3.375 Gram(s) IV Intermittent every 8 hours  QUEtiapine 100 milliGRAM(s) Oral every 12 hours  thiamine 100 milliGRAM(s) Oral daily  vitamin B complex with vitamin C 1 Tablet(s) Oral daily    Drug Dosing Weight  Height (cm): 175.26 (01 Nov 2017 12:00)  Weight (kg): 74.7 (01 Nov 2017 12:00)  BMI (kg/m2): 24.3 (01 Nov 2017 12:00)  BSA (m2): 1.9 (01 Nov 2017 12:00)    CENTRAL LINE: [] YES [x] NO  LOCATION:   DATE INSERTED:  REMOVE: [] YES [] NO  EXPLAIN:    RHODES: [] YES [x] NO    DATE INSERTED:  REMOVE:  [] YES [] NO  EXPLAIN:    A-LINE:  [] YES [x] NO  LOCATION:   DATE INSERTED:  REMOVE:  [] YES [] NO  EXPLAIN:    PMH -reviewed admission note, no change since admission  PAST MEDICAL & SURGICAL HISTORY:  Prediabetes  Alcohol induced acute pancreatitis  Pancreatitis  Alcoholism  Fatty liver  Gastroenteritis  No significant past surgical history      ICU Vital Signs Last 24 Hrs  T(C): 37.5 (11 Nov 2017 22:00), Max: 37.5 (11 Nov 2017 22:00)  T(F): 99.5 (11 Nov 2017 22:00), Max: 99.5 (11 Nov 2017 22:00)  HR: 101 (12 Nov 2017 04:00) (94 - 125)  BP: 127/81 (12 Nov 2017 04:00) (127/81 - 167/92)  BP(mean): 92 (12 Nov 2017 04:00) (91 - 113)  ABP: --  ABP(mean): --  RR: 24 (12 Nov 2017 04:00) (22 - 42)  SpO2: 97% (12 Nov 2017 04:00) (95% - 100%)            11-10 @ 07:01  -  11-11 @ 07:00  --------------------------------------------------------  IN: 212.5 mL / OUT: 1200 mL / NET: -987.5 mL            PHYSICAL EXAM:    GENERAL: [x]NAD, [x]well-groomed, [x]well-developed  HEAD:  [x]Atraumatic, [x]Normocephalic  EYES: [x]EOMI, [x]PERRLA, [x]conjunctiva and sclera clear  ENMT: []No tonsillar erythema, exudates, or enlargement; [x]Moist mucous membranes, []Good dentition, []No lesions  NECK: [x]Supple, normal appearance, []No JVD; []Normal thyroid; [x]Trachea midline  NERVOUS SYSTEM:  [x]Awake and arousable, [x]difficult and easily agitated; []Good concentration; []Motor Strength 5/5 B/L upper and lower extremities; []DTRs 2+ intact and symmetric  CHEST/LUNG: [x]No chest deformity; [x]Normal percussion bilaterally; [x]No rales, rhonchi, wheezing   HEART: [x]Regular rate and rhythm; [x]No murmurs, rubs, or gallops  ABDOMEN: [x]Soft, Nontender, Nondistended; [x]Bowel sounds present  EXTREMITIES:  [x]2+ Peripheral Pulses, [x]No clubbing, cyanosis, or edema  LYMPH: []No lymphadenopathy noted  SKIN: []No rashes or lesions; [x]Good capillary refill        LABS:  CBC Full  -  ( 11 Nov 2017 06:48 )  WBC Count : 11.5 K/uL  Hemoglobin : 15.2 g/dL  Hematocrit : 44.6 %  Platelet Count - Automated : 574 K/uL  Mean Cell Volume : 95.6 fl  Mean Cell Hemoglobin : 32.5 pg  Mean Cell Hemoglobin Concentration : 34.0 gm/dL  Auto Neutrophil # : x  Auto Lymphocyte # : x  Auto Monocyte # : x  Auto Eosinophil # : x  Auto Basophil # : x  Auto Neutrophil % : x  Auto Lymphocyte % : x  Auto Monocyte % : x  Auto Eosinophil % : x  Auto Basophil % : x    11-11    142  |  106  |  11  ----------------------------<  114<H>  3.6   |  25  |  0.59    Ca    9.7      11 Nov 2017 06:48  Phos  3.5     11-11  Mg     2.3     11-11              RADIOLOGY & ADDITIONAL STUDIES REVIEWED:  ***    []GOALS OF CARE DISCUSSION WITH PATIENT/FAMILY/PROXY:    CRITICAL CARE TIME SPENT: 35 minutes

## 2017-11-12 NOTE — PHYSICAL THERAPY INITIAL EVALUATION ADULT - PERTINENT HX OF CURRENT PROBLEM, REHAB EVAL
Per chart review, pt is a 36 y/o male with PMHx of EtOH abuse, recurrent alcohol induced pancreatitis, fatty liver, EtOH withdrawal seizure(intubated 2 times), pre-DM (not on med), gastroenteritis presented to ED c/o worsening epigastric pain over 2 days. CT abdomen (+) peripancreatic fluid and stranding, compatible with acute pancreatitis. Hospital course significant for RRT on 11/5/17, s/p xfer to MICU w for intubation for airway protection and delirium tremens. Pt extubated on 11/8/17.

## 2017-11-12 NOTE — PHYSICAL THERAPY INITIAL EVALUATION ADULT - CRITERIA FOR SKILLED THERAPEUTIC INTERVENTIONS
anticipated discharge recommendation/functional limitations in following categories/predicted duration of therapy intervention/rehab potential/impairments found/therapy frequency

## 2017-11-12 NOTE — PROGRESS NOTE ADULT - ASSESSMENT
A 37 years old male with PMH of EtOH abuse, recurrent alcohol induced pancreatitis, Fatty Liver, EtOH withdrawal seizure, h/o intubation x 2, presented to ER for evaluation of  worsening epigastric pain radiating to back. He has no fever or chills. In ER, found to have elevated lipase of 5599, AST/ALT 60/66, and CT abdomen shows Peripancreatic fluid and stranding, compatible with acute pancreatitis. No evidence for pancreatic necrosis. Interposing between the pancreatic head and the second part of duodenum, there is a small 1.5 x 1.3 cm fluid collection; this may represent a duodenal diverticulum or a pseudocyst. He is admitted to the floor for management of Acute Pancreatitis  and course complicated with ? DT's, s/p RRT for severe agitation due to DTs, required intubation and admitted to ICU on 11/1/17.Today, 11/4/17, he spiked fever and chest xray shows new Left base infiltrate. He has started on Vancomycin and Zosyn to cover HCAP, cultures pending. The ID consult requested to assist with further evaluation and antibiotic management.     # Pneumonia- Aspiration vs HCAP- MSSA  # s/p Intubated  # Acute pancreatitis  # DTs    Would recommend:  1. Monitor WBC count, is trending back up  2. Aspiration precaution  3. Continue Zosyn to cover MSSA and anaerobes  4. May change to  oral Augmentin 875 mg q 12hours when is able to take orally to continue until 11/14/17  5. Fall and seizure  precaution      d/w Patient and floor Nursing staff    will follow the patient with you

## 2017-11-12 NOTE — PHYSICAL THERAPY INITIAL EVALUATION ADULT - LIVES WITH, PROFILE
significant other/Lives with girlfriend in a 4th floor walkup apartment. Reports independence with ADLs, drives.

## 2017-11-12 NOTE — PROGRESS NOTE ADULT - SUBJECTIVE AND OBJECTIVE BOX
Patient is seen and examined at the bed side, remains afebrile. He has transferred out of ICU today, doing better, more alert and not agitated. The WBC count is trending up.           REVIEW OF SYSTEMS: Unable to obtain since patient is not fully Alert.        Vital Signs Last 24 Hrs  T(C): 36.8 (2017 20:53), Max: 37.5 (2017 22:00)  T(F): 98.3 (2017 20:53), Max: 99.5 (2017 22:00)  HR: 96 (2017 20:53) (91 - 110)  BP: 118/78 (2017 20:53) (112/71 - 149/92)  BP(mean): 93 (2017 16:00) (79 - 108)  RR: 16 (2017 20:53) (12 - 35)  SpO2: 100% (2017 20:53) (94% - 100%)        PHYSICAL EXAM:    GENERAL: Not in distress  HEENT: NGT in placed  CVS; s1 and s2 present  RESP: air entry b/L  GI: abdomen  soft and nontender  EXT: No pedal edema  CNS: Awake and alert          ALLERGIES: No Known Drug Allergies            LABS:                        16.1   12.4  )-----------( 608      ( 2017 06:57 )             44.4                           14.6   8.7   )-----------( 452      ( 10 Nov 2017 06:44 )             41.4                           14.6   11.4  )-----------( 145      ( 2017 06:23 )             42.8                11-12    139  |  104  |  11  ----------------------------<  111<H>  4.0   |  27  |  0.55    Ca    9.6      2017 06:57  Phos  3.8     11-12  Mg     2.1     -12      TPro  6.5  /  Alb  2.3<L>  /  TBili  0.3  /  DBili  x   /  AST  57<H>  /  ALT  43  /  AlkPhos  108  11-06        Urinalysis Basic - ( 2017 17:11 )    Color: Yellow / Appearance: Clear / S.010 / pH: x  Gluc: x / Ketone: Negative  / Bili: Negative / Urobili: 1   Blood: x / Protein: Negative / Nitrite: Negative   Leuk Esterase: Negative / RBC: 10-25 /HPF / WBC 0-2 /HPF   Sq Epi: x / Non Sq Epi: Occasional /HPF / Bacteria: x        POCT Blood Glucose.: 86 mg/dL (2017 06:23)  POCT Blood Glucose.: 92 mg/dL (2017 23:49)    ABG - ( 2017 05:19 )  pH: 7.34  /  pCO2: 54    /  pO2: 65    / HCO3: 28    / Base Excess: 1.5   /  SaO2: 90                MEDICATIONS  (STANDING):  ALBUTerol/ipratropium for Nebulization 3 milliLiter(s) Nebulizer every 6 hours  chlordiazePOXIDE 25 milliGRAM(s) Oral every 8 hours  enoxaparin Injectable 40 milliGRAM(s) SubCutaneous daily  folic acid 1 milliGRAM(s) Oral daily  influenza   Vaccine 0.5 milliLiter(s) IntraMuscular once  nicotine - 21 mG/24Hr(s) Patch 1 patch Transdermal daily  pantoprazole    Tablet 40 milliGRAM(s) Oral before breakfast  PHENobarbital 32.4 milliGRAM(s) Oral every 12 hours  piperacillin/tazobactam IVPB. 3.375 Gram(s) IV Intermittent every 8 hours  QUEtiapine 100 milliGRAM(s) Oral every 12 hours  thiamine 100 milliGRAM(s) Oral daily  vitamin B complex with vitamin C 1 Tablet(s) Oral daily    MEDICATIONS  (PRN):  haloperidol    Injectable 2 milliGRAM(s) IV Push every 4 hours PRN Agitation  nystatin Powder 1 Application(s) Topical once PRN itching              RADIOLOGY & ADDITIONAL TESTS:    17 : Xray Chest 1 View AP-PORTABLE IMMEDIATE (17 @ 11:32) :On  of this year there was some infiltrate at left base which is no longer evident. Lungs are presently clear.    17: Xray Chest 1 View AP-PORTABLE IMMEDIATE (17 @ 17:05) :New left base infiltrate. Feeding tube at least to stomach. Thank you for the courtesy of this referral.          MICROBIOLOGY DATA:      Culture - Sputum . (17 @ 07:31)    -  Penicillin: R >8    -  RIF- Rifampin: S <=1    -  Tetra/Doxy: S <=1    -  Trimethoprim/Sulfamethoxazole: S <=0.5/9.5    -  Vancomycin: S 2    -  Cefazolin: S <=4    -  Ciprofloxacin: S <=1    -  Clindamycin: S 0.5    -  Erythromycin: S 0.5    -  Gentamicin: S <=1    -  Levofloxacin: S <=0.5    -  Moxifloxacin(Aerobic): S <=0.5    -  Oxacillin: S 0.5    Gram Stain:   Few polymorphonuclear leukocytes per low power field  No Squamous epithelial cells per low power field  Numerous Gram Positive Cocci in Clusters per oil power field    -  Ampicillin/Sulbactam: S <=8/4    Specimen Source: .Sputum Sputum, trap    Culture Results:   Numerous Staphylococcus aureus  No routine respiratory tomasz Isolated    Organism Identification: Staphylococcus aureus    Organism: Staphylococcus aureus    Method Type: WILBERTO        Legionella pneumophila Antigen, Urine (17 @ 12:22)    Legionella Antigen, Urine: Negative        Culture - Sputum . (17 @ 07:31)    Gram Stain:   Few polymorphonuclear leukocytes per low power field  No Squamous epithelial cells per low power field  Numerous Gram Positive Cocci in Clusters per oil power field    Specimen Source: .Sputum Sputum, trap

## 2017-11-12 NOTE — PHYSICAL THERAPY INITIAL EVALUATION ADULT - GENERAL OBSERVATIONS, REHAB EVAL
Pt received lying in bed, awake and alert. Reports 3/10 R shoulder pain, related to a previous injury. (+) R UE IV access line. Agreed to assessment.

## 2017-11-12 NOTE — CHART NOTE - NSCHARTNOTEFT_GEN_A_CORE
HPI:  37 years old male from home lives with girl friend, with PMH of EtOH abuse, recurrent alcohol induced pancreatitis, Fatty Liver, EtOH withdrawal seizure(intubated 2 times), pre-DM(not on med), and gastroenteritis presented to ED c/o worsening epigastric pain over 2 days. Pain is throbbing, epigastric area radiating to the back,10/10 when most severe, and is not related to food intake. It was initially intermittent but became constant since this morning. Denies having nausea, vomiting, fever, diarrhea, SOB, or any other symptoms. Reports last drink was this morning, he has been drinking 10 beers everyday over the last month.    In ED, patient is hypertensive to 180-190, other vitals stable, labs significant for lipase of 5599, AST/ALT 60/66, CT abdomen showed: Peripancreatic fluid and stranding, compatible with acute pancreatitis. No evidence for pancreatic necrosis. Interposing between the pancreatic head and the second part of duodenum, there is a small 1.5 x 1.3 cm fluid collection; this may represent a duodenal diverticulum or a pseudocyst.    ICU COURSE:  Pt was         ASSESSMENT: HPI:  37 years old male from home lives with girl friend, with PMH of EtOH abuse, recurrent alcohol induced pancreatitis, Fatty Liver, EtOH withdrawal seizure(intubated 2 times), pre-DM(not on med), and gastroenteritis presented to ED c/o worsening epigastric pain over 2 days. Pain is throbbing, epigastric area radiating to the back,10/10 when most severe, and is not related to food intake. It was initially intermittent but became constant since this morning. Denies having nausea, vomiting, fever, diarrhea, SOB, or any other symptoms. Reports last drink was this morning, he has been drinking 10 beers everyday over the last month.    In ED, patient is hypertensive to 180-190, other vitals stable, labs significant for lipase of 5599, AST/ALT 60/66, CT abdomen showed: Peripancreatic fluid and stranding, compatible with acute pancreatitis. No evidence for pancreatic necrosis. Interposing between the pancreatic head and the second part of duodenum, there is a small 1.5 x 1.3 cm fluid collection; this may represent a duodenal diverticulum or a pseudocyst.    ICU COURSE:  Pt was admitted to hospital for alcohol withdrawal and associated pancreatitis. On day 2 of hospital stay got agitated and brought to ICU after intubation. Was started on sedatives and got aspiration PNA secondary to intubation. Pt was started on Zosyn and the sputum cultures showed MSSA. Pt was extubated on 11/8 but needed haldol, librium and Seroquel for sedation. For pancreatitis, he was treated with fluids with good urine output. After extubation and starting clear diet, his fluids were discontinued. His elevated lipase and LFTs trended down and normalized.       ASSESSMENT:     Problem/Plan - 1:  ·  Problem: Alcohol withdrawal.  Plan: Now that patient is extubated  -decreasing dose of librium, phenobarb, dc standing haldol, keep prn haldol  -occasionally agitated  -c/w monitoring.- mental status improved  -tolerated clears diet     Problem/Plan - 2:  ·  Problem: Alcohol-induced acute pancreatitis, unspecified complication status.   -resolved with normalized Lipase and LFTs     Problem/Plan - 3:  ·  Problem: Aspiration Pneumonia.  Plan: Fever of 103 after intubation  -CXR: LLL PNA secondary to intubation vs possible fluid shifts  -F/u BCx and Legionella - negative so far  -Sputum Cx shows staph Aureus - MSSA  -c/w Zosyn as per ID rec Dr. Pizarro     Problem/Plan - 4:  ·  Problem: Thrombocytopenia.  Plan: likely 2/2 to alcohol, no active bleed at this time  Improving gradually.      Problem/Plan - 5:  ·  Problem: Need for prophylactic measure.  Plan: on lovenox and scd for DVT prophylaxis  PPI for GI ppx    FOLLOW UP NEEDED:  1. Monitor for fevers and cultures as needed  2. Taper down librium and phenobarbital as possible  3. Alcohol education  4. Discontinue protonix upon discharge  5. Switch to PO antibiotics to complete a course of 2 weeks as per Dr. Pizarro    Pt is stable for downgrade to medical floor under care of Dr. Rodriguez.

## 2017-11-13 DIAGNOSIS — K76.0 FATTY (CHANGE OF) LIVER, NOT ELSEWHERE CLASSIFIED: ICD-10-CM

## 2017-11-13 LAB
ANION GAP SERPL CALC-SCNC: 12 MMOL/L — SIGNIFICANT CHANGE UP (ref 5–17)
BUN SERPL-MCNC: 10 MG/DL — SIGNIFICANT CHANGE UP (ref 7–18)
CALCIUM SERPL-MCNC: 9.2 MG/DL — SIGNIFICANT CHANGE UP (ref 8.4–10.5)
CHLORIDE SERPL-SCNC: 101 MMOL/L — SIGNIFICANT CHANGE UP (ref 96–108)
CO2 SERPL-SCNC: 24 MMOL/L — SIGNIFICANT CHANGE UP (ref 22–31)
CREAT SERPL-MCNC: 0.52 MG/DL — SIGNIFICANT CHANGE UP (ref 0.5–1.3)
GLUCOSE SERPL-MCNC: 83 MG/DL — SIGNIFICANT CHANGE UP (ref 70–99)
HCT VFR BLD CALC: 43.7 % — SIGNIFICANT CHANGE UP (ref 39–50)
HGB BLD-MCNC: 14.9 G/DL — SIGNIFICANT CHANGE UP (ref 13–17)
MAGNESIUM SERPL-MCNC: 2 MG/DL — SIGNIFICANT CHANGE UP (ref 1.6–2.6)
MCHC RBC-ENTMCNC: 33.5 PG — SIGNIFICANT CHANGE UP (ref 27–34)
MCHC RBC-ENTMCNC: 34.1 GM/DL — SIGNIFICANT CHANGE UP (ref 32–36)
MCV RBC AUTO: 98.2 FL — SIGNIFICANT CHANGE UP (ref 80–100)
PHOSPHATE SERPL-MCNC: 3.6 MG/DL — SIGNIFICANT CHANGE UP (ref 2.5–4.5)
PLATELET # BLD AUTO: 509 K/UL — HIGH (ref 150–400)
POTASSIUM SERPL-MCNC: 3.5 MMOL/L — SIGNIFICANT CHANGE UP (ref 3.5–5.3)
POTASSIUM SERPL-SCNC: 3.5 MMOL/L — SIGNIFICANT CHANGE UP (ref 3.5–5.3)
RBC # BLD: 4.45 M/UL — SIGNIFICANT CHANGE UP (ref 4.2–5.8)
RBC # FLD: 12 % — SIGNIFICANT CHANGE UP (ref 10.3–14.5)
SODIUM SERPL-SCNC: 137 MMOL/L — SIGNIFICANT CHANGE UP (ref 135–145)
WBC # BLD: 12.3 K/UL — HIGH (ref 3.8–10.5)
WBC # FLD AUTO: 12.3 K/UL — HIGH (ref 3.8–10.5)

## 2017-11-13 PROCEDURE — 99233 SBSQ HOSP IP/OBS HIGH 50: CPT | Mod: GC

## 2017-11-13 RX ORDER — PHENOBARBITAL 60 MG
16.2 TABLET ORAL DAILY
Qty: 0 | Refills: 0 | Status: DISCONTINUED | OUTPATIENT
Start: 2017-11-13 | End: 2017-11-14

## 2017-11-13 RX ORDER — HALOPERIDOL DECANOATE 100 MG/ML
1 INJECTION INTRAMUSCULAR
Qty: 0 | Refills: 0 | Status: DISCONTINUED | OUTPATIENT
Start: 2017-11-13 | End: 2017-11-14

## 2017-11-13 RX ORDER — HALOPERIDOL DECANOATE 100 MG/ML
0.5 INJECTION INTRAMUSCULAR EVERY 8 HOURS
Qty: 0 | Refills: 0 | Status: DISCONTINUED | OUTPATIENT
Start: 2017-11-13 | End: 2017-11-14

## 2017-11-13 RX ADMIN — Medication 25 MILLIGRAM(S): at 21:11

## 2017-11-13 RX ADMIN — Medication 25 MILLIGRAM(S): at 13:24

## 2017-11-13 RX ADMIN — Medication 32.4 MILLIGRAM(S): at 17:37

## 2017-11-13 RX ADMIN — Medication 32.4 MILLIGRAM(S): at 06:25

## 2017-11-13 RX ADMIN — PIPERACILLIN AND TAZOBACTAM 12.5 GRAM(S): 4; .5 INJECTION, POWDER, LYOPHILIZED, FOR SOLUTION INTRAVENOUS at 06:25

## 2017-11-13 RX ADMIN — Medication 25 MILLIGRAM(S): at 06:25

## 2017-11-13 RX ADMIN — Medication 1 PATCH: at 13:18

## 2017-11-13 RX ADMIN — Medication 1 DROP(S): at 21:12

## 2017-11-13 RX ADMIN — Medication 1 TABLET(S): at 13:24

## 2017-11-13 RX ADMIN — Medication 3 MILLILITER(S): at 20:06

## 2017-11-13 RX ADMIN — Medication 3 MILLILITER(S): at 02:11

## 2017-11-13 RX ADMIN — Medication 100 MILLIGRAM(S): at 13:24

## 2017-11-13 RX ADMIN — QUETIAPINE FUMARATE 100 MILLIGRAM(S): 200 TABLET, FILM COATED ORAL at 06:25

## 2017-11-13 RX ADMIN — Medication 3 MILLILITER(S): at 09:03

## 2017-11-13 RX ADMIN — ENOXAPARIN SODIUM 40 MILLIGRAM(S): 100 INJECTION SUBCUTANEOUS at 13:24

## 2017-11-13 RX ADMIN — Medication 1 PATCH: at 13:24

## 2017-11-13 RX ADMIN — PIPERACILLIN AND TAZOBACTAM 12.5 GRAM(S): 4; .5 INJECTION, POWDER, LYOPHILIZED, FOR SOLUTION INTRAVENOUS at 13:24

## 2017-11-13 RX ADMIN — Medication 1 MILLIGRAM(S): at 13:24

## 2017-11-13 RX ADMIN — PIPERACILLIN AND TAZOBACTAM 12.5 GRAM(S): 4; .5 INJECTION, POWDER, LYOPHILIZED, FOR SOLUTION INTRAVENOUS at 21:11

## 2017-11-13 RX ADMIN — PANTOPRAZOLE SODIUM 40 MILLIGRAM(S): 20 TABLET, DELAYED RELEASE ORAL at 06:25

## 2017-11-13 RX ADMIN — QUETIAPINE FUMARATE 100 MILLIGRAM(S): 200 TABLET, FILM COATED ORAL at 17:37

## 2017-11-13 RX ADMIN — Medication 3 MILLILITER(S): at 13:58

## 2017-11-13 NOTE — PROGRESS NOTE ADULT - ATTENDING COMMENTS
Plan discussed with patient and family in details at bedside and all their questions / concerns were addressed  Discharge planning.  Taper Librium and Phenobarb  ID follow up - to complete antibiotics tomorrow 11/14

## 2017-11-13 NOTE — PROGRESS NOTE ADULT - SUBJECTIVE AND OBJECTIVE BOX
Patient is seen and examined at the bed side, remains afebrile. He is doping better. The WBC count stay elevated.           REVIEW OF SYSTEMS: All other review systems are negative          Vital Signs Last 24 Hrs  T(C): 36.9 (2017 20:27), Max: 36.9 (2017 20:27)  T(F): 98.5 (2017 20:27), Max: 98.5 (2017 20:27)  HR: 96 (2017 20:27) (82 - 96)  BP: 134/70 (2017 20:27) (118/78 - 135/76)  BP(mean): --  RR: 17 (2017 20:27) (14 - 17)  SpO2: 95% (2017 20:27) (95% - 100%)        PHYSICAL EXAM:    GENERAL: Not in distress  HEENT: NGT in placed  CVS; s1 and s2 present  RESP: air entry b/L  GI: abdomen  soft and nontender  EXT: No pedal edema  CNS: Awake and alert          ALLERGIES: No Known Drug Allergies            LABS:                        14.9   12.3  )-----------( 509      ( 2017 07:04 )             43.7                           16.1   12.4  )-----------( 608      ( 2017 06:57 )             44.4                           14.6   8.7   )-----------( 452      ( 10 Nov 2017 06:44 )             41.4                           14.6   11.4  )-----------( 145      ( 2017 06:23 )             42.8                11-13    137  |  101  |  10  ----------------------------<  83  3.5   |  24  |  0.52    Ca    9.2      2017 07:04  Phos  3.6     11-13  Mg     2.0     11-13      TPro  6.5  /  Alb  2.3<L>  /  TBili  0.3  /  DBili  x   /  AST  57<H>  /  ALT  43  /  AlkPhos  108  11-06        Urinalysis Basic - ( 2017 17:11 )    Color: Yellow / Appearance: Clear / S.010 / pH: x  Gluc: x / Ketone: Negative  / Bili: Negative / Urobili: 1   Blood: x / Protein: Negative / Nitrite: Negative   Leuk Esterase: Negative / RBC: 10-25 /HPF / WBC 0-2 /HPF   Sq Epi: x / Non Sq Epi: Occasional /HPF / Bacteria: x        POCT Blood Glucose.: 86 mg/dL (2017 06:23)  POCT Blood Glucose.: 92 mg/dL (2017 23:49)    ABG - ( 2017 05:19 )  pH: 7.34  /  pCO2: 54    /  pO2: 65    / HCO3: 28    / Base Excess: 1.5   /  SaO2: 90                MEDICATIONS  (STANDING):  ALBUTerol/ipratropium for Nebulization 3 milliLiter(s) Nebulizer every 6 hours  artificial  tears Solution 1 Drop(s) Both EYES at bedtime  chlordiazePOXIDE 25 milliGRAM(s) Oral every 12 hours  enoxaparin Injectable 40 milliGRAM(s) SubCutaneous daily  folic acid 1 milliGRAM(s) Oral daily  influenza   Vaccine 0.5 milliLiter(s) IntraMuscular once  nicotine - 21 mG/24Hr(s) Patch 1 patch Transdermal daily  pantoprazole    Tablet 40 milliGRAM(s) Oral before breakfast  PHENobarbital 16.2 milliGRAM(s) Oral daily  piperacillin/tazobactam IVPB. 3.375 Gram(s) IV Intermittent every 8 hours  QUEtiapine 100 milliGRAM(s) Oral every 12 hours  thiamine 100 milliGRAM(s) Oral daily  vitamin B complex with vitamin C 1 Tablet(s) Oral daily    MEDICATIONS  (PRN):  haloperidol     Tablet 1 milliGRAM(s) Oral two times a day PRN agitation  haloperidol    Injectable 0.5 milliGRAM(s) IV Push every 8 hours PRN agitation  nystatin Powder 1 Application(s) Topical once PRN itching              RADIOLOGY & ADDITIONAL TESTS:    17 : Xray Chest 1 View AP-PORTABLE IMMEDIATE (17 @ 11:32) :On  of this year there was some infiltrate at left base which is no longer evident. Lungs are presently clear.    17: Xray Chest 1 View AP-PORTABLE IMMEDIATE (17 @ 17:05) :New left base infiltrate. Feeding tube at least to stomach. Thank you for the courtesy of this referral.          MICROBIOLOGY DATA:      Culture - Sputum . (17 @ 07:31)    -  Penicillin: R >8    -  RIF- Rifampin: S <=1    -  Tetra/Doxy: S <=1    -  Trimethoprim/Sulfamethoxazole: S <=0.5/9.5    -  Vancomycin: S 2    -  Cefazolin: S <=4    -  Ciprofloxacin: S <=1    -  Clindamycin: S 0.5    -  Erythromycin: S 0.5    -  Gentamicin: S <=1    -  Levofloxacin: S <=0.5    -  Moxifloxacin(Aerobic): S <=0.5    -  Oxacillin: S 0.5    Gram Stain:   Few polymorphonuclear leukocytes per low power field  No Squamous epithelial cells per low power field  Numerous Gram Positive Cocci in Clusters per oil power field    -  Ampicillin/Sulbactam: S <=8/4    Specimen Source: .Sputum Sputum, trap    Culture Results:   Numerous Staphylococcus aureus  No routine respiratory tomasz Isolated    Organism Identification: Staphylococcus aureus    Organism: Staphylococcus aureus    Method Type: WILBERTO        Legionella pneumophila Antigen, Urine (17 @ 12:22)    Legionella Antigen, Urine: Negative        Culture - Sputum . (17 @ 07:31)    Gram Stain:   Few polymorphonuclear leukocytes per low power field  No Squamous epithelial cells per low power field  Numerous Gram Positive Cocci in Clusters per oil power field    Specimen Source: .Sputum Sputum, trap

## 2017-11-13 NOTE — PROGRESS NOTE ADULT - SUBJECTIVE AND OBJECTIVE BOX
MEDICAL ATTENDING NOTE    Patient is a 37y old  Male who presents with a chief complaint of epigastric pain started 2 days ago (30 Oct 2017 12:48)      INTERVAL HPI/OVERNIGHT EVENTS: no new complaints    MEDICATIONS  (STANDING):  ALBUTerol/ipratropium for Nebulization 3 milliLiter(s) Nebulizer every 6 hours  artificial  tears Solution 1 Drop(s) Both EYES at bedtime  chlordiazePOXIDE 25 milliGRAM(s) Oral every 8 hours  enoxaparin Injectable 40 milliGRAM(s) SubCutaneous daily  folic acid 1 milliGRAM(s) Oral daily  influenza   Vaccine 0.5 milliLiter(s) IntraMuscular once  nicotine - 21 mG/24Hr(s) Patch 1 patch Transdermal daily  pantoprazole    Tablet 40 milliGRAM(s) Oral before breakfast  PHENobarbital 32.4 milliGRAM(s) Oral every 12 hours  piperacillin/tazobactam IVPB. 3.375 Gram(s) IV Intermittent every 8 hours  QUEtiapine 100 milliGRAM(s) Oral every 12 hours  thiamine 100 milliGRAM(s) Oral daily  vitamin B complex with vitamin C 1 Tablet(s) Oral daily    MEDICATIONS  (PRN):  haloperidol    Injectable 2 milliGRAM(s) IV Push every 4 hours PRN Agitation  nystatin Powder 1 Application(s) Topical once PRN itching      __________________________________________________  ----------------------------------------------------------------------------------  REVIEW OF SYSTEMS: no fever, no SOB, No Chest pain; feels well      Vital Signs Last 24 Hrs  T(C): 36.8 (13 Nov 2017 12:47), Max: 36.8 (12 Nov 2017 20:53)  T(F): 98.3 (13 Nov 2017 12:47), Max: 98.3 (12 Nov 2017 20:53)  HR: 93 (13 Nov 2017 12:47) (82 - 96)  BP: 131/80 (13 Nov 2017 12:47) (118/78 - 135/76)  BP(mean): --  RR: 16 (13 Nov 2017 12:47) (14 - 16)  SpO2: 97% (13 Nov 2017 12:47) (97% - 100%)    _________________  PHYSICAL EXAM:  ---------------------------   NAD; Normocephalic;   LUNGS - no wheezing  HEART: S1 S2+   ABDOMEN: Soft, Nontender, non distended  EXTREMITIES: no cyanosis; no edema  NERVOUS SYSTEM:  Awake and alert; no new deficits    _________________________________________________  LABS:                        14.9   12.3  )-----------( 509      ( 13 Nov 2017 07:04 )             43.7     11-13    137  |  101  |  10  ----------------------------<  83  3.5   |  24  |  0.52    Ca    9.2      13 Nov 2017 07:04  Phos  3.6     11-13  Mg     2.0     11-13          CAPILLARY BLOOD GLUCOSE  132 (13 Nov 2017 11:40)  107 (13 Nov 2017 08:06)      POCT Blood Glucose.: 169 mg/dL (13 Nov 2017 16:33)  POCT Blood Glucose.: 132 mg/dL (13 Nov 2017 11:30)  POCT Blood Glucose.: 107 mg/dL (13 Nov 2017 08:04)            Care Discussed with Consultants :     Plan of care was discussed with patient ; all questions and concerns were addressed and care was aligned with patient's wishes.  Patient has been advised to follow up with PMD (upon discharge from the hospital.  Discharge plans discussed with patient and family

## 2017-11-13 NOTE — PROGRESS NOTE ADULT - ASSESSMENT
37 years old male from home lives with girl friend, with PMH of EtOH abuse, recurrent alcohol induced pancreatitis, Fatty Liver, EtOH withdrawal seizure(intubated 2 times), pre-DM (not on med), and gastroenteritis presented to ED c/o worsening epigastric pain .Admitted for Acute Pancreatitis, had RRT and was brought intubated for airway protection in delirium tremens.

## 2017-11-13 NOTE — PROGRESS NOTE ADULT - ASSESSMENT
A 37 years old male with PMH of EtOH abuse, recurrent alcohol induced pancreatitis, Fatty Liver, EtOH withdrawal seizure, h/o intubation x 2, presented to ER for evaluation of  worsening epigastric pain radiating to back. He has no fever or chills. In ER, found to have elevated lipase of 5599, AST/ALT 60/66, and CT abdomen shows Peripancreatic fluid and stranding, compatible with acute pancreatitis. No evidence for pancreatic necrosis. Interposing between the pancreatic head and the second part of duodenum, there is a small 1.5 x 1.3 cm fluid collection; this may represent a duodenal diverticulum or a pseudocyst. He is admitted to the floor for management of Acute Pancreatitis  and course complicated with ? DT's, s/p RRT for severe agitation due to DTs, required intubation and admitted to ICU on 11/1/17.Today, 11/4/17, he spiked fever and chest xray shows new Left base infiltrate. He has started on Vancomycin and Zosyn to cover HCAP, cultures pending. The ID consult requested to assist with further evaluation and antibiotic management.     # Pneumonia- Aspiration vs HCAP- MSSA  # s/p Intubated  # Acute pancreatitis  # DTs    Would recommend:  1. Monitor WBC count, stay elevated   2. Aspiration precaution  3. Continue Zosyn inpatient  4. May change to  oral Augmentin 875 mg q 12hours when is able to take orally to continue until 11/14/17  5. Fall and seizure  precaution      d/w Patient and floor Nursing staff    will follow the patient with you

## 2017-11-14 ENCOUNTER — TRANSCRIPTION ENCOUNTER (OUTPATIENT)
Age: 37
End: 2017-11-14

## 2017-11-14 VITALS
TEMPERATURE: 98 F | SYSTOLIC BLOOD PRESSURE: 128 MMHG | DIASTOLIC BLOOD PRESSURE: 90 MMHG | OXYGEN SATURATION: 100 % | HEART RATE: 93 BPM | RESPIRATION RATE: 18 BRPM

## 2017-11-14 LAB
HCT VFR BLD CALC: 42.8 % — SIGNIFICANT CHANGE UP (ref 39–50)
HGB BLD-MCNC: 14.8 G/DL — SIGNIFICANT CHANGE UP (ref 13–17)
LYMPHOCYTES # BLD AUTO: 18 % — SIGNIFICANT CHANGE UP (ref 13–44)
MCHC RBC-ENTMCNC: 34.1 PG — HIGH (ref 27–34)
MCHC RBC-ENTMCNC: 34.7 GM/DL — SIGNIFICANT CHANGE UP (ref 32–36)
MCV RBC AUTO: 98.3 FL — SIGNIFICANT CHANGE UP (ref 80–100)
MONOCYTES NFR BLD AUTO: 3 % — SIGNIFICANT CHANGE UP (ref 2–14)
NEUTROPHILS NFR BLD AUTO: 76 % — SIGNIFICANT CHANGE UP (ref 43–77)
PLATELET # BLD AUTO: 524 K/UL — HIGH (ref 150–400)
RBC # BLD: 4.35 M/UL — SIGNIFICANT CHANGE UP (ref 4.2–5.8)
RBC # FLD: 12 % — SIGNIFICANT CHANGE UP (ref 10.3–14.5)
WBC # BLD: 12.6 K/UL — HIGH (ref 3.8–10.5)
WBC # FLD AUTO: 12.6 K/UL — HIGH (ref 3.8–10.5)

## 2017-11-14 PROCEDURE — 87070 CULTURE OTHR SPECIMN AEROBIC: CPT

## 2017-11-14 PROCEDURE — 74177 CT ABD & PELVIS W/CONTRAST: CPT

## 2017-11-14 PROCEDURE — 82803 BLOOD GASES ANY COMBINATION: CPT

## 2017-11-14 PROCEDURE — 83735 ASSAY OF MAGNESIUM: CPT

## 2017-11-14 PROCEDURE — 84100 ASSAY OF PHOSPHORUS: CPT

## 2017-11-14 PROCEDURE — 80061 LIPID PANEL: CPT

## 2017-11-14 PROCEDURE — 71045 X-RAY EXAM CHEST 1 VIEW: CPT

## 2017-11-14 PROCEDURE — 87040 BLOOD CULTURE FOR BACTERIA: CPT

## 2017-11-14 PROCEDURE — 96374 THER/PROPH/DIAG INJ IV PUSH: CPT

## 2017-11-14 PROCEDURE — 82607 VITAMIN B-12: CPT

## 2017-11-14 PROCEDURE — 82962 GLUCOSE BLOOD TEST: CPT

## 2017-11-14 PROCEDURE — 84443 ASSAY THYROID STIM HORMONE: CPT

## 2017-11-14 PROCEDURE — 83036 HEMOGLOBIN GLYCOSYLATED A1C: CPT

## 2017-11-14 PROCEDURE — 92950 HEART/LUNG RESUSCITATION CPR: CPT

## 2017-11-14 PROCEDURE — 85027 COMPLETE CBC AUTOMATED: CPT

## 2017-11-14 PROCEDURE — 94003 VENT MGMT INPAT SUBQ DAY: CPT

## 2017-11-14 PROCEDURE — 80074 ACUTE HEPATITIS PANEL: CPT

## 2017-11-14 PROCEDURE — 94760 N-INVAS EAR/PLS OXIMETRY 1: CPT

## 2017-11-14 PROCEDURE — 81001 URINALYSIS AUTO W/SCOPE: CPT

## 2017-11-14 PROCEDURE — 85610 PROTHROMBIN TIME: CPT

## 2017-11-14 PROCEDURE — 83690 ASSAY OF LIPASE: CPT

## 2017-11-14 PROCEDURE — 80307 DRUG TEST PRSMV CHEM ANLYZR: CPT

## 2017-11-14 PROCEDURE — 87449 NOS EACH ORGANISM AG IA: CPT

## 2017-11-14 PROCEDURE — 94002 VENT MGMT INPAT INIT DAY: CPT

## 2017-11-14 PROCEDURE — 99285 EMERGENCY DEPT VISIT HI MDM: CPT | Mod: 25

## 2017-11-14 PROCEDURE — 94640 AIRWAY INHALATION TREATMENT: CPT

## 2017-11-14 PROCEDURE — 82306 VITAMIN D 25 HYDROXY: CPT

## 2017-11-14 PROCEDURE — 96376 TX/PRO/DX INJ SAME DRUG ADON: CPT

## 2017-11-14 PROCEDURE — 80048 BASIC METABOLIC PNL TOTAL CA: CPT

## 2017-11-14 PROCEDURE — 93005 ELECTROCARDIOGRAM TRACING: CPT

## 2017-11-14 PROCEDURE — 80053 COMPREHEN METABOLIC PANEL: CPT

## 2017-11-14 PROCEDURE — 87186 SC STD MICRODIL/AGAR DIL: CPT

## 2017-11-14 PROCEDURE — 97161 PT EVAL LOW COMPLEX 20 MIN: CPT

## 2017-11-14 PROCEDURE — 82977 ASSAY OF GGT: CPT

## 2017-11-14 PROCEDURE — 85730 THROMBOPLASTIN TIME PARTIAL: CPT

## 2017-11-14 PROCEDURE — 80076 HEPATIC FUNCTION PANEL: CPT

## 2017-11-14 PROCEDURE — 99239 HOSP IP/OBS DSCHRG MGMT >30: CPT

## 2017-11-14 RX ORDER — THIAMINE MONONITRATE (VIT B1) 100 MG
1 TABLET ORAL
Qty: 30 | Refills: 0 | OUTPATIENT
Start: 2017-11-14 | End: 2017-12-14

## 2017-11-14 RX ORDER — QUETIAPINE FUMARATE 200 MG/1
1 TABLET, FILM COATED ORAL
Qty: 60 | Refills: 0 | OUTPATIENT
Start: 2017-11-14 | End: 2017-12-14

## 2017-11-14 RX ORDER — OMEPRAZOLE 10 MG/1
1 CAPSULE, DELAYED RELEASE ORAL
Qty: 30 | Refills: 0 | OUTPATIENT
Start: 2017-11-14 | End: 2017-12-14

## 2017-11-14 RX ORDER — QUETIAPINE FUMARATE 200 MG/1
50 TABLET, FILM COATED ORAL EVERY 12 HOURS
Qty: 0 | Refills: 0 | Status: DISCONTINUED | OUTPATIENT
Start: 2017-11-14 | End: 2017-11-14

## 2017-11-14 RX ORDER — FOLIC ACID 0.8 MG
1 TABLET ORAL
Qty: 30 | Refills: 0 | OUTPATIENT
Start: 2017-11-14 | End: 2017-12-14

## 2017-11-14 RX ORDER — QUETIAPINE FUMARATE 200 MG/1
1 TABLET, FILM COATED ORAL
Qty: 28 | Refills: 0 | OUTPATIENT
Start: 2017-11-14 | End: 2017-11-28

## 2017-11-14 RX ADMIN — Medication 1 MILLIGRAM(S): at 13:11

## 2017-11-14 RX ADMIN — Medication 1 PATCH: at 13:17

## 2017-11-14 RX ADMIN — Medication 100 MILLIGRAM(S): at 13:11

## 2017-11-14 RX ADMIN — Medication 25 MILLIGRAM(S): at 08:30

## 2017-11-14 RX ADMIN — Medication 16.2 MILLIGRAM(S): at 13:15

## 2017-11-14 RX ADMIN — QUETIAPINE FUMARATE 100 MILLIGRAM(S): 200 TABLET, FILM COATED ORAL at 05:45

## 2017-11-14 RX ADMIN — Medication 25 MILLIGRAM(S): at 14:49

## 2017-11-14 RX ADMIN — Medication 3 MILLILITER(S): at 09:12

## 2017-11-14 RX ADMIN — PIPERACILLIN AND TAZOBACTAM 12.5 GRAM(S): 4; .5 INJECTION, POWDER, LYOPHILIZED, FOR SOLUTION INTRAVENOUS at 05:45

## 2017-11-14 RX ADMIN — QUETIAPINE FUMARATE 50 MILLIGRAM(S): 200 TABLET, FILM COATED ORAL at 17:12

## 2017-11-14 RX ADMIN — Medication 1 PATCH: at 13:11

## 2017-11-14 RX ADMIN — Medication 1 TABLET(S): at 13:11

## 2017-11-14 RX ADMIN — PIPERACILLIN AND TAZOBACTAM 12.5 GRAM(S): 4; .5 INJECTION, POWDER, LYOPHILIZED, FOR SOLUTION INTRAVENOUS at 13:11

## 2017-11-14 RX ADMIN — Medication 3 MILLILITER(S): at 02:05

## 2017-11-14 RX ADMIN — ENOXAPARIN SODIUM 40 MILLIGRAM(S): 100 INJECTION SUBCUTANEOUS at 13:11

## 2017-11-14 RX ADMIN — Medication 3 MILLILITER(S): at 14:07

## 2017-11-14 RX ADMIN — PANTOPRAZOLE SODIUM 40 MILLIGRAM(S): 20 TABLET, DELAYED RELEASE ORAL at 05:46

## 2017-11-14 NOTE — PROGRESS NOTE ADULT - PROBLEM SELECTOR PROBLEM 1
Alcohol withdrawal delirium, acute, hyperactive
Alcohol-induced acute pancreatitis, unspecified complication status
Alcohol withdrawal
Alcohol withdrawal delirium, acute, hyperactive
Alcohol withdrawal delirium, acute, hyperactive

## 2017-11-14 NOTE — DISCHARGE NOTE ADULT - HOSPITAL COURSE
37 years old male from home lives with girl friend, with PMH of EtOH abuse, recurrent alcohol induced pancreatitis, Fatty Liver, EtOH withdrawal seizure(intubated 2 times), pre-DM(not on medications), and gastroenteritis presented to ED c/o worsening epigastric pain over 2 days. Pain is throbbing, epigastric area radiating to the back,10/10 when most severe, and is not related to food intake. It was initially intermittent but became constant since AM of admission. Denies having nausea, vomiting, fever, diarrhea, SOB, or any other symptoms. Reports last drink was this morning, he has been drinking 10 beers everyday over the last month.    In ED, patient is hypertensive to 180-190, other vitals stable, labs significant for lipase of 5599, AST/ALT 60/66, CT abdomen showed: Peripancreatic fluid and stranding, compatible with acute pancreatitis. No evidence for pancreatic necrosis. Interposing between the pancreatic head and the second part of duodenum, there is a small 1.5 x 1.3 cm fluid collection; this may represent a duodenal diverticulum or a pseudocyst.    ICU COURSE:  Pt was admitted to hospital for alcohol withdrawal and associated pancreatitis. On day 2 of hospital stay got agitated and brought to ICU after intubation. Was started on sedatives and got aspiration PNA secondary to intubation. Pt was started on Zosyn and the sputum cultures showed MSSA. Pt was extubated on 11/8 but needed haldol, librium and Seroquel for sedation. For pancreatitis, he was treated with fluids with good urine output. After extubation and starting clear diet, his fluids were discontinued. His elevated lipase and LFTs trended down and normalized. 37 years old male from home lives with girl friend, with PMH of EtOH abuse, recurrent alcohol induced pancreatitis, Fatty Liver, EtOH withdrawal seizure(intubated 2 times), pre-DM(not on medications), and gastroenteritis presented to ED c/o worsening epigastric pain over 2 days. Pain is throbbing, epigastric area radiating to the back,10/10 when most severe, and is not related to food intake. It was initially intermittent but became constant since AM of admission. Denied having nausea, vomiting, fever, diarrhea, SOB, or any other symptoms. Reports last drink was morning of admission, he has been drinking 10 beers everyday over the last month.  In ED, patient was hypertensive to 180-190, other vitals stable, labs significant for lipase of 5599, AST/ALT 60/66, CT abdomen showed: Peripancreatic fluid and stranding, compatible with acute pancreatitis. No evidence for pancreatic necrosis. Interposing between the pancreatic head and the second part of duodenum, there is a small 1.5 x 1.3 cm fluid collection; this may represent a duodenal diverticulum or a pseudocyst.  ICU COURSE:  Pt was admitted to hospital for alcohol withdrawal and associated pancreatitis. On day 2 of hospital stay got agitated and brought to ICU after intubation. He was started on sedatives and got aspiration PNA secondary to intubation. Pt was started on Zosyn and the sputum cultures showed MSSA. Pt was extubated on 11/8 but needed haldol, librium and Seroquel for sedation. For pancreatitis, he was treated with fluids with good urine output. After extubation and starting clear diet, his fluids were discontinued. His elevated lipase and LFTs trended down and normalized. Patient was then transferred to medial floor  FLOOR COURSE:  While on floor we continued to advance diet as he tolerated to regular diet. Tapering of librium and phenobarbital continued till off. Infectious disease continued to follow and antibiotic course completed on 11/14/17. Patient was evaluated by physical therapy and they recommended home with home physical therapy. Patient remains stable this AM, continuing to tolerate diet, denies any pain. Cleared for discharge by attending. Plan for discharge discussed with patient -- strongly advised to follow up for alcohol cessation counseling, and continue with nicotine patch. Patient stated understanding. 37 years old male from home lives with girl friend, with PMH of EtOH abuse, recurrent alcohol induced pancreatitis, Fatty Liver, EtOH withdrawal seizure(intubated 2 times), pre-DM(not on medications), and gastroenteritis presented to ED c/o worsening epigastric pain over 2 days. Pain is throbbing, epigastric area radiating to the back,10/10 when most severe, and is not related to food intake. It was initially intermittent but became constant since AM of admission. Denied having nausea, vomiting, fever, diarrhea, SOB, or any other symptoms. Reports last drink was morning of admission, he has been drinking 10 beers everyday over the last month.  In ED, patient was hypertensive to 180-190, other vitals stable, labs significant for lipase of 5599, AST/ALT 60/66, CT abdomen showed: Peripancreatic fluid and stranding, compatible with acute pancreatitis. No evidence for pancreatic necrosis. Interposing between the pancreatic head and the second part of duodenum, there is a small 1.5 x 1.3 cm fluid collection; this may represent a duodenal diverticulum or a pseudocyst.  ICU COURSE:  Pt was admitted to hospital for alcohol withdrawal and associated pancreatitis. On day 2 of hospital stay got agitated and brought to ICU after intubation. He was started on sedatives and got aspiration PNA secondary to intubation. Pt was started on Zosyn and the sputum cultures showed MSSA. Pt was extubated on 11/8 but needed haldol, librium and Seroquel for sedation. For pancreatitis, he was treated with fluids with good urine output. After extubation and starting clear diet, his fluids were discontinued. His elevated lipase and LFTs trended down and normalized. Patient was then transferred to medial floor  FLOOR COURSE:  While on floor we continued to advance diet as he tolerated to regular diet. Tapering of librium and phenobarbital continued till off. Infectious disease continued to follow and antibiotic course completed on 11/14/17. Patient was evaluated by physical therapy and they recommended home with home physical therapy. Patient remains stable this AM, continuing to tolerate diet, denies any pain. Cleared for discharge by attending. Plan for discharge discussed with patient -- strongly advised to follow up for alcohol cessation counseling, and continue with nicotine patch. WBC count remains stable but slightly elevated at 12.6 today. Patient remains afebrile; advised to recheck as outpatient in 1 week, and call PCP at any sign of fever.  Patient stated understanding. 37 years old male from home lives with girl friend, with PMH of EtOH abuse, recurrent alcohol induced pancreatitis, Fatty Liver, EtOH withdrawal seizure(intubated 2 times), pre-DM(not on medications), and gastroenteritis presented to ED c/o worsening epigastric pain over 2 days. Pain is throbbing, epigastric area radiating to the back,10/10 when most severe, and is not related to food intake. It was initially intermittent but became constant since AM of admission. Denied having nausea, vomiting, fever, diarrhea, SOB, or any other symptoms. Reports last drink was morning of admission, he has been drinking 10 beers everyday over the last month.  In ED, patient was hypertensive to 180-190, other vitals stable, labs significant for lipase of 5599, AST/ALT 60/66, CT abdomen showed: Peripancreatic fluid and stranding, compatible with acute pancreatitis. No evidence for pancreatic necrosis. Interposing between the pancreatic head and the second part of duodenum, there is a small 1.5 x 1.3 cm fluid collection; this may represent a duodenal diverticulum or a pseudocyst.  ICU COURSE:  Pt was admitted to hospital for alcohol withdrawal and associated pancreatitis. On day 2 of hospital stay got agitated and brought to ICU after intubation. He was started on sedatives and got aspiration PNA secondary to intubation. Pt was started on Zosyn and the sputum cultures showed MSSA. Pt was extubated on 11/8 but needed haldol, librium and Seroquel for sedation. For pancreatitis, he was treated with fluids with good urine output. After extubation and starting clear diet, his fluids were discontinued. His elevated lipase and LFTs trended down and normalized. Patient was then transferred to medial floor  FLOOR COURSE:  While on floor we continued to advance diet as he tolerated to regular diet. Tapering of librium and phenobarbital continued till off. Infectious disease continued to follow and antibiotic course completed on 11/14/17. Patient was evaluated by physical therapy and they recommended home with home physical therapy. Patient remains stable this AM, continuing to tolerate diet, denies any pain. Cleared for discharge by attending. Plan for discharge discussed with patient -- strongly advised to follow up with PMD ; alcohol cessation counseling was done multiple times during the hospital course. HHe was advised to continue with nicotine patch. WBC count remains stable but slightly elevated at 12.6 today. Patient remains afebrile and has completed 14 days course of antibiotics for PNA. He is advised to follow up with PCP and  recheck labs outpatient in 1 week. He will PCP at any sign of fever.  Patient stated understanding. Family also present and verbalized understanding.    ATTENDING ADDENDUM:  Patient was admitted for alcohol related acute pancreatitis and alcohol dependence with risk of withdrawal. On hospital day 2-3 , developed delirium despite ETOH withdrawal protocol initiated on admission. He was very agitated and delirius and required sedation with intubation for airway protection. He was then transferred to the MICU. ICU course was complicated by PNA for which broad spectrum IV antibiotics were initiated.   He was transferred back to the floor on Nov 12th and has continued to stabilize   He is medically stable for discharge home today with outpatient follow up.  The medical plan of care and test results were discussed with the patient and family throughout the hospital course. Patient is medically stable for discharge and is advised to follow up with the  primary care physician within 1- 2 weeks for continued medical care.

## 2017-11-14 NOTE — DISCHARGE NOTE ADULT - CARE PLAN
Principal Discharge DX:	Alcohol-induced acute pancreatitis, unspecified complication status  Secondary Diagnosis:	Alcoholism  Secondary Diagnosis:	Pneumonia  Instructions for follow-up, activity and diet:	you completed your course of antibiotics on 11/14/17 while in the hospital  follow up with your PCP  Secondary Diagnosis:	HTN (hypertension)  Instructions for follow-up, activity and diet:	continue your home regimen  follow up with your PCP  continue DASH diet  Secondary Diagnosis:	Prediabetes  Instructions for follow-up, activity and diet:	your HgbA1c was 5.2 on 10/30/17  please follow up with you PCP to recheck in 3 months  continue diabatic diet Principal Discharge DX:	Alcohol-induced acute pancreatitis, unspecified complication status  Goal:	patient will remain symptom free.  Instructions for follow-up, activity and diet:	resolved. Your diet was advanced as you tolerated from IV hydration to regular diet.   please follow up with you PCP  Secondary Diagnosis:	Alcoholism  Instructions for follow-up, activity and diet:	Please follow up with Alcohol cessation counselling  Secondary Diagnosis:	Pneumonia  Instructions for follow-up, activity and diet:	you completed your course of antibiotics on 11/14/17 while in the hospital  follow up with your PCP  Secondary Diagnosis:	HTN (hypertension)  Instructions for follow-up, activity and diet:	continue your home regimen  follow up with your PCP  continue DASH diet  Secondary Diagnosis:	Prediabetes  Instructions for follow-up, activity and diet:	your HgbA1c was 5.2 on 10/30/17  please follow up with you PCP to recheck in 3 months  continue diabatic diet Principal Discharge DX:	Alcohol-induced acute pancreatitis, unspecified complication status  Goal:	patient will remain symptom free.  Instructions for follow-up, activity and diet:	resolved. Your diet was advanced as you tolerated from IV hydration to regular diet.   please follow up with you PCP  Secondary Diagnosis:	Alcoholism  Instructions for follow-up, activity and diet:	Please follow up with Alcohol cessation counselling  Secondary Diagnosis:	Pneumonia  Instructions for follow-up, activity and diet:	you completed your course of antibiotics on 11/14/17 while in the hospital.   Your WBC count remains stable but slightly elevated at 12.6, please repeat as outpatient in 1 week.  follow up with your PCP  Secondary Diagnosis:	HTN (hypertension)  Instructions for follow-up, activity and diet:	continue your home regimen  follow up with your PCP  continue DASH diet  Secondary Diagnosis:	Prediabetes  Instructions for follow-up, activity and diet:	your HgbA1c was 5.2 on 10/30/17  please follow up with you PCP to recheck in 3 months  continue diabatic diet

## 2017-11-14 NOTE — DISCHARGE NOTE ADULT - PLAN OF CARE
you completed your course of antibiotics on 11/14/17 while in the hospital  follow up with your PCP continue your home regimen  follow up with your PCP  continue DASH diet your HgbA1c was 5.2 on 10/30/17  please follow up with you PCP to recheck in 3 months  continue diabatic diet patient will remain symptom free. resolved. Your diet was advanced as you tolerated from IV hydration to regular diet.   please follow up with you PCP Please follow up with Alcohol cessation counselling you completed your course of antibiotics on 11/14/17 while in the hospital.   Your WBC count remains stable but slightly elevated at 12.6, please repeat as outpatient in 1 week.  follow up with your PCP

## 2017-11-14 NOTE — DISCHARGE NOTE ADULT - PROVIDER TOKENS
TOKEN:'[id number]:'[id number]' TOKEN:'[id number]'

## 2017-11-14 NOTE — DISCHARGE NOTE ADULT - [id number]_GEN_ALL_CORE
observation and assessment/teaching and training/other:/rehabilitation services/medication teaching and assessment

## 2017-11-14 NOTE — PROGRESS NOTE ADULT - PROVIDER SPECIALTY LIST ADULT
Critical Care
Critical Care
Infectious Disease
Internal Medicine
MICU
Podiatry
Podiatry
Infectious Disease
Internal Medicine

## 2017-11-14 NOTE — DISCHARGE NOTE ADULT - PATIENT PORTAL LINK FT
“You can access the [facility] Patient Portal, offered by [facility], [place], by registering with the following website: [email or web address]”

## 2017-11-14 NOTE — DISCHARGE NOTE ADULT - MEDICATION SUMMARY - MEDICATIONS TO TAKE
I will START or STAY ON the medications listed below when I get home from the hospital:    QUEtiapine 100 mg oral tablet  -- 1 tab(s) by mouth every 12 hours  -- Indication: For Alcoholism    omeprazole 40 mg oral delayed release capsule  -- 1 cap(s) by mouth once a day  -- Indication: For Need for prophylactic measure    nicotine 21 mg/24 hr transdermal film, extended release  --  by transdermal patch   -- Indication: For Need for prophylactic measure    Multiple Vitamins oral tablet  -- 1 tab(s) by mouth once a day  -- Indication: For Need for prophylactic measure    B Complex 50 oral tablet, extended release  -- 1 tab(s) by mouth once a day  -- Indication: For Need for prophylactic measure    folic acid 1 mg oral tablet  -- 1 tab(s) by mouth once a day  -- Indication: For Alcoholism    thiamine 100 mg oral tablet  -- 1 tab(s) by mouth once a day  -- Indication: For Alcoholism I will START or STAY ON the medications listed below when I get home from the hospital:    QUEtiapine 100 mg oral tablet  -- 1 tab(s) by mouth every 12 hours  -- Indication: For Alcohol withdrawal    omeprazole 40 mg oral delayed release capsule  -- 1 cap(s) by mouth once a day  -- Indication: For Need for prophylactic measure    nicotine 21 mg/24 hr transdermal film, extended release  --  by transdermal patch   -- Indication: For Need for prophylactic measure    Multiple Vitamins oral tablet  -- 1 tab(s) by mouth once a day  -- Indication: For Need for prophylactic measure    B Complex 50 oral tablet, extended release  -- 1 tab(s) by mouth once a day  -- Indication: For Need for prophylactic measure    folic acid 1 mg oral tablet  -- 1 tab(s) by mouth once a day  -- Indication: For Alcoholism    thiamine 100 mg oral tablet  -- 1 tab(s) by mouth once a day  -- Indication: For Alcoholism I will START or STAY ON the medications listed below when I get home from the hospital:    QUEtiapine 50 mg oral tablet  -- 1 tab(s) by mouth every 12 hours  -- Indication: For Alcohol related psychotic disorder    omeprazole 40 mg oral delayed release capsule  -- 1 cap(s) by mouth once a day  -- Indication: For GI prophylaxis    nicotine 21 mg/24 hr transdermal film, extended release  --  by transdermal patch   -- Indication: For smoking cessation    Multiple Vitamins oral tablet  -- 1 tab(s) by mouth once a day  -- Indication: For supplement    B Complex 50 oral tablet, extended release  -- 1 tab(s) by mouth once a day  -- Indication: For supplement    folic acid 1 mg oral tablet  -- 1 tab(s) by mouth once a day  -- Indication: For supplement    thiamine 100 mg oral tablet  -- 1 tab(s) by mouth once a day  -- Indication: For supplement

## 2017-11-14 NOTE — PROGRESS NOTE ADULT - SUBJECTIVE AND OBJECTIVE BOX
MEDICAL ATTENDING NOTE    Patient is a 37y old  Male who presents with a chief complaint of alcohol induced acute pancreatitis (14 Nov 2017 09:38)      INTERVAL HPI/OVERNIGHT EVENTS: no new complaints    MEDICATIONS  (STANDING):  ALBUTerol/ipratropium for Nebulization 3 milliLiter(s) Nebulizer every 6 hours  artificial  tears Solution 1 Drop(s) Both EYES at bedtime  chlordiazePOXIDE 25 milliGRAM(s) Oral once  enoxaparin Injectable 40 milliGRAM(s) SubCutaneous daily  folic acid 1 milliGRAM(s) Oral daily  influenza   Vaccine 0.5 milliLiter(s) IntraMuscular once  nicotine - 21 mG/24Hr(s) Patch 1 patch Transdermal daily  pantoprazole    Tablet 40 milliGRAM(s) Oral before breakfast  PHENobarbital 16.2 milliGRAM(s) Oral daily  piperacillin/tazobactam IVPB. 3.375 Gram(s) IV Intermittent every 8 hours  QUEtiapine 100 milliGRAM(s) Oral every 12 hours  thiamine 100 milliGRAM(s) Oral daily  vitamin B complex with vitamin C 1 Tablet(s) Oral daily    MEDICATIONS  (PRN):  haloperidol     Tablet 1 milliGRAM(s) Oral two times a day PRN agitation  haloperidol    Injectable 0.5 milliGRAM(s) IV Push every 8 hours PRN agitation  nystatin Powder 1 Application(s) Topical once PRN itching      __________________________________________________  ----------------------------------------------------------------------------------  REVIEW OF SYSTEMS: no fever, no SOB, No Chest pain; feels well      Vital Signs Last 24 Hrs  T(C): 36.4 (14 Nov 2017 14:20), Max: 36.9 (13 Nov 2017 20:27)  T(F): 97.6 (14 Nov 2017 14:20), Max: 98.5 (13 Nov 2017 20:27)  HR: 93 (14 Nov 2017 14:20) (90 - 96)  BP: 128/90 (14 Nov 2017 14:20) (118/75 - 134/70)  BP(mean): --  RR: 18 (14 Nov 2017 14:20) (17 - 18)  SpO2: 100% (14 Nov 2017 14:20) (95% - 100%)    _________________  PHYSICAL EXAM:  ---------------------------   NAD; Normocephalic;   LUNGS - no wheezing  HEART: S1 S2+   ABDOMEN: Soft, Nontender, non distended  EXTREMITIES: no cyanosis; no edema  NERVOUS SYSTEM:  Awake and alert; no new deficits    _________________________________________________  LABS:                        14.8   12.6  )-----------( 524      ( 14 Nov 2017 07:19 )             42.8     11-13    137  |  101  |  10  ----------------------------<  83  3.5   |  24  |  0.52    Ca    9.2      13 Nov 2017 07:04  Phos  3.6     11-13  Mg     2.0     11-13          CAPILLARY BLOOD GLUCOSE      POCT Blood Glucose.: 130 mg/dL (14 Nov 2017 11:23)  POCT Blood Glucose.: 118 mg/dL (14 Nov 2017 07:34)  POCT Blood Glucose.: 126 mg/dL (13 Nov 2017 20:31)  POCT Blood Glucose.: 169 mg/dL (13 Nov 2017 16:33)            Care Discussed with Consultants :     Plan of care was discussed with patient ; all questions and concerns were addressed and care was aligned with patient's wishes.  Patient has been advised to follow up with PMD upon discharge from the hospital.  Discharge plans discussed with nursing staff , case manger and .

## 2017-11-14 NOTE — PROGRESS NOTE ADULT - PROBLEM SELECTOR PROBLEM 2
Alcohol-induced acute pancreatitis, unspecified complication status
Pneumonia
Prediabetes
Alcohol-induced acute pancreatitis, unspecified complication status
Pneumonia

## 2017-12-18 PROCEDURE — 84100 ASSAY OF PHOSPHORUS: CPT

## 2017-12-18 PROCEDURE — 97162 PT EVAL MOD COMPLEX 30 MIN: CPT

## 2017-12-18 PROCEDURE — 96376 TX/PRO/DX INJ SAME DRUG ADON: CPT

## 2017-12-18 PROCEDURE — 80048 BASIC METABOLIC PNL TOTAL CA: CPT

## 2017-12-18 PROCEDURE — 82248 BILIRUBIN DIRECT: CPT

## 2017-12-18 PROCEDURE — 85027 COMPLETE CBC AUTOMATED: CPT

## 2017-12-18 PROCEDURE — 71045 X-RAY EXAM CHEST 1 VIEW: CPT

## 2017-12-18 PROCEDURE — 81001 URINALYSIS AUTO W/SCOPE: CPT

## 2017-12-18 PROCEDURE — 97116 GAIT TRAINING THERAPY: CPT

## 2017-12-18 PROCEDURE — 82803 BLOOD GASES ANY COMBINATION: CPT

## 2017-12-18 PROCEDURE — 93005 ELECTROCARDIOGRAM TRACING: CPT

## 2017-12-18 PROCEDURE — 97110 THERAPEUTIC EXERCISES: CPT

## 2017-12-18 PROCEDURE — 99285 EMERGENCY DEPT VISIT HI MDM: CPT | Mod: 25

## 2017-12-18 PROCEDURE — 80061 LIPID PANEL: CPT

## 2017-12-18 PROCEDURE — 80053 COMPREHEN METABOLIC PANEL: CPT

## 2017-12-18 PROCEDURE — 84300 ASSAY OF URINE SODIUM: CPT

## 2017-12-18 PROCEDURE — 82436 ASSAY OF URINE CHLORIDE: CPT

## 2017-12-18 PROCEDURE — 84478 ASSAY OF TRIGLYCERIDES: CPT

## 2017-12-18 PROCEDURE — 83690 ASSAY OF LIPASE: CPT

## 2017-12-18 PROCEDURE — 80076 HEPATIC FUNCTION PANEL: CPT

## 2017-12-18 PROCEDURE — 96374 THER/PROPH/DIAG INJ IV PUSH: CPT

## 2017-12-18 PROCEDURE — 83935 ASSAY OF URINE OSMOLALITY: CPT

## 2017-12-18 PROCEDURE — 70450 CT HEAD/BRAIN W/O DYE: CPT

## 2017-12-18 PROCEDURE — 36415 COLL VENOUS BLD VENIPUNCTURE: CPT

## 2017-12-18 PROCEDURE — 94640 AIRWAY INHALATION TREATMENT: CPT

## 2017-12-18 PROCEDURE — 94002 VENT MGMT INPAT INIT DAY: CPT

## 2017-12-18 PROCEDURE — 83036 HEMOGLOBIN GLYCOSYLATED A1C: CPT

## 2017-12-18 PROCEDURE — 76705 ECHO EXAM OF ABDOMEN: CPT

## 2017-12-18 PROCEDURE — 94003 VENT MGMT INPAT SUBQ DAY: CPT

## 2017-12-18 PROCEDURE — 87040 BLOOD CULTURE FOR BACTERIA: CPT

## 2017-12-18 PROCEDURE — 84443 ASSAY THYROID STIM HORMONE: CPT

## 2017-12-18 PROCEDURE — 83735 ASSAY OF MAGNESIUM: CPT

## 2017-12-18 PROCEDURE — 80307 DRUG TEST PRSMV CHEM ANLYZR: CPT

## 2017-12-18 PROCEDURE — 94760 N-INVAS EAR/PLS OXIMETRY 1: CPT

## 2017-12-18 PROCEDURE — 81003 URINALYSIS AUTO W/O SCOPE: CPT

## 2018-07-20 ENCOUNTER — EMERGENCY (EMERGENCY)
Facility: HOSPITAL | Age: 38
LOS: 1 days | Discharge: HOME | End: 2018-07-20
Attending: EMERGENCY MEDICINE
Payer: COMMERCIAL

## 2018-07-20 VITALS
TEMPERATURE: 98 F | OXYGEN SATURATION: 98 % | RESPIRATION RATE: 18 BRPM | HEART RATE: 99 BPM | DIASTOLIC BLOOD PRESSURE: 94 MMHG | SYSTOLIC BLOOD PRESSURE: 160 MMHG

## 2018-07-20 PROCEDURE — 93005 ELECTROCARDIOGRAM TRACING: CPT

## 2018-07-20 PROCEDURE — 99284 EMERGENCY DEPT VISIT MOD MDM: CPT

## 2018-07-20 PROCEDURE — 71046 X-RAY EXAM CHEST 2 VIEWS: CPT | Mod: 26

## 2018-07-20 PROCEDURE — 99283 EMERGENCY DEPT VISIT LOW MDM: CPT | Mod: 25

## 2018-07-20 PROCEDURE — 71046 X-RAY EXAM CHEST 2 VIEWS: CPT

## 2018-07-20 RX ORDER — ACETAMINOPHEN 500 MG
650 TABLET ORAL ONCE
Qty: 0 | Refills: 0 | Status: COMPLETED | OUTPATIENT
Start: 2018-07-20 | End: 2018-07-20

## 2018-07-20 RX ADMIN — Medication 650 MILLIGRAM(S): at 19:52

## 2018-07-20 RX ADMIN — Medication 650 MILLIGRAM(S): at 20:47

## 2018-07-20 NOTE — ED ADULT NURSE NOTE - CONTEXT
"HPI: Arlene Triplett is a 49 y.o. female who presents with   Chief Complaint   Patient presents with   • Pharyngitis     suspects strep, sinus congestion, fever, fluid in right ear, x1wk      Patient has had 5-6 days of initially sinus pressure and congestion some fevers and chills but she's developed now a sore throat last took 200 mg of ibuprofen and 2 this morning. No nausea vomiting or diarrhea. She had strep throat was treated by myself and September 2017 feels similar  Worsened by: activity, laying supine at night, first thing in the morning, when exposed to outside allergens  Improved by: OTC symptomatic medictions      Review of Systems performed. All other systems are negative except for what is listed above.     PMH:  has no past medical history on file.  MEDS:   Current Outpatient Prescriptions:   •  amoxicillin (AMOXIL) 875 MG tablet, Take 1 Tab by mouth 2 times a day for 10 days. With food, Disp: 20 Tab, Rfl: 0  •  loratadine (CLARITIN) 10 MG Tab, Take 10 mg by mouth every day., Disp: , Rfl:   •  fluticasone (FLONASE ALLERGY RELIEF) 50 MCG/ACT nasal spray, Spray 1 Spray in nose 2 times a day., Disp: 16 g, Rfl: 0  ALLERGIES:   Allergies   Allergen Reactions   • Biaxin [Clarithromycin]      Hives     • Lorabid [Loracarbef]      Hives     • Macrobid [Nitrofurantoin] Hives   • Zithromax [Azithromycin-Fd&C Blue #2 [place]]      Hives       SURGHX: History reviewed. No pertinent surgical history.  SOCHX:  reports that she has never smoked. She has never used smokeless tobacco. She reports that she drinks alcohol. She reports that she does not use drugs.  FH: Family history was reviewed, no pertinent findings to report    PE:  Vitals /88   Pulse 87   Temp 37 °C (98.6 °F)   Resp 16   Ht 1.626 m (5' 4\")   Wt 65.8 kg (145 lb)   SpO2 99%   Breastfeeding? No   BMI 24.89 kg/m²    Gen AOx4, NAD  HEENT: moist mucus membranes, no pain or pressure with percussion of frontal, maxillary or ethmoid " sinuses.  Bilateral conjunciva clear without erythema or exudate, Right TM with erythema fluid and bulge without loss of landmarks, left TM clear without bulge, fluid or loss of landmarks, mild pharyngeal erythema without tonsillar exudate that with bilateral tonsillar enlargement present  Neck: supple, no cervical lymphadenopathy, no signs of menigismus  CV/PULM: RRR no murmurs, no rales ronchi or wheezes, no signs of resp distress  Abd soft nontender, bs present  Skin no rashes  Extremities -c/c/e  Neuro appropriate affect,    A/P  1. Sore throat  POCT Rapid Strep A    amoxicillin (AMOXIL) 875 MG tablet   2. Pharyngitis due to other organism  amoxicillin (AMOXIL) 875 MG tablet   3. Dysfunction of right eustachian tube  amoxicillin (AMOXIL) 875 MG tablet     Patient was given a contingent antibiotic prescription to fill and use as directed if symptoms progressed as discussed and agreed upon.  Differential diagnosis, natural history, supportive care discussed. Follow-up with primary care provider within 7-10 days, emergency room precautions discussed.  Patient and/or family appears understanding of information.     trauma

## 2018-07-20 NOTE — ED PROVIDER NOTE - OBJECTIVE STATEMENT
37 y/o M PMHx of pancreatitis, prediabetes, alcoholism and no significant PSHx presents to the ED with complaints of left sided rib cage pain aft injury at work. Patient states he was moving boxes when he was hit in the left distal rib cage area. Patient reports minimal shortness of breath. Patient denies coughing, fever or any other complaints. NKDA.

## 2018-07-20 NOTE — ED PROVIDER NOTE - MEDICAL DECISION MAKING DETAILS
37 y/o M presents with acute minor trauma to chest cavity. There are no signs or symptoms concerning for pneumothorax. Will evaluate for rib fracture and provide pain control.

## 2018-07-20 NOTE — ED PROVIDER NOTE - NS_ ATTENDINGSCRIBEDETAILS _ED_A_ED_FT
The scribe's documentation has been prepared under my direction and personally reviewed by me in its entirety. I confirm that the note above accurately reflects all work, treatment, procedures, and medical decision making performed by me (Dr. Victor M Vences).

## 2018-07-21 PROBLEM — R73.03 PREDIABETES: Chronic | Status: ACTIVE | Noted: 2017-10-30

## 2018-08-07 NOTE — PROGRESS NOTE ADULT - ASSESSMENT
A 37 years old male with PMH of EtOH abuse, recurrent alcohol induced pancreatitis, Fatty Liver, EtOH withdrawal seizure, h/o intubation x 2, presented to ER for evaluation of  worsening epigastric pain radiating to back. He has no fever or chills. In ER, found to have elevated lipase of 5599, AST/ALT 60/66, and CT abdomen shows Peripancreatic fluid and stranding, compatible with acute pancreatitis. No evidence for pancreatic necrosis. Interposing between the pancreatic head and the second part of duodenum, there is a small 1.5 x 1.3 cm fluid collection; this may represent a duodenal diverticulum or a pseudocyst. He is admitted to the floor for management of Acute Pancreatitis  and course complicated with ? DT's, s/p RRT for severe agitation due to DTs, required intubation and admitted to ICU on 11/1/17.Today, 11/4/17, he spiked fever and chest xray shows new Left base infiltrate. He has started on Vancomycin and Zosyn to cover HCAP, cultures pending. The ID consult requested to assist with further evaluation and antibiotic management.     # Pneumonia- Aspiration vs HCAP  # s/p Intubated  # Acute pancreatitis  # DTs    Would recommend;  1. Follow up  Sputum/tracheal aspirate culture  2. Follow up Blood cultures  3. Follow up Urine Antigen for Legionella  4. continue Vancomycin and zosyn until cultures are finalized  5. Monitor Temp. and continue supportive care  6. Aspiration precaution    d/w ICU team    will follow the patient with you No

## 2018-08-09 ENCOUNTER — EMERGENCY (EMERGENCY)
Facility: HOSPITAL | Age: 38
LOS: 1 days | Discharge: HOME | End: 2018-08-09
Attending: EMERGENCY MEDICINE
Payer: COMMERCIAL

## 2018-08-09 VITALS — WEIGHT: 169.98 LBS | HEIGHT: 73 IN

## 2018-08-09 PROCEDURE — 99283 EMERGENCY DEPT VISIT LOW MDM: CPT | Mod: 25

## 2018-08-09 PROCEDURE — 73562 X-RAY EXAM OF KNEE 3: CPT

## 2018-08-09 PROCEDURE — 90715 TDAP VACCINE 7 YRS/> IM: CPT

## 2018-08-09 PROCEDURE — 90471 IMMUNIZATION ADMIN: CPT

## 2018-08-09 PROCEDURE — 73562 X-RAY EXAM OF KNEE 3: CPT | Mod: 26,RT

## 2018-08-09 PROCEDURE — 12002 RPR S/N/AX/GEN/TRNK2.6-7.5CM: CPT

## 2018-08-09 RX ORDER — CEPHALEXIN 500 MG
250 CAPSULE ORAL ONCE
Qty: 0 | Refills: 0 | Status: COMPLETED | OUTPATIENT
Start: 2018-08-09 | End: 2018-08-09

## 2018-08-09 RX ORDER — CEPHALEXIN 500 MG
1 CAPSULE ORAL
Qty: 28 | Refills: 0 | OUTPATIENT
Start: 2018-08-09 | End: 2018-08-15

## 2018-08-09 RX ORDER — TETANUS TOXOID, REDUCED DIPHTHERIA TOXOID AND ACELLULAR PERTUSSIS VACCINE, ADSORBED 5; 2.5; 8; 8; 2.5 [IU]/.5ML; [IU]/.5ML; UG/.5ML; UG/.5ML; UG/.5ML
0.5 SUSPENSION INTRAMUSCULAR ONCE
Qty: 0 | Refills: 0 | Status: COMPLETED | OUTPATIENT
Start: 2018-08-09 | End: 2018-08-09

## 2018-08-09 RX ADMIN — TETANUS TOXOID, REDUCED DIPHTHERIA TOXOID AND ACELLULAR PERTUSSIS VACCINE, ADSORBED 0.5 MILLILITER(S): 5; 2.5; 8; 8; 2.5 SUSPENSION INTRAMUSCULAR at 19:59

## 2018-08-09 RX ADMIN — Medication 250 MILLIGRAM(S): at 20:08

## 2018-08-09 NOTE — ED PROVIDER NOTE - PROGRESS NOTE DETAILS
Xray negative. Tetanus updated. Right arm ecchymosis with begninning of cellulitis. Will treat with Keflex and dc with suture removal in 10 days. Pt is well appearing walking with steady gait, stable for discharge and follow up without fail with medical doctor. I had a detailed discussion with the patient and/or guardian regarding the historical points, exam findings, and any diagnostic results supporting the discharge diagnosis. Pt educated on care and need for follow up. Strict return instructions and red flag signs and symptoms discussed with patient. Questions answered. Pt shows understanding of discharge information and agrees to follow.

## 2018-08-09 NOTE — ED ADULT NURSE NOTE - SKIN INTEGRITY
3.5 cm laceration to right lower leg, with redness to right upper arm and left shoulder , with healing abrasion to left shoulder.

## 2018-08-09 NOTE — ED ADULT NURSE NOTE - OBJECTIVE STATEMENT
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 noted to have redness to right arm ,right shoulder with healed abrasion . laceration sutured by Maddi ABDULLAHI under sterile technique,DSD applied.

## 2018-08-09 NOTE — ED PROVIDER NOTE - CARE PLAN
Principal Discharge DX:	Laceration of right lower extremity, initial encounter  Secondary Diagnosis:	Cellulitis of arm, right

## 2018-08-09 NOTE — ED PROVIDER NOTE - PHYSICAL EXAMINATION
R proximal tibia area 4 cm laceration slightly gaping, mild venous bleeding. Abrasion to knee. Right triceps area ecchymosis without evidence of hematoma or cellulitis. Anterior knee mild swelling with bony tenderness.

## 2018-08-09 NOTE — ED ADULT NURSE NOTE - NSIMPLEMENTINTERV_GEN_ALL_ED
Implemented All Universal Safety Interventions:  [place] to call system. Call bell, personal items and telephone within reach. Instruct patient to call for assistance. Room bathroom lighting operational. Non-slip footwear when patient is off stretcher. Physically safe environment: no spills, clutter or unnecessary equipment. Stretcher in lowest position, wheels locked, appropriate side rails in place.

## 2018-08-09 NOTE — ED PROVIDER NOTE - OBJECTIVE STATEMENT
38 year-old male, history of alcohol abuse, fatty liver, pancreatitis, presents with cc R leg laceration today (6 hours PTA). 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 Continued work and now presenting with pain and bleeding to site. Also c/o injury to r arm multiple times days ago. Denies head injury, neck/back pain, distal numbness/tingling/focal weakness or any other complaints. Last tetanus unknown.

## 2018-08-31 NOTE — ED PROCEDURE NOTE - CPROC ED LACER REPAIR DETAIL1
The wound was explored to base in bloodless field./No foreign body/Non-extensive debridement was performed.

## 2018-09-16 ENCOUNTER — EMERGENCY (EMERGENCY)
Facility: HOSPITAL | Age: 38
LOS: 1 days | Discharge: HOME | End: 2018-09-16
Attending: EMERGENCY MEDICINE
Payer: COMMERCIAL

## 2018-09-16 VITALS
SYSTOLIC BLOOD PRESSURE: 138 MMHG | WEIGHT: 149.91 LBS | HEART RATE: 110 BPM | DIASTOLIC BLOOD PRESSURE: 82 MMHG | RESPIRATION RATE: 16 BRPM | TEMPERATURE: 98 F | OXYGEN SATURATION: 96 %

## 2018-09-16 PROCEDURE — 93010 ELECTROCARDIOGRAM REPORT: CPT

## 2018-09-16 PROCEDURE — 71046 X-RAY EXAM CHEST 2 VIEWS: CPT | Mod: 26

## 2018-09-16 PROCEDURE — 99285 EMERGENCY DEPT VISIT HI MDM: CPT | Mod: 25

## 2018-09-16 RX ORDER — PANTOPRAZOLE SODIUM 20 MG/1
40 TABLET, DELAYED RELEASE ORAL ONCE
Qty: 0 | Refills: 0 | Status: COMPLETED | OUTPATIENT
Start: 2018-09-16 | End: 2018-09-16

## 2018-09-16 RX ORDER — SODIUM CHLORIDE 9 MG/ML
1000 INJECTION INTRAMUSCULAR; INTRAVENOUS; SUBCUTANEOUS ONCE
Qty: 0 | Refills: 0 | Status: COMPLETED | OUTPATIENT
Start: 2018-09-16 | End: 2018-09-16

## 2018-09-16 RX ORDER — ONDANSETRON 8 MG/1
4 TABLET, FILM COATED ORAL ONCE
Qty: 0 | Refills: 0 | Status: COMPLETED | OUTPATIENT
Start: 2018-09-16 | End: 2018-09-16

## 2018-09-16 RX ORDER — SODIUM CHLORIDE 9 MG/ML
3 INJECTION INTRAMUSCULAR; INTRAVENOUS; SUBCUTANEOUS ONCE
Qty: 0 | Refills: 0 | Status: COMPLETED | OUTPATIENT
Start: 2018-09-16 | End: 2018-09-16

## 2018-09-16 NOTE — ED ADULT TRIAGE NOTE - CHIEF COMPLAINT QUOTE
I have pancreatitis and I have been having chest pain and stomach pain and vomiting all day. I drink scotch and beer every day for years , I smoke cigarettes 1 pack a day , I do cocaine and marijuana as well

## 2018-09-16 NOTE — ED ADULT NURSE NOTE - OBJECTIVE STATEMENT
c/o pancreatitis hest pain and stomach pain and vomiting all day.admits I drinking  scotch and beer every day for years ,

## 2018-09-16 NOTE — ED ADULT NURSE NOTE - NSIMPLEMENTINTERV_GEN_ALL_ED
Implemented All Universal Safety Interventions:  [place] to call system. Call bell, personal items and telephone within reach. Instruct patient to call for assistance. Room bathroom lighting operational. Non-slip footwear when patient is off stretcher. Physically safe environment: no spills, clutter or unnecessary equipment. Stretcher in lowest position, wheels locked, appropriate side rails in place.

## 2018-09-16 NOTE — ED ADULT NURSE NOTE - PMH
Alcohol induced acute pancreatitis    Alcoholism    Cocaine abuse    Fatty liver    Gastroenteritis    Marijuana smoker    Pancreatitis    Prediabetes

## 2018-09-17 VITALS
TEMPERATURE: 98 F | RESPIRATION RATE: 20 BRPM | SYSTOLIC BLOOD PRESSURE: 132 MMHG | DIASTOLIC BLOOD PRESSURE: 68 MMHG | OXYGEN SATURATION: 98 % | HEART RATE: 100 BPM

## 2018-09-17 LAB
ALBUMIN SERPL ELPH-MCNC: 3.3 G/DL — LOW (ref 3.5–5)
ALP SERPL-CCNC: 106 U/L — SIGNIFICANT CHANGE UP (ref 40–120)
ALT FLD-CCNC: 95 U/L DA — HIGH (ref 10–60)
ANION GAP SERPL CALC-SCNC: 11 MMOL/L — SIGNIFICANT CHANGE UP (ref 5–17)
APTT BLD: 29.8 SEC — SIGNIFICANT CHANGE UP (ref 27.5–37.4)
AST SERPL-CCNC: 108 U/L — HIGH (ref 10–40)
BASOPHILS # BLD AUTO: 0.1 K/UL — SIGNIFICANT CHANGE UP (ref 0–0.2)
BASOPHILS NFR BLD AUTO: 1.2 % — SIGNIFICANT CHANGE UP (ref 0–2)
BILIRUB SERPL-MCNC: 0.2 MG/DL — SIGNIFICANT CHANGE UP (ref 0.2–1.2)
BUN SERPL-MCNC: 8 MG/DL — SIGNIFICANT CHANGE UP (ref 7–18)
CALCIUM SERPL-MCNC: 7.7 MG/DL — LOW (ref 8.4–10.5)
CHLORIDE SERPL-SCNC: 107 MMOL/L — SIGNIFICANT CHANGE UP (ref 96–108)
CO2 SERPL-SCNC: 25 MMOL/L — SIGNIFICANT CHANGE UP (ref 22–31)
CREAT SERPL-MCNC: 0.6 MG/DL — SIGNIFICANT CHANGE UP (ref 0.5–1.3)
EOSINOPHIL # BLD AUTO: 0.1 K/UL — SIGNIFICANT CHANGE UP (ref 0–0.5)
EOSINOPHIL NFR BLD AUTO: 1.4 % — SIGNIFICANT CHANGE UP (ref 0–6)
GLUCOSE SERPL-MCNC: 108 MG/DL — HIGH (ref 70–99)
HCT VFR BLD CALC: 37.5 % — LOW (ref 39–50)
HGB BLD-MCNC: 12.7 G/DL — LOW (ref 13–17)
HIV 1 & 2 AB SERPL IA.RAPID: SIGNIFICANT CHANGE UP
INR BLD: 0.94 RATIO — SIGNIFICANT CHANGE UP (ref 0.88–1.16)
LIDOCAIN IGE QN: 375 U/L — SIGNIFICANT CHANGE UP (ref 73–393)
LYMPHOCYTES # BLD AUTO: 1.7 K/UL — SIGNIFICANT CHANGE UP (ref 1–3.3)
LYMPHOCYTES # BLD AUTO: 25.5 % — SIGNIFICANT CHANGE UP (ref 13–44)
MCHC RBC-ENTMCNC: 33.9 GM/DL — SIGNIFICANT CHANGE UP (ref 32–36)
MCHC RBC-ENTMCNC: 34 PG — SIGNIFICANT CHANGE UP (ref 27–34)
MCV RBC AUTO: 100.3 FL — HIGH (ref 80–100)
MONOCYTES # BLD AUTO: 0.7 K/UL — SIGNIFICANT CHANGE UP (ref 0–0.9)
MONOCYTES NFR BLD AUTO: 11.1 % — SIGNIFICANT CHANGE UP (ref 2–14)
NEUTROPHILS # BLD AUTO: 4 K/UL — SIGNIFICANT CHANGE UP (ref 1.8–7.4)
NEUTROPHILS NFR BLD AUTO: 60.9 % — SIGNIFICANT CHANGE UP (ref 43–77)
PLATELET # BLD AUTO: 82 K/UL — LOW (ref 150–400)
POTASSIUM SERPL-MCNC: 3.6 MMOL/L — SIGNIFICANT CHANGE UP (ref 3.5–5.3)
POTASSIUM SERPL-SCNC: 3.6 MMOL/L — SIGNIFICANT CHANGE UP (ref 3.5–5.3)
PROT SERPL-MCNC: 7.1 G/DL — SIGNIFICANT CHANGE UP (ref 6–8.3)
PROTHROM AB SERPL-ACNC: 10.2 SEC — SIGNIFICANT CHANGE UP (ref 9.8–12.7)
RBC # BLD: 3.74 M/UL — LOW (ref 4.2–5.8)
RBC # FLD: 11.3 % — SIGNIFICANT CHANGE UP (ref 10.3–14.5)
SODIUM SERPL-SCNC: 143 MMOL/L — SIGNIFICANT CHANGE UP (ref 135–145)
TROPONIN I SERPL-MCNC: <0.015 NG/ML — SIGNIFICANT CHANGE UP (ref 0–0.04)
WBC # BLD: 6.5 K/UL — SIGNIFICANT CHANGE UP (ref 3.8–10.5)
WBC # FLD AUTO: 6.5 K/UL — SIGNIFICANT CHANGE UP (ref 3.8–10.5)

## 2018-09-17 PROCEDURE — 96375 TX/PRO/DX INJ NEW DRUG ADDON: CPT

## 2018-09-17 PROCEDURE — 93005 ELECTROCARDIOGRAM TRACING: CPT

## 2018-09-17 PROCEDURE — 85730 THROMBOPLASTIN TIME PARTIAL: CPT

## 2018-09-17 PROCEDURE — 80053 COMPREHEN METABOLIC PANEL: CPT

## 2018-09-17 PROCEDURE — 86703 HIV-1/HIV-2 1 RESULT ANTBDY: CPT

## 2018-09-17 PROCEDURE — 86850 RBC ANTIBODY SCREEN: CPT

## 2018-09-17 PROCEDURE — 71046 X-RAY EXAM CHEST 2 VIEWS: CPT

## 2018-09-17 PROCEDURE — 86900 BLOOD TYPING SEROLOGIC ABO: CPT

## 2018-09-17 PROCEDURE — 99284 EMERGENCY DEPT VISIT MOD MDM: CPT | Mod: 25

## 2018-09-17 PROCEDURE — 96361 HYDRATE IV INFUSION ADD-ON: CPT

## 2018-09-17 PROCEDURE — 96374 THER/PROPH/DIAG INJ IV PUSH: CPT

## 2018-09-17 PROCEDURE — 83690 ASSAY OF LIPASE: CPT

## 2018-09-17 PROCEDURE — 85027 COMPLETE CBC AUTOMATED: CPT

## 2018-09-17 PROCEDURE — 84484 ASSAY OF TROPONIN QUANT: CPT

## 2018-09-17 PROCEDURE — 86901 BLOOD TYPING SEROLOGIC RH(D): CPT

## 2018-09-17 PROCEDURE — 85610 PROTHROMBIN TIME: CPT

## 2018-09-17 RX ADMIN — ONDANSETRON 4 MILLIGRAM(S): 8 TABLET, FILM COATED ORAL at 01:37

## 2018-09-17 RX ADMIN — SODIUM CHLORIDE 1000 MILLILITER(S): 9 INJECTION INTRAMUSCULAR; INTRAVENOUS; SUBCUTANEOUS at 00:36

## 2018-09-17 RX ADMIN — PANTOPRAZOLE SODIUM 40 MILLIGRAM(S): 20 TABLET, DELAYED RELEASE ORAL at 01:36

## 2018-09-17 RX ADMIN — SODIUM CHLORIDE 1000 MILLILITER(S): 9 INJECTION INTRAMUSCULAR; INTRAVENOUS; SUBCUTANEOUS at 02:21

## 2018-09-17 RX ADMIN — SODIUM CHLORIDE 3 MILLILITER(S): 9 INJECTION INTRAMUSCULAR; INTRAVENOUS; SUBCUTANEOUS at 00:01

## 2018-09-17 NOTE — ED PROVIDER NOTE - OBJECTIVE STATEMENT
39 y/o M pt with PMHx alcohol abuse and pancreatitis presents to ED with complaints of chest pain associated with abdominal/epigastric pain for a few days. Pt reports drinking daily, 1 scotch and multiple beers per day. His last drink was PTA ED. Pt reports feeling nauseous but denies vomiting, diarrhea or any other complaints. 37 y/o M pt with PMHx alcohol abuse and pancreatitis presents to ED with complaints of chest pain associated with abdominal/epigastric pain for a few days. Pt reports drinking daily, 1 scotch and multiple beers per day. His last drink was PTA ED. Pt reports feeling nauseous but denies vomiting, diarrhea, fever, cough, dyspnea or any other complaints.

## 2018-09-17 NOTE — ED PROVIDER NOTE - PROGRESS NOTE DETAILS
patient seen by staff drinking from alcohol bottle while sitting on his stretcher, when asked, pt handed over bottle of liquor which he was drinking from.  labs show neg trop, lipase wnl  discussed above with patient, on reeval patient sleeping comfortably, no evidence of acute distress, pt stable for discharge with detox resource information

## 2018-09-17 NOTE — ED PROVIDER NOTE - MEDICAL DECISION MAKING DETAILS
37 y/o M pt with Hx alcohol abuse presents with abdominal/epigastric pain. Will obtain EKG, labs, give Zofran, Pertonix, IV fluids then reassess.

## 2018-09-17 NOTE — ED PROVIDER NOTE - CONDUCTED A DETAILED DISCUSSION WITH PATIENT AND/OR GUARDIAN REGARDING, MDM
lab results radiology results/lab results/return to ED if symptoms worsen, persist or questions arise/need for outpatient follow-up

## 2018-10-20 ENCOUNTER — EMERGENCY (EMERGENCY)
Facility: HOSPITAL | Age: 38
LOS: 1 days | Discharge: HOME | End: 2018-10-20
Attending: EMERGENCY MEDICINE
Payer: COMMERCIAL

## 2018-10-20 VITALS
TEMPERATURE: 98 F | RESPIRATION RATE: 18 BRPM | OXYGEN SATURATION: 97 % | HEART RATE: 102 BPM | WEIGHT: 160.06 LBS | SYSTOLIC BLOOD PRESSURE: 154 MMHG | DIASTOLIC BLOOD PRESSURE: 104 MMHG

## 2018-10-20 PROCEDURE — 99285 EMERGENCY DEPT VISIT HI MDM: CPT | Mod: 25

## 2018-10-20 RX ORDER — FAMOTIDINE 10 MG/ML
20 INJECTION INTRAVENOUS ONCE
Qty: 0 | Refills: 0 | Status: COMPLETED | OUTPATIENT
Start: 2018-10-20 | End: 2018-10-20

## 2018-10-20 RX ORDER — SODIUM CHLORIDE 9 MG/ML
1000 INJECTION INTRAMUSCULAR; INTRAVENOUS; SUBCUTANEOUS ONCE
Qty: 0 | Refills: 0 | Status: COMPLETED | OUTPATIENT
Start: 2018-10-20 | End: 2018-10-20

## 2018-10-20 NOTE — ED ADULT NURSE NOTE - NSIMPLEMENTINTERV_GEN_ALL_ED
Implemented All Universal Safety Interventions:  [place] to call system. Call bell, personal items and telephone within reach. Instruct patient to call for assistance. Room bathroom lighting operational. Non-slip footwear when patient is off stretcher. Physically safe environment: no spills, clutter or unnecessary equipment. Stretcher in lowest position, wheels locked, appropriate side rails in place.

## 2018-10-21 VITALS
RESPIRATION RATE: 18 BRPM | OXYGEN SATURATION: 98 % | DIASTOLIC BLOOD PRESSURE: 94 MMHG | HEART RATE: 84 BPM | TEMPERATURE: 98 F | SYSTOLIC BLOOD PRESSURE: 138 MMHG

## 2018-10-21 PROBLEM — F12.90 CANNABIS USE, UNSPECIFIED, UNCOMPLICATED: Chronic | Status: ACTIVE | Noted: 2018-09-16

## 2018-10-21 PROBLEM — F14.10 COCAINE ABUSE, UNCOMPLICATED: Chronic | Status: ACTIVE | Noted: 2018-09-16

## 2018-10-21 LAB
ALBUMIN SERPL ELPH-MCNC: 4.4 G/DL — SIGNIFICANT CHANGE UP (ref 3.5–5)
ALP SERPL-CCNC: 108 U/L — SIGNIFICANT CHANGE UP (ref 40–120)
ALT FLD-CCNC: 56 U/L DA — SIGNIFICANT CHANGE UP (ref 10–60)
ANION GAP SERPL CALC-SCNC: 10 MMOL/L — SIGNIFICANT CHANGE UP (ref 5–17)
ANION GAP SERPL CALC-SCNC: 10 MMOL/L — SIGNIFICANT CHANGE UP (ref 5–17)
APTT BLD: 25.1 SEC — LOW (ref 27.5–37.4)
AST SERPL-CCNC: 104 U/L — HIGH (ref 10–40)
BASOPHILS # BLD AUTO: 0.1 K/UL — SIGNIFICANT CHANGE UP (ref 0–0.2)
BASOPHILS NFR BLD AUTO: 0.9 % — SIGNIFICANT CHANGE UP (ref 0–2)
BILIRUB SERPL-MCNC: 0.2 MG/DL — SIGNIFICANT CHANGE UP (ref 0.2–1.2)
BUN SERPL-MCNC: 7 MG/DL — SIGNIFICANT CHANGE UP (ref 7–18)
BUN SERPL-MCNC: 7 MG/DL — SIGNIFICANT CHANGE UP (ref 7–18)
CALCIUM SERPL-MCNC: 7.9 MG/DL — LOW (ref 8.4–10.5)
CALCIUM SERPL-MCNC: 8.5 MG/DL — SIGNIFICANT CHANGE UP (ref 8.4–10.5)
CHLORIDE SERPL-SCNC: 102 MMOL/L — SIGNIFICANT CHANGE UP (ref 96–108)
CHLORIDE SERPL-SCNC: 105 MMOL/L — SIGNIFICANT CHANGE UP (ref 96–108)
CO2 SERPL-SCNC: 27 MMOL/L — SIGNIFICANT CHANGE UP (ref 22–31)
CO2 SERPL-SCNC: 27 MMOL/L — SIGNIFICANT CHANGE UP (ref 22–31)
CREAT SERPL-MCNC: 0.5 MG/DL — SIGNIFICANT CHANGE UP (ref 0.5–1.3)
CREAT SERPL-MCNC: 0.58 MG/DL — SIGNIFICANT CHANGE UP (ref 0.5–1.3)
EOSINOPHIL # BLD AUTO: 0.1 K/UL — SIGNIFICANT CHANGE UP (ref 0–0.5)
EOSINOPHIL NFR BLD AUTO: 1.1 % — SIGNIFICANT CHANGE UP (ref 0–6)
ETHANOL SERPL-MCNC: 363 MG/DL — HIGH (ref 0–10)
GLUCOSE SERPL-MCNC: 102 MG/DL — HIGH (ref 70–99)
GLUCOSE SERPL-MCNC: 105 MG/DL — HIGH (ref 70–99)
HCT VFR BLD CALC: 48.1 % — SIGNIFICANT CHANGE UP (ref 39–50)
HGB BLD-MCNC: 16.1 G/DL — SIGNIFICANT CHANGE UP (ref 13–17)
INR BLD: 0.96 RATIO — SIGNIFICANT CHANGE UP (ref 0.88–1.16)
LIDOCAIN IGE QN: 659 U/L — HIGH (ref 73–393)
LYMPHOCYTES # BLD AUTO: 2 K/UL — SIGNIFICANT CHANGE UP (ref 1–3.3)
LYMPHOCYTES # BLD AUTO: 26.7 % — SIGNIFICANT CHANGE UP (ref 13–44)
MCHC RBC-ENTMCNC: 33.1 PG — SIGNIFICANT CHANGE UP (ref 27–34)
MCHC RBC-ENTMCNC: 33.5 GM/DL — SIGNIFICANT CHANGE UP (ref 32–36)
MCV RBC AUTO: 98.9 FL — SIGNIFICANT CHANGE UP (ref 80–100)
MONOCYTES # BLD AUTO: 0.7 K/UL — SIGNIFICANT CHANGE UP (ref 0–0.9)
MONOCYTES NFR BLD AUTO: 8.7 % — SIGNIFICANT CHANGE UP (ref 2–14)
NEUTROPHILS # BLD AUTO: 4.7 K/UL — SIGNIFICANT CHANGE UP (ref 1.8–7.4)
NEUTROPHILS NFR BLD AUTO: 62.6 % — SIGNIFICANT CHANGE UP (ref 43–77)
PLATELET # BLD AUTO: 123 K/UL — LOW (ref 150–400)
POTASSIUM SERPL-MCNC: 3.5 MMOL/L — SIGNIFICANT CHANGE UP (ref 3.5–5.3)
POTASSIUM SERPL-MCNC: 6.6 MMOL/L — CRITICAL HIGH (ref 3.5–5.3)
POTASSIUM SERPL-SCNC: 3.5 MMOL/L — SIGNIFICANT CHANGE UP (ref 3.5–5.3)
POTASSIUM SERPL-SCNC: 6.6 MMOL/L — CRITICAL HIGH (ref 3.5–5.3)
PROT SERPL-MCNC: 9.3 G/DL — HIGH (ref 6–8.3)
PROTHROM AB SERPL-ACNC: 10.5 SEC — SIGNIFICANT CHANGE UP (ref 9.8–12.7)
RBC # BLD: 4.87 M/UL — SIGNIFICANT CHANGE UP (ref 4.2–5.8)
RBC # FLD: 11.6 % — SIGNIFICANT CHANGE UP (ref 10.3–14.5)
SODIUM SERPL-SCNC: 139 MMOL/L — SIGNIFICANT CHANGE UP (ref 135–145)
SODIUM SERPL-SCNC: 142 MMOL/L — SIGNIFICANT CHANGE UP (ref 135–145)
TROPONIN I SERPL-MCNC: <0.015 NG/ML — SIGNIFICANT CHANGE UP (ref 0–0.04)
TROPONIN I SERPL-MCNC: <0.015 NG/ML — SIGNIFICANT CHANGE UP (ref 0–0.04)
WBC # BLD: 7.5 K/UL — SIGNIFICANT CHANGE UP (ref 3.8–10.5)
WBC # FLD AUTO: 7.5 K/UL — SIGNIFICANT CHANGE UP (ref 3.8–10.5)

## 2018-10-21 PROCEDURE — 96374 THER/PROPH/DIAG INJ IV PUSH: CPT

## 2018-10-21 PROCEDURE — 85027 COMPLETE CBC AUTOMATED: CPT

## 2018-10-21 PROCEDURE — 99285 EMERGENCY DEPT VISIT HI MDM: CPT | Mod: 25

## 2018-10-21 PROCEDURE — 36415 COLL VENOUS BLD VENIPUNCTURE: CPT

## 2018-10-21 PROCEDURE — 84484 ASSAY OF TROPONIN QUANT: CPT

## 2018-10-21 PROCEDURE — 80048 BASIC METABOLIC PNL TOTAL CA: CPT

## 2018-10-21 PROCEDURE — 71045 X-RAY EXAM CHEST 1 VIEW: CPT

## 2018-10-21 PROCEDURE — 93005 ELECTROCARDIOGRAM TRACING: CPT

## 2018-10-21 PROCEDURE — 85610 PROTHROMBIN TIME: CPT

## 2018-10-21 PROCEDURE — 80053 COMPREHEN METABOLIC PANEL: CPT

## 2018-10-21 PROCEDURE — 96361 HYDRATE IV INFUSION ADD-ON: CPT

## 2018-10-21 PROCEDURE — 85730 THROMBOPLASTIN TIME PARTIAL: CPT

## 2018-10-21 PROCEDURE — 83690 ASSAY OF LIPASE: CPT

## 2018-10-21 PROCEDURE — 71045 X-RAY EXAM CHEST 1 VIEW: CPT | Mod: 26

## 2018-10-21 PROCEDURE — 80307 DRUG TEST PRSMV CHEM ANLYZR: CPT

## 2018-10-21 PROCEDURE — 96375 TX/PRO/DX INJ NEW DRUG ADDON: CPT

## 2018-10-21 RX ORDER — KETOROLAC TROMETHAMINE 30 MG/ML
15 SYRINGE (ML) INJECTION ONCE
Qty: 0 | Refills: 0 | Status: DISCONTINUED | OUTPATIENT
Start: 2018-10-21 | End: 2018-10-21

## 2018-10-21 RX ORDER — SODIUM CHLORIDE 9 MG/ML
1000 INJECTION INTRAMUSCULAR; INTRAVENOUS; SUBCUTANEOUS ONCE
Qty: 0 | Refills: 0 | Status: COMPLETED | OUTPATIENT
Start: 2018-10-21 | End: 2018-10-21

## 2018-10-21 RX ADMIN — Medication 15 MILLIGRAM(S): at 01:31

## 2018-10-21 RX ADMIN — Medication 30 MILLILITER(S): at 06:23

## 2018-10-21 RX ADMIN — SODIUM CHLORIDE 1000 MILLILITER(S): 9 INJECTION INTRAMUSCULAR; INTRAVENOUS; SUBCUTANEOUS at 01:44

## 2018-10-21 RX ADMIN — SODIUM CHLORIDE 1000 MILLILITER(S): 9 INJECTION INTRAMUSCULAR; INTRAVENOUS; SUBCUTANEOUS at 02:49

## 2018-10-21 RX ADMIN — Medication 15 MILLIGRAM(S): at 03:44

## 2018-10-21 RX ADMIN — SODIUM CHLORIDE 1000 MILLILITER(S): 9 INJECTION INTRAMUSCULAR; INTRAVENOUS; SUBCUTANEOUS at 00:20

## 2018-10-21 RX ADMIN — FAMOTIDINE 20 MILLIGRAM(S): 10 INJECTION INTRAVENOUS at 00:20

## 2018-10-21 RX ADMIN — SODIUM CHLORIDE 1000 MILLILITER(S): 9 INJECTION INTRAMUSCULAR; INTRAVENOUS; SUBCUTANEOUS at 05:39

## 2018-10-21 NOTE — ED PROVIDER NOTE - OBJECTIVE STATEMENT
39 y/o M patient with a significant PMHx of Alcohol induced acute pancreatitis, Alcoholism, Cocaine abuse, Fatty liver, Gastroenteritis, Marijuana smoker, Prediabetes and no significant PSHx presents to the ED with left upper abdominal pain and chest pain. Patient admits to drinking x10 beers today. Patient states his presenting pain does not compare to the usual pain he experiences. Patient denies any other complaints. NKDA.

## 2018-10-21 NOTE — ED PROVIDER NOTE - PROGRESS NOTE DETAILS
Feeling a little better, patient does not want to stay, now sober and DC with relative, lipase mildly elevated but not 2x upper limit, no vomiting in ED.

## 2018-10-21 NOTE — ED PROVIDER NOTE - MEDICAL DECISION MAKING DETAILS
Patient with abdominal pain. Probable EtOH gastritis, check lipase ,cardiac enzymes and observe until sober.

## 2019-06-30 NOTE — ED ADULT NURSE NOTE - WEIGHT METHOD
A/P;  Acute appendicitis  IV antibiotics  NPO, IV hydration  GI/DVT prophylaxis  Pain control  Incentive spirometry  Serial abd exams  F/U labs  Pt for surgical intervention of appendectomy  Pt aware of and agrees with all of the above
stated

## 2019-08-26 ENCOUNTER — APPOINTMENT (OUTPATIENT)
Dept: GASTROENTEROLOGY | Facility: CLINIC | Age: 39
End: 2019-08-26
Payer: COMMERCIAL

## 2019-08-26 VITALS
OXYGEN SATURATION: 98 % | HEART RATE: 92 BPM | HEIGHT: 73 IN | BODY MASS INDEX: 20.54 KG/M2 | WEIGHT: 155 LBS | DIASTOLIC BLOOD PRESSURE: 60 MMHG | SYSTOLIC BLOOD PRESSURE: 120 MMHG | TEMPERATURE: 97.3 F

## 2019-08-26 DIAGNOSIS — R19.7 DIARRHEA, UNSPECIFIED: ICD-10-CM

## 2019-08-26 DIAGNOSIS — R12 HEARTBURN: ICD-10-CM

## 2019-08-26 DIAGNOSIS — R10.9 UNSPECIFIED ABDOMINAL PAIN: ICD-10-CM

## 2019-08-26 DIAGNOSIS — F19.90 OTHER PSYCHOACTIVE SUBSTANCE USE, UNSPECIFIED, UNCOMPLICATED: ICD-10-CM

## 2019-08-26 DIAGNOSIS — Z87.19 PERSONAL HISTORY OF OTHER DISEASES OF THE DIGESTIVE SYSTEM: ICD-10-CM

## 2019-08-26 DIAGNOSIS — F14.11 COCAINE ABUSE, IN REMISSION: ICD-10-CM

## 2019-08-26 DIAGNOSIS — Z78.9 OTHER SPECIFIED HEALTH STATUS: ICD-10-CM

## 2019-08-26 DIAGNOSIS — F17.200 NICOTINE DEPENDENCE, UNSPECIFIED, UNCOMPLICATED: ICD-10-CM

## 2019-08-26 DIAGNOSIS — F10.20 ALCOHOL DEPENDENCE, UNCOMPLICATED: ICD-10-CM

## 2019-08-26 DIAGNOSIS — Z72.89 OTHER PROBLEMS RELATED TO LIFESTYLE: ICD-10-CM

## 2019-08-26 PROCEDURE — 99204 OFFICE O/P NEW MOD 45 MIN: CPT

## 2019-08-28 PROBLEM — R12 HEART BURN: Status: ACTIVE | Noted: 2019-08-26

## 2019-08-28 PROBLEM — R19.7 DIARRHEA: Status: ACTIVE | Noted: 2019-08-26

## 2019-08-28 RX ORDER — OMEPRAZOLE 20 MG/1
TABLET, DELAYED RELEASE ORAL
Refills: 0 | Status: ACTIVE | COMMUNITY

## 2019-08-28 RX ORDER — DICLOFENAC SODIUM 75 MG/1
75 TABLET, DELAYED RELEASE ORAL
Refills: 0 | Status: ACTIVE | COMMUNITY

## 2019-08-28 NOTE — END OF VISIT
[] : Fellow [>50% of Time Spent on Counseling for ____] : Greater than 50% of the encounter time was spent on counseling for [unfilled]

## 2019-08-29 PROBLEM — R10.9 ABDOMINAL PAIN: Status: ACTIVE | Noted: 2019-08-26

## 2019-08-29 NOTE — PHYSICAL EXAM
[General Appearance - Alert] : alert [General Appearance - In No Acute Distress] : in no acute distress [General Appearance - Well Nourished] : well nourished [Sclera] : the sclera and conjunctiva were normal [Extraocular Movements] : extraocular movements were intact [Neck Appearance] : the appearance of the neck was normal [Respiration, Rhythm And Depth] : normal respiratory rhythm and effort [Auscultation Breath Sounds / Voice Sounds] : lungs were clear to auscultation bilaterally [Heart Sounds] : normal S1 and S2 [Heart Rate And Rhythm] : heart rate was normal and rhythm regular [Bowel Sounds] : normal bowel sounds [Abdomen Mass (___ Cm)] : no abdominal mass palpated [Abdomen Soft] : soft [Musculoskeletal - Swelling] : no joint swelling seen [Abnormal Walk] : normal gait [Skin Turgor] : normal skin turgor [] : no rash [Oriented To Time, Place, And Person] : oriented to person, place, and time [Affect] : the affect was normal [FreeTextEntry1] : tremor

## 2019-08-29 NOTE — HISTORY OF PRESENT ILLNESS
[Heartburn] : denies heartburn [Nausea] : denies nausea [Vomiting] : denies vomiting [Diarrhea] : denies diarrhea [Constipation] : denies constipation [Yellow Skin Or Eyes (Jaundice)] : denies jaundice [Abdominal Pain] : denies abdominal pain [Abdominal Swelling] : denies abdominal swelling [Rectal Pain] : denies rectal pain [de-identified] : 39M w/ PMHx of alcohol abuse with chronic pancreatitis and polysubstance abuse p/f evaluation of pancreatic mass.  Patient reports frequent hospitalization for pancreatitis in the setting of alcohol use around twice per year since 10 yr ago.  Drinks 10 beer per day and around half a pint of scotch.  Reports 30 PPD hx and occasional marijuana and intranasal cocaine use.  Denies hx of ivdu.  Denies f/c, cp, sob, abd pain, n/v/d, change in bowel movement or unintentional weight loss, jaundice.  \par \par Recent MRI of abd 8/19 with 5 cm mass at the tail of the pancreas.  \par \par Past EGD 2/19, reported normal\par Past Colonoscopy over 10 yr ago, normal per patient\par Denies fam hx cancer including pancreatic cancer

## 2019-08-29 NOTE — PHYSICAL EXAM
[General Appearance - Alert] : alert [General Appearance - In No Acute Distress] : in no acute distress [General Appearance - Well Nourished] : well nourished [Extraocular Movements] : extraocular movements were intact [Sclera] : the sclera and conjunctiva were normal [Neck Appearance] : the appearance of the neck was normal [Respiration, Rhythm And Depth] : normal respiratory rhythm and effort [Auscultation Breath Sounds / Voice Sounds] : lungs were clear to auscultation bilaterally [Heart Sounds] : normal S1 and S2 [Heart Rate And Rhythm] : heart rate was normal and rhythm regular [Bowel Sounds] : normal bowel sounds [Abdomen Mass (___ Cm)] : no abdominal mass palpated [Abdomen Soft] : soft [Musculoskeletal - Swelling] : no joint swelling seen [Abnormal Walk] : normal gait [Skin Turgor] : normal skin turgor [] : no rash [Oriented To Time, Place, And Person] : oriented to person, place, and time [Affect] : the affect was normal [FreeTextEntry1] : tremor

## 2019-08-29 NOTE — ASSESSMENT
[FreeTextEntry1] : 39M w/ PMHx of long and recidivist alcohol abuse with chronic pancreatitis and polysubstance abuse (cocaine) p/f evaluation of pancreatic mass likely cystic found on MRI. \par \par #Pancreatic Lesion:\par Patient with hx of long standing alcohol use with multiple episode of pancreatitis presents for evaluation of pancreatic lesion on recent MRI 8/19.  Prior imaging in 2017 with pseudocyst.  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  \par -Will upload imaging and review with radiology\par -If consistent with prior lesion and continue suspicion for pseudocyst, would plan for repeat MRI in 6 months\par -Discuss importance of alcohol abstinence and strategies for cessation.  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

## 2019-08-29 NOTE — HISTORY OF PRESENT ILLNESS
[Heartburn] : denies heartburn [Vomiting] : denies vomiting [Nausea] : denies nausea [Diarrhea] : denies diarrhea [Constipation] : denies constipation [Yellow Skin Or Eyes (Jaundice)] : denies jaundice [Abdominal Pain] : denies abdominal pain [Abdominal Swelling] : denies abdominal swelling [Rectal Pain] : denies rectal pain [de-identified] : 39M w/ PMHx of alcohol abuse with chronic pancreatitis and polysubstance abuse p/f evaluation of pancreatic mass.  Patient reports frequent hospitalization for pancreatitis in the setting of alcohol use around twice per year since 10 yr ago.  Drinks 10 beer per day and around half a pint of scotch.  Reports 30 PPD hx and occasional marijuana and intranasal cocaine use.  Denies hx of ivdu.  Denies f/c, cp, sob, abd pain, n/v/d, change in bowel movement or unintentional weight loss, jaundice.  \par \par Recent MRI of abd 8/19 with 5 cm mass at the tail of the pancreas.  \par \par Past EGD 2/19, reported normal\par Past Colonoscopy over 10 yr ago, normal per patient\par Denies fam hx cancer including pancreatic cancer

## 2019-09-03 ENCOUNTER — APPOINTMENT (OUTPATIENT)
Dept: SURGICAL ONCOLOGY | Facility: CLINIC | Age: 39
End: 2019-09-03

## 2019-09-08 ENCOUNTER — TRANSCRIPTION ENCOUNTER (OUTPATIENT)
Age: 39
End: 2019-09-08

## 2019-11-17 NOTE — PROGRESS NOTE ADULT - ASSESSMENT
A: Right hallux venous congestion improving  H/O EtOH abuse, recurrent alcohol induced pancreatitis, Fatty Liver, EtOH withdrawal seizure, pre-DM, and gastroenteritis     P:   Chart reviewed and Patient evaluated  Recommend elevation of extremities B/L  Off load bilateral heel.    Will monitor generalized

## 2019-12-03 ENCOUNTER — APPOINTMENT (OUTPATIENT)
Dept: GASTROENTEROLOGY | Facility: CLINIC | Age: 39
End: 2019-12-03
Payer: COMMERCIAL

## 2019-12-03 VITALS
RESPIRATION RATE: 15 BRPM | OXYGEN SATURATION: 98 % | HEART RATE: 100 BPM | HEIGHT: 73 IN | DIASTOLIC BLOOD PRESSURE: 78 MMHG | BODY MASS INDEX: 19.35 KG/M2 | SYSTOLIC BLOOD PRESSURE: 122 MMHG | WEIGHT: 146 LBS | TEMPERATURE: 97.8 F

## 2019-12-03 DIAGNOSIS — K85.20 ALCOHOL INDUCED ACUTE PANCREATITIS WITHOUT NECROSIS OR INFECTION: ICD-10-CM

## 2019-12-03 DIAGNOSIS — K86.9 DISEASE OF PANCREAS, UNSPECIFIED: ICD-10-CM

## 2019-12-03 DIAGNOSIS — R93.3 ABNORMAL FINDINGS ON DIAGNOSTIC IMAGING OF OTHER PARTS OF DIGESTIVE TRACT: ICD-10-CM

## 2019-12-03 DIAGNOSIS — F10.10 ALCOHOL ABUSE, UNCOMPLICATED: ICD-10-CM

## 2019-12-03 DIAGNOSIS — R11.2 NAUSEA WITH VOMITING, UNSPECIFIED: ICD-10-CM

## 2019-12-03 PROCEDURE — 99214 OFFICE O/P EST MOD 30 MIN: CPT

## 2019-12-04 PROBLEM — K85.20 ALCOHOL-INDUCED ACUTE PANCREATITIS, UNSPECIFIED COMPLICATION STATUS: Status: ACTIVE | Noted: 2019-12-04

## 2019-12-04 PROBLEM — F10.10 ALCOHOL ABUSE: Status: ACTIVE | Noted: 2019-12-04

## 2019-12-04 PROBLEM — R11.2 NAUSEA AND VOMITING: Status: ACTIVE | Noted: 2019-08-26

## 2019-12-04 PROBLEM — R93.3 ABNORMAL FINDING ON GI TRACT IMAGING: Status: ACTIVE | Noted: 2019-08-29

## 2019-12-04 PROBLEM — K85.20 ALCOHOL-INDUCED ACUTE PANCREATITIS, UNSPECIFIED COMPLICATION STATUS: Status: ACTIVE | Noted: 2019-08-29

## 2019-12-04 PROBLEM — K86.9 PANCREATIC LESION: Status: ACTIVE | Noted: 2019-08-26

## 2019-12-04 NOTE — PHYSICAL EXAM
[General Appearance - Alert] : alert [General Appearance - In No Acute Distress] : in no acute distress [Extraocular Movements] : extraocular movements were intact [Sclera] : the sclera and conjunctiva were normal [Neck Appearance] : the appearance of the neck was normal [Heart Sounds] : normal S1 and S2 [Bowel Sounds] : normal bowel sounds [Abdomen Soft] : soft [Abdomen Mass (___ Cm)] : no abdominal mass palpated [Abnormal Walk] : normal gait [Musculoskeletal - Swelling] : no joint swelling seen [Oriented To Time, Place, And Person] : oriented to person, place, and time [Affect] : the affect was normal [Abdomen Tenderness] : non-tender [] : no hepato-splenomegaly [Skin Color & Pigmentation] : normal skin color and pigmentation [No Focal Deficits] : no focal deficits [FreeTextEntry1] : tremor

## 2019-12-04 NOTE — HISTORY OF PRESENT ILLNESS
[Heartburn] : denies heartburn [Nausea] : denies nausea [Vomiting] : denies vomiting [Diarrhea] : denies diarrhea [Constipation] : denies constipation [Abdominal Pain] : denies abdominal pain [Yellow Skin Or Eyes (Jaundice)] : denies jaundice [Rectal Pain] : denies rectal pain [Abdominal Swelling] : denies abdominal swelling [de-identified] : 39yr old M with PMHx significant for Pancreatic cyst, alcohol abuse c/b chronic pancreatitis, Hx of polysubstance abuse (cocaine) who presents for follow up of pancreatic cyst.  \par \par INTERVAL Hx - 12/3/19\par Recent hospitalization last month for pancreatitis and alcohol withdrawal for 11days at [facility] - following a fall, and was noted to be in alcohol withdrawal requiring IV ativan drip.\par Patient states that he has no new c/o currently at this time. He still gets occasional abdominal discomfort from time to time. He is working on getting into an inpatient rehab center for alcohol rehab. Mother is primary care giver.\par He denies n/v/d, fever, chills, weight loss, jaundice, pruritus, dysphagia, odynophagia, abdominal distention, bleeding.\par \par \par PRIOR Hx -\par Patient reports frequent hospitalization for pancreatitis in the setting of alcohol use around twice per year since 10 yr ago.  Drinks 10 beer per day and around half a pint of scotch.  Reports 30 PPD hx and occasional marijuana and intranasal cocaine use.  Denies hx of ivdu.  Denies f/c, cp, sob, abd pain, n/v/d, change in bowel movement or unintentional weight loss, jaundice.  \par \par Recent MRI of abd 8/19 with 5 cm mass at the tail of the pancreas.  \par \par Past EGD 2/19, reported normal\par Past Colonoscopy over 10 yr ago, normal per patient\par Denies fam hx cancer including pancreatic cancer

## 2019-12-04 NOTE — ASSESSMENT
[FreeTextEntry1] : 39yr old M with PMHx significant for Pancreatic cyst, alcohol abuse c/b chronic pancreatitis, Hx of polysubstance abuse (cocaine) who presents for follow up of pancreatic cyst.  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

## 2020-01-12 ENCOUNTER — FORM ENCOUNTER (OUTPATIENT)
Age: 40
End: 2020-01-12

## 2020-01-13 ENCOUNTER — OUTPATIENT (OUTPATIENT)
Dept: OUTPATIENT SERVICES | Facility: HOSPITAL | Age: 40
LOS: 1 days | End: 2020-01-13
Payer: COMMERCIAL

## 2020-01-13 ENCOUNTER — APPOINTMENT (OUTPATIENT)
Dept: MRI IMAGING | Facility: HOSPITAL | Age: 40
End: 2020-01-13

## 2020-01-13 PROCEDURE — A9585: CPT

## 2020-01-13 PROCEDURE — 74183 MRI ABD W/O CNTR FLWD CNTR: CPT | Mod: 26

## 2020-01-13 PROCEDURE — 74183 MRI ABD W/O CNTR FLWD CNTR: CPT

## 2020-09-18 NOTE — END OF VISIT
Pt here with c/o SOB x \"weeks\" repots history of COPD, pt vague with symptoms, also c/o generalized head pain x 2 days.  Reports wear home Oxygen PRN.    [] : Fellow [>50% of Time Spent on Counseling for ____] : Greater than 50% of the encounter time was spent on counseling for [unfilled]

## 2020-12-17 NOTE — PATIENT PROFILE ADULT. - URINARY CATHETER
Health Maintenance Due   Topic Date Due   • Hepatitis B Vaccine (1 of 3 - Risk 3-dose series) 11/21/1983   • Shingles Vaccine (1 of 2) 11/21/2014   • Diabetes Eye Exam  05/19/2018   • Diabetes A1C  04/05/2019   • Diabetes Foot Exam  10/04/2019   • Breast Cancer Screening  11/21/2019   • DM/CKD GFR  11/26/2019   • Cervical Cancer Screening HPV CO-Testing  02/10/2021   • Colonoscopy Risk  02/28/2021       Patient is due for topics as listed above but is not proceeding with Immunization(s) Hep B and Shingles at this time. Explained to pt to schedule a wellness physical w/ primary care physician.          no

## 2021-01-12 NOTE — ED PROVIDER NOTE - SKIN [+], MLM
[Fever] : no fever [Chills] : no chills [Feeling Poorly] : not feeling poorly [Feeling Tired] : feeling tired [Eye Pain] : no eye pain [Dry Eyes] : no dryness of the eyes [Loss Of Hearing] : no hearing loss [Chest Pain] : no chest pain [Palpitations] : no palpitations [Leg Claudication] : no intermittent leg claudication ABRASION [Lower Ext Edema] : no lower extremity edema [Shortness Of Breath] : no shortness of breath [Cough] : no cough [SOB on Exertion] : no shortness of breath during exertion [Abdominal Pain] : no abdominal pain [Dysuria] : no dysuria [Arthralgias] : arthralgias [Joint Pain] : joint pain [Joint Swelling] : no joint swelling [Joint Stiffness] : no joint stiffness [Skin Lesions] : no skin lesions [Confused] : no confusion [Convulsions] : no convulsions [Limb Weakness] : no limb weakness [Difficulty Walking] : no difficulty walking [Anxiety] : no anxiety [Depression] : no depression [Muscle Weakness] : no muscle weakness [Easy Bruising] : no tendency for easy bruising

## 2021-02-10 NOTE — PHYSICAL THERAPY INITIAL EVALUATION ADULT - SIT-TO-STAND BALANCE
Pt educated on sling uses and consequences of using of the sling. Pt educated to use it less and allow for their arm move more to promote healing. Pt ambulated to exit with steady gait.    
Rounding completed, pt appears comfortable at this time. Side rails X2 remain in proper position and bed in lowest position and locked. No other needs at this time, call light within reach. Will continue to monitor pt.     
fair balance/needs UE support

## 2021-06-04 NOTE — ED PROVIDER NOTE - GASTROINTESTINAL [-], MLM
reports indicate that the patient needs overdue colon cancer screening. If non-English speaking, CMT will schedule patient to discuss colon cancer screening options with PCP. Writer intends to contact the patient to assist in scheduling and appointing for this purpose. Per clinic policy, writer will three times prior to closing encounter.   
Patient scheduled to meet with MD to discuss colon cancer screening options 12/30/19 at 6:40 PM. Patient needs  and transportation. Thanks.   
See note for transportation.  
no vomiting/no diarrhea

## 2021-06-22 NOTE — DISCHARGE NOTE ADULT - CARE PROVIDER_API CALL
purulent drainage/serosanguineous fluid purulent drainage Amin, Mohsena F (MD), Infectious Disease; Internal Medicine  [address] [place] [address]  Phone: [phone]  Fax: [phone]    Adolfo Whaley), Gastroenterology  [address] [place] [address]  Phone: [phone]  Fax: [phone]    Nikky Rodriguez), Geriatric Medicine; Internal Medicine  [address] [place] [address]  Phone: [phone]  Fax: [phone]    Michael Bell), Internal Medicine  [address] [place] [address]  Phone: [phone]  Fax: [phone] Amin, Mohsena F (MD), Infectious Disease; Internal Medicine  [address] [place] [address]  Phone: [phone]  Fax: [phone]    Adolfo Whaley), Gastroenterology  [address] [place] [address]  Phone: [phone]  Fax: [phone]    Michael Bell), Internal Medicine  [address] [place] [address]  Phone: [phone]  Fax: [phone]

## 2022-12-02 NOTE — ED PROVIDER NOTE - NSALCOHOLTYPE_GEN__A_CORE_SD
----- Message from Lilly Castillo NP sent at 12/2/2022  7:33 AM CST -----  Explanation of labs sent to pt in [facility]  Please contact pt to set up the following  -- hepatic function panel lab in 3 month and 6 months  -- f/u with me in 1 year with hepatic function panel a few days before, fibroscan same day as appt   Thanks !   beer

## 2023-01-01 NOTE — H&P ADULT - PROBLEM SELECTOR PLAN 5
Reason for Disposition  • Infrequent  stools and over 3weeks old    Answer Assessment - Initial Assessment Questions  1. STOOL PATTERN OR FREQUENCY: "How often does your child pass a stool?"  (Normal range: tid to q 2 days)  "When was the last stool passed?"        Usually normal   2. STRAINING: "Is your child straining without any results?" If so, ask: "How much straining today?" (minutes or hours)       At times   3. PAIN OR CRYING: "Does your child cry or complain of pain when the stool comes out?" If so, ask: "How bad is the pain?"        Intermittent   4. ABDOMINAL PAIN: "Does your child also have a stomach ache?" If so, ask:  "Does the pain come and go, or is it constant?"  Caution: Constant abdominal pain is not caused by constipation and needs to be triaged using the Abdominal Pain protocol. At times   5. ONSET: "When did the constipation start?"       This week   6. STOOL SIZE: "Are the stools unusually large?"  If so, ask: "How wide are they?"      Normal   7. BLOOD ON STOOLS: "Has there been any blood on the toilet tissue or on the surface of the stool?" If so, ask: "When was the last time?"       no  8. CHANGES IN DIET: "Have there been any recent changes in your child's diet?"       no  9. CAUSE: "What do you think is causing the constipation?"      Normal BM pattern     Sent mom my chart message with home care advise. Mom agreeable and will call back if needed.     Protocols used: CONSTIPATION-PEDIATRIC-OH platelet level noted to be < 100 significantly decereased compared to recent hospital labs.  Monitor for bleeding   thrombocytopenia likely related to alcoholic liver disease

## 2023-10-10 ENCOUNTER — APPOINTMENT (OUTPATIENT)
Dept: ORTHOPEDIC SURGERY | Facility: CLINIC | Age: 43
End: 2023-10-10
Payer: OTHER MISCELLANEOUS

## 2023-10-10 ENCOUNTER — NON-APPOINTMENT (OUTPATIENT)
Age: 43
End: 2023-10-10

## 2023-10-10 VITALS — HEIGHT: 73 IN | BODY MASS INDEX: 28.49 KG/M2 | WEIGHT: 215 LBS

## 2023-10-10 DIAGNOSIS — M25.571 PAIN IN RIGHT ANKLE AND JOINTS OF RIGHT FOOT: ICD-10-CM

## 2023-10-10 DIAGNOSIS — F17.210 NICOTINE DEPENDENCE, CIGARETTES, UNCOMPLICATED: ICD-10-CM

## 2023-10-10 DIAGNOSIS — S90.32XA CONTUSION OF LEFT FOOT, INITIAL ENCOUNTER: ICD-10-CM

## 2023-10-10 PROCEDURE — 73600 X-RAY EXAM OF ANKLE: CPT | Mod: LT

## 2023-10-10 PROCEDURE — 73630 X-RAY EXAM OF FOOT: CPT | Mod: 50

## 2023-10-10 PROCEDURE — 99204 OFFICE O/P NEW MOD 45 MIN: CPT

## 2023-10-30 ENCOUNTER — APPOINTMENT (OUTPATIENT)
Dept: ORTHOPEDIC SURGERY | Facility: CLINIC | Age: 43
End: 2023-10-30
Payer: OTHER MISCELLANEOUS

## 2023-10-30 VITALS — WEIGHT: 215 LBS | HEIGHT: 73 IN | BODY MASS INDEX: 28.49 KG/M2

## 2023-10-30 DIAGNOSIS — S90.32XD CONTUSION OF LEFT FOOT, SUBSEQUENT ENCOUNTER: ICD-10-CM

## 2023-10-30 PROCEDURE — 99213 OFFICE O/P EST LOW 20 MIN: CPT

## 2023-12-11 ENCOUNTER — APPOINTMENT (OUTPATIENT)
Dept: ORTHOPEDIC SURGERY | Facility: CLINIC | Age: 43
End: 2023-12-11

## 2025-05-21 NOTE — ED ADULT NURSE NOTE - NS ED NOTE ABUSE RESPONSE YN
Could try budesonide rinses spring and fall and see what this does. Will try below before going to this. Call if we need to send this. Can send rx for nebs but use in rinses twice daily.     Continue rinses, nasacort and astelin 2x/day, atrovent 3-4 times a day, singulair. try xyzal instead of loratidine.   Add pepcid when PND/throat clearing/globus for double anithistamine coverage and silent reflux coverage.   
Yes